# Patient Record
Sex: MALE | Race: WHITE | NOT HISPANIC OR LATINO | Employment: OTHER | URBAN - METROPOLITAN AREA
[De-identification: names, ages, dates, MRNs, and addresses within clinical notes are randomized per-mention and may not be internally consistent; named-entity substitution may affect disease eponyms.]

---

## 2018-03-30 ENCOUNTER — HOSPITAL ENCOUNTER (INPATIENT)
Facility: MEDICAL CENTER | Age: 63
LOS: 8 days | DRG: 853 | End: 2018-04-07
Attending: INTERNAL MEDICINE | Admitting: INTERNAL MEDICINE
Payer: MEDICAID

## 2018-03-30 ENCOUNTER — HOSPITAL ENCOUNTER (OUTPATIENT)
Facility: MEDICAL CENTER | Age: 63
DRG: 853 | End: 2018-03-30
Payer: MEDICAID

## 2018-03-30 DIAGNOSIS — E66.01 MORBID OBESITY (HCC): ICD-10-CM

## 2018-03-30 DIAGNOSIS — K81.9 CHOLECYSTITIS: ICD-10-CM

## 2018-03-30 DIAGNOSIS — K85.90 ACUTE PANCREATITIS, UNSPECIFIED COMPLICATION STATUS, UNSPECIFIED PANCREATITIS TYPE: ICD-10-CM

## 2018-03-30 PROBLEM — D68.318 CIRCULATING ANTICOAGULANTS (HCC): Status: ACTIVE | Noted: 2018-03-30

## 2018-03-30 PROBLEM — G47.33 OSA ON CPAP: Status: ACTIVE | Noted: 2018-03-30

## 2018-03-30 PROBLEM — Z86.19 HISTORY OF HEPATITIS C: Status: ACTIVE | Noted: 2018-03-30

## 2018-03-30 PROBLEM — A41.9 SEPSIS (HCC): Status: ACTIVE | Noted: 2018-03-30

## 2018-03-30 PROBLEM — K80.20 CHOLELITHIASIS: Status: ACTIVE | Noted: 2018-03-30

## 2018-03-30 PROBLEM — Z86.718 HISTORY OF DVT (DEEP VEIN THROMBOSIS): Status: ACTIVE | Noted: 2018-03-30

## 2018-03-30 PROBLEM — Z86.79 HISTORY OF ATRIAL FIBRILLATION: Status: ACTIVE | Noted: 2018-03-30

## 2018-03-30 PROBLEM — Z86.79 HISTORY OF CHF (CONGESTIVE HEART FAILURE): Status: ACTIVE | Noted: 2018-03-30

## 2018-03-30 PROBLEM — J44.9 COPD (CHRONIC OBSTRUCTIVE PULMONARY DISEASE) (HCC): Status: ACTIVE | Noted: 2018-03-30

## 2018-03-30 LAB
ALBUMIN SERPL BCP-MCNC: 3.4 G/DL (ref 3.2–4.9)
ALBUMIN/GLOB SERPL: 1.1 G/DL
ALP SERPL-CCNC: 130 U/L (ref 30–99)
ALT SERPL-CCNC: 14 U/L (ref 2–50)
ANION GAP SERPL CALC-SCNC: 12 MMOL/L (ref 0–11.9)
APTT PPP: 32 SEC (ref 24.7–36)
AST SERPL-CCNC: 25 U/L (ref 12–45)
BILIRUB SERPL-MCNC: 2.1 MG/DL (ref 0.1–1.5)
BUN SERPL-MCNC: 32 MG/DL (ref 8–22)
CALCIUM SERPL-MCNC: 9.1 MG/DL (ref 8.5–10.5)
CHLORIDE SERPL-SCNC: 105 MMOL/L (ref 96–112)
CO2 SERPL-SCNC: 18 MMOL/L (ref 20–33)
CREAT SERPL-MCNC: 1.34 MG/DL (ref 0.5–1.4)
ERYTHROCYTE [DISTWIDTH] IN BLOOD BY AUTOMATED COUNT: 47.9 FL (ref 35.9–50)
GLOBULIN SER CALC-MCNC: 3.2 G/DL (ref 1.9–3.5)
GLUCOSE SERPL-MCNC: 79 MG/DL (ref 65–99)
HCT VFR BLD AUTO: 44.7 % (ref 42–52)
HGB BLD-MCNC: 14.3 G/DL (ref 14–18)
INR PPP: 1.53 (ref 0.87–1.13)
LACTATE BLD-SCNC: 3.5 MMOL/L (ref 0.5–2)
LIPASE SERPL-CCNC: 425 U/L (ref 11–82)
MCH RBC QN AUTO: 29.5 PG (ref 27–33)
MCHC RBC AUTO-ENTMCNC: 32 G/DL (ref 33.7–35.3)
MCV RBC AUTO: 92.4 FL (ref 81.4–97.8)
PLATELET # BLD AUTO: 278 K/UL (ref 164–446)
PMV BLD AUTO: 11.2 FL (ref 9–12.9)
POTASSIUM SERPL-SCNC: 4.5 MMOL/L (ref 3.6–5.5)
PROT SERPL-MCNC: 6.6 G/DL (ref 6–8.2)
PROTHROMBIN TIME: 18.1 SEC (ref 12–14.6)
RBC # BLD AUTO: 4.84 M/UL (ref 4.7–6.1)
SODIUM SERPL-SCNC: 135 MMOL/L (ref 135–145)
TROPONIN I SERPL-MCNC: <0.01 NG/ML (ref 0–0.04)
WBC # BLD AUTO: 17.5 K/UL (ref 4.8–10.8)

## 2018-03-30 PROCEDURE — 85730 THROMBOPLASTIN TIME PARTIAL: CPT

## 2018-03-30 PROCEDURE — 80074 ACUTE HEPATITIS PANEL: CPT

## 2018-03-30 PROCEDURE — A9270 NON-COVERED ITEM OR SERVICE: HCPCS | Performed by: INTERNAL MEDICINE

## 2018-03-30 PROCEDURE — 36415 COLL VENOUS BLD VENIPUNCTURE: CPT

## 2018-03-30 PROCEDURE — 770006 HCHG ROOM/CARE - MED/SURG/GYN SEMI*

## 2018-03-30 PROCEDURE — 99223 1ST HOSP IP/OBS HIGH 75: CPT | Performed by: INTERNAL MEDICINE

## 2018-03-30 PROCEDURE — 80053 COMPREHEN METABOLIC PANEL: CPT

## 2018-03-30 PROCEDURE — 700111 HCHG RX REV CODE 636 W/ 250 OVERRIDE (IP): Performed by: INTERNAL MEDICINE

## 2018-03-30 PROCEDURE — 93005 ELECTROCARDIOGRAM TRACING: CPT | Performed by: INTERNAL MEDICINE

## 2018-03-30 PROCEDURE — 700101 HCHG RX REV CODE 250: Performed by: INTERNAL MEDICINE

## 2018-03-30 PROCEDURE — 83690 ASSAY OF LIPASE: CPT

## 2018-03-30 PROCEDURE — 700102 HCHG RX REV CODE 250 W/ 637 OVERRIDE(OP): Performed by: INTERNAL MEDICINE

## 2018-03-30 PROCEDURE — 84484 ASSAY OF TROPONIN QUANT: CPT

## 2018-03-30 PROCEDURE — 93010 ELECTROCARDIOGRAM REPORT: CPT | Performed by: INTERNAL MEDICINE

## 2018-03-30 PROCEDURE — 85027 COMPLETE CBC AUTOMATED: CPT

## 2018-03-30 PROCEDURE — 87522 HEPATITIS C REVRS TRNSCRPJ: CPT

## 2018-03-30 PROCEDURE — 83605 ASSAY OF LACTIC ACID: CPT

## 2018-03-30 PROCEDURE — 85610 PROTHROMBIN TIME: CPT

## 2018-03-30 PROCEDURE — 700105 HCHG RX REV CODE 258: Performed by: INTERNAL MEDICINE

## 2018-03-30 RX ORDER — FUROSEMIDE 40 MG/1
40 TABLET ORAL DAILY
COMMUNITY
End: 2023-12-26

## 2018-03-30 RX ORDER — CLONAZEPAM 1 MG/1
1 TABLET ORAL 2 TIMES DAILY
Status: ON HOLD | COMMUNITY
End: 2024-01-05

## 2018-03-30 RX ORDER — BUDESONIDE AND FORMOTEROL FUMARATE DIHYDRATE 160; 4.5 UG/1; UG/1
2 AEROSOL RESPIRATORY (INHALATION)
Status: DISCONTINUED | OUTPATIENT
Start: 2018-03-30 | End: 2018-04-07 | Stop reason: HOSPADM

## 2018-03-30 RX ORDER — TIOTROPIUM BROMIDE 18 UG/1
1 CAPSULE ORAL; RESPIRATORY (INHALATION) DAILY
Status: DISCONTINUED | OUTPATIENT
Start: 2018-03-31 | End: 2018-04-07 | Stop reason: HOSPADM

## 2018-03-30 RX ORDER — SIMVASTATIN 20 MG
20 TABLET ORAL
COMMUNITY
End: 2023-12-26

## 2018-03-30 RX ORDER — BISACODYL 10 MG
10 SUPPOSITORY, RECTAL RECTAL
Status: DISCONTINUED | OUTPATIENT
Start: 2018-03-30 | End: 2018-04-07 | Stop reason: HOSPADM

## 2018-03-30 RX ORDER — QUETIAPINE FUMARATE 150 MG/1
150 TABLET, FILM COATED ORAL 2 TIMES DAILY
COMMUNITY

## 2018-03-30 RX ORDER — PROMETHAZINE HYDROCHLORIDE 25 MG/1
12.5-25 TABLET ORAL EVERY 4 HOURS PRN
Status: DISCONTINUED | OUTPATIENT
Start: 2018-03-30 | End: 2018-04-07 | Stop reason: HOSPADM

## 2018-03-30 RX ORDER — SODIUM CHLORIDE 9 MG/ML
INJECTION, SOLUTION INTRAVENOUS
Status: ACTIVE
Start: 2018-03-30 | End: 2018-03-31

## 2018-03-30 RX ORDER — SODIUM CHLORIDE 9 MG/ML
INJECTION, SOLUTION INTRAVENOUS CONTINUOUS
Status: DISCONTINUED | OUTPATIENT
Start: 2018-03-30 | End: 2018-04-03

## 2018-03-30 RX ORDER — ATENOLOL 25 MG/1
25 TABLET ORAL 2 TIMES DAILY
COMMUNITY
End: 2023-12-26

## 2018-03-30 RX ORDER — CLONAZEPAM 1 MG/1
1 TABLET ORAL 3 TIMES DAILY
Status: DISCONTINUED | OUTPATIENT
Start: 2018-03-30 | End: 2018-04-07 | Stop reason: HOSPADM

## 2018-03-30 RX ORDER — AMOXICILLIN 250 MG
2 CAPSULE ORAL 2 TIMES DAILY
Status: DISCONTINUED | OUTPATIENT
Start: 2018-03-30 | End: 2018-04-07 | Stop reason: HOSPADM

## 2018-03-30 RX ORDER — LISINOPRIL 20 MG/1
40 TABLET ORAL DAILY
Status: ON HOLD | COMMUNITY
End: 2018-04-07

## 2018-03-30 RX ORDER — OXYCODONE HYDROCHLORIDE 5 MG/1
2.5 TABLET ORAL
Status: DISCONTINUED | OUTPATIENT
Start: 2018-03-30 | End: 2018-03-31

## 2018-03-30 RX ORDER — OXYCODONE HYDROCHLORIDE 5 MG/1
5 TABLET ORAL
Status: DISCONTINUED | OUTPATIENT
Start: 2018-03-30 | End: 2018-03-31

## 2018-03-30 RX ORDER — SODIUM CHLORIDE 9 MG/ML
500 INJECTION, SOLUTION INTRAVENOUS
Status: COMPLETED | OUTPATIENT
Start: 2018-03-30 | End: 2018-03-30

## 2018-03-30 RX ORDER — PROMETHAZINE HYDROCHLORIDE 25 MG/1
12.5-25 SUPPOSITORY RECTAL EVERY 4 HOURS PRN
Status: DISCONTINUED | OUTPATIENT
Start: 2018-03-30 | End: 2018-04-07 | Stop reason: HOSPADM

## 2018-03-30 RX ORDER — ALBUTEROL SULFATE 90 UG/1
2 AEROSOL, METERED RESPIRATORY (INHALATION) EVERY 6 HOURS PRN
Status: DISCONTINUED | OUTPATIENT
Start: 2018-03-30 | End: 2018-04-07 | Stop reason: HOSPADM

## 2018-03-30 RX ORDER — ATENOLOL 50 MG/1
25 TABLET ORAL 2 TIMES DAILY
Status: DISCONTINUED | OUTPATIENT
Start: 2018-03-30 | End: 2018-03-31

## 2018-03-30 RX ORDER — POLYETHYLENE GLYCOL 3350 17 G/17G
1 POWDER, FOR SOLUTION ORAL
Status: DISCONTINUED | OUTPATIENT
Start: 2018-03-30 | End: 2018-04-07 | Stop reason: HOSPADM

## 2018-03-30 RX ORDER — SIMVASTATIN 10 MG
20 TABLET ORAL EVERY EVENING
Status: DISCONTINUED | OUTPATIENT
Start: 2018-03-30 | End: 2018-04-07 | Stop reason: HOSPADM

## 2018-03-30 RX ORDER — THEOPHYLLINE 300 MG/1
300 TABLET, EXTENDED RELEASE ORAL 2 TIMES DAILY
COMMUNITY

## 2018-03-30 RX ORDER — LISINOPRIL 20 MG/1
40 TABLET ORAL DAILY
Status: DISCONTINUED | OUTPATIENT
Start: 2018-03-31 | End: 2018-03-31

## 2018-03-30 RX ORDER — ONDANSETRON 2 MG/ML
4 INJECTION INTRAMUSCULAR; INTRAVENOUS EVERY 4 HOURS PRN
Status: DISCONTINUED | OUTPATIENT
Start: 2018-03-30 | End: 2018-04-07 | Stop reason: HOSPADM

## 2018-03-30 RX ORDER — TIOTROPIUM BROMIDE 18 UG/1
18 CAPSULE ORAL; RESPIRATORY (INHALATION) DAILY
COMMUNITY

## 2018-03-30 RX ORDER — ONDANSETRON 4 MG/1
4 TABLET, ORALLY DISINTEGRATING ORAL EVERY 4 HOURS PRN
Status: DISCONTINUED | OUTPATIENT
Start: 2018-03-30 | End: 2018-04-07 | Stop reason: HOSPADM

## 2018-03-30 RX ORDER — HYDROCODONE BITARTRATE AND ACETAMINOPHEN 10; 325 MG/1; MG/1
1 TABLET ORAL 4 TIMES DAILY
COMMUNITY
End: 2023-12-26

## 2018-03-30 RX ORDER — ALBUTEROL SULFATE 90 UG/1
2 AEROSOL, METERED RESPIRATORY (INHALATION) EVERY 6 HOURS PRN
COMMUNITY

## 2018-03-30 RX ORDER — QUETIAPINE FUMARATE 25 MG/1
100 TABLET, FILM COATED ORAL
Status: DISCONTINUED | OUTPATIENT
Start: 2018-03-30 | End: 2018-04-07 | Stop reason: HOSPADM

## 2018-03-30 RX ORDER — SODIUM CHLORIDE 9 MG/ML
500 INJECTION, SOLUTION INTRAVENOUS ONCE
Status: COMPLETED | OUTPATIENT
Start: 2018-03-31 | End: 2018-03-31

## 2018-03-30 RX ADMIN — SIMVASTATIN 20 MG: 10 TABLET, FILM COATED ORAL at 21:25

## 2018-03-30 RX ADMIN — HYDROMORPHONE HYDROCHLORIDE 0.25 MG: 10 INJECTION, SOLUTION INTRAMUSCULAR; INTRAVENOUS; SUBCUTANEOUS at 23:09

## 2018-03-30 RX ADMIN — QUETIAPINE FUMARATE 100 MG: 25 TABLET ORAL at 21:31

## 2018-03-30 RX ADMIN — ATENOLOL 25 MG: 50 TABLET ORAL at 21:25

## 2018-03-30 RX ADMIN — SODIUM CHLORIDE: 9 INJECTION, SOLUTION INTRAVENOUS at 20:01

## 2018-03-30 RX ADMIN — BUDESONIDE AND FORMOTEROL FUMARATE DIHYDRATE 2 PUFF: 160; 4.5 AEROSOL RESPIRATORY (INHALATION) at 21:15

## 2018-03-30 RX ADMIN — METRONIDAZOLE 500 MG: 500 INJECTION, SOLUTION INTRAVENOUS at 19:59

## 2018-03-30 RX ADMIN — CLONAZEPAM 1 MG: 1 TABLET ORAL at 21:26

## 2018-03-30 RX ADMIN — CEFTRIAXONE 2 G: 2 INJECTION, POWDER, FOR SOLUTION INTRAMUSCULAR; INTRAVENOUS at 19:30

## 2018-03-30 RX ADMIN — HYDROMORPHONE HYDROCHLORIDE 0.25 MG: 10 INJECTION, SOLUTION INTRAMUSCULAR; INTRAVENOUS; SUBCUTANEOUS at 19:49

## 2018-03-30 ASSESSMENT — PATIENT HEALTH QUESTIONNAIRE - PHQ9
1. LITTLE INTEREST OR PLEASURE IN DOING THINGS: MORE THAN HALF THE DAYS
8. MOVING OR SPEAKING SO SLOWLY THAT OTHER PEOPLE COULD HAVE NOTICED. OR THE OPPOSITE, BEING SO FIGETY OR RESTLESS THAT YOU HAVE BEEN MOVING AROUND A LOT MORE THAN USUAL: NOT AT ALL
9. THOUGHTS THAT YOU WOULD BE BETTER OFF DEAD, OR OF HURTING YOURSELF: NOT AT ALL
SUM OF ALL RESPONSES TO PHQ9 QUESTIONS 1 AND 2: 4
6. FEELING BAD ABOUT YOURSELF - OR THAT YOU ARE A FAILURE OR HAVE LET YOURSELF OR YOUR FAMILY DOWN: SEVERAL DAYS
SUM OF ALL RESPONSES TO PHQ QUESTIONS 1-9: 10
7. TROUBLE CONCENTRATING ON THINGS, SUCH AS READING THE NEWSPAPER OR WATCHING TELEVISION: SEVERAL DAYS
5. POOR APPETITE OR OVEREATING: SEVERAL DAYS
3. TROUBLE FALLING OR STAYING ASLEEP OR SLEEPING TOO MUCH: MORE THAN HALF THE DAYS
2. FEELING DOWN, DEPRESSED, IRRITABLE, OR HOPELESS: MORE THAN HALF THE DAYS
4. FEELING TIRED OR HAVING LITTLE ENERGY: SEVERAL DAYS

## 2018-03-30 ASSESSMENT — COPD QUESTIONNAIRES
DO YOU EVER COUGH UP ANY MUCUS OR PHLEGM?: NO/ONLY WITH OCCASIONAL COLDS OR INFECTIONS
COPD SCREENING SCORE: 5
HAVE YOU SMOKED AT LEAST 100 CIGARETTES IN YOUR ENTIRE LIFE: NO/DON'T KNOW
DURING THE PAST 4 WEEKS HOW MUCH DID YOU FEEL SHORT OF BREATH: MOST  OR ALL OF THE TIME

## 2018-03-30 ASSESSMENT — PAIN SCALES - GENERAL
PAINLEVEL_OUTOF10: 9
PAINLEVEL_OUTOF10: 9

## 2018-03-30 ASSESSMENT — LIFESTYLE VARIABLES
EVER_SMOKED: YES
ALCOHOL_USE: NO

## 2018-03-30 NOTE — PROGRESS NOTES
Direct admit from [facility], Dr. Lees, [phone].  Accepted by Dr. Redd for Biliary cholecystitis.  ADT signed & held @ [id number], needs to be released upon pt arrival.  No written orders received.  Pt coming by fixed wing.

## 2018-03-30 NOTE — PROGRESS NOTES
Medical records received from [facility]:  D/C summary; H&P; Consults; and Demographics.  Scanned into Media tab.

## 2018-03-31 ENCOUNTER — RESOLUTE PROFESSIONAL BILLING HOSPITAL PROF FEE (OUTPATIENT)
Dept: HOSPITALIST | Facility: MEDICAL CENTER | Age: 63
End: 2018-03-31
Payer: MEDICAID

## 2018-03-31 PROBLEM — E66.01 MORBID OBESITY (HCC): Status: ACTIVE | Noted: 2018-03-31

## 2018-03-31 LAB
ANION GAP SERPL CALC-SCNC: 6 MMOL/L (ref 0–11.9)
BUN SERPL-MCNC: 35 MG/DL (ref 8–22)
CALCIUM SERPL-MCNC: 8.3 MG/DL (ref 8.5–10.5)
CHLORIDE SERPL-SCNC: 108 MMOL/L (ref 96–112)
CO2 SERPL-SCNC: 21 MMOL/L (ref 20–33)
CREAT SERPL-MCNC: 1.69 MG/DL (ref 0.5–1.4)
EKG IMPRESSION: NORMAL
ERYTHROCYTE [DISTWIDTH] IN BLOOD BY AUTOMATED COUNT: 47.5 FL (ref 35.9–50)
GLUCOSE SERPL-MCNC: 79 MG/DL (ref 65–99)
HCT VFR BLD AUTO: 37.8 % (ref 42–52)
HGB BLD-MCNC: 12.4 G/DL (ref 14–18)
LACTATE BLD-SCNC: 1.4 MMOL/L (ref 0.5–2)
LACTATE BLD-SCNC: 1.4 MMOL/L (ref 0.5–2)
LIPASE SERPL-CCNC: 263 U/L (ref 11–82)
MCH RBC QN AUTO: 29.7 PG (ref 27–33)
MCHC RBC AUTO-ENTMCNC: 32.8 G/DL (ref 33.7–35.3)
MCV RBC AUTO: 90.6 FL (ref 81.4–97.8)
PLATELET # BLD AUTO: 170 K/UL (ref 164–446)
PMV BLD AUTO: 11.5 FL (ref 9–12.9)
POTASSIUM SERPL-SCNC: 4.7 MMOL/L (ref 3.6–5.5)
RBC # BLD AUTO: 4.17 M/UL (ref 4.7–6.1)
SODIUM SERPL-SCNC: 135 MMOL/L (ref 135–145)
WBC # BLD AUTO: 12.1 K/UL (ref 4.8–10.8)

## 2018-03-31 PROCEDURE — 700111 HCHG RX REV CODE 636 W/ 250 OVERRIDE (IP): Performed by: INTERNAL MEDICINE

## 2018-03-31 PROCEDURE — 700105 HCHG RX REV CODE 258: Performed by: INTERNAL MEDICINE

## 2018-03-31 PROCEDURE — 770006 HCHG ROOM/CARE - MED/SURG/GYN SEMI*

## 2018-03-31 PROCEDURE — A9270 NON-COVERED ITEM OR SERVICE: HCPCS | Performed by: HOSPITALIST

## 2018-03-31 PROCEDURE — 700102 HCHG RX REV CODE 250 W/ 637 OVERRIDE(OP): Performed by: INTERNAL MEDICINE

## 2018-03-31 PROCEDURE — 99233 SBSQ HOSP IP/OBS HIGH 50: CPT | Performed by: HOSPITALIST

## 2018-03-31 PROCEDURE — 36415 COLL VENOUS BLD VENIPUNCTURE: CPT

## 2018-03-31 PROCEDURE — 83605 ASSAY OF LACTIC ACID: CPT | Mod: 91

## 2018-03-31 PROCEDURE — 80048 BASIC METABOLIC PNL TOTAL CA: CPT

## 2018-03-31 PROCEDURE — 700105 HCHG RX REV CODE 258: Performed by: STUDENT IN AN ORGANIZED HEALTH CARE EDUCATION/TRAINING PROGRAM

## 2018-03-31 PROCEDURE — A9270 NON-COVERED ITEM OR SERVICE: HCPCS | Performed by: INTERNAL MEDICINE

## 2018-03-31 PROCEDURE — 99407 BEHAV CHNG SMOKING > 10 MIN: CPT

## 2018-03-31 PROCEDURE — 700111 HCHG RX REV CODE 636 W/ 250 OVERRIDE (IP): Performed by: HOSPITALIST

## 2018-03-31 PROCEDURE — 83690 ASSAY OF LIPASE: CPT

## 2018-03-31 PROCEDURE — 700111 HCHG RX REV CODE 636 W/ 250 OVERRIDE (IP): Performed by: SURGERY

## 2018-03-31 PROCEDURE — 700102 HCHG RX REV CODE 250 W/ 637 OVERRIDE(OP): Performed by: HOSPITALIST

## 2018-03-31 PROCEDURE — 85027 COMPLETE CBC AUTOMATED: CPT

## 2018-03-31 PROCEDURE — 700101 HCHG RX REV CODE 250: Performed by: INTERNAL MEDICINE

## 2018-03-31 RX ORDER — OXYCODONE HYDROCHLORIDE 10 MG/1
10 TABLET ORAL
Status: DISCONTINUED | OUTPATIENT
Start: 2018-03-31 | End: 2018-04-07 | Stop reason: HOSPADM

## 2018-03-31 RX ORDER — FAMOTIDINE 20 MG/1
20 TABLET, FILM COATED ORAL 2 TIMES DAILY
Status: DISCONTINUED | OUTPATIENT
Start: 2018-03-31 | End: 2018-04-07 | Stop reason: HOSPADM

## 2018-03-31 RX ORDER — OXYCODONE HYDROCHLORIDE 5 MG/1
15 TABLET ORAL
Status: DISCONTINUED | OUTPATIENT
Start: 2018-03-31 | End: 2018-04-07 | Stop reason: HOSPADM

## 2018-03-31 RX ORDER — SODIUM CHLORIDE 9 MG/ML
1000 INJECTION, SOLUTION INTRAVENOUS ONCE
Status: COMPLETED | OUTPATIENT
Start: 2018-03-31 | End: 2018-03-31

## 2018-03-31 RX ADMIN — OXYCODONE HYDROCHLORIDE 15 MG: 5 TABLET ORAL at 13:55

## 2018-03-31 RX ADMIN — SODIUM CHLORIDE: 9 INJECTION, SOLUTION INTRAVENOUS at 03:42

## 2018-03-31 RX ADMIN — SODIUM CHLORIDE: 9 INJECTION, SOLUTION INTRAVENOUS at 19:35

## 2018-03-31 RX ADMIN — ENOXAPARIN SODIUM 40 MG: 100 INJECTION SUBCUTANEOUS at 20:01

## 2018-03-31 RX ADMIN — CLONAZEPAM 1 MG: 1 TABLET ORAL at 07:32

## 2018-03-31 RX ADMIN — OXYCODONE HYDROCHLORIDE 15 MG: 5 TABLET ORAL at 09:12

## 2018-03-31 RX ADMIN — ONDANSETRON 4 MG: 4 TABLET, ORALLY DISINTEGRATING ORAL at 03:40

## 2018-03-31 RX ADMIN — FAMOTIDINE 20 MG: 20 TABLET, FILM COATED ORAL at 20:01

## 2018-03-31 RX ADMIN — SIMVASTATIN 20 MG: 10 TABLET, FILM COATED ORAL at 20:01

## 2018-03-31 RX ADMIN — METRONIDAZOLE 500 MG: 500 INJECTION, SOLUTION INTRAVENOUS at 13:55

## 2018-03-31 RX ADMIN — ONDANSETRON 4 MG: 2 INJECTION, SOLUTION INTRAMUSCULAR; INTRAVENOUS at 12:06

## 2018-03-31 RX ADMIN — FAMOTIDINE 20 MG: 20 TABLET, FILM COATED ORAL at 09:12

## 2018-03-31 RX ADMIN — STANDARDIZED SENNA CONCENTRATE AND DOCUSATE SODIUM 2 TABLET: 8.6; 5 TABLET, FILM COATED ORAL at 09:12

## 2018-03-31 RX ADMIN — METRONIDAZOLE 500 MG: 500 INJECTION, SOLUTION INTRAVENOUS at 06:07

## 2018-03-31 RX ADMIN — HYDROMORPHONE HYDROCHLORIDE 1 MG: 10 INJECTION, SOLUTION INTRAMUSCULAR; INTRAVENOUS; SUBCUTANEOUS at 11:58

## 2018-03-31 RX ADMIN — HYDROMORPHONE HYDROCHLORIDE 0.25 MG: 10 INJECTION, SOLUTION INTRAMUSCULAR; INTRAVENOUS; SUBCUTANEOUS at 07:30

## 2018-03-31 RX ADMIN — ONDANSETRON 4 MG: 4 TABLET, ORALLY DISINTEGRATING ORAL at 17:58

## 2018-03-31 RX ADMIN — OXYCODONE HYDROCHLORIDE 15 MG: 5 TABLET ORAL at 19:36

## 2018-03-31 RX ADMIN — CEFTRIAXONE 2 G: 2 INJECTION, POWDER, FOR SOLUTION INTRAMUSCULAR; INTRAVENOUS at 20:01

## 2018-03-31 RX ADMIN — OXYCODONE HYDROCHLORIDE 5 MG: 5 TABLET ORAL at 03:40

## 2018-03-31 RX ADMIN — QUETIAPINE FUMARATE 100 MG: 25 TABLET ORAL at 22:38

## 2018-03-31 RX ADMIN — BUDESONIDE AND FORMOTEROL FUMARATE DIHYDRATE 2 PUFF: 160; 4.5 AEROSOL RESPIRATORY (INHALATION) at 09:13

## 2018-03-31 RX ADMIN — TIOTROPIUM BROMIDE 1 CAPSULE: 18 CAPSULE ORAL; RESPIRATORY (INHALATION) at 09:14

## 2018-03-31 RX ADMIN — BUDESONIDE AND FORMOTEROL FUMARATE DIHYDRATE 2 PUFF: 160; 4.5 AEROSOL RESPIRATORY (INHALATION) at 20:01

## 2018-03-31 RX ADMIN — ENOXAPARIN SODIUM 40 MG: 100 INJECTION SUBCUTANEOUS at 11:57

## 2018-03-31 RX ADMIN — SODIUM CHLORIDE 1000 ML: 9 INJECTION, SOLUTION INTRAVENOUS at 01:10

## 2018-03-31 RX ADMIN — HYDROMORPHONE HYDROCHLORIDE 1 MG: 10 INJECTION, SOLUTION INTRAMUSCULAR; INTRAVENOUS; SUBCUTANEOUS at 17:52

## 2018-03-31 RX ADMIN — SODIUM CHLORIDE: 9 INJECTION, SOLUTION INTRAVENOUS at 12:06

## 2018-03-31 RX ADMIN — CLONAZEPAM 1 MG: 1 TABLET ORAL at 20:01

## 2018-03-31 RX ADMIN — SODIUM CHLORIDE 500 ML: 9 INJECTION, SOLUTION INTRAVENOUS at 00:00

## 2018-03-31 RX ADMIN — METRONIDAZOLE 500 MG: 500 INJECTION, SOLUTION INTRAVENOUS at 20:46

## 2018-03-31 RX ADMIN — CLONAZEPAM 1 MG: 1 TABLET ORAL at 13:55

## 2018-03-31 RX ADMIN — STANDARDIZED SENNA CONCENTRATE AND DOCUSATE SODIUM 2 TABLET: 8.6; 5 TABLET, FILM COATED ORAL at 20:01

## 2018-03-31 ASSESSMENT — ENCOUNTER SYMPTOMS
DIZZINESS: 0
TREMORS: 0
ABDOMINAL PAIN: 1
MUSCULOSKELETAL NEGATIVE: 1
EYES NEGATIVE: 1
FEVER: 1
CONSTIPATION: 0
CHILLS: 1
NERVOUS/ANXIOUS: 0
DIARRHEA: 0
FOCAL WEAKNESS: 0
BRUISES/BLEEDS EASILY: 0
FALLS: 0
HEARTBURN: 0
SHORTNESS OF BREATH: 0
HEMOPTYSIS: 0
SPUTUM PRODUCTION: 1
HEADACHES: 0
EYE PAIN: 0
DEPRESSION: 0
MYALGIAS: 0
SHORTNESS OF BREATH: 1
WEAKNESS: 1
COUGH: 1
COUGH: 0
NAUSEA: 1
DOUBLE VISION: 0
WHEEZING: 0
DIAPHORESIS: 0
PSYCHIATRIC NEGATIVE: 1
FEVER: 0
CHILLS: 0
LOSS OF CONSCIOUSNESS: 0
DIARRHEA: 1
PALPITATIONS: 0
VOMITING: 1
SEIZURES: 0
BLOOD IN STOOL: 0
INSOMNIA: 0
EYE REDNESS: 0

## 2018-03-31 ASSESSMENT — LIFESTYLE VARIABLES
EVER_SMOKED: YES
SUBSTANCE_ABUSE: 0
PACK_YEARS: 30

## 2018-03-31 ASSESSMENT — COPD QUESTIONNAIRES
DURING THE PAST 4 WEEKS HOW MUCH DID YOU FEEL SHORT OF BREATH: MOST  OR ALL OF THE TIME
HAVE YOU SMOKED AT LEAST 100 CIGARETTES IN YOUR ENTIRE LIFE: YES
COPD SCREENING SCORE: 7
DO YOU EVER COUGH UP ANY MUCUS OR PHLEGM?: NO/ONLY WITH OCCASIONAL COLDS OR INFECTIONS

## 2018-03-31 ASSESSMENT — PAIN SCALES - GENERAL
PAINLEVEL_OUTOF10: 8
PAINLEVEL_OUTOF10: 9
PAINLEVEL_OUTOF10: 5
PAINLEVEL_OUTOF10: 7
PAINLEVEL_OUTOF10: 7
PAINLEVEL_OUTOF10: 10
PAINLEVEL_OUTOF10: 8
PAINLEVEL_OUTOF10: 6

## 2018-03-31 NOTE — ASSESSMENT & PLAN NOTE
Patient's atrial fibrillation was previously corrected with a combination of rate control as well as anticoagulation. This will be resumed once surgery says it's okay to resume anticoagulation.

## 2018-03-31 NOTE — RESPIRATORY CARE
COPD EDUCATION by COPD CLINICAL EDUCATOR  3/31/2018 at 4:07 PM by Lela Zuñiga     Patient interviewed by COPD education team. Patient refused COPD program at this time.  He states he understands his COPD and his SANTOSH. His declined further conversation. He declined our information on smoking cessation. Offered our contact information.  He declined.

## 2018-03-31 NOTE — ASSESSMENT & PLAN NOTE
Patient postoperatively may be able to be resumed on his anticoagulation depending on when surgery allows this to happen. Usually within the next 48 hours

## 2018-03-31 NOTE — PROGRESS NOTES
Dr. Trivedi updated regarding vital signs T 99.0, BP 85/55, BP 89-90/61 mmHg (by manual blood pressure), HR  (afib), RR 20, oxygen saturation 95% on 4L O2 NC (per pt's request, baseline 3L at home). Also notified doctor of initial 3.5 lactic acid and requested for q4h lactic acid draws to trend lactic acid. Received order to bolus 500 ml x1. MD aware of pt hx CHF.

## 2018-03-31 NOTE — PROGRESS NOTES
Pt alert and oriented  Having lots of pain, meds increased, medicated with oxycodone  Iv patent to right ac  Hrr, generalized edema throughout, pulses 1+  Lungs with wheezing throughout, loose productive cough, 02 at 4-5 liters, cont ox in place  Bs+, no n/v, +flatus, +bm  Voiding q.s.  Right lower quadrant wound with dressing cdi, WOCN will evaluate today  Turning self in bed  Stood at side of bed  NPO except meds  BP still running a little low, MD notified, meds changed  Unable to get MRCP due to weight, MD aware

## 2018-03-31 NOTE — PROGRESS NOTES
"BP (!) 87/55 Comment: RN Notified  Pulse 95   Temp 37.2 °C (99 °F)   Resp 20   Ht 1.88 m (6' 2\")   Wt (!) 178.5 kg (393 lb 8.3 oz)   SpO2 96%   BMI 50.53 kg/m²     Dr. Trivedi notified. 1L bolus ordered.   "

## 2018-03-31 NOTE — ASSESSMENT & PLAN NOTE
Status post cholecystectomy.  The patient this point will be continued on antibiotics.  Continued this point with pain management.  Monitor fluid status and continue with fluid hydration.

## 2018-03-31 NOTE — H&P
Hospital Medicine History and Physical    Date of Service  3/30/2018    Chief Complaint  No chief complaint on file.  Direct admit for pancreatitis, cholecystitis and cholelithiasis    History of Presenting Illness  62 y.o. male who is a transfer from [facility] for pancreatitis, cholecystitis and cholelithiasis.  He has significant cardiac history of reported chronic atrial fibrillation on Xarelto and atenolol, history of DVT on Xarelto, reported history of CHF. He also has COPD, Hep C, SANTOSH on CPAP.   He complained of severe epigastric and RUQ pain along with nausea, bilious vomiting and nonbloody, not melena, but loose stools.   He was seen at [facility] where he was found to have elevated lipase in the 5000 and amylase 400. Normal transaminases, bilirubin however slightly elevated as is alk phos. His US however showed gallstones and wall thickening concerning for cholecystitis. Bile duct was reported not to be dilated.   Dr. Kim, Gen Surgery was consulted and hospitalist team accepted transfer.  At the surgical floor, he was pretty comfortable. Mild TUQ and epigastric tenderness. He was nauseous. Auscultation irregular. Trace edema  I called and informed Dr. Kim.    Primary Care Physician  No primary care provider on file.    Consultants  Dr. kim    Code Status  full    Review of Systems  Review of Systems   Constitutional: Positive for malaise/fatigue. Negative for chills and fever.   HENT: Negative for congestion, hearing loss and nosebleeds.    Eyes: Negative for pain and redness.   Respiratory: Negative for cough, hemoptysis, shortness of breath and wheezing.    Cardiovascular: Negative for chest pain and palpitations.   Gastrointestinal: Positive for abdominal pain, diarrhea, nausea and vomiting. Negative for blood in stool and constipation.   Genitourinary: Negative for dysuria, frequency and hematuria.   Musculoskeletal: Negative for falls, joint pain and myalgias.   Skin: Negative for rash.   Neurological:  Positive for weakness. Negative for dizziness, tremors, focal weakness, seizures, loss of consciousness and headaches.   Psychiatric/Behavioral: The patient is not nervous/anxious and does not have insomnia.    All other systems reviewed and are negative.       Past Medical History  No past medical history on file.  See above. COPD, CHF, Afib, SANTOSH on CPAP  Surgical History  No past surgical history on file.    Medications  No current facility-administered medications on file prior to encounter.      No current outpatient prescriptions on file prior to encounter.     Current Facility-Administered Medications:   •  famotidine  •  senna-docusate **AND** polyethylene glycol/lytes **AND** magnesium hydroxide **AND** bisacodyl  •  NS  •  cefTRIAXone (ROCEPHIN) IV  •  metroNIDAZOLE (FLAGYL) IV  •  Respiratory Care per Protocol  •  Notify provider if pain remains uncontrolled **AND** Use the numeric rating scale (NRS-11) on regular floors and Critical-Care Pain Observation Tool (CPOT) on ICUs/Trauma to assess pain **AND** Pulse Ox (Oximetry) **AND** Pharmacy Consult Request **AND** If patient difficult to arouse and/or has respiratory depression, stop any opiates that are currently infusing and call a Rapid Response. **AND** oxyCODONE immediate-release **AND** oxyCODONE immediate-release **AND** HYDROmorphone  •  ondansetron  •  ondansetron  •  promethazine  •  promethazine  •  prochlorperazine  •  albuterol  •  atenolol  •  clonazePAM  •  lisinopril  •  QUEtiapine  •  rivaroxaban  •  simvastatin  •  tiotropium  •  budesonide-formoterol  •  NS  •  NS      Family History  No family history on file.  Reviewed. Denied fam h/o pancreatitis  Social History  Social History   Substance Use Topics   • Smoking status: Not on file   • Smokeless tobacco: Not on file   • Alcohol use Not on file   Denied alcohol dependence.   Denied tobacco  Allergies  No Known Allergies     Physical Exam  Laboratory   Hemodynamics  No data recorded.       No Data Recorded         Reviewed vitals at the outlying facility. Afebrile, hemodynamically stable.  Respiratory                    Physical Exam   Constitutional: He appears well-developed and well-nourished.   HENT:   Head: Normocephalic and atraumatic.   Eyes: Conjunctivae and EOM are normal. No scleral icterus.   Neck: Normal range of motion. Neck supple.   Cardiovascular: Exam reveals no gallop and no friction rub.    No murmur heard.  Irregular, slight tachycardia   Pulmonary/Chest: Effort normal and breath sounds normal. No respiratory distress. He has no wheezes. He has no rales.   Abdominal: Soft. Bowel sounds are normal. He exhibits distension (obese. Soft). There is tenderness (epigastric, RUQ). There is no rebound and no guarding.   Musculoskeletal: He exhibits no edema or tenderness.   Neurological: He is alert.   Skin: Skin is warm.   Psychiatric: He has a normal mood and affect. His behavior is normal.       Recent Labs      03/30/18 1938   WBC  17.5*   RBC  4.84   HEMOGLOBIN  14.3   HEMATOCRIT  44.7   MCV  92.4   MCH  29.5   MCHC  32.0*   RDW  47.9   PLATELETCT  278   MPV  11.2     Recent Labs      03/30/18 1938   SODIUM  135   POTASSIUM  4.5   CHLORIDE  105   CO2  18*   GLUCOSE  79   BUN  32*   CREATININE  1.34   CALCIUM  9.1     Recent Labs      03/30/18 1938   ALTSGPT  14   ASTSGOT  25   ALKPHOSPHAT  130*   TBILIRUBIN  2.1*   LIPASE  425*   GLUCOSE  79     Recent Labs      03/30/18 1938   APTT  32.0   INR  1.53*             No results found for: TROPONINI  Urinalysis:  No results found for: SPECGRAVITY, GLUCOSEUR, KETONES, NITRITE, WBCURINE, RBCURINE, BACTERIA, EPITHELCELL     Imaging  No results found.   Assessment/Plan     I anticipate this patient will require at least two midnights for appropriate medical management, necessitating inpatient admission.    * Acute pancreatitis   Assessment & Plan    Possibly gallstone pancreatitis.  MRCP ordered  Bowel rest, pain control, antiemetic,  famotidine, trend lipase        Cholelithiasis   Assessment & Plan    Dr. Olson consulted. Not emergent surgery per her, recommends the MRCP and GI consultation if MRCP abnormal.        Cholecystitis   Assessment & Plan    Ordered antibiotics  Dr. Olson consulted. Not emergent surgery per her, recommends the MRCP and GI consultation if MRCP abnormal.        Sepsis (CMS-HCC)   Assessment & Plan    This is sepsis (without associated acute organ dysfunction).   Has cholecystitis on scan at outlying facility  Ordered labs and lactic acid  Ordered IVF and sepsis protocol  IV antibiotics  Normotensive  Resolving        SANTOSH on CPAP   Assessment & Plan    Not on nocturnal O2 according to him  Ordered CPAP        COPD (chronic obstructive pulmonary disease) (CMS-HCC)   Assessment & Plan    urrently not exacerbating  Resp and O2 per protocol        History of hepatitis C   Assessment & Plan    Noted.  Outpatient follow up        History of atrial fibrillation   Assessment & Plan    On Xarelto and atenolol, continue  Get baseline EKG and echo  Low threshold to telemetry if rapid ventricular rate          History of CHF (congestive heart failure)   Assessment & Plan    Does not seem to be exacerbating at the moment  Get baseline echo        Circulating anticoagulants (CMS-HCC)   Assessment & Plan    On Xarelto for DVT.   No active bleeding  No immediate surgery planned therefore continue for now.        History of DVT (deep vein thrombosis)   Assessment & Plan    Continue Xarelto            VTE prophylaxis: Xarelto.    I spent 80 minutes, reviewing the chart, notes, vitals, labs, imaging, ordering labs, evaluating Henry Kumari for assessment, enacting the plan above. 50% of the time was spent in counseling Henry Kumari and answering questions. Discussed with Bed Control, Dr. Yo. Medical decision making is therefore complex. Time was devoted to counseling and coordinating care including review of records, pertinent  lab data and studies, as well as discussing diagnostic evaluation and work up, planned therapeutic interventions and future disposition of care. Where indicated, the assessment and plan reflect discussion of patient with consultants, other healthcare providers, family members, and additional research needed to obtain further information in formulating the plan of care for Henry Kumari.

## 2018-03-31 NOTE — WOUND TEAM
"[facility]  Inpatient Services  Initial Wound and Skin Care Evaluation    Admission Date:  3/30/18  HPI, PMH, SH: Reviewed  Unit where seen by Wound Team: [id number]  WOUND CONSULT RELATED TO:  Evaluation of open surgical wound    SUBJECTIVE:  \"You should have seen it before, I could put my hand in there.  I just saw the doctor and he said to cover it with dry gauze\"      Self Report / Pain Level:  denies    OBJECTIVE:  Clean surgical wound that would benefit with NPWT dressing while patient is here but he is refusing reporting that he wants to do what the MD in [place] has told him.  See below     WOUND TYPE, LOCATION, CHARACTERISTICS (Pressure ulcers: location, stage, POA or date identified)    Wound Type/Location: open surgical RLQ abdomen   Periwound: intact with scattered areas due to tape stripping    Drainage:  Small serosanguinous     Tissue Type and %:  Red 100%    Wound Edges:  attached    Odor:  none     Exposed structure(s):  none   S&S of Infection:  none    Measurements:  (taken 3/31/18)    Length: 1.5 cm   Width:   10cm   Depth:    6cm   Tracts/undermining:  none      INTERVENTIONS BY WOUND TEAM:  Met with patient and he informed me of self care of wound x several weeks and wound is healing.  He is pleased with progress and would prefer to continue with MD orders.  After removing old dressing and packing placed by staff, I informed patient that we would do wet to dry dressing changes daily as due to wound depth, covering wound with dry gauze is very inappropriate.  He agrees to this as he was doing this before recent MD visit.  Measurements taken and wound filled with NS moistened kerlix and covered with dry gauze securing with hypafix tape.  Discussed with staff RN.    Interdisciplinary consultation: staff RN, patient    EVALUATION: clean surgical wound that will progress slowly with wet to dry wound care    Factors affecting wound healing:  obese  Goals: wound to decrease in size by 1% " weekly    NURSING PLAN OF CARE ORDERS (X):    Dressing changes: See Dressing Maintenance orders:  X  Skin care: See Skin Care orders:   Rectal tube care: See Rectal Tube Care orders:    Other orders:    RSKIN: CURRENT (X) ORDERED (O)  Q shift Antony:  X  Q shift pressure point assessments:  X  Atmosair   X      JOSEPH       Bariatric JOSEPH       Bariatric foam         Heel float boots        Heels floated on pillows     moves self in bed  Barrier wipes       Barrier Cream       Barrier paste    Sacral silicone dressing      Padded O2 tubing       Anchorfast       Trach with Optifoam split foam        Waffle cushion       Rectal tube or BMS       Antifungal tx    Turn q 2 hours  see above  Up to chair  X    Ambulate    PT/OT      Dietician      PO  X   TF   TPN     PVN    NPO   # days    Other       WOUND TEAM PLAN OF CARE (X):    NPWT change 3 x week:         Dressing changes by wound team:       Follow up as needed:  X      Other (explain):    Anticipated discharge plans (X):  SNF:           Home Care:           Outpatient Wound Center:            Self Care:   X         Other:

## 2018-03-31 NOTE — PROGRESS NOTES
Patient reports pain, however this RN unable to give pain medicine at this time. Oxycodone and Dilaudid PRN are linked orders, last dose given at 0340 and will be available at 0640. RN offered to call MD, however patient remains adamant about getting PRN IV Dilaudid. Pt educated, however no evidence of understanding noted.

## 2018-03-31 NOTE — CARE PLAN
Problem: Safety  Goal: Will remain free from injury  Up with assist, bed alarm in use, call light in reach    Problem: Infection  Goal: Will remain free from infection  Iv antibx in use    Problem: Mobility  Goal: Risk for activity intolerance will decrease  Up with assist    Problem: Pain Management  Goal: Pain level will decrease to patient's comfort goal  Oxycodone and dilaudid as needed for pain    Problem: Respiratory:  Goal: Respiratory status will improve  02 at 4-5 liters, cont ox in place, inhalers given

## 2018-03-31 NOTE — CARE PLAN
Problem: Safety  Goal: Will remain free from injury  Outcome: PROGRESSING AS EXPECTED  Safety precautions in place. Bed in locked/low position. 2 side rails up. Treaded socks. BA on. Call light in reach, calls appropriately. Hourly rounding practiced.    Problem: Infection  Goal: Will remain free from infection  Outcome: PROGRESSING AS EXPECTED  Monitoring lactic acid, other labs, and vitals. Abx administered.

## 2018-03-31 NOTE — ASSESSMENT & PLAN NOTE
Patient remains on CPAP due to his large body habitus at nighttime. Continue with respiratory protocol

## 2018-03-31 NOTE — PROGRESS NOTES
[facility] Hospitalist Progress Note    Date of Service: 3/31/2018    Chief Complaint  62 y.o. male admitted 3/30/2018 with abdominal pain    Interval Problem Update  Mr. Kumari is a 62-year-old gentleman who comes in with abdominal pain in the epigastric as well as right upper quadrant region. Patient says the pain does radiate like a belt. Patient also has acute pancreatitis with elevated lipase. Patient is found to have acute cholecystitis. Patient is on anticoagulation for atrial fibrillation which had to be held for surgery. The patient at this point was not able to complete an MRCP due to his morbid obesity. Patient this point will go for cholecystectomy once his pancreatitis has improved.    Consultants/Specialty  Surgery    Disposition  To be determined        Review of Systems   Constitutional: Positive for chills and fever. Negative for diaphoresis.   HENT: Negative.    Eyes: Negative.  Negative for double vision.   Respiratory: Positive for cough, sputum production and shortness of breath. Negative for hemoptysis and wheezing.    Cardiovascular: Positive for chest pain. Negative for palpitations and leg swelling.   Gastrointestinal: Positive for abdominal pain, nausea and vomiting. Negative for blood in stool, constipation, diarrhea and heartburn.        Loss of appetitie    Genitourinary: Negative.  Negative for frequency, hematuria and urgency.   Musculoskeletal: Negative.  Negative for joint pain.   Skin: Negative.  Negative for itching and rash.   Neurological: Positive for weakness. Negative for dizziness, focal weakness, seizures, loss of consciousness and headaches.   Endo/Heme/Allergies: Negative.  Does not bruise/bleed easily.   Psychiatric/Behavioral: Negative.  Negative for depression, substance abuse and suicidal ideas. The patient is not nervous/anxious.    All other systems reviewed and are negative.     Physical Exam  Laboratory/Imaging   Hemodynamics  Temp (24hrs), Av.7 °C (98.1 °F),  Min:36.1 °C (97 °F), Max:37.2 °C (99 °F)   Temperature: 36.8 °C (98.3 °F)  Pulse  Av  Min: 60  Max: 117    Blood Pressure: 101/68      Respiratory      Respiration: 19, Pulse Oximetry: 98 %, O2 Daily Delivery Respiratory : Silicone Nasal Cannula     Work Of Breathing / Effort: Mild  RUL Breath Sounds: Diminished, RML Breath Sounds: Diminished, RLL Breath Sounds: Diminished, CEASAR Breath Sounds: Diminished, LLL Breath Sounds: Diminished    Fluids    Intake/Output Summary (Last 24 hours) at 18 1349  Last data filed at 18 1200   Gross per 24 hour   Intake             2100 ml   Output                0 ml   Net             2100 ml       Nutrition  No orders of the defined types were placed in this encounter.    Physical Exam   Constitutional: He is oriented to person, place, and time. He appears well-developed and well-nourished. He is cooperative. He has a sickly appearance. Nasal cannula in place.   HENT:   Head: Normocephalic and atraumatic.   Right Ear: External ear normal.   Left Ear: External ear normal.   Nose: Nose normal.   Mouth/Throat: Oropharynx is clear and moist.   Eyes: Conjunctivae and EOM are normal. Pupils are equal, round, and reactive to light.   Neck: Normal range of motion. Neck supple. No JVD present. No thyromegaly present.   Cardiovascular: Normal rate and regular rhythm.    No murmur heard.  Pulmonary/Chest: He has no wheezes. He has no rales. He exhibits no tenderness.   Abdominal: Soft. Bowel sounds are normal. He exhibits distension (from morbid obesity). He exhibits no mass. There is generalized tenderness. There is no rebound and no guarding.       Musculoskeletal: Normal range of motion. He exhibits no edema or tenderness.   Lymphadenopathy:     He has no cervical adenopathy.   Neurological: He is alert and oriented to person, place, and time. He has normal reflexes. He is not disoriented. No cranial nerve deficit. GCS eye subscore is 4. GCS verbal subscore is 5. GCS motor  subscore is 6.   Skin: Skin is warm and dry. No rash noted. He is not diaphoretic. No erythema.   Psychiatric: He has a normal mood and affect. His behavior is normal. Judgment and thought content normal.   Nursing note and vitals reviewed.      Recent Labs      03/30/18 1938 03/31/18   0336   WBC  17.5*  12.1*   RBC  4.84  4.17*   HEMOGLOBIN  14.3  12.4*   HEMATOCRIT  44.7  37.8*   MCV  92.4  90.6   MCH  29.5  29.7   MCHC  32.0*  32.8*   RDW  47.9  47.5   PLATELETCT  278  170   MPV  11.2  11.5     Recent Labs      03/30/18 1938 03/31/18   0336   SODIUM  135  135   POTASSIUM  4.5  4.7   CHLORIDE  105  108   CO2  18*  21   GLUCOSE  79  79   BUN  32*  35*   CREATININE  1.34  1.69*   CALCIUM  9.1  8.3*     Recent Labs      03/30/18 1938   APTT  32.0   INR  1.53*                  Assessment/Plan     * Acute pancreatitis   Assessment & Plan    Fluid resuscitation is being actively given with normal saline and 150 mL per hour.  Nothing by mouth status.  Pain management.  Follow lipase and amylase.        Cholelithiasis   Assessment & Plan    Patient unable to do MRCP due to his body size.  Surgery was consulted, I spoken with Dr. Olson today who recommends at this point cooling off the acute pancreatitis and at that point she will reevaluate to see if the patient benefits from having his gallbladder removed.        Cholecystitis   Assessment & Plan    Continue with antibiotics.  Continue pain management.  Surgery will reevaluate to see if patient benefits from cholecystectomy.        Morbid obesity (CMS-[facility])   Assessment & Plan    Patient's weight at this point is over 380 pounds.  Patient will need outpatient weight loss management program.  Body mass index is 50.53 kg/m².        Sepsis (CMS-[facility])   Assessment & Plan    This is sepsis (without associated acute organ dysfunction).   Secondary to cholecystitis on admission.  Follow lactic acid level.  Follow away blood cell count which has improved from  17.5-12.1.  Follow vitals.        SANTOSH on CPAP   Assessment & Plan    Continue CPAP at night        COPD (chronic obstructive pulmonary disease) (CMS-HCC)   Assessment & Plan    No acute exacerbation continue at this point with oxygen and RT protocol        History of hepatitis C   Assessment & Plan    Chronic and stable        History of atrial fibrillation   Assessment & Plan    Off anticoagulation.  Holding at this point beta blockers due to hypotension.  Monitor at this point with telemetric monitoring          History of CHF (congestive heart failure)   Assessment & Plan    Patient is pending an echocardiogram.  Monitor fluid status.  If the patient becomes fluid overloaded give diuresis.  For now he is hypotensive thus diuresis is held        Circulating anticoagulants (CMS-HCC)   Assessment & Plan    Holding anticoagulation        History of DVT (deep vein thrombosis)   Assessment & Plan    Anticoagulation at this point is on hold in lieu of the fact the patient may be going for surgery.          Quality-Core Measures   Reviewed items::  EKG reviewed, Medications reviewed, Radiology images reviewed and Labs reviewed  Cummings catheter::  No Cummings  DVT prophylaxis pharmacological::  Contraindicated - High bleeding risk  Ulcer Prophylaxis::  Yes  Antibiotics:  Treating active infection/contamination beyond 24 hours perioperative coverage  Assessed for rehabilitation services:  Patient was assess for and/or received rehabilitation services during this hospitalization

## 2018-03-31 NOTE — ASSESSMENT & PLAN NOTE
This is sepsis (without associated acute organ dysfunction).   Resolved initial sepsis-like status.

## 2018-03-31 NOTE — PROGRESS NOTES
PT seen and examined  Has gallstone pancreatitis  Too obese for MRCP  Will take to OR when lipase improves  On abx  HOLD xarelto

## 2018-03-31 NOTE — ASSESSMENT & PLAN NOTE
Patient has a history of hepatitis C at this point and monitor liver functions as well as hepatitis C viral levels.

## 2018-03-31 NOTE — ASSESSMENT & PLAN NOTE
Lipase levels are down. The elevation in lipase is most likely secondary to common bile duct stone.   ERCP4/3   Patient is now status post surgery postoperative day #1

## 2018-03-31 NOTE — PROGRESS NOTES
Recent EKG revealed atrial fibrillation with ST segment depression in anterior leads. Dr. Trivedi notified. STAT Troponin ordered.

## 2018-03-31 NOTE — ASSESSMENT & PLAN NOTE
I discussed with patient about doing weight loss management as an outpatient including bariatric clinic evaluation.  Body mass index is 50.53 kg/m².

## 2018-04-01 LAB
ALBUMIN SERPL BCP-MCNC: 2.8 G/DL (ref 3.2–4.9)
ALBUMIN/GLOB SERPL: 0.9 G/DL
ALP SERPL-CCNC: 134 U/L (ref 30–99)
ALT SERPL-CCNC: 8 U/L (ref 2–50)
ANION GAP SERPL CALC-SCNC: 8 MMOL/L (ref 0–11.9)
AST SERPL-CCNC: 15 U/L (ref 12–45)
BILIRUB SERPL-MCNC: 1.8 MG/DL (ref 0.1–1.5)
BUN SERPL-MCNC: 42 MG/DL (ref 8–22)
CALCIUM SERPL-MCNC: 8.2 MG/DL (ref 8.5–10.5)
CHLORIDE SERPL-SCNC: 107 MMOL/L (ref 96–112)
CO2 SERPL-SCNC: 20 MMOL/L (ref 20–33)
CREAT SERPL-MCNC: 1.05 MG/DL (ref 0.5–1.4)
ERYTHROCYTE [DISTWIDTH] IN BLOOD BY AUTOMATED COUNT: 49.9 FL (ref 35.9–50)
GLOBULIN SER CALC-MCNC: 3.2 G/DL (ref 1.9–3.5)
GLUCOSE SERPL-MCNC: 64 MG/DL (ref 65–99)
HCT VFR BLD AUTO: 38.2 % (ref 42–52)
HGB BLD-MCNC: 11.4 G/DL (ref 14–18)
LIPASE SERPL-CCNC: 93 U/L (ref 11–82)
LV EJECT FRACT  99904: 60
LV EJECT FRACT MOD 4C 99902: 68.39
MCH RBC QN AUTO: 28.8 PG (ref 27–33)
MCHC RBC AUTO-ENTMCNC: 29.8 G/DL (ref 33.7–35.3)
MCV RBC AUTO: 96.5 FL (ref 81.4–97.8)
MORPHOLOGY BLD-IMP: NORMAL
PLATELET # BLD AUTO: 141 K/UL (ref 164–446)
PMV BLD AUTO: 11 FL (ref 9–12.9)
POTASSIUM SERPL-SCNC: 4.4 MMOL/L (ref 3.6–5.5)
PROT SERPL-MCNC: 6 G/DL (ref 6–8.2)
RBC # BLD AUTO: 3.96 M/UL (ref 4.7–6.1)
SODIUM SERPL-SCNC: 135 MMOL/L (ref 135–145)
WBC # BLD AUTO: 10 K/UL (ref 4.8–10.8)

## 2018-04-01 PROCEDURE — 85027 COMPLETE CBC AUTOMATED: CPT

## 2018-04-01 PROCEDURE — 700111 HCHG RX REV CODE 636 W/ 250 OVERRIDE (IP): Performed by: HOSPITALIST

## 2018-04-01 PROCEDURE — 700105 HCHG RX REV CODE 258: Performed by: INTERNAL MEDICINE

## 2018-04-01 PROCEDURE — 36415 COLL VENOUS BLD VENIPUNCTURE: CPT

## 2018-04-01 PROCEDURE — 80053 COMPREHEN METABOLIC PANEL: CPT

## 2018-04-01 PROCEDURE — 770006 HCHG ROOM/CARE - MED/SURG/GYN SEMI*

## 2018-04-01 PROCEDURE — 83690 ASSAY OF LIPASE: CPT

## 2018-04-01 PROCEDURE — 93306 TTE W/DOPPLER COMPLETE: CPT

## 2018-04-01 PROCEDURE — 700102 HCHG RX REV CODE 250 W/ 637 OVERRIDE(OP): Performed by: HOSPITALIST

## 2018-04-01 PROCEDURE — 93306 TTE W/DOPPLER COMPLETE: CPT | Mod: 26 | Performed by: INTERNAL MEDICINE

## 2018-04-01 PROCEDURE — A9270 NON-COVERED ITEM OR SERVICE: HCPCS | Performed by: INTERNAL MEDICINE

## 2018-04-01 PROCEDURE — 700111 HCHG RX REV CODE 636 W/ 250 OVERRIDE (IP): Performed by: INTERNAL MEDICINE

## 2018-04-01 PROCEDURE — A9270 NON-COVERED ITEM OR SERVICE: HCPCS | Performed by: HOSPITALIST

## 2018-04-01 PROCEDURE — 99233 SBSQ HOSP IP/OBS HIGH 50: CPT | Performed by: HOSPITALIST

## 2018-04-01 PROCEDURE — 700102 HCHG RX REV CODE 250 W/ 637 OVERRIDE(OP): Performed by: INTERNAL MEDICINE

## 2018-04-01 PROCEDURE — 700111 HCHG RX REV CODE 636 W/ 250 OVERRIDE (IP): Performed by: SURGERY

## 2018-04-01 PROCEDURE — 700101 HCHG RX REV CODE 250: Performed by: INTERNAL MEDICINE

## 2018-04-01 RX ADMIN — OXYCODONE HYDROCHLORIDE 15 MG: 5 TABLET ORAL at 22:17

## 2018-04-01 RX ADMIN — CEFTRIAXONE 2 G: 2 INJECTION, POWDER, FOR SOLUTION INTRAMUSCULAR; INTRAVENOUS at 21:05

## 2018-04-01 RX ADMIN — CLONAZEPAM 1 MG: 1 TABLET ORAL at 05:11

## 2018-04-01 RX ADMIN — BUDESONIDE AND FORMOTEROL FUMARATE DIHYDRATE 2 PUFF: 160; 4.5 AEROSOL RESPIRATORY (INHALATION) at 21:21

## 2018-04-01 RX ADMIN — OXYCODONE HYDROCHLORIDE 15 MG: 5 TABLET ORAL at 15:22

## 2018-04-01 RX ADMIN — QUETIAPINE FUMARATE 100 MG: 25 TABLET ORAL at 20:52

## 2018-04-01 RX ADMIN — OXYCODONE HYDROCHLORIDE 15 MG: 5 TABLET ORAL at 03:51

## 2018-04-01 RX ADMIN — SODIUM CHLORIDE: 9 INJECTION, SOLUTION INTRAVENOUS at 00:50

## 2018-04-01 RX ADMIN — METRONIDAZOLE 500 MG: 500 INJECTION, SOLUTION INTRAVENOUS at 12:50

## 2018-04-01 RX ADMIN — OXYCODONE HYDROCHLORIDE 15 MG: 5 TABLET ORAL at 18:47

## 2018-04-01 RX ADMIN — STANDARDIZED SENNA CONCENTRATE AND DOCUSATE SODIUM 2 TABLET: 8.6; 5 TABLET, FILM COATED ORAL at 20:52

## 2018-04-01 RX ADMIN — FAMOTIDINE 20 MG: 20 TABLET, FILM COATED ORAL at 20:52

## 2018-04-01 RX ADMIN — METRONIDAZOLE 500 MG: 500 INJECTION, SOLUTION INTRAVENOUS at 05:11

## 2018-04-01 RX ADMIN — OXYCODONE HYDROCHLORIDE 15 MG: 5 TABLET ORAL at 00:50

## 2018-04-01 RX ADMIN — OXYCODONE HYDROCHLORIDE 15 MG: 5 TABLET ORAL at 08:53

## 2018-04-01 RX ADMIN — HYDROMORPHONE HYDROCHLORIDE 1 MG: 10 INJECTION, SOLUTION INTRAMUSCULAR; INTRAVENOUS; SUBCUTANEOUS at 06:43

## 2018-04-01 RX ADMIN — CLONAZEPAM 1 MG: 1 TABLET ORAL at 12:50

## 2018-04-01 RX ADMIN — SODIUM CHLORIDE: 9 INJECTION, SOLUTION INTRAVENOUS at 18:50

## 2018-04-01 RX ADMIN — BUDESONIDE AND FORMOTEROL FUMARATE DIHYDRATE 2 PUFF: 160; 4.5 AEROSOL RESPIRATORY (INHALATION) at 08:53

## 2018-04-01 RX ADMIN — SODIUM CHLORIDE: 9 INJECTION, SOLUTION INTRAVENOUS at 12:58

## 2018-04-01 RX ADMIN — TIOTROPIUM BROMIDE 1 CAPSULE: 18 CAPSULE ORAL; RESPIRATORY (INHALATION) at 08:53

## 2018-04-01 RX ADMIN — FAMOTIDINE 20 MG: 20 TABLET, FILM COATED ORAL at 08:52

## 2018-04-01 RX ADMIN — ENOXAPARIN SODIUM 40 MG: 100 INJECTION SUBCUTANEOUS at 20:52

## 2018-04-01 RX ADMIN — METRONIDAZOLE 500 MG: 500 INJECTION, SOLUTION INTRAVENOUS at 22:17

## 2018-04-01 RX ADMIN — STANDARDIZED SENNA CONCENTRATE AND DOCUSATE SODIUM 2 TABLET: 8.6; 5 TABLET, FILM COATED ORAL at 08:53

## 2018-04-01 RX ADMIN — CLONAZEPAM 1 MG: 1 TABLET ORAL at 20:52

## 2018-04-01 RX ADMIN — SIMVASTATIN 20 MG: 10 TABLET, FILM COATED ORAL at 20:52

## 2018-04-01 RX ADMIN — HYDROMORPHONE HYDROCHLORIDE 1 MG: 10 INJECTION, SOLUTION INTRAMUSCULAR; INTRAVENOUS; SUBCUTANEOUS at 12:50

## 2018-04-01 RX ADMIN — ENOXAPARIN SODIUM 40 MG: 100 INJECTION SUBCUTANEOUS at 08:53

## 2018-04-01 ASSESSMENT — ENCOUNTER SYMPTOMS
SENSORY CHANGE: 0
VOMITING: 0
ABDOMINAL PAIN: 1
DOUBLE VISION: 0
STRIDOR: 0
SPUTUM PRODUCTION: 0
DEPRESSION: 0
ORTHOPNEA: 0
FLANK PAIN: 0
EYES NEGATIVE: 1
CONSTIPATION: 0
BRUISES/BLEEDS EASILY: 0
SHORTNESS OF BREATH: 1
PALPITATIONS: 0
FEVER: 0
CHILLS: 0
NECK PAIN: 0
NAUSEA: 1
MYALGIAS: 0
HEMOPTYSIS: 0
TREMORS: 0
DIARRHEA: 0
WHEEZING: 0
WEAKNESS: 0
TINGLING: 0
COUGH: 0
HEARTBURN: 0
SINUS PAIN: 0
MEMORY LOSS: 0
PSYCHIATRIC NEGATIVE: 1
MUSCULOSKELETAL NEGATIVE: 1

## 2018-04-01 ASSESSMENT — PAIN SCALES - GENERAL
PAINLEVEL_OUTOF10: 6
PAINLEVEL_OUTOF10: 5
PAINLEVEL_OUTOF10: 7
PAINLEVEL_OUTOF10: 6
PAINLEVEL_OUTOF10: 6
PAINLEVEL_OUTOF10: 5
PAINLEVEL_OUTOF10: 7
PAINLEVEL_OUTOF10: 6
PAINLEVEL_OUTOF10: 5
PAINLEVEL_OUTOF10: 5

## 2018-04-01 ASSESSMENT — LIFESTYLE VARIABLES: SUBSTANCE_ABUSE: 0

## 2018-04-01 NOTE — PROGRESS NOTES
Removed dressing to right lower quadrant and repacked with saline soaked gauze. Applied dry gauze and tegaderm. Pt tolerated well.

## 2018-04-01 NOTE — PROGRESS NOTES
"/62   Pulse (!) 126 Comment: RN Notified  Temp 36.1 °C (97 °F)   Resp 18   Ht 1.88 m (6' 2\")   Wt (!) 178.5 kg (393 lb 8.3 oz)   SpO2 97%   BMI 50.53 kg/m²     Patient is A&Ox4.   Reports pain to abdomen, medicated per TRACEE PHILIPPE, CMS intact, reports baseline numbness and tingling.   Mobilizes with SBA and cane, educated to call for assistance.   On 3L O2 NC, SOB with activity, chest pain. CPAP at bedside.   Normoactive BS x 4. NPO with ice chips, tolerating well. Denies N&V.   + flatus, - BM. Pt is voiding.   RLQ abdominal wound, dressing changed.   PIV running IVF  Updated on POC. Belongings and call light within reach. All needs met at this time.   "

## 2018-04-01 NOTE — PROGRESS NOTES
[facility] Hospitalist Progress Note    Date of Service: 2018    Chief Complaint  62 y.o. male admitted 3/30/2018 with abdominal pain    Interval Problem Update  Mr. Kumari is a 62-year-old male comes in with morbid obesity and abdominal pain. The patient has a previous history of heart failure however heart failure has been completely ruled out and he has a normal echocardiogram. Patient at this point will be going to surgery tomorrow. The patient's lipase levels at this point have come down to 96 and at this point he is cleared for surgery he needs at this point an acute cholecystectomy.    Consultants/Specialty  Surgery Dr Olson    Disposition  To be determined        Review of Systems   Constitutional: Negative for chills and fever.   HENT: Negative.  Negative for hearing loss and sinus pain.    Eyes: Negative.  Negative for double vision.   Respiratory: Positive for shortness of breath. Negative for cough, hemoptysis, sputum production, wheezing and stridor.    Cardiovascular: Negative for chest pain, palpitations, orthopnea and leg swelling.   Gastrointestinal: Positive for abdominal pain and nausea. Negative for constipation, diarrhea, heartburn and vomiting.        Loss of appetitie    Genitourinary: Negative.  Negative for dysuria and flank pain.   Musculoskeletal: Negative.  Negative for myalgias and neck pain.   Skin: Negative.  Negative for rash.   Neurological: Negative for tingling, tremors, sensory change and weakness.   Endo/Heme/Allergies: Negative.  Does not bruise/bleed easily.   Psychiatric/Behavioral: Negative.  Negative for depression, memory loss, substance abuse and suicidal ideas.   All other systems reviewed and are negative.     Physical Exam  Laboratory/Imaging   Hemodynamics  Temp (24hrs), Av.6 °C (97.8 °F), Min:36.1 °C (97 °F), Max:36.8 °C (98.3 °F)   Temperature: 36.8 °C (98.3 °F)  Pulse  Av.6  Min: 60  Max: 126    Blood Pressure: 111/58      Respiratory      Respiration: 18,  Pulse Oximetry: 95 %     Work Of Breathing / Effort: Mild  RUL Breath Sounds: Diminished, RML Breath Sounds: Diminished, RLL Breath Sounds: Diminished, CEASAR Breath Sounds: Diminished, LLL Breath Sounds: Diminished    Fluids    Intake/Output Summary (Last 24 hours) at 04/01/18 1338  Last data filed at 04/01/18 0800   Gross per 24 hour   Intake             2460 ml   Output                0 ml   Net             2460 ml       Nutrition  Orders Placed This Encounter   Procedures   • DIET ORDER     Standing Status:   Standing     Number of Occurrences:   1     Order Specific Question:   Diet:     Answer:   Clear Liquids - No Red Foods [12]     Physical Exam   Constitutional: He is oriented to person, place, and time. He appears well-developed and well-nourished. He is cooperative. He has a sickly appearance. Nasal cannula in place.   HENT:   Head: Normocephalic and atraumatic.   Right Ear: External ear normal.   Left Ear: External ear normal.   Eyes: Conjunctivae and EOM are normal. Pupils are equal, round, and reactive to light. Right eye exhibits no discharge. Left eye exhibits no discharge.   Neck: Normal range of motion. Neck supple. No tracheal deviation present.   Cardiovascular: Normal rate and regular rhythm.    No murmur heard.  Pulmonary/Chest: No stridor. No respiratory distress. He has no wheezes.   Abdominal: Soft. Bowel sounds are normal. He exhibits distension (from morbid obesity). There is generalized tenderness. There is no rebound.       Musculoskeletal: Normal range of motion. He exhibits no edema or deformity.   Neurological: He is alert and oriented to person, place, and time. He has normal reflexes. He is not disoriented. No cranial nerve deficit. Coordination normal. GCS eye subscore is 4. GCS verbal subscore is 5. GCS motor subscore is 6.   Skin: Skin is warm and dry. No rash noted. No erythema. No pallor.   Psychiatric: He has a normal mood and affect. His behavior is normal. Judgment and thought  content normal.   Nursing note and vitals reviewed.      Recent Labs      03/30/18 1938 03/31/18 0336 04/01/18 0330   WBC  17.5*  12.1*  10.0   RBC  4.84  4.17*  3.96*   HEMOGLOBIN  14.3  12.4*  11.4*   HEMATOCRIT  44.7  37.8*  38.2*   MCV  92.4  90.6  96.5   MCH  29.5  29.7  28.8   MCHC  32.0*  32.8*  29.8*   RDW  47.9  47.5  49.9   PLATELETCT  278  170  141*   MPV  11.2  11.5  11.0     Recent Labs      03/30/18 1938 03/31/18 0336 04/01/18 0330   SODIUM  135  135  135   POTASSIUM  4.5  4.7  4.4   CHLORIDE  105  108  107   CO2  18*  21  20   GLUCOSE  79  79  64*   BUN  32*  35*  42*   CREATININE  1.34  1.69*  1.05   CALCIUM  9.1  8.3*  8.2*     Recent Labs      03/30/18 1938   APTT  32.0   INR  1.53*                  Assessment/Plan     * Acute pancreatitis   Assessment & Plan    Monitor lipase levels which are down to 93. Sinus surgery and they will be taking this patient to surgery tomorrow. Continue with fluid resuscitation        Cholelithiasis   Assessment & Plan    Patient for surgery most likely tomorrow on 4/2/2018.  Continue at this point with pain management.          Cholecystitis   Assessment & Plan    Patient is on day 3 of IV Rocephin and Flagyl combination. End date will be most likely 7 days.  Remains at this point with pain management and fluid resuscitation.          Morbid obesity (CMS-HCC)   Assessment & Plan    Patient will need outpatient weight loss management program in the bariatric clinic appointment  Body mass index is 50.53 kg/m².        Sepsis (CMS-HCC)   Assessment & Plan    This is sepsis (without associated acute organ dysfunction).   Resolved        SANTOSH on CPAP   Assessment & Plan    Patient remains on CPAP at night.        COPD (chronic obstructive pulmonary disease) (CMS-HCC)   Assessment & Plan    Currently on RT protocol and oxygen support.  No acute exacerbation noted.        History of hepatitis C   Assessment & Plan    Monitored hepatitis C levels         History of atrial fibrillation   Assessment & Plan    Currently on break and no anticoagulation with impending surgery.          History of CHF (congestive heart failure)   Assessment & Plan    Monitor fluid status and repeat echocardiogram was done today without any kind of heart failure as the patient's ejection fraction is 60% and he has excellent valve function. No prior study available.  Heart failure ruled out.        Circulating anticoagulants (CMS-[facility])   Assessment & Plan    Currently anticoagulation is on hold.        History of DVT (deep vein thrombosis)   Assessment & Plan    Currently anticoagulation is on hold secondary to the fact the patient will be going to surgery tomorrow          Quality-Core Measures   Reviewed items::  EKG reviewed, Medications reviewed, Radiology images reviewed and Labs reviewed  Cummings catheter::  No Cummings  DVT prophylaxis pharmacological::  Contraindicated - High bleeding risk  Ulcer Prophylaxis::  Yes  Antibiotics:  Treating active infection/contamination beyond 24 hours perioperative coverage  Assessed for rehabilitation services:  Patient was assess for and/or received rehabilitation services during this hospitalization

## 2018-04-01 NOTE — CARE PLAN
Problem: Safety  Goal: Will remain free from injury  Up with standby assist, calls for assistance as needed, call light in reach    Problem: Infection  Goal: Will remain free from infection  Iv antibx in use    Problem: Mobility  Goal: Risk for activity intolerance will decrease  Up with standby assist    Problem: Pain Management  Goal: Pain level will decrease to patient's comfort goal  Iv dilaudid and oxycodone for pain    Problem: Respiratory:  Goal: Respiratory status will improve  02 at 4 liters

## 2018-04-01 NOTE — PROGRESS NOTES
Pt alert and oriented  Medicated with oxycodone for pain  Iv patent to left ac  Hr tachy at times, generalized edema throughout, pulses 1+  Lungs diminished throughout, 02 at 4 liters, cont ox in place  BS+, no n/v, +flatus, no bm  Voiding q.s.   Right lower quadrant dressing cdi, with packing and dry gauze  Trapeze in use  NPO at this time with sips with meds  VSS

## 2018-04-01 NOTE — PROGRESS NOTES
"  Trauma/Surgical Progress Note    Author: Meche Bondrosalie Date & Time created: 4/1/2018   9:43 AM     Interval Events:  Feeling better. Abd pain better.  Lipase down. Plan lap nilson with IOC 4/2    Hemodynamics:  Blood pressure 111/58, pulse (!) 114, temperature 36.8 °C (98.3 °F), resp. rate 18, height 1.88 m (6' 2\"), weight (!) 178.5 kg (393 lb 8.3 oz), SpO2 95 %.     Respiratory:    Respiration: 18, Pulse Oximetry: 95 %     Work Of Breathing / Effort: Mild  RUL Breath Sounds: Diminished, RML Breath Sounds: Diminished, RLL Breath Sounds: Diminished, CEASAR Breath Sounds: Diminished, LLL Breath Sounds: Diminished  Fluids:    Intake/Output Summary (Last 24 hours) at 04/01/18 0943  Last data filed at 04/01/18 0800   Gross per 24 hour   Intake             3060 ml   Output                0 ml   Net             3060 ml     Admit Weight: (!) 172.4 kg (380 lb)  Current     Physical Exam   Constitutional: He appears well-developed.   Cardiovascular: Normal rate.    Pulmonary/Chest: He is in respiratory distress.   Abdominal: Soft. There is tenderness.   Musculoskeletal: Normal range of motion.   Neurological: He is alert.   Skin: Skin is warm.       Medical Decision Making/Problem List:    Active Hospital Problems    Diagnosis   • Acute pancreatitis [K85.90]     Priority: High   • Cholecystitis [K81.9]     Priority: High   • Cholelithiasis [K80.20]     Priority: High   • Morbid obesity (CMS-HCC) [E66.01]   • History of DVT (deep vein thrombosis) [Z86.718]   • Circulating anticoagulants (CMS-HCC) [D68.318]   • History of CHF (congestive heart failure) [Z86.79]   • History of atrial fibrillation [Z86.79]   • History of hepatitis C [Z86.19]   • COPD (chronic obstructive pulmonary disease) (CMS-HCC) [J44.9]   • SANTOSH on CPAP [G47.33, Z99.89]   • Sepsis (CMS-HCC) [A41.9]     Core Measures & Quality Metrics:  Core Measures & Quality Metrics  FRANCIS Score  Discussed patient condition with RN and Patient Dr. Burr.  CRITICAL CARE TIME " EXCLUDING PROCEDURES:20   minutes

## 2018-04-01 NOTE — CARE PLAN
Problem: Venous Thromboembolism (VTW)/Deep Vein Thrombosis (DVT) Prevention:  Goal: Patient will participate in Venous Thrombosis (VTE)/Deep Vein Thrombosis (DVT)Prevention Measures  Outcome: PROGRESSING AS EXPECTED  Refused SCDs, educated and continues to refuse. Lovenox administered per MAR     Problem: Pain Management  Goal: Pain level will decrease to patient's comfort goal  Outcome: PROGRESSING AS EXPECTED  Pain managed with Oxy 15 mg PRN

## 2018-04-01 NOTE — PROGRESS NOTES
\Pt refusing bed alarm despite education from this RN on the risks of falling. Educated pt to call prior to getting up. Pt verbalizes understanding. Pt not attempting to get out of bed and calls appropriately.

## 2018-04-02 ENCOUNTER — APPOINTMENT (OUTPATIENT)
Dept: RADIOLOGY | Facility: MEDICAL CENTER | Age: 63
DRG: 853 | End: 2018-04-02
Attending: SURGERY
Payer: MEDICAID

## 2018-04-02 LAB
HCV RNA SERPL NAA+PROBE-ACNC: <15 IU/ML
HCV RNA SERPL NAA+PROBE-LOG IU: <1.2 LOG IU
HCV RNA SERPL QL NAA+PROBE: NOT DETECTED
PATHOLOGY STUDY: NORMAL

## 2018-04-02 PROCEDURE — 500514 HCHG ENDOCLIP: Performed by: SURGERY

## 2018-04-02 PROCEDURE — 700101 HCHG RX REV CODE 250: Performed by: INTERNAL MEDICINE

## 2018-04-02 PROCEDURE — 500447 HCHG DRESSING, TEGADERM 8X12: Performed by: SURGERY

## 2018-04-02 PROCEDURE — 0FT44ZZ RESECTION OF GALLBLADDER, PERCUTANEOUS ENDOSCOPIC APPROACH: ICD-10-PCS | Performed by: SURGERY

## 2018-04-02 PROCEDURE — 700101 HCHG RX REV CODE 250

## 2018-04-02 PROCEDURE — 88304 TISSUE EXAM BY PATHOLOGIST: CPT

## 2018-04-02 PROCEDURE — 700111 HCHG RX REV CODE 636 W/ 250 OVERRIDE (IP)

## 2018-04-02 PROCEDURE — 501582 HCHG TROCAR, THRD BLADED: Performed by: SURGERY

## 2018-04-02 PROCEDURE — 501583 HCHG TROCAR, THRD CAN&SEAL 5X100: Performed by: SURGERY

## 2018-04-02 PROCEDURE — 160009 HCHG ANES TIME/MIN: Performed by: SURGERY

## 2018-04-02 PROCEDURE — 700111 HCHG RX REV CODE 636 W/ 250 OVERRIDE (IP): Performed by: HOSPITALIST

## 2018-04-02 PROCEDURE — 700102 HCHG RX REV CODE 250 W/ 637 OVERRIDE(OP)

## 2018-04-02 PROCEDURE — 500256 HCHG CATH, REDDICK: Performed by: SURGERY

## 2018-04-02 PROCEDURE — 160002 HCHG RECOVERY MINUTES (STAT): Performed by: SURGERY

## 2018-04-02 PROCEDURE — 700102 HCHG RX REV CODE 250 W/ 637 OVERRIDE(OP): Performed by: INTERNAL MEDICINE

## 2018-04-02 PROCEDURE — 160028 HCHG SURGERY MINUTES - 1ST 30 MINS LEVEL 3: Performed by: SURGERY

## 2018-04-02 PROCEDURE — 700111 HCHG RX REV CODE 636 W/ 250 OVERRIDE (IP): Performed by: INTERNAL MEDICINE

## 2018-04-02 PROCEDURE — 160048 HCHG OR STATISTICAL LEVEL 1-5: Performed by: SURGERY

## 2018-04-02 PROCEDURE — 502571 HCHG PACK, LAP CHOLE: Performed by: SURGERY

## 2018-04-02 PROCEDURE — 160039 HCHG SURGERY MINUTES - EA ADDL 1 MIN LEVEL 3: Performed by: SURGERY

## 2018-04-02 PROCEDURE — 99232 SBSQ HOSP IP/OBS MODERATE 35: CPT | Performed by: HOSPITALIST

## 2018-04-02 PROCEDURE — 700102 HCHG RX REV CODE 250 W/ 637 OVERRIDE(OP): Performed by: HOSPITALIST

## 2018-04-02 PROCEDURE — A9270 NON-COVERED ITEM OR SERVICE: HCPCS | Performed by: INTERNAL MEDICINE

## 2018-04-02 PROCEDURE — 501572 HCHG TROCAR, SHIELD OBTU 5X100: Performed by: SURGERY

## 2018-04-02 PROCEDURE — 501399 HCHG SPECIMAN BAG, ENDO CATC: Performed by: SURGERY

## 2018-04-02 PROCEDURE — 700111 HCHG RX REV CODE 636 W/ 250 OVERRIDE (IP): Performed by: SURGERY

## 2018-04-02 PROCEDURE — 700105 HCHG RX REV CODE 258: Performed by: INTERNAL MEDICINE

## 2018-04-02 PROCEDURE — 500868 HCHG NEEDLE, SURGI(VARES): Performed by: SURGERY

## 2018-04-02 PROCEDURE — 503366 HCHG TROCAR, 12X150 KII FIOS Z THR: Performed by: SURGERY

## 2018-04-02 PROCEDURE — 500389 HCHG DRAIN, RESERVOIR SUCT JP 100CC: Performed by: SURGERY

## 2018-04-02 PROCEDURE — A6402 STERILE GAUZE <= 16 SQ IN: HCPCS | Performed by: SURGERY

## 2018-04-02 PROCEDURE — 500445 HCHG HEMOSTAT, SURGICEL 4X8: Performed by: SURGERY

## 2018-04-02 PROCEDURE — A9270 NON-COVERED ITEM OR SERVICE: HCPCS | Performed by: HOSPITALIST

## 2018-04-02 PROCEDURE — 770006 HCHG ROOM/CARE - MED/SURG/GYN SEMI*

## 2018-04-02 PROCEDURE — 500375 HCHG DRAIN, J-P ROUND 10FR: Performed by: SURGERY

## 2018-04-02 PROCEDURE — 501574 HCHG TROCAR, SMTH CAN&SEAL 5: Performed by: SURGERY

## 2018-04-02 PROCEDURE — 501586 HCHG TROCAR, THRD SPIKE 5X55: Performed by: SURGERY

## 2018-04-02 PROCEDURE — 501838 HCHG SUTURE GENERAL: Performed by: SURGERY

## 2018-04-02 PROCEDURE — 160035 HCHG PACU - 1ST 60 MINS PHASE I: Performed by: SURGERY

## 2018-04-02 PROCEDURE — A9270 NON-COVERED ITEM OR SERVICE: HCPCS

## 2018-04-02 PROCEDURE — 160036 HCHG PACU - EA ADDL 30 MINS PHASE I: Performed by: SURGERY

## 2018-04-02 RX ORDER — BUPIVACAINE HYDROCHLORIDE 2.5 MG/ML
INJECTION, SOLUTION EPIDURAL; INFILTRATION; INTRACAUDAL
Status: DISCONTINUED | OUTPATIENT
Start: 2018-04-02 | End: 2018-04-02 | Stop reason: HOSPADM

## 2018-04-02 RX ADMIN — STANDARDIZED SENNA CONCENTRATE AND DOCUSATE SODIUM 2 TABLET: 8.6; 5 TABLET, FILM COATED ORAL at 20:56

## 2018-04-02 RX ADMIN — CLONAZEPAM 1 MG: 1 TABLET ORAL at 12:35

## 2018-04-02 RX ADMIN — OXYCODONE HYDROCHLORIDE 15 MG: 5 TABLET ORAL at 10:18

## 2018-04-02 RX ADMIN — FENTANYL CITRATE 50 MCG: 50 INJECTION, SOLUTION INTRAMUSCULAR; INTRAVENOUS at 08:50

## 2018-04-02 RX ADMIN — OXYCODONE HYDROCHLORIDE 15 MG: 5 TABLET ORAL at 02:46

## 2018-04-02 RX ADMIN — METRONIDAZOLE 500 MG: 500 INJECTION, SOLUTION INTRAVENOUS at 05:24

## 2018-04-02 RX ADMIN — SODIUM CHLORIDE: 9 INJECTION, SOLUTION INTRAVENOUS at 02:50

## 2018-04-02 RX ADMIN — HYDROMORPHONE HYDROCHLORIDE 1 MG: 10 INJECTION, SOLUTION INTRAMUSCULAR; INTRAVENOUS; SUBCUTANEOUS at 12:35

## 2018-04-02 RX ADMIN — OXYCODONE HYDROCHLORIDE 15 MG: 5 TABLET ORAL at 18:20

## 2018-04-02 RX ADMIN — CLONAZEPAM 1 MG: 1 TABLET ORAL at 20:56

## 2018-04-02 RX ADMIN — BUDESONIDE AND FORMOTEROL FUMARATE DIHYDRATE 2 PUFF: 160; 4.5 AEROSOL RESPIRATORY (INHALATION) at 09:54

## 2018-04-02 RX ADMIN — TIOTROPIUM BROMIDE 1 CAPSULE: 18 CAPSULE ORAL; RESPIRATORY (INHALATION) at 09:54

## 2018-04-02 RX ADMIN — STANDARDIZED SENNA CONCENTRATE AND DOCUSATE SODIUM 2 TABLET: 8.6; 5 TABLET, FILM COATED ORAL at 09:54

## 2018-04-02 RX ADMIN — QUETIAPINE FUMARATE 100 MG: 25 TABLET ORAL at 20:56

## 2018-04-02 RX ADMIN — ENOXAPARIN SODIUM 40 MG: 100 INJECTION SUBCUTANEOUS at 09:54

## 2018-04-02 RX ADMIN — ALBUTEROL SULFATE 2.5 MG: 2.5 SOLUTION RESPIRATORY (INHALATION) at 08:40

## 2018-04-02 RX ADMIN — ENOXAPARIN SODIUM 40 MG: 100 INJECTION SUBCUTANEOUS at 20:56

## 2018-04-02 RX ADMIN — HYDROCODONE BITARTRATE AND ACETAMINOPHEN 30 ML: 2.5; 108 SOLUTION ORAL at 09:05

## 2018-04-02 RX ADMIN — SODIUM CHLORIDE: 9 INJECTION, SOLUTION INTRAVENOUS at 18:20

## 2018-04-02 RX ADMIN — METRONIDAZOLE 500 MG: 500 INJECTION, SOLUTION INTRAVENOUS at 15:15

## 2018-04-02 RX ADMIN — FAMOTIDINE 20 MG: 20 TABLET, FILM COATED ORAL at 09:54

## 2018-04-02 RX ADMIN — CLONAZEPAM 1 MG: 1 TABLET ORAL at 05:24

## 2018-04-02 RX ADMIN — METRONIDAZOLE 500 MG: 500 INJECTION, SOLUTION INTRAVENOUS at 20:57

## 2018-04-02 RX ADMIN — FENTANYL CITRATE 50 MCG: 50 INJECTION, SOLUTION INTRAMUSCULAR; INTRAVENOUS at 09:05

## 2018-04-02 RX ADMIN — FAMOTIDINE 20 MG: 20 TABLET, FILM COATED ORAL at 20:56

## 2018-04-02 RX ADMIN — BUDESONIDE AND FORMOTEROL FUMARATE DIHYDRATE 2 PUFF: 160; 4.5 AEROSOL RESPIRATORY (INHALATION) at 21:15

## 2018-04-02 RX ADMIN — CEFTRIAXONE 2 G: 2 INJECTION, POWDER, FOR SOLUTION INTRAMUSCULAR; INTRAVENOUS at 20:57

## 2018-04-02 RX ADMIN — ONDANSETRON 4 MG: 2 INJECTION, SOLUTION INTRAMUSCULAR; INTRAVENOUS at 20:57

## 2018-04-02 RX ADMIN — SIMVASTATIN 20 MG: 10 TABLET, FILM COATED ORAL at 20:56

## 2018-04-02 RX ADMIN — OXYCODONE HYDROCHLORIDE 15 MG: 5 TABLET ORAL at 15:15

## 2018-04-02 ASSESSMENT — PAIN SCALES - GENERAL
PAINLEVEL_OUTOF10: 7
PAINLEVEL_OUTOF10: 8
PAINLEVEL_OUTOF10: 9
PAINLEVEL_OUTOF10: 9
PAINLEVEL_OUTOF10: 5
PAINLEVEL_OUTOF10: 6
PAINLEVEL_OUTOF10: ASSUMED PAIN PRESENT
PAINLEVEL_OUTOF10: 5

## 2018-04-02 ASSESSMENT — ENCOUNTER SYMPTOMS
EYES NEGATIVE: 1
NECK PAIN: 0
HEMOPTYSIS: 0
HALLUCINATIONS: 0
WEIGHT LOSS: 0
CLAUDICATION: 0
MYALGIAS: 0
MUSCULOSKELETAL NEGATIVE: 1
PND: 0
NAUSEA: 1
CONSTITUTIONAL NEGATIVE: 1
DIZZINESS: 0
EYE DISCHARGE: 0
SHORTNESS OF BREATH: 1
PSYCHIATRIC NEGATIVE: 1
CARDIOVASCULAR NEGATIVE: 1
MEMORY LOSS: 0
RESPIRATORY NEGATIVE: 1
NERVOUS/ANXIOUS: 0
TINGLING: 0
ABDOMINAL PAIN: 1
DEPRESSION: 0
HEADACHES: 0
COUGH: 0
BRUISES/BLEEDS EASILY: 0
PALPITATIONS: 0
BLOOD IN STOOL: 0
NEUROLOGICAL NEGATIVE: 1
EYE PAIN: 0

## 2018-04-02 NOTE — CARE PLAN
Problem: Bowel/Gastric:  Goal: Normal bowel function is maintained or improved  BM PTa  +flatus  Tolerating diet    Problem: Mobility  Goal: Risk for activity intolerance will decrease  Up x 1 assist, steady with cane    Problem: Pain Management  Goal: Pain level will decrease to patient's comfort goal  Medicated per MAR  repositioned    Problem: Respiratory:  Goal: Respiratory status will improve  4 L O2   in use  Inspiratory/ expiratory wheezes      Problem: Skin Integrity  Goal: Risk for impaired skin integrity will decrease  Coccyx red blanching  Educated on turns every 2 hours  Waffle mattress in use

## 2018-04-02 NOTE — PROGRESS NOTES
Pt back to floor.    Poc discussed, assumed care of pt.   Call light in reach, hourly rounding in place.   Pt gets up x 1 with a cane.   Reg diet.  Aawait void. LBM pta.   Oxy/dilaudid for pain.  R ELLA drain.  No further needs.

## 2018-04-02 NOTE — CONSULTS
Date of Consultation:  4/2/2018    Patient: : Henry Kumari  MRN: [id number]    Referring Physician: Dr. Meche Olson     GI:Ariel Aguirre M.D.     Reason for Consultation: ERCP For bile duct stent    History of Present Illness:   Thank you for allowing us to consult on this patient. This is a 62-year-old male morbidly obese with past history of chronic atrial fibrillation on 0 total atenolol history of DVT history CHF COPD hep C obstructive sleep apnea on CPAP soon for consultation for ERCP with stent placement. Patient was admitted on March 30, 2018 with elevation of lipase and amylase but normal transaminases and bilirubin. Ultrasound demonstrated gallstones and wall thickening concerning for cholecystitis. Lipase down trended. Patient ultimately underwent cholecystectomy 4/2/2018 with attempt for clipping of the cystic duct but due to the necrosis unable to successfully place due to the quality of tissue. A 10 mm drain in place and ERCP was recommended for decompression allow healing. Patient currently reports of mild abdominal discomfort right upper quadrant. Denies any significant history of alcohol for trauma. No family history of pancreatitis.        Past Medical History:  COPD, CHF, A. fib, obstructive sleep apnea on CPAP 4 L    Past Surgical History:  Cholecystectomy    Family History:  Noncontributory history of hepatitis    Review of Systems   Constitutional: Negative.    HENT: Negative.    Eyes: Negative.    Respiratory: Negative.    Cardiovascular: Negative.    Gastrointestinal: Positive for abdominal pain.   Genitourinary: Negative.    Musculoskeletal: Negative.    Skin: Negative.    Neurological: Negative.    Endo/Heme/Allergies: Negative.    Psychiatric/Behavioral: Negative.          Physical Exam:  Vitals:    04/02/18 0900 04/02/18 0915 04/02/18 0930 04/02/18 1000   BP:    105/71   Pulse: (!) 124 (!) 102 (!) 110 (!) 104   Resp: 15 16 19 20   Temp: 37.1 °C (98.7 °F)   36.3 °C (97.3 °F)   SpO2: 96% 96%  96% 96%   Weight:       Height:           Physical Exam   Constitutional: He is oriented to person, place, and time and well-developed, well-nourished, and in no distress.   Morbidly obese   HENT:   Head: Normocephalic and atraumatic.   Eyes: Conjunctivae are normal. Pupils are equal, round, and reactive to light.   Neck: Normal range of motion. Neck supple. No JVD present. No tracheal deviation present. No thyromegaly present.   Cardiovascular: Normal rate, regular rhythm and normal heart sounds.  Exam reveals no gallop and no friction rub.    No murmur heard.  Pulmonary/Chest: Effort normal. No stridor.   Abdominal: Soft. He exhibits no distension and no mass. There is tenderness. There is no rebound and no guarding.   Multiple laproscopic port site. ELLA drain   Musculoskeletal: Normal range of motion. He exhibits no edema.   Lymphadenopathy:     He has no cervical adenopathy.   Neurological: He is alert and oriented to person, place, and time.   Skin: Skin is warm and dry.         Labs:  Recent Labs      03/30/18 1938 03/31/18 0336 04/01/18   0330   WBC  17.5*  12.1*  10.0   RBC  4.84  4.17*  3.96*   HEMOGLOBIN  14.3  12.4*  11.4*   HEMATOCRIT  44.7  37.8*  38.2*   MCV  92.4  90.6  96.5   MCH  29.5  29.7  28.8   MCHC  32.0*  32.8*  29.8*   RDW  47.9  47.5  49.9   PLATELETCT  278  170  141*   MPV  11.2  11.5  11.0     Recent Labs      03/30/18 1938 03/31/18 0336  04/01/18   0330   SODIUM  135  135  135   POTASSIUM  4.5  4.7  4.4   CHLORIDE  105  108  107   CO2  18*  21  20   GLUCOSE  79  79  64*   BUN  32*  35*  42*     Recent Labs      03/30/18 1938   APTT  32.0   INR  1.53*         Imaging:  No inpatient image          Impressions:  1. Necrotic gallbladder, s/p laparoscopic cholecystectomy. Cystic duct unable to be clipped due to friability of tissue   2. Gallstone pancreatitis  3. Morbid obesity  4. History of DVT  5. COPD  6. Hepatitis C, unclear if treated  7. Sleep apnea on CPAP    62-year-old  male with morbid obesity multiple risk for further procedure including sleep apnea history atrial fibrillation, DVT on Xarelto with complaints of abdominal pain with necrotizing gallbladder status post cholecystectomy. Due to friability of the cystic duct unable to apply clips. ELLA tube placed. Recommended for ERCP for decompression and stent placement to allow healing.    Recommendations:  1. Nothing by mouth post midnight for planned ERCP and stent placement, tentatively scheduled for April 3, 2018, 7:30AM at [facility]. Risk and benefits discussed including pancreatitis discussed. Agreeable to proceeding. Hold AM dose of lovenox x 1 dose  2. Hepatitis C management as outpatient.   3. Continue antibiotics per team    Indication:Bile leak, gangrenous gallbladder.   Risks, benefits, and alternatives were discussed with with consenting person(s). Consenting person(s) were given an opportunity to ask questions and discuss other options. Risks including but not limited to failed or incomplete ERCP, stent migration, ineffective therapy, radiation exposure, pancreatitis (with potential future complications), contrast reaction, perforation, infection, bleeding, missed lesion(s), cardiac and/or pulmonary event, aspiration, stroke, possible need for surgery, hospitalization possibly prolonged, discomfort, unsuccessful and/or incomplete procedure, possible need for repeat procedures and/or additional testings, damage to adjacent organs and/or vascular structures, medication reaction, disability, death, and other adverse events possibly life-threatening. Discussion was undertaken with Layman's terms. Consenting persons stated understanding and acceptance of these risks, and wished to proceed. Consent was given in clear state of mind.

## 2018-04-02 NOTE — DOCUMENTATION QUERY
DOCUMENTATION QUERY    PROVIDERS: Please select “Cosign w/ note” to reply to query.    To better represent the severity of illness of your patient, please review the following information and exercise your independent professional judgment in responding to this query.     BUN 35, Creatinine 1.69 and GFR 41 are noted in the Lab Results. IVF has been given. Based upon the clinical findings, risk factors, and treatment, can a diagnosis be provided to support this finding and treatment?    Acute renal failure  Acute kidney injury  Chronic kidney disease, (if so, please provide staging).  Acute on chronic kidney disease, (if so, please provide staging)  Other explanation of clinical findings  Findings of no clinical significance   Unable to determine (no explanation for clinical findings)    The medical record reflects the following:   Clinical Findings BUN 42 - 32  Creatinine 1.69 - 1.05  GFR >60 - 41   Treatment IVF  Labs    Risk Factors Sepsis  Gallstone pancreatitis   Acute cholecystitis with cholelithiasis   Chronic atrial fibrillation   Location within medical record Lab Results      Thank you,   Martina Lagunas, RN, BSN  Clinical   [phone]

## 2018-04-02 NOTE — CARE PLAN
Problem: Pain Management  Goal: Pain level will decrease to patient’s comfort goal  Outcome: PROGRESSING AS EXPECTED  Pt has pain in abdomen, receiving PRN pain meds per MAR, repositioned for comfort. Non-pharmacologic options offered.    Problem: Mobility  Goal: Risk for activity intolerance will decrease  Outcome: PROGRESSING AS EXPECTED  Pt is a 1 person assist with cane. Pt mobilized up to BR.

## 2018-04-02 NOTE — PROGRESS NOTES
[facility] Hospitalist Progress Note    Date of Service: 4/2/2018    Chief Complaint  62 y.o. male admitted 3/30/2018 with abdominal pain    Interval Problem Update  Mr. Kumari is a 62-year-old male comes to us from [place]. The patient has morbid obesity was recently with a surgical intervention to remove his appendix. The patient still has been having some postoperative pain for which she is recovering. The patient now has developed severe epigastric pain that was radiating about-like fashion. The patient was found to have acute cholecystitis with common bile duct stone and the patient also had acute pancreatitis with it. Patient at this point underwent surgical removal of the gallbladder as well as intraoperative: Angiogram. Patient at this point has improved with his overall pancreatitis pain is better at this point he will be allowed to eat and ambulate. Anticipate that the patient will be discharging home and currently he is residing with his sister in [place] and this will be done in the next 24 hours.    Consultants/Specialty  Surgery Dr Olson    Disposition  To be determined        Review of Systems   Constitutional: Negative for malaise/fatigue and weight loss.   HENT: Negative.  Negative for congestion and nosebleeds.    Eyes: Negative.  Negative for pain and discharge.   Respiratory: Positive for shortness of breath. Negative for cough and hemoptysis.    Cardiovascular: Negative for chest pain, palpitations, claudication and PND.   Gastrointestinal: Positive for abdominal pain and nausea. Negative for blood in stool.        Loss of appetitie    Genitourinary: Negative.  Negative for dysuria and urgency.   Musculoskeletal: Negative.  Negative for joint pain, myalgias and neck pain.   Skin: Negative.  Negative for itching and rash.   Neurological: Negative for dizziness, tingling and headaches.   Endo/Heme/Allergies: Negative.  Does not bruise/bleed easily.   Psychiatric/Behavioral: Negative.  Negative for  depression, hallucinations, memory loss and suicidal ideas. The patient is not nervous/anxious.    All other systems reviewed and are negative.     Physical Exam  Laboratory/Imaging   Hemodynamics  Temp (24hrs), Av.9 °C (98.4 °F), Min:36.3 °C (97.3 °F), Max:38.1 °C (100.5 °F)   Temperature: 36.3 °C (97.3 °F)  Pulse  Av.4  Min: 60  Max: 126 Heart Rate (Monitored): (!) 116  Blood Pressure: 105/71, NIBP: 132/81      Respiratory      Respiration: 20, Pulse Oximetry: 96 %     Work Of Breathing / Effort: Moderate  RUL Breath Sounds: Diminished, RML Breath Sounds: Diminished, RLL Breath Sounds: Diminished, CEASAR Breath Sounds: Diminished, LLL Breath Sounds: Diminished    Fluids    Intake/Output Summary (Last 24 hours) at 18 1339  Last data filed at 18 1215   Gross per 24 hour   Intake             4150 ml   Output              305 ml   Net             3845 ml       Nutrition  Orders Placed This Encounter   Procedures   • DIET ORDER     Standing Status:   Standing     Number of Occurrences:   1     Order Specific Question:   Diet:     Answer:   Regular [1]     Physical Exam   Constitutional: He is oriented to person, place, and time. He appears well-developed and well-nourished. He is cooperative. He has a sickly appearance. No distress. Nasal cannula in place.   HENT:   Head: Normocephalic and atraumatic.   Right Ear: External ear normal.   Left Ear: External ear normal.   Nose: Nose normal.   Mouth/Throat: Oropharynx is clear and moist.   Eyes: Conjunctivae and EOM are normal. Pupils are equal, round, and reactive to light.   Neck: Normal range of motion. Neck supple. No JVD present. No thyromegaly present.   Cardiovascular: Normal rate, regular rhythm and normal heart sounds.    Pulmonary/Chest: Effort normal and breath sounds normal. No respiratory distress. He has no wheezes. He has no rales.   Abdominal: Soft. Bowel sounds are normal. He exhibits distension (from morbid obesity). He exhibits no  mass. There is generalized tenderness. There is no rebound and no guarding.       Musculoskeletal: Normal range of motion. He exhibits no edema, tenderness or deformity.   Lymphadenopathy:     He has no cervical adenopathy.   Neurological: He is alert and oriented to person, place, and time. He has normal reflexes. He is not disoriented. He displays normal reflexes. He exhibits normal muscle tone. GCS eye subscore is 4. GCS verbal subscore is 5. GCS motor subscore is 6.   Skin: Skin is warm and dry. He is not diaphoretic. No erythema.   Psychiatric: He has a normal mood and affect. His behavior is normal. Judgment and thought content normal.   Nursing note and vitals reviewed.      Recent Labs      03/30/18 1938 03/31/18 0336 04/01/18 0330   WBC  17.5*  12.1*  10.0   RBC  4.84  4.17*  3.96*   HEMOGLOBIN  14.3  12.4*  11.4*   HEMATOCRIT  44.7  37.8*  38.2*   MCV  92.4  90.6  96.5   MCH  29.5  29.7  28.8   MCHC  32.0*  32.8*  29.8*   RDW  47.9  47.5  49.9   PLATELETCT  278  170  141*   MPV  11.2  11.5  11.0     Recent Labs      03/30/18 1938 03/31/18 0336 04/01/18 0330   SODIUM  135  135  135   POTASSIUM  4.5  4.7  4.4   CHLORIDE  105  108  107   CO2  18*  21  20   GLUCOSE  79  79  64*   BUN  32*  35*  42*   CREATININE  1.34  1.69*  1.05   CALCIUM  9.1  8.3*  8.2*     Recent Labs      03/30/18 1938   APTT  32.0   INR  1.53*                  Assessment/Plan     * Acute pancreatitis   Assessment & Plan    Lipase levels are down. The elevation in lipase is most likely secondary to common bile duct stone. ERCP could not be done to remove this as the patient's body size prevented that from happening. Patient is now status post surgery postoperative day #0        Cholelithiasis   Assessment & Plan    Status post cholecystectomy.  The patient this point will be continued on antibiotics.  Continued this point with pain management.  Monitor fluid status and continue with fluid hydration.           Cholecystitis   Assessment & Plan    Today's day 4 out of 7 of IV Rocephin and Flagyl. This can be switched over to oral once the patient's oral intake is adequate.          Morbid obesity (CMS-HCC)   Assessment & Plan    I discussed with patient about doing weight loss management as an outpatient including bariatric clinic evaluation.  Body mass index is 50.53 kg/m².        Sepsis (CMS-HCC)   Assessment & Plan    This is sepsis (without associated acute organ dysfunction).   Resolved initial sepsis-like status.        SANTOSH on CPAP   Assessment & Plan    Patient remains on CPAP due to his large body habitus at nighttime. Continue with respiratory protocol        COPD (chronic obstructive pulmonary disease) (CMS-HCC)   Assessment & Plan    Currently no acute COPD exacerbation remains on RT protocol and nebulizer treatments.        History of hepatitis C   Assessment & Plan    Patient has a history of hepatitis C at this point and monitor liver functions as well as hepatitis C viral levels.        History of atrial fibrillation   Assessment & Plan    Patient's atrial fibrillation was previously corrected with a combination of rate control as well as anticoagulation. This will be resumed once surgery says it's okay to resume anticoagulation.          History of CHF (congestive heart failure)   Assessment & Plan    Patient does not have a history of heart failure. His left ventricular ejection fraction is above 60% and his cardiac function is stable.  Cardiac history at this point has been ruled out..        Circulating anticoagulants (CMS-HCC)   Assessment & Plan    Patient may resume anticoagulation once surgery deems feasible        History of DVT (deep vein thrombosis)   Assessment & Plan    Patient postoperatively may be able to be resumed on his anticoagulation depending on when surgery allows this to happen. Usually within the next 48 hours          Quality-Core Measures   Reviewed items::  EKG reviewed,  Medications reviewed, Radiology images reviewed and Labs reviewed  Cummings catheter::  No Cummings  DVT prophylaxis pharmacological::  Contraindicated - High bleeding risk  Ulcer Prophylaxis::  Yes  Antibiotics:  Treating active infection/contamination beyond 24 hours perioperative coverage  Assessed for rehabilitation services:  Patient was assess for and/or received rehabilitation services during this hospitalization

## 2018-04-02 NOTE — PROGRESS NOTES
Bedside report completed, assumed pt care.  Pt resting in bed, A&Ox4.   Assessment complete.  Pt has healing RLQ appy incision with packing, dressing CDI  Inspiratory and expiratory wheezes noted  Pt on 4 L O2 nasal cannula  Pt has obese rounded abdomen  BM PTA  +flatus  Pt up x 1 assist to bathroom  NPO at midnight  Pt has numbness and tingling BLE and on coccyx  Pt denies chest pain and nausea  Pt has some SOB, but is his baseline per pt  Pt has red blanching coccyx, educated pt to turn every two hours,  Waffle overlay placed on bed  Pillows in use for turns   complaints of pain. Denies need for medication  Repositioned for comfort  Trapeze in use  Discussed plan of care with pt.   all questions answered   Call light within reach, bed in low position, possessions within reach, treaded socks on, floor free from trip hazard, Hourly rounding in place.  Bed alarm in use   SCDs on.

## 2018-04-02 NOTE — DOCUMENTATION QUERY
DOCUMENTATION QUERY    PROVIDERS: Please select “Cosign w/ note” to reply to query.    To better represent the severity of illness of your patient, please review the following information and exercise your independent professional judgment in responding to this query.     Oxygen saturation 95% on 4L O2 NC (per pt's request, baseline 3L at home) is documented in the 3/30 RN Progress Note. Based upon the clinical findings, risk factors, and treatment, can a diagnosis be provided to support this finding?    • Chronic Respiratory Failure  • Chronic Hypoxemia  • Other explanation of clinical findings  • Unable to determine    The medical record reflects the following:   Clinical Findings 3/30 RN PN: RR 20, oxygen saturation 95% on 4L NC (per pt's request, baseline 3L at home)    3/31 Hospitalist PN:   · COPD - no acute exacerbation at this point with oxygen and RT protocol  · Nasal cannula in place, diminished lung sounds, SpO2 98%   Treatment Supplemental oxygen  RT protocol   Risk Factors Sepsis  Gallstone pancreatitis  Acute cholecystitis  Morbid obesity with BMI > 50  COPD  SANTOSH   Location within medical record Progress Notes     Thank you,   Martina Lagunas, RN, BSN  Clinical   [phone]

## 2018-04-02 NOTE — OP REPORT
DATE OF SERVICE:  04/02/2018    PREOPERATIVE DIAGNOSES:  Acute cholecystitis and gallstone pancreatitis.    POSTOPERATIVE DIAGNOSIS:  Necrotic gangrenous cholecystitis.    PROCEDURE:  Laparoscopic cholecystectomy.    SURGEON:  Meche Olson MD    ASSISTANT:  RK Paez    ANESTHESIA:  General endotracheal.    ANESTHESIOLOGIST:  Chirag Anguiano MD.    INDICATIONS:  The patient is a 62-year-old male who is morbidly obese, who has   gallstone pancreatitis and acute cholecystitis.  His pancreatitis is now   resolved.  He is being brought at this time for laparoscopic cholecystectomy.    FINDINGS:  Markedly necrotic gallbladder which took approximately 40 minutes   to dissect out due to the necrosis and his obesity.  The duct was clipped;   however, it was necrotic and disintegrated.  More clips were placed, but there   was a bile leak found, drainage tube was left in place with anticipation of   having to have an ERCP to stent him open because of the necrosis of his cystic   duct.    DESCRIPTION OF PROCEDURE:  Patient was identified and consented, he was   brought to the operating room and placed in supine position.  Patient   underwent general endotracheal anesthetic.  Patient's abdomen was prepped and   draped in sterile fashion.  The periumbilical was anesthetized with 0.25%   Marcaine.  A 1 cm incision was made in the superior aspect of the umbilicus   and Veress needle was introduced into the abdominal cavity.  Pneumoperitoneum   was released and port sites were _____.  A 5 mm trocar was placed.  Under   laparoscopic guidance, a 5 mm trocar was placed in the mid abdomen and a 10-mm   trocar was placed in the epigastric site, and two 5-mm trocars placed in the   right subcostal position.  The gallbladder was identified, it was decompressed   with needle cystostomy and it was elevated.  The duct was  from   surrounding tissues.  Clips were placed, but because of the quality of the   tissues,  disintegrated and the rest of the gallbladder was removed and brought   out through the epigastric port with an EndoCatch.  The liver bed was   irrigated and hemostasis obtained with electrocautery.  Surgicel was placed in   place.  Attempt was made to try to clip the duct again, but this was not   successful.  It was elected because of the quality of the tissues that to   leave a 10 mm drain in place and then plan on having an ERCP performed, to   stent him open to allow this to heal.  The pneumoperitoneum was released.    Port sites were closed with staples and the drain was left in place and   secured with 2-0 nylon.  Patient was extubated and taken to recovery in stable   condition.  All sponge and needle counts were correct.       ____________________________________     ANDREIA RODRIGUEZ MD    [facility] / [facility]    DD:  04/02/2018 08:24:33  DT:  04/02/2018 09:26:13    D#:  [id number]  Job#:  [id number]    cc: SABAS TALBOT MD

## 2018-04-02 NOTE — CONSULTS
DATE OF SERVICE:  03/30/2018    SURGICAL CONSULTATION    PHYSICIAN REQUESTING CONSULTATION:  Dr. Yo.    REASON FOR CONSULTATION:  The patient is a 62-year-old male who was   transferred from [place] with evidence of cholecystitis as well as elevated   amylase.  He admitted his usual state of health and started having epigastric   abdominal pain, was taken to the emergency room, was found to have an elevated   liver function tests as well as an elevated lipase and amylase.  He   subsequently was transferred to [facility] because of his morbid   obesity.    PAST MEDICAL HISTORY:  ILLNESSES:  History of DVTs, on Xarelto.  He has COPD, hepatitis C and he is   on CPAP for obstructive apnea.    PAST SURGICAL HISTORY:  Open appendectomy.    MEDICATIONS:  Xarelto, albuterol, Tenormin, Clonazepam, Lasix, Prinivil,   Seroquel, Zocor, Spiriva.    ALLERGIES:  None.    SOCIAL HISTORY:  _____.    PHYSICAL EXAMINATION:  VITAL SIGNS:  He weighs 178 kilos.  GENERAL:  He is alert.  HEENT:  Mildly icteric.  NECK:  Supple.  LUNGS:  Coarse.  HEART:  Regular rate and rhythm.  ABDOMEN:  Protuberant.  He has an open wound in his right lower quadrant, is   being packed from recent open appendectomy.  EXTREMITIES:  With 2+ edema.  NEUROLOGIC:  Intact.    LABORATORY DATA:  Blood work shows white count of 12,000.  His lipase is 263.    His liver function tests are mildly elevated with alkaline phosphatase 134   and bilirubin 1.8.    IMPRESSION:  A 62-year-old male with morbid obesity, has evidence of gallstone   pancreatitis.    PLAN:  He will be admitted.  We will monitor him until his lipase normalizes   and we will proceed with laparoscopic cholecystectomy with an intraoperative   cholangiogram.  He is too large to undergo an MRCP.  We will continue him on   antibiotics.  The procedure has been explained to the patient as well as risks   including bleeding, infection, conversion to an open procedure,   intraabdominal  injuries, and anesthetic risk.  He understands and wished to   proceed.       ____________________________________     MD ANNE RILEY / MARIVEL    DD:  04/02/2018 07:14:36  DT:  04/02/2018 07:35:03    D#:  [id number]  Job#:  [id number]

## 2018-04-03 ENCOUNTER — APPOINTMENT (OUTPATIENT)
Dept: RADIOLOGY | Facility: MEDICAL CENTER | Age: 63
DRG: 853 | End: 2018-04-03
Attending: INTERNAL MEDICINE
Payer: MEDICAID

## 2018-04-03 LAB
ANION GAP SERPL CALC-SCNC: 7 MMOL/L (ref 0–11.9)
BASOPHILS # BLD AUTO: 0 % (ref 0–1.8)
BASOPHILS # BLD: 0 K/UL (ref 0–0.12)
BUN SERPL-MCNC: 24 MG/DL (ref 8–22)
CALCIUM SERPL-MCNC: 8 MG/DL (ref 8.5–10.5)
CHLORIDE SERPL-SCNC: 105 MMOL/L (ref 96–112)
CO2 SERPL-SCNC: 20 MMOL/L (ref 20–33)
CREAT SERPL-MCNC: 0.71 MG/DL (ref 0.5–1.4)
EOSINOPHIL # BLD AUTO: 0.24 K/UL (ref 0–0.51)
EOSINOPHIL NFR BLD: 3.5 % (ref 0–6.9)
ERYTHROCYTE [DISTWIDTH] IN BLOOD BY AUTOMATED COUNT: 46.3 FL (ref 35.9–50)
GLUCOSE SERPL-MCNC: 123 MG/DL (ref 65–99)
HCT VFR BLD AUTO: 29.3 % (ref 42–52)
HGB BLD-MCNC: 9.5 G/DL (ref 14–18)
LYMPHOCYTES # BLD AUTO: 0.48 K/UL (ref 1–4.8)
LYMPHOCYTES NFR BLD: 7 % (ref 22–41)
MANUAL DIFF BLD: NORMAL
MCH RBC QN AUTO: 29 PG (ref 27–33)
MCHC RBC AUTO-ENTMCNC: 32.1 G/DL (ref 33.7–35.3)
MCV RBC AUTO: 90.3 FL (ref 81.4–97.8)
MONOCYTES # BLD AUTO: 0.24 K/UL (ref 0–0.85)
MONOCYTES NFR BLD AUTO: 3.5 % (ref 0–13.4)
MORPHOLOGY BLD-IMP: NORMAL
NEUTROPHILS # BLD AUTO: 5.85 K/UL (ref 1.82–7.42)
NEUTROPHILS NFR BLD: 86 % (ref 44–72)
NRBC # BLD AUTO: 0 K/UL
NRBC BLD-RTO: 0 /100 WBC
PLATELET # BLD AUTO: 170 K/UL (ref 164–446)
PLATELET BLD QL SMEAR: NORMAL
PMV BLD AUTO: 11 FL (ref 9–12.9)
POTASSIUM SERPL-SCNC: 3.8 MMOL/L (ref 3.6–5.5)
RBC # BLD AUTO: 3.21 M/UL (ref 4.7–6.1)
RBC BLD AUTO: NORMAL
SODIUM SERPL-SCNC: 132 MMOL/L (ref 135–145)
WBC # BLD AUTO: 6.8 K/UL (ref 4.8–10.8)

## 2018-04-03 PROCEDURE — 110371 HCHG SHELL REV 272: Performed by: INTERNAL MEDICINE

## 2018-04-03 PROCEDURE — 502240 HCHG MISC OR SUPPLY RC 0272: Performed by: INTERNAL MEDICINE

## 2018-04-03 PROCEDURE — 94640 AIRWAY INHALATION TREATMENT: CPT

## 2018-04-03 PROCEDURE — 85007 BL SMEAR W/DIFF WBC COUNT: CPT

## 2018-04-03 PROCEDURE — 74328 X-RAY BILE DUCT ENDOSCOPY: CPT

## 2018-04-03 PROCEDURE — 700111 HCHG RX REV CODE 636 W/ 250 OVERRIDE (IP): Performed by: STUDENT IN AN ORGANIZED HEALTH CARE EDUCATION/TRAINING PROGRAM

## 2018-04-03 PROCEDURE — 160048 HCHG OR STATISTICAL LEVEL 1-5: Performed by: INTERNAL MEDICINE

## 2018-04-03 PROCEDURE — 160203 HCHG ENDO MINUTES - 1ST 30 MINS LEVEL 4: Performed by: INTERNAL MEDICINE

## 2018-04-03 PROCEDURE — 80048 BASIC METABOLIC PNL TOTAL CA: CPT

## 2018-04-03 PROCEDURE — 99233 SBSQ HOSP IP/OBS HIGH 50: CPT | Performed by: INTERNAL MEDICINE

## 2018-04-03 PROCEDURE — 160035 HCHG PACU - 1ST 60 MINS PHASE I: Performed by: INTERNAL MEDICINE

## 2018-04-03 PROCEDURE — 36415 COLL VENOUS BLD VENIPUNCTURE: CPT

## 2018-04-03 PROCEDURE — 160009 HCHG ANES TIME/MIN: Performed by: INTERNAL MEDICINE

## 2018-04-03 PROCEDURE — 160002 HCHG RECOVERY MINUTES (STAT): Performed by: INTERNAL MEDICINE

## 2018-04-03 PROCEDURE — 700111 HCHG RX REV CODE 636 W/ 250 OVERRIDE (IP)

## 2018-04-03 PROCEDURE — 700111 HCHG RX REV CODE 636 W/ 250 OVERRIDE (IP): Performed by: SURGERY

## 2018-04-03 PROCEDURE — 700101 HCHG RX REV CODE 250: Performed by: INTERNAL MEDICINE

## 2018-04-03 PROCEDURE — 700111 HCHG RX REV CODE 636 W/ 250 OVERRIDE (IP): Performed by: HOSPITALIST

## 2018-04-03 PROCEDURE — 500066 HCHG BITE BLOCK, ECT: Performed by: INTERNAL MEDICINE

## 2018-04-03 PROCEDURE — 700102 HCHG RX REV CODE 250 W/ 637 OVERRIDE(OP): Performed by: INTERNAL MEDICINE

## 2018-04-03 PROCEDURE — 700102 HCHG RX REV CODE 250 W/ 637 OVERRIDE(OP): Performed by: HOSPITALIST

## 2018-04-03 PROCEDURE — 770001 HCHG ROOM/CARE - MED/SURG/GYN PRIV*

## 2018-04-03 PROCEDURE — 700102 HCHG RX REV CODE 250 W/ 637 OVERRIDE(OP)

## 2018-04-03 PROCEDURE — 700101 HCHG RX REV CODE 250

## 2018-04-03 PROCEDURE — 160208 HCHG ENDO MINUTES - EA ADDL 1 MIN LEVEL 4: Performed by: INTERNAL MEDICINE

## 2018-04-03 PROCEDURE — 0F788DZ DILATION OF CYSTIC DUCT WITH INTRALUMINAL DEVICE, VIA NATURAL OR ARTIFICIAL OPENING ENDOSCOPIC: ICD-10-PCS | Performed by: INTERNAL MEDICINE

## 2018-04-03 PROCEDURE — A9270 NON-COVERED ITEM OR SERVICE: HCPCS

## 2018-04-03 PROCEDURE — BF101ZZ FLUOROSCOPY OF BILE DUCTS USING LOW OSMOLAR CONTRAST: ICD-10-PCS | Performed by: INTERNAL MEDICINE

## 2018-04-03 PROCEDURE — 700101 HCHG RX REV CODE 250: Performed by: HOSPITALIST

## 2018-04-03 PROCEDURE — A9270 NON-COVERED ITEM OR SERVICE: HCPCS | Performed by: HOSPITALIST

## 2018-04-03 PROCEDURE — 85027 COMPLETE CBC AUTOMATED: CPT

## 2018-04-03 PROCEDURE — C2617 STENT, NON-COR, TEM W/O DEL: HCPCS | Performed by: INTERNAL MEDICINE

## 2018-04-03 PROCEDURE — 700111 HCHG RX REV CODE 636 W/ 250 OVERRIDE (IP): Performed by: INTERNAL MEDICINE

## 2018-04-03 PROCEDURE — A9270 NON-COVERED ITEM OR SERVICE: HCPCS | Performed by: INTERNAL MEDICINE

## 2018-04-03 DEVICE — STENT BILIARY ADVANTIX 10FR X 9CM: Type: IMPLANTABLE DEVICE | Status: FUNCTIONAL

## 2018-04-03 RX ORDER — SODIUM CHLORIDE 9 MG/ML
INJECTION, SOLUTION INTRAVENOUS CONTINUOUS
Status: DISCONTINUED | OUTPATIENT
Start: 2018-04-03 | End: 2018-04-04

## 2018-04-03 RX ORDER — FUROSEMIDE 10 MG/ML
40 INJECTION INTRAMUSCULAR; INTRAVENOUS
Status: DISCONTINUED | OUTPATIENT
Start: 2018-04-03 | End: 2018-04-03

## 2018-04-03 RX ORDER — IPRATROPIUM BROMIDE AND ALBUTEROL SULFATE 2.5; .5 MG/3ML; MG/3ML
3 SOLUTION RESPIRATORY (INHALATION)
Status: DISCONTINUED | OUTPATIENT
Start: 2018-04-03 | End: 2018-04-07 | Stop reason: HOSPADM

## 2018-04-03 RX ORDER — FUROSEMIDE 10 MG/ML
40 INJECTION INTRAMUSCULAR; INTRAVENOUS ONCE
Status: COMPLETED | OUTPATIENT
Start: 2018-04-03 | End: 2018-04-03

## 2018-04-03 RX ORDER — ONDANSETRON 2 MG/ML
INJECTION INTRAMUSCULAR; INTRAVENOUS
Status: COMPLETED
Start: 2018-04-03 | End: 2018-04-03

## 2018-04-03 RX ORDER — ALBUTEROL SULFATE 90 UG/1
AEROSOL, METERED RESPIRATORY (INHALATION)
Status: COMPLETED
Start: 2018-04-03 | End: 2018-04-03

## 2018-04-03 RX ADMIN — STANDARDIZED SENNA CONCENTRATE AND DOCUSATE SODIUM 2 TABLET: 8.6; 5 TABLET, FILM COATED ORAL at 23:02

## 2018-04-03 RX ADMIN — FAMOTIDINE 20 MG: 20 TABLET, FILM COATED ORAL at 23:02

## 2018-04-03 RX ADMIN — CLONAZEPAM 1 MG: 1 TABLET ORAL at 23:02

## 2018-04-03 RX ADMIN — TIOTROPIUM BROMIDE 1 CAPSULE: 18 CAPSULE ORAL; RESPIRATORY (INHALATION) at 10:50

## 2018-04-03 RX ADMIN — IPRATROPIUM BROMIDE AND ALBUTEROL SULFATE 3 ML: .5; 3 SOLUTION RESPIRATORY (INHALATION) at 04:16

## 2018-04-03 RX ADMIN — OXYCODONE HYDROCHLORIDE 15 MG: 5 TABLET ORAL at 05:13

## 2018-04-03 RX ADMIN — HYDROMORPHONE HYDROCHLORIDE 1 MG: 10 INJECTION, SOLUTION INTRAMUSCULAR; INTRAVENOUS; SUBCUTANEOUS at 03:40

## 2018-04-03 RX ADMIN — ONDANSETRON 4 MG: 2 INJECTION, SOLUTION INTRAMUSCULAR; INTRAVENOUS at 03:40

## 2018-04-03 RX ADMIN — SODIUM CHLORIDE: 9 INJECTION, SOLUTION INTRAVENOUS at 07:45

## 2018-04-03 RX ADMIN — ONDANSETRON 4 MG: 2 INJECTION INTRAMUSCULAR; INTRAVENOUS at 09:35

## 2018-04-03 RX ADMIN — BUDESONIDE AND FORMOTEROL FUMARATE DIHYDRATE 2 PUFF: 160; 4.5 AEROSOL RESPIRATORY (INHALATION) at 10:50

## 2018-04-03 RX ADMIN — OXYCODONE HYDROCHLORIDE 10 MG: 10 TABLET ORAL at 13:48

## 2018-04-03 RX ADMIN — CLONAZEPAM 1 MG: 1 TABLET ORAL at 05:13

## 2018-04-03 RX ADMIN — METRONIDAZOLE 500 MG: 500 INJECTION, SOLUTION INTRAVENOUS at 23:03

## 2018-04-03 RX ADMIN — METRONIDAZOLE 500 MG: 500 INJECTION, SOLUTION INTRAVENOUS at 13:49

## 2018-04-03 RX ADMIN — CEFTRIAXONE 2 G: 2 INJECTION, POWDER, FOR SOLUTION INTRAMUSCULAR; INTRAVENOUS at 23:05

## 2018-04-03 RX ADMIN — QUETIAPINE FUMARATE 100 MG: 25 TABLET ORAL at 23:03

## 2018-04-03 RX ADMIN — METRONIDAZOLE 500 MG: 500 INJECTION, SOLUTION INTRAVENOUS at 05:13

## 2018-04-03 RX ADMIN — ALBUTEROL SULFATE 2 PUFF: 90 AEROSOL, METERED RESPIRATORY (INHALATION) at 03:47

## 2018-04-03 RX ADMIN — BUDESONIDE AND FORMOTEROL FUMARATE DIHYDRATE 2 PUFF: 160; 4.5 AEROSOL RESPIRATORY (INHALATION) at 23:02

## 2018-04-03 RX ADMIN — ENOXAPARIN SODIUM 40 MG: 100 INJECTION SUBCUTANEOUS at 23:02

## 2018-04-03 RX ADMIN — FUROSEMIDE 40 MG: 10 INJECTION, SOLUTION INTRAMUSCULAR; INTRAVENOUS at 05:13

## 2018-04-03 RX ADMIN — HYDROMORPHONE HYDROCHLORIDE 1 MG: 10 INJECTION, SOLUTION INTRAMUSCULAR; INTRAVENOUS; SUBCUTANEOUS at 10:54

## 2018-04-03 RX ADMIN — SIMVASTATIN 20 MG: 10 TABLET, FILM COATED ORAL at 23:02

## 2018-04-03 RX ADMIN — CLONAZEPAM 1 MG: 1 TABLET ORAL at 13:48

## 2018-04-03 RX ADMIN — OXYCODONE HYDROCHLORIDE 15 MG: 5 TABLET ORAL at 17:25

## 2018-04-03 RX ADMIN — HYDROMORPHONE HYDROCHLORIDE 1 MG: 10 INJECTION, SOLUTION INTRAMUSCULAR; INTRAVENOUS; SUBCUTANEOUS at 23:02

## 2018-04-03 ASSESSMENT — PAIN SCALES - GENERAL
PAINLEVEL_OUTOF10: 10
PAINLEVEL_OUTOF10: 8
PAINLEVEL_OUTOF10: 8
PAINLEVEL_OUTOF10: 9
PAINLEVEL_OUTOF10: 8
PAINLEVEL_OUTOF10: 8
PAINLEVEL_OUTOF10: 10
PAINLEVEL_OUTOF10: 8
PAINLEVEL_OUTOF10: 8

## 2018-04-03 ASSESSMENT — ENCOUNTER SYMPTOMS
BRUISES/BLEEDS EASILY: 0
MUSCULOSKELETAL NEGATIVE: 1
NERVOUS/ANXIOUS: 0
TINGLING: 0
WEAKNESS: 1
DEPRESSION: 0
DOUBLE VISION: 0
BLOOD IN STOOL: 0
DIZZINESS: 0
EYES NEGATIVE: 1
EYE DISCHARGE: 0
PSYCHIATRIC NEGATIVE: 1
COUGH: 1
ABDOMINAL PAIN: 1
WEIGHT LOSS: 0
MEMORY LOSS: 0
HALLUCINATIONS: 0
SHORTNESS OF BREATH: 1
NAUSEA: 0
PALPITATIONS: 0
HEMOPTYSIS: 0
EYE PAIN: 0
MYALGIAS: 0
PND: 0
CLAUDICATION: 0
HEADACHES: 0
NECK PAIN: 0

## 2018-04-03 ASSESSMENT — LIFESTYLE VARIABLES: DO YOU DRINK ALCOHOL: NO

## 2018-04-03 NOTE — OR NURSING
0921- Pt arrives from Or, pt on 10L mask, udsed inhaler prior to coming to PACU.  Pt alert and drain noted to right abd.    0935- Pt c/o nausea, 4mg zofran given.    0945- Report called to pt's previous bed, transport request placed.    1004- Pt sleeping at this time.    1014- Transport here to take pt back to his room.

## 2018-04-03 NOTE — PROGRESS NOTES
Pt returned from ERCP, with O2 at 5L NC, pt titrated down to 3L, O2 saturation at 96%. Pt ambulated with FWW, steady gait from bed to bathroom , +flatus, -BM, +void. Pt given ice chips per active order, advanced diet to CLD for lunch per active order. Pt has 4 lap sites to abdomen, CDI and RQ dressing  To be changed per active order. ELLA with moderate output to RLQ. All questions and concerns have been addressed at this time. Bariatric accommodations have been met, bed in locked/lowest position, call light and personal items within reach, bed alarm on, will continue to monitor.

## 2018-04-03 NOTE — PROGRESS NOTES
[facility] Hospitalist Progress Note    Date of Service: 4/3/2018    Chief Complaint  62 y.o. male admitted 3/30/2018 with abdominal pain    Interval Problem Update  Mr. Kumari is a 62-year-old male comes to us from [place]. The patient has morbid obesity was recently with a surgical intervention to remove his appendix. The patient still has been having some postoperative pain for which she is recovering. The patient now has developed severe epigastric pain that was radiating about-like fashion. The patient was found to have acute cholecystitis with common bile duct stone and the patient also had acute pancreatitis with it. Patient at this point underwent surgical removal of the gallbladder as well as intraoperative: Angiogram. Patient at this point has improved with his overall pancreatitis pain is better at this point he will be allowed to eat and ambulate. SOB with possible some signs offluid overload then treated with iv lasix and CPAP.     4/3 still some abd pain and SOB. ERCP today with good result. On clears. Patient's pain is local, 6-8/10, intermittent and does not radiate to other location, sharp and with some tingling. Can be controlled by pain meds.     Consultants/Specialty  Surgery Dr Olson    Disposition  To be determined        Review of Systems   Constitutional: Negative for malaise/fatigue and weight loss.   HENT: Negative.  Negative for congestion and nosebleeds.    Eyes: Negative.  Negative for double vision, pain and discharge.   Respiratory: Positive for cough and shortness of breath. Negative for hemoptysis.    Cardiovascular: Negative for chest pain, palpitations, claudication and PND.   Gastrointestinal: Positive for abdominal pain. Negative for blood in stool and nausea.        Loss of appetitie    Genitourinary: Negative.  Negative for dysuria and urgency.   Musculoskeletal: Negative.  Negative for joint pain, myalgias and neck pain.   Skin: Negative.  Negative for itching and rash.    Neurological: Positive for weakness. Negative for dizziness, tingling and headaches.   Endo/Heme/Allergies: Negative.  Does not bruise/bleed easily.   Psychiatric/Behavioral: Negative.  Negative for depression, hallucinations, memory loss and suicidal ideas. The patient is not nervous/anxious.    All other systems reviewed and are negative.     Physical Exam  Laboratory/Imaging   Hemodynamics  Temp (24hrs), Av.7 °C (98.1 °F), Min:36.3 °C (97.3 °F), Max:37.2 °C (98.9 °F)   Temperature: 36.3 °C (97.3 °F)  Pulse  Av.6  Min: 60  Max: 126 Heart Rate (Monitored): (!) 120  Blood Pressure: 112/72, NIBP: 105/74      Respiratory      Respiration: (!) 22, Pulse Oximetry: 97 %     Given By:: Mouthpiece, Work Of Breathing / Effort: Moderate;Tachypnea  RUL Breath Sounds: Coarse Crackles, RML Breath Sounds: Coarse Crackles, RLL Breath Sounds: Coarse Crackles, CEASAR Breath Sounds: Coarse Crackles, LLL Breath Sounds: Coarse Crackles    Fluids    Intake/Output Summary (Last 24 hours) at 18 1447  Last data filed at 18 1425   Gross per 24 hour   Intake             3870 ml   Output             1440 ml   Net             2430 ml       Nutrition  Orders Placed This Encounter   Procedures   • DIET ORDER     Standing Status:   Standing     Number of Occurrences:   1     Order Specific Question:   Diet:     Answer:   Clear Liquid [10]     Physical Exam   Constitutional: He is oriented to person, place, and time. He appears well-developed. He is cooperative. He has a sickly appearance. No distress. Nasal cannula in place.   HENT:   Head: Normocephalic and atraumatic.   Nose: Nose normal.   Mouth/Throat: Oropharynx is clear and moist.   Eyes: Conjunctivae and EOM are normal. Pupils are equal, round, and reactive to light.   Neck: Normal range of motion. Neck supple. No JVD present. No thyromegaly present.   Cardiovascular: Normal rate, regular rhythm and normal heart sounds.    Pulmonary/Chest: Effort normal. No  respiratory distress. He has no wheezes. He has rales. He exhibits no tenderness.   Abdominal: Soft. Bowel sounds are normal. He exhibits distension (from morbid obesity). He exhibits no mass. There is generalized tenderness. There is no rebound.       Musculoskeletal: Normal range of motion. He exhibits no edema, tenderness or deformity.   Lymphadenopathy:     He has no cervical adenopathy.   Neurological: He is alert and oriented to person, place, and time. He has normal reflexes. He is not disoriented. He exhibits normal muscle tone. GCS eye subscore is 4. GCS verbal subscore is 5. GCS motor subscore is 6.   Skin: Skin is warm and dry. He is not diaphoretic. No erythema.   Psychiatric: He has a normal mood and affect. His behavior is normal. Judgment and thought content normal.   Nursing note and vitals reviewed.      Recent Labs      04/01/18   0330   WBC  10.0   RBC  3.96*   HEMOGLOBIN  11.4*   HEMATOCRIT  38.2*   MCV  96.5   MCH  28.8   MCHC  29.8*   RDW  49.9   PLATELETCT  141*   MPV  11.0     Recent Labs      04/01/18   0330   SODIUM  135   POTASSIUM  4.4   CHLORIDE  107   CO2  20   GLUCOSE  64*   BUN  42*   CREATININE  1.05   CALCIUM  8.2*                      Assessment/Plan     * Acute pancreatitis- (present on admission)   Assessment & Plan    Lipase levels are down. The elevation in lipase is most likely secondary to common bile duct stone.   ERCP4/3   Patient is now status post surgery postoperative day #1        Cholelithiasis- (present on admission)   Assessment & Plan    Status post cholecystectomy.  The patient this point will be continued on antibiotics.  Continued this point with pain management.  Monitor fluid status and continue with fluid hydration.          Cholecystitis- (present on admission)   Assessment & Plan    Today's day 5 out of 7 of IV Rocephin and Flagyl. This can be switched over to oral once the patient's oral intake is adequate.  S/p ERCP 4/3, Cystic duct leak secondary to  gangrenous gallbladder, treated with biliary sphincterotomy placement of decompressive 10Fr x 9cm plastic biliary stent          Morbid obesity (CMS-[facility])- (present on admission)   Assessment & Plan    I discussed with patient about doing weight loss management as an outpatient including bariatric clinic evaluation.  Body mass index is 50.53 kg/m².        Sepsis (CMS-[facility])- (present on admission)   Assessment & Plan    This is sepsis (without associated acute organ dysfunction).   Resolved initial sepsis-like status.        SANTOSH on CPAP- (present on admission)   Assessment & Plan    Patient remains on CPAP due to his large body habitus at nighttime. Continue with respiratory protocol        COPD (chronic obstructive pulmonary disease) (CMS-HCC)- (present on admission)   Assessment & Plan    Currently no acute COPD exacerbation remains on RT protocol and nebulizer treatments.        History of hepatitis C- (present on admission)   Assessment & Plan    Patient has a history of hepatitis C at this point and monitor liver functions as well as hepatitis C viral levels.        History of atrial fibrillation- (present on admission)   Assessment & Plan    Patient's atrial fibrillation was previously corrected with a combination of rate control as well as anticoagulation. This will be resumed once surgery says it's okay to resume anticoagulation.          Circulating anticoagulants (CMS-[facility])- (present on admission)   Assessment & Plan    Patient may resume anticoagulation once surgery deems feasible        History of DVT (deep vein thrombosis)- (present on admission)   Assessment & Plan    Patient postoperatively may be able to be resumed on his anticoagulation depending on when surgery allows this to happen. Usually within the next 48 hours          Quality-Core Measures   Reviewed items::  EKG reviewed, Medications reviewed, Radiology images reviewed and Labs reviewed  Cummings catheter::  No Cummings  DVT prophylaxis  pharmacological::  Contraindicated - High bleeding risk and Enoxaparin (Lovenox)  DVT prophylaxis - mechanical:  SCDs  Ulcer Prophylaxis::  Yes  Antibiotics:  Treating active infection/contamination beyond 24 hours perioperative coverage  Assessed for rehabilitation services:  Patient was assess for and/or received rehabilitation services during this hospitalization   For complexity-based billing, please refer to the history, exam, and decison making above. In addition, I spent >35 minutes caring for the patient today. More than 50% of the time was spent counseling and coordinating care.    I have discussed with RN and JAMEY and SW and other consultants about patient's plan.

## 2018-04-03 NOTE — PROCEDURES
Endoscopic Retrograde Cholangiopancreatography    Date of Procedure:  4/3/2018  Attending Physician:  Ariel Aguirre MD  Indications: Bile duct leak due to gangrenous gallbladder and friable cystic duct      Instrument: Olympus Flexible Sideviewing Endoscope  Sedation: Nicko Mike M.D./General    Pre-Anesthesia Assessment:  Prior to the procedure, a History and Physical was performed, and patient medications and allergies were reviewed. The patient’s tolerance of previous anesthesia was also reviewed. The risks and benefits of the procedure and the sedation options and risks were discussed with the patient including but not limited to infection, bleeding, aspiration, perforation, adverse medication reaction, missed diagnosis, missed lesions, and pancreatitis. The patient verbalized understanding. All questions were answered, and informed consent was obtained    After I obtained informed consent from the patient, the patient was placed in the supine position. Appropriate time-out protocol was followed: the correct patient, the correct procedure, and the correct equipment in the room were confirmed. Throughout the procedure, the patient’s blood pressure, pulse, and oxygen saturations were monitored continuously. The Olymus flexible sideviewing duodenoscope was inserted through the oropharynx, esophagus intubated, then advanced to the gastrointestinal tract to the major papilla. The duct(s) were cannulated and contrast was injected I personally interpreted the ductal images.  Findings and interventions were performed and documented below. Air was then withdrawn and the duodenoscope was removed. The patient tolerated the procedure well. There were no immediate postoperative complications    Findings:     film showed right upper quadrant drain. Stomach with fluid and food debris but no obvious signs of gastric outlet obstruction. Papilla located in the 2nd portion of duodenum. Utilizing 0.025 wire and sphincterotome  cannulation of the biliary system was pure. Wire was advanced into the right intrahepatic duct over the expected course. Test cholangiogram demonstrated anatomy. Due to patient known history of friable cystic duct with clips unable to secure a traction delivery sphincterotomy was made. There were no bleeding noted. Utilizing a 9-12 mm extractor balloon a stepwise occlusion cholangiogram from proximal to distal demonstrated no proximal leakage or extravasation of contrast in the liver or proximal duct. Slow pull-through demonstrated filling of the cystic duct which appeared long. There appears to be extravasation of contrast into the location of drain. Sphincterotomy site without extravasation. The bile duct does appear mildly dilated. A 10 Burmese by 9 cm plastic bile duct stent was placed successfully. It did span above the location of the cystic duct takeoff. Throughout the procedure the pancreatic duct was never cannulated or injected. Bilateral films under diaphragm was negative for free air.    Impressions:   1. Cystic duct leak secondary to gangrenous gallbladder, treated with biliary sphincterotomy placement of decompressive 10Fr x 9cm plastic biliary stent    Recommendations:   1. Nothing by mouth for 4 hours and if no pain advanced diet to clears and then as tolerated  2. Monitor for postprocedure complication including sphincterotomy bleed  3. Ideally hold anticoagulation for 3 days but due to history of atrial fibrillation, from GI's stand point can starting next dose as scheduled.  4. Stent patency approximately 3 months, will need removal at that time with repeat ERCP and cholangiogram  5. Surgical drain management as per team.    NOTE: Radiologic interpretation of dynamic and static fluoroscopic imaging by myself.  At no time was/were a Radiologist present.

## 2018-04-03 NOTE — PROGRESS NOTES
Pt's work of breathing increased and crackles heard. Respiratory called for PRN nebulizer; pt tolerated well, but lungs still wet. Hospitalist on-call, Dr. Farooq joaquind and 40 of Lasixs ordered as well as maintenance fluids stopped. No other new orders at this time.

## 2018-04-03 NOTE — DOCUMENTATION QUERY
DOCUMENTATION QUERY    PROVIDERS: Please select “Cosign w/ note” to reply to query.    To better represent the severity of illness of your patient, please review the following information and exercise your independent professional judgment in responding to this query.     Sepsis is documented in the H&P and Hospitalist Progress Notes and MINO is noted in the CDI query response. Based upon the clinical findings, risk factors, and treatment, can the relationship between these two conditions be further specified?    • Acute kidney injury is related to sepsis (severe sepsis)  • Acute kidney injury is not related to sepsis  • Other explanation of clinical findings  • Unable to determine    The medical record reflects the following:   Clinical Findings CDI query response: acute kidney injury    4/1 Hosp PN: sepsis - resolved    3/31 Hosp PN:  · Sepsis - secondary to cholecystitis on admission  · Follow lactic acid, follow vitals  · WBC has improved from 17.5 - 12.1    H&P:  · Sepsis - has cholecystitis on scan at outlying facility  · Ordered IVF, IV abx and sepsis protocol, resolving     BUN 42 - 32  Creatinine 1.69 - 1.05  GFR >60 - 41   Treatment IVF  Sepsis protocol   Ceftriaxone  Flagyl   S/p cholecystectomy    Risk Factors Sepsis  Cholecystitis  Gallbladder pancreatitis   MINO   Location within medical record History and Physical, Progress Notes, CDI query and Lab Results      Thank you,   Martina Lagunas, RN, BSN  Clinical   [phone]

## 2018-04-03 NOTE — CARE PLAN
Problem: Safety  Goal: Will remain free from injury  Outcome: PROGRESSING AS EXPECTED  Proper fall precautions in place. Call light within reach and encouraged to use. Hourly rounding in practice.      Problem: Skin Integrity  Goal: Risk for impaired skin integrity will decrease  Outcome: PROGRESSING AS EXPECTED  Pt demonstrates ability to turn self in bed without assistance of staff. Understands importance in prevention of breakdown, ulcers, and potential infection. Hourly rounding in effect.

## 2018-04-03 NOTE — PROGRESS NOTES
"  Trauma/Surgical Progress Note    Author: Meche Olson Date & Time created: 4/3/2018   10:46 AM     Interval Events:  SP lap nilson for gangrenous cholecystitis - friable duct with drain left. ERCP today to drain. Reg diet. Mobilize.    Hemodynamics:  Blood pressure 141/69, pulse (!) 122, temperature 36.9 °C (98.4 °F), resp. rate 18, height 1.88 m (6' 2\"), weight (!) 178.5 kg (393 lb 8.3 oz), SpO2 98 %.     Respiratory:    Respiration: 18, Pulse Oximetry: 98 %     Given By:: Mouthpiece, Work Of Breathing / Effort: Moderate  RUL Breath Sounds: Coarse Crackles, RML Breath Sounds: Coarse Crackles, RLL Breath Sounds: Coarse Crackles, CEASAR Breath Sounds: Coarse Crackles, LLL Breath Sounds: Coarse Crackles  Fluids:    Intake/Output Summary (Last 24 hours) at 04/03/18 1046  Last data filed at 04/03/18 0600   Gross per 24 hour   Intake             3870 ml   Output             1185 ml   Net             2685 ml     Admit Weight: (!) 172.4 kg (380 lb)  Current     Physical Exam   Constitutional: He appears well-developed.   HENT:   Head: Normocephalic.   Cardiovascular: Normal rate.    Pulmonary/Chest: He is in respiratory distress.   Abdominal: Soft. There is tenderness.   Dressings dry  ELLA bilious drainage   Musculoskeletal: Normal range of motion.   Neurological: He is alert.   Skin: Skin is warm.       Medical Decision Making/Problem List:    Active Hospital Problems    Diagnosis   • Acute pancreatitis [K85.90]     Priority: High   • Cholecystitis [K81.9]     Priority: High     Severe gangrenous cholecystitis  4/2 - Lap nilson - friable duct - bile drainage noted, drain left  4/3 - ERCP, stent       • Cholelithiasis [K80.20]     Priority: High   • Morbid obesity (CMS-HCC) [E66.01]   • History of DVT (deep vein thrombosis) [Z86.718]   • Circulating anticoagulants (CMS-HCC) [D68.318]   • History of CHF (congestive heart failure) [Z86.79]   • History of atrial fibrillation [Z86.79]   • History of hepatitis C [Z86.19]   • COPD " (chronic obstructive pulmonary disease) (CMS-HCC) [J44.9]   • SANTOSH on CPAP [G47.33, Z99.89]   • Sepsis (CMS-HCC) [A41.9]     Core Measures & Quality Metrics:  Labs reviewed, Medications reviewed and Radiology images reviewed  Cummings catheter: No Cummings      DVT Prophylaxis: Enoxaparin (Lovenox)  DVT prophylaxis - mechanical: SCDs  Ulcer prophylaxis: Yes  Antibiotics: Treating active infection/contamination beyond 24 hours perioperative coverage  Assessed for rehab: Patient unable to tolerate rehabilitation therapeutic regimen    FRANCIS Score  Discussed patient condition with RN and Patient.  CRITICAL CARE TIME EXCLUDING PROCEDURES: 20    minutes

## 2018-04-03 NOTE — PROGRESS NOTES
Assumed care of pt at 1900, report given.  A+O x 4, VSS, and on 2/3L O2 NC (CPAP at night).  Pt has 4 abdominal lap sites; covered with dry gauze, CDI. No drainage.   Pt has POA RQ wound; dressing changes daily per active order; tolerating well. Dressing CDI; no drainage.   RLQ ELLA drain in place draining moderate serosanguinous/purulent fluid to bulb suction.  +void, +flatus.   Pt tolerating regular diet well, but NPO at midnight for ERCP in AM.   Pt ambulates with a 1 assist FWW to restroom.   Bariatric accommodations with trapeze in place; pt tolerating well.      Pt updated on POC and all questions answered at this time.  Bed in lowest position, call light within reach, and no current needs.

## 2018-04-04 LAB
ANION GAP SERPL CALC-SCNC: 5 MMOL/L (ref 0–11.9)
BASOPHILS # BLD AUTO: 0.3 % (ref 0–1.8)
BASOPHILS # BLD: 0.02 K/UL (ref 0–0.12)
BUN SERPL-MCNC: 18 MG/DL (ref 8–22)
CALCIUM SERPL-MCNC: 8.3 MG/DL (ref 8.5–10.5)
CHLORIDE SERPL-SCNC: 104 MMOL/L (ref 96–112)
CO2 SERPL-SCNC: 24 MMOL/L (ref 20–33)
CREAT SERPL-MCNC: 0.63 MG/DL (ref 0.5–1.4)
EOSINOPHIL # BLD AUTO: 0.22 K/UL (ref 0–0.51)
EOSINOPHIL NFR BLD: 3.6 % (ref 0–6.9)
ERYTHROCYTE [DISTWIDTH] IN BLOOD BY AUTOMATED COUNT: 47.4 FL (ref 35.9–50)
GLUCOSE SERPL-MCNC: 107 MG/DL (ref 65–99)
HCT VFR BLD AUTO: 30.5 % (ref 42–52)
HGB BLD-MCNC: 9.8 G/DL (ref 14–18)
IMM GRANULOCYTES # BLD AUTO: 0.07 K/UL (ref 0–0.11)
IMM GRANULOCYTES NFR BLD AUTO: 1.1 % (ref 0–0.9)
LYMPHOCYTES # BLD AUTO: 0.52 K/UL (ref 1–4.8)
LYMPHOCYTES NFR BLD: 8.5 % (ref 22–41)
MCH RBC QN AUTO: 29.3 PG (ref 27–33)
MCHC RBC AUTO-ENTMCNC: 32.1 G/DL (ref 33.7–35.3)
MCV RBC AUTO: 91.3 FL (ref 81.4–97.8)
MONOCYTES # BLD AUTO: 0.66 K/UL (ref 0–0.85)
MONOCYTES NFR BLD AUTO: 10.8 % (ref 0–13.4)
NEUTROPHILS # BLD AUTO: 4.6 K/UL (ref 1.82–7.42)
NEUTROPHILS NFR BLD: 75.7 % (ref 44–72)
NRBC # BLD AUTO: 0 K/UL
NRBC BLD-RTO: 0 /100 WBC
PLATELET # BLD AUTO: 181 K/UL (ref 164–446)
PMV BLD AUTO: 11.4 FL (ref 9–12.9)
POTASSIUM SERPL-SCNC: 3.8 MMOL/L (ref 3.6–5.5)
RBC # BLD AUTO: 3.34 M/UL (ref 4.7–6.1)
SODIUM SERPL-SCNC: 133 MMOL/L (ref 135–145)
WBC # BLD AUTO: 6.1 K/UL (ref 4.8–10.8)

## 2018-04-04 PROCEDURE — 700102 HCHG RX REV CODE 250 W/ 637 OVERRIDE(OP): Performed by: HOSPITALIST

## 2018-04-04 PROCEDURE — 700111 HCHG RX REV CODE 636 W/ 250 OVERRIDE (IP): Performed by: HOSPITALIST

## 2018-04-04 PROCEDURE — A9270 NON-COVERED ITEM OR SERVICE: HCPCS | Performed by: INTERNAL MEDICINE

## 2018-04-04 PROCEDURE — 770001 HCHG ROOM/CARE - MED/SURG/GYN PRIV*

## 2018-04-04 PROCEDURE — 700111 HCHG RX REV CODE 636 W/ 250 OVERRIDE (IP): Performed by: NURSE PRACTITIONER

## 2018-04-04 PROCEDURE — 700101 HCHG RX REV CODE 250: Performed by: HOSPITALIST

## 2018-04-04 PROCEDURE — 700102 HCHG RX REV CODE 250 W/ 637 OVERRIDE(OP): Performed by: INTERNAL MEDICINE

## 2018-04-04 PROCEDURE — 700102 HCHG RX REV CODE 250 W/ 637 OVERRIDE(OP): Performed by: NURSE PRACTITIONER

## 2018-04-04 PROCEDURE — 99232 SBSQ HOSP IP/OBS MODERATE 35: CPT | Performed by: INTERNAL MEDICINE

## 2018-04-04 PROCEDURE — 700111 HCHG RX REV CODE 636 W/ 250 OVERRIDE (IP): Performed by: SURGERY

## 2018-04-04 PROCEDURE — 700105 HCHG RX REV CODE 258

## 2018-04-04 PROCEDURE — 85025 COMPLETE CBC W/AUTO DIFF WBC: CPT

## 2018-04-04 PROCEDURE — 36415 COLL VENOUS BLD VENIPUNCTURE: CPT

## 2018-04-04 PROCEDURE — 80048 BASIC METABOLIC PNL TOTAL CA: CPT

## 2018-04-04 PROCEDURE — A9270 NON-COVERED ITEM OR SERVICE: HCPCS | Performed by: HOSPITALIST

## 2018-04-04 PROCEDURE — A9270 NON-COVERED ITEM OR SERVICE: HCPCS | Performed by: NURSE PRACTITIONER

## 2018-04-04 RX ORDER — SODIUM CHLORIDE 9 MG/ML
INJECTION, SOLUTION INTRAVENOUS
Status: COMPLETED
Start: 2018-04-04 | End: 2018-04-04

## 2018-04-04 RX ORDER — FUROSEMIDE 20 MG/1
40 TABLET ORAL
Status: DISCONTINUED | OUTPATIENT
Start: 2018-04-04 | End: 2018-04-07 | Stop reason: HOSPADM

## 2018-04-04 RX ORDER — ACETAMINOPHEN 500 MG
1000 TABLET ORAL EVERY 6 HOURS
Status: DISCONTINUED | OUTPATIENT
Start: 2018-04-04 | End: 2018-04-07 | Stop reason: HOSPADM

## 2018-04-04 RX ORDER — KETOROLAC TROMETHAMINE 30 MG/ML
30 INJECTION, SOLUTION INTRAMUSCULAR; INTRAVENOUS EVERY 6 HOURS
Status: DISCONTINUED | OUTPATIENT
Start: 2018-04-04 | End: 2018-04-07 | Stop reason: HOSPADM

## 2018-04-04 RX ORDER — METRONIDAZOLE 500 MG/1
500 TABLET ORAL EVERY 8 HOURS
Status: COMPLETED | OUTPATIENT
Start: 2018-04-04 | End: 2018-04-05

## 2018-04-04 RX ORDER — POTASSIUM CHLORIDE 20 MEQ/1
20 TABLET, EXTENDED RELEASE ORAL DAILY
Status: DISCONTINUED | OUTPATIENT
Start: 2018-04-04 | End: 2018-04-05

## 2018-04-04 RX ADMIN — OXYCODONE HYDROCHLORIDE 15 MG: 5 TABLET ORAL at 22:45

## 2018-04-04 RX ADMIN — KETOROLAC TROMETHAMINE 30 MG: 30 INJECTION, SOLUTION INTRAMUSCULAR; INTRAVENOUS at 22:45

## 2018-04-04 RX ADMIN — OXYCODONE HYDROCHLORIDE 15 MG: 5 TABLET ORAL at 08:45

## 2018-04-04 RX ADMIN — METRONIDAZOLE 500 MG: 500 TABLET ORAL at 13:40

## 2018-04-04 RX ADMIN — FUROSEMIDE 40 MG: 20 TABLET ORAL at 11:46

## 2018-04-04 RX ADMIN — OXYCODONE HYDROCHLORIDE 15 MG: 5 TABLET ORAL at 11:46

## 2018-04-04 RX ADMIN — METRONIDAZOLE 500 MG: 500 TABLET ORAL at 22:46

## 2018-04-04 RX ADMIN — ENOXAPARIN SODIUM 40 MG: 100 INJECTION SUBCUTANEOUS at 08:46

## 2018-04-04 RX ADMIN — ACETAMINOPHEN 1000 MG: 500 TABLET ORAL at 11:00

## 2018-04-04 RX ADMIN — STANDARDIZED SENNA CONCENTRATE AND DOCUSATE SODIUM 2 TABLET: 8.6; 5 TABLET, FILM COATED ORAL at 08:46

## 2018-04-04 RX ADMIN — FAMOTIDINE 20 MG: 20 TABLET, FILM COATED ORAL at 22:46

## 2018-04-04 RX ADMIN — CLONAZEPAM 1 MG: 1 TABLET ORAL at 13:40

## 2018-04-04 RX ADMIN — BUDESONIDE AND FORMOTEROL FUMARATE DIHYDRATE 2 PUFF: 160; 4.5 AEROSOL RESPIRATORY (INHALATION) at 22:45

## 2018-04-04 RX ADMIN — FUROSEMIDE 40 MG: 20 TABLET ORAL at 17:13

## 2018-04-04 RX ADMIN — CLONAZEPAM 1 MG: 1 TABLET ORAL at 05:31

## 2018-04-04 RX ADMIN — KETOROLAC TROMETHAMINE 30 MG: 30 INJECTION, SOLUTION INTRAMUSCULAR; INTRAVENOUS at 17:13

## 2018-04-04 RX ADMIN — CEFTRIAXONE 2 G: 2 INJECTION, POWDER, FOR SOLUTION INTRAMUSCULAR; INTRAVENOUS at 22:48

## 2018-04-04 RX ADMIN — ACETAMINOPHEN 1000 MG: 500 TABLET ORAL at 17:13

## 2018-04-04 RX ADMIN — SODIUM CHLORIDE: 9 INJECTION, SOLUTION INTRAVENOUS at 05:30

## 2018-04-04 RX ADMIN — TIOTROPIUM BROMIDE 1 CAPSULE: 18 CAPSULE ORAL; RESPIRATORY (INHALATION) at 08:46

## 2018-04-04 RX ADMIN — STANDARDIZED SENNA CONCENTRATE AND DOCUSATE SODIUM 2 TABLET: 8.6; 5 TABLET, FILM COATED ORAL at 22:45

## 2018-04-04 RX ADMIN — METRONIDAZOLE 500 MG: 500 INJECTION, SOLUTION INTRAVENOUS at 05:31

## 2018-04-04 RX ADMIN — ACETAMINOPHEN 1000 MG: 500 TABLET ORAL at 22:45

## 2018-04-04 RX ADMIN — CLONAZEPAM 1 MG: 1 TABLET ORAL at 22:45

## 2018-04-04 RX ADMIN — BUDESONIDE AND FORMOTEROL FUMARATE DIHYDRATE 2 PUFF: 160; 4.5 AEROSOL RESPIRATORY (INHALATION) at 08:46

## 2018-04-04 RX ADMIN — POTASSIUM CHLORIDE 20 MEQ: 1500 TABLET, EXTENDED RELEASE ORAL at 11:45

## 2018-04-04 RX ADMIN — OXYCODONE HYDROCHLORIDE 15 MG: 5 TABLET ORAL at 18:43

## 2018-04-04 RX ADMIN — OXYCODONE HYDROCHLORIDE 15 MG: 5 TABLET ORAL at 05:31

## 2018-04-04 RX ADMIN — FAMOTIDINE 20 MG: 20 TABLET, FILM COATED ORAL at 08:45

## 2018-04-04 RX ADMIN — RIVAROXABAN 20 MG: 20 TABLET, FILM COATED ORAL at 17:13

## 2018-04-04 RX ADMIN — SIMVASTATIN 20 MG: 10 TABLET, FILM COATED ORAL at 22:46

## 2018-04-04 RX ADMIN — KETOROLAC TROMETHAMINE 30 MG: 30 INJECTION, SOLUTION INTRAMUSCULAR; INTRAVENOUS at 11:00

## 2018-04-04 RX ADMIN — QUETIAPINE FUMARATE 100 MG: 25 TABLET ORAL at 22:46

## 2018-04-04 ASSESSMENT — ENCOUNTER SYMPTOMS
DOUBLE VISION: 0
MYALGIAS: 0
CHILLS: 0
BACK PAIN: 1
EYE PAIN: 0
CARDIOVASCULAR NEGATIVE: 1
NAUSEA: 0
EYES NEGATIVE: 1
SHORTNESS OF BREATH: 1
PALPITATIONS: 0
MEMORY LOSS: 0
HEADACHES: 0
HALLUCINATIONS: 0
DIZZINESS: 0
TINGLING: 0
MUSCULOSKELETAL NEGATIVE: 1
ABDOMINAL PAIN: 0
EYE DISCHARGE: 0
CLAUDICATION: 0
NERVOUS/ANXIOUS: 0
CONSTITUTIONAL NEGATIVE: 1
PSYCHIATRIC NEGATIVE: 1
WEAKNESS: 0
BLOOD IN STOOL: 0
GASTROINTESTINAL NEGATIVE: 1
DEPRESSION: 0
NECK PAIN: 0
WEIGHT LOSS: 0
BRUISES/BLEEDS EASILY: 0
RESPIRATORY NEGATIVE: 1
COUGH: 0
PND: 0
HEMOPTYSIS: 0

## 2018-04-04 ASSESSMENT — PAIN SCALES - GENERAL
PAINLEVEL_OUTOF10: 6
PAINLEVEL_OUTOF10: 8
PAINLEVEL_OUTOF10: 9

## 2018-04-04 ASSESSMENT — LIFESTYLE VARIABLES: DO YOU DRINK ALCOHOL: NO

## 2018-04-04 NOTE — PROGRESS NOTES
Assumed care of pt at 1900, report given.  A+O x 4, VSS, and on 3L O2 NC (CPAP at night).  Pt has 4 abdominal lap sites; covered with dry gauze, CDI. No drainage.   Pt has POA RQ wound; dressing changes daily per active order; tolerating well. Dressing CDI; no drainage.   RLQ ELLA drain in place draining moderate serosanguinous/purulent fluid to bulb suction.  +void, +flatus.   Pt tolerating regular diet.  Pt ambulates with a 1 assist FWW to restroom.   Bariatric accommodations with trapeze in place; pt tolerating well.      Pt updated on POC and all questions answered at this time.  Bed in lowest position, call light within reach, and no current needs.

## 2018-04-04 NOTE — PROGRESS NOTES
Gastroenterology Progress Note     Author: Ariel Aguirre   Date & Time Created: 4/4/2018 4:35 PM    Interval History:  4/3/2018: ERCP with sphincterotomy and placement of 10Fr x 9cm plastic biliary stent for cystic duct leak  4/4/2018: no issues overnight. Reports abdominal pain improving.    Chief Complaint:  Cystic Duct Leak    Review of Systems:  Review of Systems   Constitutional: Negative.    HENT: Negative.    Eyes: Negative.    Respiratory: Negative.    Cardiovascular: Negative.    Gastrointestinal: Negative.    Genitourinary: Negative.    Musculoskeletal: Negative.    Skin: Negative.    Endo/Heme/Allergies: Negative.    Psychiatric/Behavioral: Negative.        Physical Exam:  Physical Exam   Constitutional: He appears well-developed and well-nourished.   Morbidly obese   HENT:   Head: Normocephalic and atraumatic.   Eyes: Conjunctivae are normal. Pupils are equal, round, and reactive to light.   Neck: Normal range of motion. Neck supple. No JVD present. No tracheal deviation present. No thyromegaly present.   Cardiovascular: Normal rate and regular rhythm.    Pulmonary/Chest: No stridor. No respiratory distress. He has no wheezes. He has no rales.   Abdominal: He exhibits no distension and no mass. There is tenderness. There is no rebound and no guarding.   ELLA drain in place.   Lymphadenopathy:     He has no cervical adenopathy.   Neurological: He is alert.   Skin: Skin is warm.       Labs:        Invalid input(s): [id number]      Recent Labs      04/03/18   1450  04/04/18   0446   SODIUM  132*  133*   POTASSIUM  3.8  3.8   CHLORIDE  105  104   CO2  20  24   BUN  24*  18   CREATININE  0.71  0.63   CALCIUM  8.0*  8.3*     Recent Labs      04/03/18   1450  04/04/18   0446   GLUCOSE  123*  107*     Recent Labs      04/03/18   1450  04/04/18   0446   RBC  3.21*  3.34*   HEMOGLOBIN  9.5*  9.8*   HEMATOCRIT  29.3*  30.5*   PLATELETCT  170  181     Recent Labs      04/03/18   1450  04/04/18   0446   WBC  6.8   6.1   NEUTSPOLYS  86.00*  75.70*   LYMPHOCYTES  7.00*  8.50*   MONOCYTES  3.50  10.80   EOSINOPHILS  3.50  3.60   BASOPHILS  0.00  0.30     Hemodynamics:  Temp (24hrs), Av.9 °C (98.5 °F), Min:36.5 °C (97.7 °F), Max:37.5 °C (99.5 °F)  Temperature: 36.7 °C (98 °F)  Pulse  Av.4  Min: 60  Max: 126  Blood Pressure: 112/65     Respiratory:    Respiration: 20, Pulse Oximetry: 96 %     Work Of Breathing / Effort: Moderate;Tachypnea  RUL Breath Sounds: Coarse Crackles, RML Breath Sounds: Coarse Crackles, RLL Breath Sounds: Coarse Crackles, CEASAR Breath Sounds: Coarse Crackles, LLL Breath Sounds: Coarse Crackles  Fluids:    Intake/Output Summary (Last 24 hours) at 18 1635  Last data filed at 18 0600   Gross per 24 hour   Intake              560 ml   Output              660 ml   Net             -100 ml       GI/Nutrition:  Orders Placed This Encounter   Procedures   • DIET ORDER     Standing Status:   Standing     Number of Occurrences:   1     Order Specific Question:   Diet:     Answer:   Regular [1]     Medical Decision Making, by Problem:  Active Hospital Problems    Diagnosis   • *Acute pancreatitis [K85.90]   • Cholecystitis [K81.9]   • Cholelithiasis [K80.20]   • Morbid obesity (CMS-HCC) [E66.01]   • History of DVT (deep vein thrombosis) [Z86.718]   • Circulating anticoagulants (CMS-HCC) [D68.318]   • History of atrial fibrillation [Z86.79]   • History of hepatitis C [Z86.19]   • COPD (chronic obstructive pulmonary disease) (CMS-HCC) [J44.9]   • SANTOSH on CPAP [G47.33, Z99.89]   • Sepsis (CMS-HCC) [A41.9]     Impressions:  1. Necrotic gallbladder, s/p laparoscopic cholecystectomy. Cystic duct unable to be clipped due to friability of tissue- treated with ERCP with sphincterotomy on 4/3/2018 with placement of 10Fr x 9cm stent  2. Gallstone pancreatitis  3. Morbid obesity  4. History of DVT  5. COPD  6. Hepatitis C, unclear if treated  7. Sleep apnea on CPAP    Plan:  1. ELLA drain as per team  2. Repeat  ERCP in 3 months for stent removal and evaluation of leak.     GI TO SIGN OFF / STAND BY - PLEASE CALL IF ANY CONCERNS OR QUESTIONS      Quality-Core Measures

## 2018-04-04 NOTE — PROGRESS NOTES
[facility] Hospitalist Progress Note    Date of Service: 4/4/2018    Chief Complaint  62 y.o. male admitted 3/30/2018 with abdominal pain    Interval Problem Update  Mr. Kumari is a 62-year-old male comes to us from [place]. The patient has morbid obesity was recently with a surgical intervention to remove his appendix. The patient still has been having some postoperative pain for which she is recovering. The patient now has developed severe epigastric pain that was radiating about-like fashion. The patient was found to have acute cholecystitis with common bile duct stone and the patient also had acute pancreatitis with it. Patient at this point underwent surgical removal of the gallbladder as well as intraoperative: Angiogram. Patient at this point has improved with his overall pancreatitis pain is better at this point he will be allowed to eat and ambulate. SOB with possible some signs offluid overload then treated with iv lasix and CPAP.     4/3 still some abd pain and SOB. ERCP today with good result. On clears. Patient's pain is local, 6-8/10, intermittent and does not radiate to other location, sharp and with some tingling. Can be controlled by pain meds.   4/4 stable and less SOB, but patient still demonstrates significant fluid overload. Significant edema with dependent. Patient has been giving diuretics yesterday. Patient otherwise denies acute event overnight. Patient otherwise denies fever, chills, nausea, vomiting, adb pain, SOB, CP, headache, constipation, diarrhea, cough, or sputum.    Consultants/Specialty  Surgery Dr Olson    Disposition  To be determined        Review of Systems   Constitutional: Negative for chills, malaise/fatigue and weight loss.   HENT: Negative.  Negative for congestion and nosebleeds.    Eyes: Negative.  Negative for double vision, pain and discharge.   Respiratory: Positive for shortness of breath. Negative for cough and hemoptysis.    Cardiovascular: Negative for chest pain,  palpitations, claudication and PND.   Gastrointestinal: Negative for abdominal pain, blood in stool and nausea.        Loss of appetitie    Genitourinary: Negative.  Negative for dysuria and urgency.   Musculoskeletal: Positive for back pain. Negative for joint pain, myalgias and neck pain.   Skin: Negative.  Negative for itching and rash.   Neurological: Negative for dizziness, tingling, weakness and headaches.   Endo/Heme/Allergies: Negative.  Does not bruise/bleed easily.   Psychiatric/Behavioral: Negative.  Negative for depression, hallucinations, memory loss and suicidal ideas. The patient is not nervous/anxious.    All other systems reviewed and are negative.     Physical Exam  Laboratory/Imaging   Hemodynamics  Temp (24hrs), Av.8 °C (98.3 °F), Min:36.3 °C (97.3 °F), Max:37.5 °C (99.5 °F)   Temperature: 36.7 °C (98 °F)  Pulse  Av.4  Min: 60  Max: 126 Heart Rate (Monitored): (!) 120  Blood Pressure: 112/65, NIBP: 105/74      Respiratory      Respiration: 20, Pulse Oximetry: 96 %     Work Of Breathing / Effort: Moderate;Tachypnea  RUL Breath Sounds: Coarse Crackles, RML Breath Sounds: Coarse Crackles, RLL Breath Sounds: Coarse Crackles, CEASAR Breath Sounds: Coarse Crackles, LLL Breath Sounds: Coarse Crackles    Fluids    Intake/Output Summary (Last 24 hours) at 18 0928  Last data filed at 18 0600   Gross per 24 hour   Intake              660 ml   Output             1000 ml   Net             -340 ml       Nutrition  Orders Placed This Encounter   Procedures   • DIET ORDER     Standing Status:   Standing     Number of Occurrences:   1     Order Specific Question:   Diet:     Answer:   Regular [1]     Physical Exam   Constitutional: He is oriented to person, place, and time. He appears well-developed and well-nourished. He is cooperative. He has a sickly appearance. No distress. Nasal cannula in place.   HENT:   Head: Normocephalic and atraumatic.   Nose: Nose normal.   Mouth/Throat: Oropharynx  is clear and moist.   Eyes: Conjunctivae and EOM are normal. Pupils are equal, round, and reactive to light.   Neck: Normal range of motion. Neck supple. No JVD present. No thyromegaly present.   Cardiovascular: Normal rate, regular rhythm and normal heart sounds.    Pulmonary/Chest: Effort normal. He has no wheezes. He has rales. He exhibits no tenderness.   Abdominal: Soft. Bowel sounds are normal. He exhibits no distension (from morbid obesity) and no mass. There is generalized tenderness. There is no rebound.       Musculoskeletal: Normal range of motion. He exhibits edema. He exhibits no tenderness or deformity.   Lymphadenopathy:     He has no cervical adenopathy.   Neurological: He is alert and oriented to person, place, and time. He has normal reflexes. He is not disoriented. He exhibits normal muscle tone. GCS eye subscore is 4. GCS verbal subscore is 5. GCS motor subscore is 6.   Skin: Skin is warm and dry. He is not diaphoretic. No erythema.   Psychiatric: He has a normal mood and affect. His behavior is normal. Judgment and thought content normal.   Nursing note and vitals reviewed.      Recent Labs      04/03/18   1450  04/04/18   0446   WBC  6.8  6.1   RBC  3.21*  3.34*   HEMOGLOBIN  9.5*  9.8*   HEMATOCRIT  29.3*  30.5*   MCV  90.3  91.3   MCH  29.0  29.3   MCHC  32.1*  32.1*   RDW  46.3  47.4   PLATELETCT  170  181   MPV  11.0  11.4     Recent Labs      04/03/18   1450  04/04/18   0446   SODIUM  132*  133*   POTASSIUM  3.8  3.8   CHLORIDE  105  104   CO2  20  24   GLUCOSE  123*  107*   BUN  24*  18   CREATININE  0.71  0.63   CALCIUM  8.0*  8.3*                      Assessment/Plan     * Acute pancreatitis- (present on admission)   Assessment & Plan    Lipase levels are down. The elevation in lipase is most likely secondary to common bile duct stone.   ERCP4/3   Patient is now status post surgery postoperative day #2  PT OT pending        Cholelithiasis- (present on admission)   Assessment & Plan     Status post cholecystectomy.  The patient this point will be continued on antibiotics.  Continued this point with pain management.  Monitor fluid status and continue with fluid hydration.          Cholecystitis- (present on admission)   Assessment & Plan    Today's day 6 out of 7 of IV Rocephin and Flagyl. This can be switched over to oral once the patient's oral intake is adequate.  S/p ERCP 4/3, Cystic duct leak secondary to gangrenous gallbladder, treated with biliary sphincterotomy placement of decompressive 10Fr x 9cm plastic biliary stent          Morbid obesity (CMS-HCC)- (present on admission)   Assessment & Plan    I discussed with patient about doing weight loss management as an outpatient including bariatric clinic evaluation.  Body mass index is 50.53 kg/m².        Sepsis (CMS-HCC)- (present on admission)   Assessment & Plan    This is sepsis (without associated acute organ dysfunction).   Resolved initial sepsis-like status.        SANTOSH on CPAP- (present on admission)   Assessment & Plan    Patient remains on CPAP due to his large body habitus at nighttime. Continue with respiratory protocol        COPD (chronic obstructive pulmonary disease) (CMS-HCC)- (present on admission)   Assessment & Plan    Currently no acute COPD exacerbation remains on RT protocol and nebulizer treatments.        History of hepatitis C- (present on admission)   Assessment & Plan    Patient has a history of hepatitis C at this point and monitor liver functions as well as hepatitis C viral levels.        History of atrial fibrillation- (present on admission)   Assessment & Plan    Patient's atrial fibrillation was previously corrected with a combination of rate control as well as anticoagulation.  restart AC          Circulating anticoagulants (CMS-HCC)- (present on admission)   Assessment & Plan    Patient may resume anticoagulation once surgery deems feasible        History of DVT (deep vein thrombosis)- (present on admission)    Assessment & Plan    Restart AC        fluid overload:  - likely from recent fluid resuscitation   Start patient on Lasix 40 mg by mouth twice a day   Monitor patient's potassium level   Quality-Core Measures   Reviewed items::  EKG reviewed, Medications reviewed, Radiology images reviewed and Labs reviewed  Cumimngs catheter::  No Cummings  DVT prophylaxis - mechanical:  SCDs  Ulcer Prophylaxis::  Yes  Antibiotics:  Treating active infection/contamination beyond 24 hours perioperative coverage  Assessed for rehabilitation services:  Patient was assess for and/or received rehabilitation services during this hospitalization   For complexity-based billing, please refer to the history, exam, and decison making above. In addition, I spent >35 minutes caring for the patient today. More than 50% of the time was spent counseling and coordinating care.    I have discussed with RN and CM and SW and other consultants about patient's plan.

## 2018-04-04 NOTE — DIETARY
"Nutrition Services     Pt noted with nutrition admit screen trigger: poor po and unplanned wt loss PTA     He was admitted with Biliary cholecystitis, Acute cholecystitis due to biliary calculus.  He is currently on a regular diet with great po intake of % of meals.     Ht: 74\"  Wt: 178.5 kg via bed scale 3/30  Body mass index is 50.5 kg/m². - morbidly obese, class III     No mention of recent wt changes per chart review.  Due to pt's morbid obesity, slow wt loss is encouraged.  Due to BMI of >40, weight loss counseling not appropriate in acute care setting.     RECOMMEND:   · Referral to outpatient nutrition services for weight management after D/C  · Encourage po intake of meals   · Record percentage of meals conusmed in ADLs to help monitor po adequacy  · Obtain measured weights to help monitor wt and lab trends     Due to great PO intake and no recent wt changes note; RD will con't to monitor per dept policy     "

## 2018-04-04 NOTE — PROGRESS NOTES
"  Trauma/Surgical Progress Note    Author: Meche Olson Date & Time created: 4/3/2018   10:46 AM     Interval Events:  SP lap nilson for gangrenous cholecystitis - friable duct with drain left. ERCP yesterday with stent placement. Tolerating Reg diet. Needs to Mobilize more.  Continue aggressive pulmonary toilet.    Hemodynamics:  Blood pressure 112/65, pulse (!) 116, temperature 36.7 °C (98 °F), resp. rate 20, height 1.88 m (6' 2\"), weight (!) 178.5 kg (393 lb 8.3 oz), SpO2 96 %.     Respiratory:    Respiration: 20, Pulse Oximetry: 96 %     Work Of Breathing / Effort: Moderate;Tachypnea  RUL Breath Sounds: Coarse Crackles, RML Breath Sounds: Coarse Crackles, RLL Breath Sounds: Coarse Crackles, CEASAR Breath Sounds: Coarse Crackles, LLL Breath Sounds: Coarse Crackles  Fluids:    Intake/Output Summary (Last 24 hours) at 04/03/18 1046  Last data filed at 04/03/18 0600   Gross per 24 hour   Intake             3870 ml   Output             1185 ml   Net             2685 ml     Admit Weight: (!) 172.4 kg (380 lb)  Current     Physical Exam   Constitutional: He appears well-developed.   HENT:   Head: Normocephalic.   Cardiovascular: Normal rate.    Pulmonary/Chest: He is in respiratory distress.   Abdominal: Soft. There is tenderness.   Dressings dry  ELLA bilious drainage   Musculoskeletal: Normal range of motion.   Neurological: He is alert.   Skin: Skin is warm.       Medical Decision Making/Problem List:    Active Hospital Problems    Diagnosis   • Acute pancreatitis [K85.90]     Priority: High   • Cholecystitis [K81.9]     Priority: High     Severe gangrenous cholecystitis  4/2 - Lap nilson - friable duct - bile drainage noted, drain left  4/3 - ERCP, stent       • Cholelithiasis [K80.20]     Priority: High   • Morbid obesity (CMS-HCC) [E66.01]   • History of DVT (deep vein thrombosis) [Z86.718]   • Circulating anticoagulants (CMS-HCC) [D68.318]   • History of atrial fibrillation [Z86.79]   • History of hepatitis C " [Z86.19]   • COPD (chronic obstructive pulmonary disease) (CMS-HCC) [J44.9]   • SANTOSH on CPAP [G47.33, Z99.89]   • Sepsis (CMS-HCC) [A41.9]     Core Measures & Quality Metrics:  Labs reviewed, Medications reviewed and Radiology images reviewed  Cummings catheter: No Cummings      DVT Prophylaxis: Enoxaparin (Lovenox)  DVT prophylaxis - mechanical: SCDs  Ulcer prophylaxis: Yes  Antibiotics: Treating active infection/contamination beyond 24 hours perioperative coverage  Assessed for rehab: Patient unable to tolerate rehabilitation therapeutic regimen    FRANCIS Score  Discussed patient condition with RN and Patient.  CRITICAL CARE TIME EXCLUDING PROCEDURES: 20    minutes    ROS    Physical Exam   Constitutional: He appears well-developed.   HENT:   Head: Normocephalic.   Cardiovascular: Normal rate.    Pulmonary/Chest: He is in respiratory distress.   Abdominal: Soft. There is tenderness.   Dressings dry  ELLA bilious drainage   Musculoskeletal: Normal range of motion.   Neurological: He is alert.   Skin: Skin is warm.       Quality-Core Measures   Reviewed items::  Labs reviewed, Medications reviewed and Radiology images reviewed  Cummings catheter::  No Cummings  DVT prophylaxis pharmacological::  Enoxaparin (Lovenox)  DVT prophylaxis - mechanical:  SCDs  Ulcer Prophylaxis::  Yes  Antibiotics:  Treating active infection/contamination beyond 24 hours perioperative coverage  Assessed for rehabilitation services:  Patient unable to tolerate rehabilitation therapeutic regimen

## 2018-04-05 LAB
ANION GAP SERPL CALC-SCNC: 9 MMOL/L (ref 0–11.9)
BASOPHILS # BLD AUTO: 0.6 % (ref 0–1.8)
BASOPHILS # BLD: 0.03 K/UL (ref 0–0.12)
BUN SERPL-MCNC: 21 MG/DL (ref 8–22)
CALCIUM SERPL-MCNC: 8.5 MG/DL (ref 8.5–10.5)
CHLORIDE SERPL-SCNC: 108 MMOL/L (ref 96–112)
CO2 SERPL-SCNC: 21 MMOL/L (ref 20–33)
CREAT SERPL-MCNC: 1.09 MG/DL (ref 0.5–1.4)
EOSINOPHIL # BLD AUTO: 0.28 K/UL (ref 0–0.51)
EOSINOPHIL NFR BLD: 5.4 % (ref 0–6.9)
ERYTHROCYTE [DISTWIDTH] IN BLOOD BY AUTOMATED COUNT: 48.1 FL (ref 35.9–50)
GLUCOSE SERPL-MCNC: 114 MG/DL (ref 65–99)
HCT VFR BLD AUTO: 28.9 % (ref 42–52)
HGB BLD-MCNC: 9.1 G/DL (ref 14–18)
IMM GRANULOCYTES # BLD AUTO: 0.08 K/UL (ref 0–0.11)
IMM GRANULOCYTES NFR BLD AUTO: 1.6 % (ref 0–0.9)
LYMPHOCYTES # BLD AUTO: 0.56 K/UL (ref 1–4.8)
LYMPHOCYTES NFR BLD: 10.9 % (ref 22–41)
MCH RBC QN AUTO: 28.9 PG (ref 27–33)
MCHC RBC AUTO-ENTMCNC: 31.5 G/DL (ref 33.7–35.3)
MCV RBC AUTO: 91.7 FL (ref 81.4–97.8)
MONOCYTES # BLD AUTO: 0.54 K/UL (ref 0–0.85)
MONOCYTES NFR BLD AUTO: 10.5 % (ref 0–13.4)
NEUTROPHILS # BLD AUTO: 3.65 K/UL (ref 1.82–7.42)
NEUTROPHILS NFR BLD: 71 % (ref 44–72)
NRBC # BLD AUTO: 0 K/UL
NRBC BLD-RTO: 0 /100 WBC
PLATELET # BLD AUTO: 190 K/UL (ref 164–446)
PMV BLD AUTO: 11.2 FL (ref 9–12.9)
POTASSIUM SERPL-SCNC: 4.2 MMOL/L (ref 3.6–5.5)
RBC # BLD AUTO: 3.15 M/UL (ref 4.7–6.1)
SODIUM SERPL-SCNC: 138 MMOL/L (ref 135–145)
WBC # BLD AUTO: 5.1 K/UL (ref 4.8–10.8)

## 2018-04-05 PROCEDURE — 700102 HCHG RX REV CODE 250 W/ 637 OVERRIDE(OP): Performed by: HOSPITALIST

## 2018-04-05 PROCEDURE — 97161 PT EVAL LOW COMPLEX 20 MIN: CPT

## 2018-04-05 PROCEDURE — A9270 NON-COVERED ITEM OR SERVICE: HCPCS | Performed by: INTERNAL MEDICINE

## 2018-04-05 PROCEDURE — A9270 NON-COVERED ITEM OR SERVICE: HCPCS | Performed by: HOSPITALIST

## 2018-04-05 PROCEDURE — 700102 HCHG RX REV CODE 250 W/ 637 OVERRIDE(OP): Performed by: NURSE PRACTITIONER

## 2018-04-05 PROCEDURE — 97165 OT EVAL LOW COMPLEX 30 MIN: CPT

## 2018-04-05 PROCEDURE — 85025 COMPLETE CBC W/AUTO DIFF WBC: CPT

## 2018-04-05 PROCEDURE — 700102 HCHG RX REV CODE 250 W/ 637 OVERRIDE(OP): Performed by: INTERNAL MEDICINE

## 2018-04-05 PROCEDURE — G8987 SELF CARE CURRENT STATUS: HCPCS | Mod: CK

## 2018-04-05 PROCEDURE — 700111 HCHG RX REV CODE 636 W/ 250 OVERRIDE (IP): Performed by: NURSE PRACTITIONER

## 2018-04-05 PROCEDURE — G8979 MOBILITY GOAL STATUS: HCPCS | Mod: CI

## 2018-04-05 PROCEDURE — 94660 CPAP INITIATION&MGMT: CPT

## 2018-04-05 PROCEDURE — A9270 NON-COVERED ITEM OR SERVICE: HCPCS | Performed by: NURSE PRACTITIONER

## 2018-04-05 PROCEDURE — G8980 MOBILITY D/C STATUS: HCPCS | Mod: CI

## 2018-04-05 PROCEDURE — 36415 COLL VENOUS BLD VENIPUNCTURE: CPT

## 2018-04-05 PROCEDURE — 770001 HCHG ROOM/CARE - MED/SURG/GYN PRIV*

## 2018-04-05 PROCEDURE — 99232 SBSQ HOSP IP/OBS MODERATE 35: CPT | Performed by: INTERNAL MEDICINE

## 2018-04-05 PROCEDURE — G8978 MOBILITY CURRENT STATUS: HCPCS | Mod: CI

## 2018-04-05 PROCEDURE — G8988 SELF CARE GOAL STATUS: HCPCS | Mod: CJ

## 2018-04-05 PROCEDURE — 80048 BASIC METABOLIC PNL TOTAL CA: CPT

## 2018-04-05 RX ADMIN — CLONAZEPAM 1 MG: 1 TABLET ORAL at 06:16

## 2018-04-05 RX ADMIN — KETOROLAC TROMETHAMINE 30 MG: 30 INJECTION, SOLUTION INTRAMUSCULAR; INTRAVENOUS at 12:00

## 2018-04-05 RX ADMIN — OXYCODONE HYDROCHLORIDE 15 MG: 5 TABLET ORAL at 10:48

## 2018-04-05 RX ADMIN — FAMOTIDINE 20 MG: 20 TABLET, FILM COATED ORAL at 10:47

## 2018-04-05 RX ADMIN — POLYETHYLENE GLYCOL 3350 1 PACKET: 17 POWDER, FOR SOLUTION ORAL at 10:47

## 2018-04-05 RX ADMIN — OXYCODONE HYDROCHLORIDE 15 MG: 5 TABLET ORAL at 03:00

## 2018-04-05 RX ADMIN — MAGNESIUM HYDROXIDE 30 ML: 400 SUSPENSION ORAL at 10:48

## 2018-04-05 RX ADMIN — ACETAMINOPHEN 1000 MG: 500 TABLET ORAL at 23:54

## 2018-04-05 RX ADMIN — STANDARDIZED SENNA CONCENTRATE AND DOCUSATE SODIUM 2 TABLET: 8.6; 5 TABLET, FILM COATED ORAL at 20:55

## 2018-04-05 RX ADMIN — FUROSEMIDE 40 MG: 20 TABLET ORAL at 15:11

## 2018-04-05 RX ADMIN — OXYCODONE HYDROCHLORIDE 15 MG: 5 TABLET ORAL at 18:44

## 2018-04-05 RX ADMIN — BUDESONIDE AND FORMOTEROL FUMARATE DIHYDRATE 2 PUFF: 160; 4.5 AEROSOL RESPIRATORY (INHALATION) at 10:47

## 2018-04-05 RX ADMIN — FUROSEMIDE 40 MG: 20 TABLET ORAL at 06:15

## 2018-04-05 RX ADMIN — METRONIDAZOLE 500 MG: 500 TABLET ORAL at 06:15

## 2018-04-05 RX ADMIN — OXYCODONE HYDROCHLORIDE 15 MG: 5 TABLET ORAL at 06:15

## 2018-04-05 RX ADMIN — CLONAZEPAM 1 MG: 1 TABLET ORAL at 20:55

## 2018-04-05 RX ADMIN — KETOROLAC TROMETHAMINE 30 MG: 30 INJECTION, SOLUTION INTRAMUSCULAR; INTRAVENOUS at 06:16

## 2018-04-05 RX ADMIN — BUDESONIDE AND FORMOTEROL FUMARATE DIHYDRATE 2 PUFF: 160; 4.5 AEROSOL RESPIRATORY (INHALATION) at 21:26

## 2018-04-05 RX ADMIN — CLONAZEPAM 1 MG: 1 TABLET ORAL at 13:00

## 2018-04-05 RX ADMIN — FAMOTIDINE 20 MG: 20 TABLET, FILM COATED ORAL at 20:55

## 2018-04-05 RX ADMIN — RIVAROXABAN 20 MG: 20 TABLET, FILM COATED ORAL at 18:44

## 2018-04-05 RX ADMIN — SIMVASTATIN 20 MG: 10 TABLET, FILM COATED ORAL at 20:55

## 2018-04-05 RX ADMIN — ACETAMINOPHEN 1000 MG: 500 TABLET ORAL at 12:00

## 2018-04-05 RX ADMIN — TIOTROPIUM BROMIDE 1 CAPSULE: 18 CAPSULE ORAL; RESPIRATORY (INHALATION) at 10:48

## 2018-04-05 RX ADMIN — QUETIAPINE FUMARATE 100 MG: 25 TABLET ORAL at 20:55

## 2018-04-05 RX ADMIN — KETOROLAC TROMETHAMINE 30 MG: 30 INJECTION, SOLUTION INTRAMUSCULAR; INTRAVENOUS at 18:44

## 2018-04-05 RX ADMIN — ACETAMINOPHEN 1000 MG: 500 TABLET ORAL at 06:15

## 2018-04-05 RX ADMIN — STANDARDIZED SENNA CONCENTRATE AND DOCUSATE SODIUM 2 TABLET: 8.6; 5 TABLET, FILM COATED ORAL at 10:47

## 2018-04-05 RX ADMIN — ACETAMINOPHEN 1000 MG: 500 TABLET ORAL at 18:44

## 2018-04-05 ASSESSMENT — GAIT ASSESSMENTS
GAIT LEVEL OF ASSIST: STAND BY ASSIST
ASSISTIVE DEVICE: FRONT WHEEL WALKER
DEVIATION: INCREASED BASE OF SUPPORT
DISTANCE (FEET): 45

## 2018-04-05 ASSESSMENT — ENCOUNTER SYMPTOMS
ABDOMINAL PAIN: 0
EYE DISCHARGE: 0
PND: 0
EYE PAIN: 0
BACK PAIN: 1
NECK PAIN: 0
TINGLING: 0
DEPRESSION: 0
FEVER: 0
MEMORY LOSS: 0
HEMOPTYSIS: 0
DIZZINESS: 0
MYALGIAS: 0
HALLUCINATIONS: 0
CHILLS: 0
HEADACHES: 0
SHORTNESS OF BREATH: 0
PALPITATIONS: 0
BRUISES/BLEEDS EASILY: 0
WEAKNESS: 0
CLAUDICATION: 0
NAUSEA: 0
EYES NEGATIVE: 1
COUGH: 0
BLOOD IN STOOL: 0
NERVOUS/ANXIOUS: 0
DOUBLE VISION: 0
PSYCHIATRIC NEGATIVE: 1

## 2018-04-05 ASSESSMENT — PAIN SCALES - GENERAL
PAINLEVEL_OUTOF10: 4
PAINLEVEL_OUTOF10: 8
PAINLEVEL_OUTOF10: 9
PAINLEVEL_OUTOF10: 7
PAINLEVEL_OUTOF10: 3
PAINLEVEL_OUTOF10: 7
PAINLEVEL_OUTOF10: 7

## 2018-04-05 ASSESSMENT — COGNITIVE AND FUNCTIONAL STATUS - GENERAL
CLIMB 3 TO 5 STEPS WITH RAILING: A LITTLE
SUGGESTED CMS G CODE MODIFIER MOBILITY: CI
SUGGESTED CMS G CODE MODIFIER DAILY ACTIVITY: CK
MOBILITY SCORE: 23
DAILY ACTIVITIY SCORE: 16
TOILETING: A LOT
DRESSING REGULAR LOWER BODY CLOTHING: TOTAL
DRESSING REGULAR UPPER BODY CLOTHING: A LITTLE
HELP NEEDED FOR BATHING: A LOT

## 2018-04-05 ASSESSMENT — ACTIVITIES OF DAILY LIVING (ADL): TOILETING: INDEPENDENT

## 2018-04-05 NOTE — PROGRESS NOTES
"  Trauma/Surgical Progress Note    Author: Dorota Burgess Date & Time created: 4/5/2018   06:46 AM     Interval Events:  POD #3 SP lap nilson for gangrenous cholecystitis - friable duct with drain left. ERCP  with stent placement 4/3. Tolerating Reg diet. Decreased abd pain.  Still with some bilious drainage in ELLA drain.  Needs to Mobilize more.  Continue aggressive pulmonary toilet.    Hemodynamics:  Blood pressure 105/67, pulse 83, temperature 36.2 °C (97.2 °F), resp. rate 18, height 1.88 m (6' 2.02\"), weight (!) 178.5 kg (393 lb 8.3 oz), SpO2 100 %.     Respiratory:    Respiration: 18, Pulse Oximetry: 100 %     Work Of Breathing / Effort: Moderate  RUL Breath Sounds: Diminished;Crackles, RML Breath Sounds: Diminished;Expiratory Wheezes;Crackles, RLL Breath Sounds: Diminished;Expiratory Wheezes;Crackles, CEASAR Breath Sounds: Diminished;Crackles, LLL Breath Sounds: Diminished;Expiratory Wheezes;Crackles  Fluids:    Intake/Output Summary (Last 24 hours) at 04/03/18 1046  Last data filed at 04/03/18 0600   Gross per 24 hour   Intake             3870 ml   Output             1185 ml   Net             2685 ml     Admit Weight: (!) 172.4 kg (380 lb)  Current     Physical Exam   Constitutional: He appears well-developed.   HENT:   Head: Normocephalic.   Cardiovascular: Normal rate.    Pulmonary/Chest: He is in respiratory distress.   Abdominal: Soft. There is tenderness.   Dressings dry  ELLA bilious drainage   Musculoskeletal: Normal range of motion.   Neurological: He is alert.   Skin: Skin is warm.       Medical Decision Making/Problem List:    Active Hospital Problems    Diagnosis   • Acute pancreatitis [K85.90]     Priority: High   • Cholecystitis [K81.9]     Priority: High     Severe gangrenous cholecystitis  4/2 - Lap nilson - friable duct - bile drainage noted, drain left  4/3 - ERCP, stent       • Cholelithiasis [K80.20]     Priority: High   • Morbid obesity (CMS-HCC) [E66.01]   • History of DVT (deep vein thrombosis) " [Z86.718]   • Circulating anticoagulants (CMS-HCC) [D68.318]   • History of atrial fibrillation [Z86.79]   • History of hepatitis C [Z86.19]   • COPD (chronic obstructive pulmonary disease) (CMS-HCC) [J44.9]   • SANTOSH on CPAP [G47.33, Z99.89]   • Sepsis (CMS-HCC) [A41.9]     Core Measures & Quality Metrics:  Labs reviewed, Medications reviewed and Radiology images reviewed  Cummings catheter: No Cummings      DVT Prophylaxis: Enoxaparin (Lovenox)  DVT prophylaxis - mechanical: SCDs  Ulcer prophylaxis: Yes  Antibiotics: Treating active infection/contamination beyond 24 hours perioperative coverage  Assessed for rehab: Patient unable to tolerate rehabilitation therapeutic regimen    FRANCIS Score  Discussed patient condition with RN and Patient.  CRITICAL CARE TIME EXCLUDING PROCEDURES: 20    minutes    ROS    Physical Exam   Constitutional: He appears well-developed.   HENT:   Head: Normocephalic.   Cardiovascular: Normal rate.    Pulmonary/Chest: He is in respiratory distress.   Abdominal: Soft. There is tenderness.   Dressings dry  ELLA bilious drainage   Musculoskeletal: Normal range of motion.   Neurological: He is alert.   Skin: Skin is warm.       Quality-Core Measures   Reviewed items::  Labs reviewed, Medications reviewed and Radiology images reviewed  Cummings catheter::  No Cummings  DVT prophylaxis pharmacological::  Enoxaparin (Lovenox)  DVT prophylaxis - mechanical:  SCDs  Ulcer Prophylaxis::  Yes  Antibiotics:  Treating active infection/contamination beyond 24 hours perioperative coverage  Assessed for rehabilitation services:  Patient unable to tolerate rehabilitation therapeutic regimen

## 2018-04-05 NOTE — PROGRESS NOTES
Assumed care of pt at 1900, report given.  A+O x 4, VSS, and on 3L O2 NC.  Pt has 4 abdominal lap sites; covered with dry gauze, CDI. No drainage.   Pt has POA RQ wound; dressing changes daily per active order; tolerating well. Dressing CDI; no drainage.   RLQ ELLA drain in place draining moderate serosanguinous/purulent fluid to bulb suction.  +void, +flatus.   Pt tolerating regular diet.  Pt ambulates with a 1 assist FWW to restroom.   Bariatric accommodations with trapeze in place; pt tolerating well.      Lasix started 2 times a day; tolerating well.   Pt updated on POC and all questions answered at this time.  Bed in lowest position, call light within reach, and no current needs.

## 2018-04-05 NOTE — CARE PLAN
Problem: Venous Thromboembolism (VTW)/Deep Vein Thrombosis (DVT) Prevention:  Goal: Patient will participate in Venous Thrombosis (VTE)/Deep Vein Thrombosis (DVT)Prevention Measures  Outcome: PROGRESSING AS EXPECTED  Encourage ambulation, give anticoags as ordered    Problem: Respiratory:  Goal: Respiratory status will improve  Outcome: PROGRESSING AS EXPECTED  Encourage ambulation, give lasix as ordered, encourage coughing and deep breathing to lessen fluids in lungs

## 2018-04-05 NOTE — FACE TO FACE
Face to Face Supporting Documentation - Home Health    The encounter with this patient was in whole or in part the primary reason for home health admission.    Date of encounter:   Patient:                    MRN:                       YOB: 2018  Henry Kumari  [id number]  1955     Home health to see patient for:  Skilled Nursing care for assessment, interventions & education and Occupational therapy evaluation and treatment    Skilled need for:  Surgical Aftercare s/p surgery    Skilled nursing interventions to include:  Wound Care    Homebound status evidenced by:  Need the aid of supportive devices such as crutches, canes, wheelchairs or walkers. Leaving home requires a considerable and taxing effort. There is a normal inability to leave the home.    Community Physician to provide follow up care: Pcp Unknown     Optional Interventions? No      I certify the face to face encounter for this home health care referral meets the CMS requirements and the encounter/clinical assessment with the patient was, in whole, or in part, for the medical condition(s) listed above, which is the primary reason for home health care. Based on my clinical findings: the service(s) are medically necessary, support the need for home health care, and the homebound criteria are met.  I certify that this patient has had a face to face encounter by myself.  Claritza Vee M.D. - NPI: [id number]

## 2018-04-05 NOTE — PROGRESS NOTES
[facility] Hospitalist Progress Note    Date of Service: 4/5/2018    Chief Complaint  62 y.o. male admitted 3/30/2018 with abdominal pain    Interval Problem Update  Mr. Kumari is a 62-year-old male comes to us from [place]. The patient has morbid obesity was recently with a surgical intervention to remove his appendix. The patient still has been having some postoperative pain for which she is recovering. The patient now has developed severe epigastric pain that was radiating about-like fashion. The patient was found to have acute cholecystitis with common bile duct stone and the patient also had acute pancreatitis with it. Patient at this point underwent surgical removal of the gallbladder as well as intraoperative: Angiogram. Patient at this point has improved with his overall pancreatitis pain is better at this point he will be allowed to eat and ambulate. SOB with possible some signs offluid overload then treated with iv lasix and CPAP.     4/3 still some abd pain and SOB. ERCP today with good result. On clears. Patient's pain is local, 6-8/10, intermittent and does not radiate to other location, sharp and with some tingling. Can be controlled by pain meds.   4/4 stable and less SOB, but patient still demonstrates significant fluid overload. Significant edema with dependent. Patient has been giving diuretics yesterday. Patient otherwise denies acute event overnight. Patient otherwise denies fever, chills, nausea, vomiting, adb pain, SOB, CP, headache, constipation, diarrhea, cough, or sputum.  4/5 feeling better after lasix. Less pain. Less edema but still swollen. Patient otherwise denies fever, chills, nausea, vomiting, adb pain, SOB, CP, headache, constipation, diarrhea, cough, or sputum.      Consultants/Specialty  Surgery Dr Olson    Disposition  To be determined        Review of Systems   Constitutional: Negative for chills, fever and malaise/fatigue.   HENT: Negative.  Negative for congestion, ear discharge and  nosebleeds.    Eyes: Negative.  Negative for double vision, pain and discharge.   Respiratory: Negative for cough, hemoptysis and shortness of breath.    Cardiovascular: Positive for leg swelling. Negative for chest pain, palpitations, claudication and PND.   Gastrointestinal: Negative for abdominal pain, blood in stool and nausea.        Loss of appetitie    Genitourinary: Negative.  Negative for dysuria and urgency.   Musculoskeletal: Positive for back pain. Negative for joint pain, myalgias and neck pain.   Skin: Negative.  Negative for itching.   Neurological: Negative for dizziness, tingling, weakness and headaches.   Endo/Heme/Allergies: Negative.  Does not bruise/bleed easily.   Psychiatric/Behavioral: Negative.  Negative for depression, hallucinations, memory loss and suicidal ideas. The patient is not nervous/anxious.    All other systems reviewed and are negative.     Physical Exam  Laboratory/Imaging   Hemodynamics  Temp (24hrs), Av.4 °C (97.5 °F), Min:36.1 °C (97 °F), Max:37 °C (98.6 °F)   Temperature: 37 °C (98.6 °F)  Pulse  Av.6  Min: 60  Max: 126   Blood Pressure: 114/73      Respiratory      Respiration: 19, Pulse Oximetry: 100 %     Work Of Breathing / Effort: Moderate  RUL Breath Sounds: Diminished;Crackles, RML Breath Sounds: Diminished;Expiratory Wheezes;Crackles, RLL Breath Sounds: Diminished;Expiratory Wheezes;Crackles, CEASAR Breath Sounds: Diminished;Crackles, LLL Breath Sounds: Diminished;Expiratory Wheezes;Crackles    Fluids    Intake/Output Summary (Last 24 hours) at 18 1502  Last data filed at 18 1229   Gross per 24 hour   Intake             1240 ml   Output              375 ml   Net              865 ml       Nutrition  Orders Placed This Encounter   Procedures   • DIET ORDER     Standing Status:   Standing     Number of Occurrences:   1     Order Specific Question:   Diet:     Answer:   Regular [1]     Order Specific Question:   Macronutrient modifications:     Answer:    Low Fat [5]     Physical Exam   Constitutional: He is oriented to person, place, and time. He appears well-developed and well-nourished. He is cooperative. He has a sickly appearance. No distress. Nasal cannula in place.   HENT:   Head: Normocephalic.   Left Ear: External ear normal.   Nose: Nose normal.   Mouth/Throat: Oropharynx is clear and moist.   Eyes: Conjunctivae and EOM are normal. Pupils are equal, round, and reactive to light.   Neck: Normal range of motion. Neck supple. No JVD present. No thyromegaly present.   Cardiovascular: Normal rate, regular rhythm and normal heart sounds.  Exam reveals no gallop.    No murmur heard.  Pulmonary/Chest: Effort normal. No respiratory distress. He has no rales. He exhibits no tenderness.   Abdominal: Soft. Bowel sounds are normal. He exhibits no distension (from morbid obesity) and no mass. There is generalized tenderness. There is no rebound.       Musculoskeletal: Normal range of motion. He exhibits edema. He exhibits no tenderness or deformity.   Lymphadenopathy:     He has no cervical adenopathy.   Neurological: He is alert and oriented to person, place, and time. He has normal reflexes. He is not disoriented. He exhibits normal muscle tone. GCS eye subscore is 4. GCS verbal subscore is 5. GCS motor subscore is 6.   Skin: Skin is warm and dry. He is not diaphoretic. No erythema.   Psychiatric: He has a normal mood and affect. His behavior is normal. Judgment and thought content normal.   Nursing note and vitals reviewed.      Recent Labs      04/03/18   1450  04/04/18   0446  04/05/18   0321   WBC  6.8  6.1  5.1   RBC  3.21*  3.34*  3.15*   HEMOGLOBIN  9.5*  9.8*  9.1*   HEMATOCRIT  29.3*  30.5*  28.9*   MCV  90.3  91.3  91.7   MCH  29.0  29.3  28.9   MCHC  32.1*  32.1*  31.5*   RDW  46.3  47.4  48.1   PLATELETCT  170  181  190   MPV  11.0  11.4  11.2     Recent Labs      04/03/18   1450  04/04/18   0446  04/05/18   0322   SODIUM  132*  133*  138   POTASSIUM  3.8   3.8  4.2   CHLORIDE  105  104  108   CO2  20  24  21   GLUCOSE  123*  107*  114*   BUN  24*  18  21   CREATININE  0.71  0.63  1.09   CALCIUM  8.0*  8.3*  8.5                      Assessment/Plan     * Acute pancreatitis- (present on admission)   Assessment & Plan    Lipase levels are down. The elevation in lipase is most likely secondary to common bile duct stone.   ERCP4/3   Patient is now status post surgery postoperative day #3  Better with lasix and no PT needs. May home soon when able to better with OT.         Cholelithiasis- (present on admission)   Assessment & Plan    Status post cholecystectomy.  The patient this point will be continued on antibiotics. Will change to po after DC home  Continued this point with pain management.  Monitor fluid status and continue with fluid hydration.          Cholecystitis- (present on admission)   Assessment & Plan    Today's day 6 out of 7 of IV Rocephin and Flagyl. This can be switched over to oral once the patient's oral intake is adequate.  S/p ERCP 4/3, Cystic duct leak secondary to gangrenous gallbladder, treated with biliary sphincterotomy placement of decompressive 10Fr x 9cm plastic biliary stent          Morbid obesity (CMS-HCC)- (present on admission)   Assessment & Plan    I discussed with patient about doing weight loss management as an outpatient including bariatric clinic evaluation.  Body mass index is 50.53 kg/m².        Sepsis (CMS-HCC)- (present on admission)   Assessment & Plan    This is sepsis (without associated acute organ dysfunction).   Resolved initial sepsis-like status.        SANTOSH on CPAP- (present on admission)   Assessment & Plan    Patient remains on CPAP due to his large body habitus at nighttime. Continue with respiratory protocol        COPD (chronic obstructive pulmonary disease) (CMS-HCC)- (present on admission)   Assessment & Plan    Currently no acute COPD exacerbation remains on RT protocol and nebulizer treatments.        History  of hepatitis C- (present on admission)   Assessment & Plan    Patient has a history of hepatitis C at this point and monitor liver functions as well as hepatitis C viral levels.        History of atrial fibrillation- (present on admission)   Assessment & Plan    Patient's atrial fibrillation was previously corrected with a combination of rate control as well as anticoagulation.  restart AC          Circulating anticoagulants (CMS-HCC)- (present on admission)   Assessment & Plan    Patient may resume anticoagulation once surgery deems feasible        History of DVT (deep vein thrombosis)- (present on admission)   Assessment & Plan    Restart AC        fluid overload:  - likely from recent fluid resuscitation   Better now, will cont with patient on Lasix 40 mg by mouth twice a day   Monitor patient's potassium level 4.2 today  Quality-Core Measures   Reviewed items::  EKG reviewed, Medications reviewed, Radiology images reviewed and Labs reviewed  Cummings catheter::  No Cummings  DVT prophylaxis - mechanical:  SCDs  Ulcer Prophylaxis::  Yes  Antibiotics:  Treating active infection/contamination beyond 24 hours perioperative coverage  Assessed for rehabilitation services:  Patient was assess for and/or received rehabilitation services during this hospitalization   For complexity-based billing, please refer to the history, exam, and decison making above. In addition, I spent >35 minutes caring for the patient today. More than 50% of the time was spent counseling and coordinating care.    I have discussed with RN and JAMEY and SW and other consultants about patient's plan.

## 2018-04-05 NOTE — THERAPY
"Occupational Therapy Evaluation completed.   Functional Status:  OT eval completed on 63 YO M admitted s/p lap nilson for gangrenous cholecystitis. Pt required total A for LB dressing and min A for supine>sit EOB. Pt screaming from pain with movement of LB. Pt completed grooming seated with SPV. Pt reports legs feel like they're \"locked\" and has difficulty and pain with bending at knees. Pt limited due to pain management and decreased activity tolerance. Will continue to follow for acute OT services while in-house.  Plan of Care: Will benefit from Occupational Therapy 3 times per week  Discharge Recommendations:  Equipment: Will Continue to Assess for Equipment Needs. Post-acute therapy TBD depending on progress pt makes while in acute care setting     See \"Rehab Therapy-Acute\" Patient Summary Report for complete documentation.    "

## 2018-04-05 NOTE — PROGRESS NOTES
"0645 Received report, introduced self to pt, reviewed labs, orders, allergies, code status    1052 pt given milk of mag and laxative powder as pt c/o constipation. States last BM was two weeks ago with only \"a few nuggets here and there\"    1530 dressing changed to old appendectomy site as well as dressing to ELLA insertion site changed. Both have moderate output of serosanguinous drainage. Pt then ambulated to bathroom, stand by assist.     1800 pt has had two BMs since given PRN milk of mag and PRN miralax.  "

## 2018-04-05 NOTE — THERAPY
"Physical Therapy Evaluation completed.   Bed Mobility:  Supine to Sit: Stand by Assist  Transfers: Sit to Stand: Supervised  Gait: Level Of Assist: Stand by Assist with Front-Wheel Walker       Plan of Care: Patient with no further skilled PT needs in the acute care setting at this time  Discharge Recommendations: Equipment: No Equipment Needed.     Pt presents very near his functional baseline. He reports his mobility is usually influenced by his COPD and 02 requirements that day. Today, pt did not require physical assist and was able to perform gait with FWW. Pt reports he has a wc he utilizes at home when he is unable to ambulate long distances. At this time, pt does not require further acute skilled PT services.     See \"Rehab Therapy-Acute\" Patient Summary Report for complete documentation.     "

## 2018-04-06 LAB
ALBUMIN SERPL BCP-MCNC: 2.5 G/DL (ref 3.2–4.9)
ALBUMIN/GLOB SERPL: 0.7 G/DL
ALP SERPL-CCNC: 141 U/L (ref 30–99)
ALT SERPL-CCNC: <5 U/L (ref 2–50)
ANION GAP SERPL CALC-SCNC: 7 MMOL/L (ref 0–11.9)
AST SERPL-CCNC: 10 U/L (ref 12–45)
BASOPHILS # BLD AUTO: 0.8 % (ref 0–1.8)
BASOPHILS # BLD: 0.05 K/UL (ref 0–0.12)
BILIRUB SERPL-MCNC: 0.4 MG/DL (ref 0.1–1.5)
BUN SERPL-MCNC: 26 MG/DL (ref 8–22)
CALCIUM SERPL-MCNC: 8.5 MG/DL (ref 8.5–10.5)
CHLORIDE SERPL-SCNC: 107 MMOL/L (ref 96–112)
CO2 SERPL-SCNC: 20 MMOL/L (ref 20–33)
CREAT SERPL-MCNC: 0.99 MG/DL (ref 0.5–1.4)
EOSINOPHIL # BLD AUTO: 0.31 K/UL (ref 0–0.51)
EOSINOPHIL NFR BLD: 5.1 % (ref 0–6.9)
ERYTHROCYTE [DISTWIDTH] IN BLOOD BY AUTOMATED COUNT: 48.8 FL (ref 35.9–50)
GLOBULIN SER CALC-MCNC: 3.4 G/DL (ref 1.9–3.5)
GLUCOSE SERPL-MCNC: 112 MG/DL (ref 65–99)
HCT VFR BLD AUTO: 30.6 % (ref 42–52)
HGB BLD-MCNC: 9.4 G/DL (ref 14–18)
IMM GRANULOCYTES # BLD AUTO: 0.16 K/UL (ref 0–0.11)
IMM GRANULOCYTES NFR BLD AUTO: 2.7 % (ref 0–0.9)
LYMPHOCYTES # BLD AUTO: 0.64 K/UL (ref 1–4.8)
LYMPHOCYTES NFR BLD: 10.6 % (ref 22–41)
MCH RBC QN AUTO: 28.5 PG (ref 27–33)
MCHC RBC AUTO-ENTMCNC: 30.7 G/DL (ref 33.7–35.3)
MCV RBC AUTO: 92.7 FL (ref 81.4–97.8)
MONOCYTES # BLD AUTO: 0.53 K/UL (ref 0–0.85)
MONOCYTES NFR BLD AUTO: 8.8 % (ref 0–13.4)
NEUTROPHILS # BLD AUTO: 4.34 K/UL (ref 1.82–7.42)
NEUTROPHILS NFR BLD: 72 % (ref 44–72)
NRBC # BLD AUTO: 0 K/UL
NRBC BLD-RTO: 0 /100 WBC
PLATELET # BLD AUTO: 217 K/UL (ref 164–446)
PMV BLD AUTO: 10.6 FL (ref 9–12.9)
POTASSIUM SERPL-SCNC: 4.3 MMOL/L (ref 3.6–5.5)
PROT SERPL-MCNC: 5.9 G/DL (ref 6–8.2)
RBC # BLD AUTO: 3.3 M/UL (ref 4.7–6.1)
SODIUM SERPL-SCNC: 134 MMOL/L (ref 135–145)
WBC # BLD AUTO: 6 K/UL (ref 4.8–10.8)

## 2018-04-06 PROCEDURE — 700105 HCHG RX REV CODE 258

## 2018-04-06 PROCEDURE — 770001 HCHG ROOM/CARE - MED/SURG/GYN PRIV*

## 2018-04-06 PROCEDURE — 36415 COLL VENOUS BLD VENIPUNCTURE: CPT

## 2018-04-06 PROCEDURE — A9270 NON-COVERED ITEM OR SERVICE: HCPCS | Performed by: HOSPITALIST

## 2018-04-06 PROCEDURE — 80053 COMPREHEN METABOLIC PANEL: CPT

## 2018-04-06 PROCEDURE — 99232 SBSQ HOSP IP/OBS MODERATE 35: CPT | Performed by: INTERNAL MEDICINE

## 2018-04-06 PROCEDURE — 700102 HCHG RX REV CODE 250 W/ 637 OVERRIDE(OP): Performed by: INTERNAL MEDICINE

## 2018-04-06 PROCEDURE — 700102 HCHG RX REV CODE 250 W/ 637 OVERRIDE(OP): Performed by: HOSPITALIST

## 2018-04-06 PROCEDURE — A9270 NON-COVERED ITEM OR SERVICE: HCPCS | Performed by: INTERNAL MEDICINE

## 2018-04-06 PROCEDURE — 700102 HCHG RX REV CODE 250 W/ 637 OVERRIDE(OP): Performed by: NURSE PRACTITIONER

## 2018-04-06 PROCEDURE — A9270 NON-COVERED ITEM OR SERVICE: HCPCS | Performed by: NURSE PRACTITIONER

## 2018-04-06 PROCEDURE — 700111 HCHG RX REV CODE 636 W/ 250 OVERRIDE (IP): Performed by: NURSE PRACTITIONER

## 2018-04-06 PROCEDURE — 85025 COMPLETE CBC W/AUTO DIFF WBC: CPT

## 2018-04-06 RX ORDER — SODIUM CHLORIDE 9 MG/ML
INJECTION, SOLUTION INTRAVENOUS
Status: COMPLETED
Start: 2018-04-06 | End: 2018-04-06

## 2018-04-06 RX ADMIN — QUETIAPINE FUMARATE 100 MG: 25 TABLET ORAL at 20:13

## 2018-04-06 RX ADMIN — KETOROLAC TROMETHAMINE 30 MG: 30 INJECTION, SOLUTION INTRAMUSCULAR; INTRAVENOUS at 12:20

## 2018-04-06 RX ADMIN — BUDESONIDE AND FORMOTEROL FUMARATE DIHYDRATE 2 PUFF: 160; 4.5 AEROSOL RESPIRATORY (INHALATION) at 12:10

## 2018-04-06 RX ADMIN — BUDESONIDE AND FORMOTEROL FUMARATE DIHYDRATE 2 PUFF: 160; 4.5 AEROSOL RESPIRATORY (INHALATION) at 20:14

## 2018-04-06 RX ADMIN — FUROSEMIDE 40 MG: 20 TABLET ORAL at 15:26

## 2018-04-06 RX ADMIN — ACETAMINOPHEN 1000 MG: 500 TABLET ORAL at 06:19

## 2018-04-06 RX ADMIN — KETOROLAC TROMETHAMINE 30 MG: 30 INJECTION, SOLUTION INTRAMUSCULAR; INTRAVENOUS at 06:20

## 2018-04-06 RX ADMIN — OXYCODONE HYDROCHLORIDE 15 MG: 5 TABLET ORAL at 06:25

## 2018-04-06 RX ADMIN — ACETAMINOPHEN 1000 MG: 500 TABLET ORAL at 18:11

## 2018-04-06 RX ADMIN — CLONAZEPAM 1 MG: 1 TABLET ORAL at 20:12

## 2018-04-06 RX ADMIN — SIMVASTATIN 20 MG: 10 TABLET, FILM COATED ORAL at 20:12

## 2018-04-06 RX ADMIN — KETOROLAC TROMETHAMINE 30 MG: 30 INJECTION, SOLUTION INTRAMUSCULAR; INTRAVENOUS at 00:02

## 2018-04-06 RX ADMIN — FAMOTIDINE 20 MG: 20 TABLET, FILM COATED ORAL at 20:13

## 2018-04-06 RX ADMIN — CLONAZEPAM 1 MG: 1 TABLET ORAL at 12:19

## 2018-04-06 RX ADMIN — CLONAZEPAM 1 MG: 1 TABLET ORAL at 06:20

## 2018-04-06 RX ADMIN — OXYCODONE HYDROCHLORIDE 15 MG: 5 TABLET ORAL at 00:11

## 2018-04-06 RX ADMIN — SODIUM CHLORIDE 500 ML: 9 INJECTION, SOLUTION INTRAVENOUS at 06:20

## 2018-04-06 RX ADMIN — KETOROLAC TROMETHAMINE 30 MG: 30 INJECTION, SOLUTION INTRAMUSCULAR; INTRAVENOUS at 18:12

## 2018-04-06 RX ADMIN — FAMOTIDINE 20 MG: 20 TABLET, FILM COATED ORAL at 08:24

## 2018-04-06 RX ADMIN — ALBUTEROL SULFATE 2 PUFF: 90 AEROSOL, METERED RESPIRATORY (INHALATION) at 08:28

## 2018-04-06 RX ADMIN — ACETAMINOPHEN 1000 MG: 500 TABLET ORAL at 12:19

## 2018-04-06 RX ADMIN — TIOTROPIUM BROMIDE 1 CAPSULE: 18 CAPSULE ORAL; RESPIRATORY (INHALATION) at 08:21

## 2018-04-06 RX ADMIN — RIVAROXABAN 20 MG: 20 TABLET, FILM COATED ORAL at 18:12

## 2018-04-06 RX ADMIN — FUROSEMIDE 40 MG: 20 TABLET ORAL at 06:19

## 2018-04-06 ASSESSMENT — ENCOUNTER SYMPTOMS
HEMOPTYSIS: 0
COUGH: 0
PALPITATIONS: 0
CLAUDICATION: 0
SHORTNESS OF BREATH: 0
EYE DISCHARGE: 0
MYALGIAS: 0
PND: 0
DIZZINESS: 0
EYES NEGATIVE: 1
DEPRESSION: 0
FEVER: 0
BLOOD IN STOOL: 0
BRUISES/BLEEDS EASILY: 0
DOUBLE VISION: 0
WEAKNESS: 0
HEADACHES: 0
NAUSEA: 0
MEMORY LOSS: 0
ABDOMINAL PAIN: 0
EYE PAIN: 0
NECK PAIN: 0
BACK PAIN: 1
TINGLING: 0
PSYCHIATRIC NEGATIVE: 1
CHILLS: 0
NERVOUS/ANXIOUS: 0
HALLUCINATIONS: 0

## 2018-04-06 ASSESSMENT — PAIN SCALES - GENERAL
PAINLEVEL_OUTOF10: 6
PAINLEVEL_OUTOF10: 6
PAINLEVEL_OUTOF10: 7
PAINLEVEL_OUTOF10: 6

## 2018-04-06 NOTE — PROGRESS NOTES
ELLA compressed with green fluid, leaking ss fluid around insertion site, up in chair for short time states more comfortable in bed, was able to walk around in the room he is SOB with exertion.

## 2018-04-06 NOTE — DISCHARGE PLANNING
CCS received a call from Betty at [facility] the referral has been accepted however they do not have PT or OT available. SW on floor has been notified.

## 2018-04-06 NOTE — DISCHARGE PLANNING
CCS received notification from [facility]. The referral has been denied. They are not contracted with the patient's insurance

## 2018-04-06 NOTE — CARE PLAN
Problem: Venous Thromboembolism (VTW)/Deep Vein Thrombosis (DVT) Prevention:  Goal: Patient will participate in Venous Thrombosis (VTE)/Deep Vein Thrombosis (DVT)Prevention Measures  Outcome: PROGRESSING AS EXPECTED  Remains on an oral blood thinner, usually refusing SCDs.     Problem: Bowel/Gastric:  Goal: Normal bowel function is maintained or improved  Outcome: PROGRESSING AS EXPECTED  Moving bowel, tolerating solid diet.

## 2018-04-06 NOTE — PROGRESS NOTES
[facility] Hospitalist Progress Note    Date of Service: 4/6/2018    Chief Complaint  62 y.o. male admitted 3/30/2018 with abdominal pain    Interval Problem Update  Mr. Kumari is a 62-year-old male comes to us from [place]. The patient has morbid obesity was recently with a surgical intervention to remove his appendix. The patient still has been having some postoperative pain for which she is recovering. The patient now has developed severe epigastric pain that was radiating about-like fashion. The patient was found to have acute cholecystitis with common bile duct stone and the patient also had acute pancreatitis with it. Patient at this point underwent surgical removal of the gallbladder as well as intraoperative: Angiogram. Patient at this point has improved with his overall pancreatitis pain is better at this point he will be allowed to eat and ambulate. SOB with possible some signs offluid overload then treated with iv lasix and CPAP.     4/3 still some abd pain and SOB. ERCP today with good result. On clears. Patient's pain is local, 6-8/10, intermittent and does not radiate to other location, sharp and with some tingling. Can be controlled by pain meds.   4/4 stable and less SOB, but patient still demonstrates significant fluid overload. Significant edema with dependent. Patient has been giving diuretics yesterday. Patient otherwise denies acute event overnight. Patient otherwise denies fever, chills, nausea, vomiting, adb pain, SOB, CP, headache, constipation, diarrhea, cough, or sputum.  4/5 feeling better after lasix. Less pain. Less edema but still swollen. Patient otherwise denies fever, chills, nausea, vomiting, adb pain, SOB, CP, headache, constipation, diarrhea, cough, or sputum.  4/6 patient's ELLA drain still bilious . Not clear to discharge per surgery. Patient is a little bit upset about this news. Patient wants to go home. Mild abdominal pain. Less edematous. Will cont with lasix. Patient otherwise  denies fever, chills, nausea, vomiting, SOB, CP, headache, constipation, diarrhea, cough, or sputum.      Consultants/Specialty  Surgery Dr Olson    Disposition  Home with         Review of Systems   Constitutional: Negative for chills, fever and malaise/fatigue.   HENT: Negative.  Negative for congestion, ear discharge and nosebleeds.    Eyes: Negative.  Negative for double vision, pain and discharge.   Respiratory: Negative for cough, hemoptysis and shortness of breath.    Cardiovascular: Positive for leg swelling. Negative for chest pain, palpitations, claudication and PND.   Gastrointestinal: Negative for abdominal pain, blood in stool and nausea.        Loss of appetitie    Genitourinary: Negative.  Negative for dysuria and urgency.   Musculoskeletal: Positive for back pain. Negative for joint pain, myalgias and neck pain.   Skin: Negative.  Negative for itching.   Neurological: Negative for dizziness, tingling, weakness and headaches.   Endo/Heme/Allergies: Negative.  Does not bruise/bleed easily.   Psychiatric/Behavioral: Negative.  Negative for depression, hallucinations, memory loss and suicidal ideas. The patient is not nervous/anxious.    All other systems reviewed and are negative.     Physical Exam  Laboratory/Imaging   Hemodynamics  Temp (24hrs), Av.3 °C (97.4 °F), Min:36.1 °C (97 °F), Max:36.7 °C (98.1 °F)   Temperature: 36.7 °C (98.1 °F)  Pulse  Av.5  Min: 60  Max: 126   Blood Pressure: 113/79      Respiratory      Respiration: 16, Pulse Oximetry: 99 %     Work Of Breathing / Effort: Moderate  RUL Breath Sounds: Coarse Crackles, RML Breath Sounds: Coarse Crackles, RLL Breath Sounds: Coarse Crackles, CEASAR Breath Sounds: Coarse Crackles, LLL Breath Sounds: Coarse Crackles    Fluids    Intake/Output Summary (Last 24 hours) at 18 1527  Last data filed at 18 1200   Gross per 24 hour   Intake              880 ml   Output              605 ml   Net              275 ml        Nutrition  Orders Placed This Encounter   Procedures   • DIET ORDER     Standing Status:   Standing     Number of Occurrences:   1     Order Specific Question:   Diet:     Answer:   Regular [1]     Order Specific Question:   Macronutrient modifications:     Answer:   Low Fat [5]     Physical Exam   Constitutional: He is oriented to person, place, and time. He appears well-developed and well-nourished. He is cooperative. He has a sickly appearance. No distress. Nasal cannula in place.   HENT:   Head: Normocephalic.   Left Ear: External ear normal.   Nose: Nose normal.   Mouth/Throat: Oropharynx is clear and moist.   Eyes: Conjunctivae and EOM are normal. Pupils are equal, round, and reactive to light.   Neck: Normal range of motion. Neck supple. No JVD present. No thyromegaly present.   Cardiovascular: Normal rate, regular rhythm and normal heart sounds.  Exam reveals no gallop.    No murmur heard.  Pulmonary/Chest: Effort normal. No respiratory distress. He has no rales. He exhibits no tenderness.   Abdominal: Soft. Bowel sounds are normal. He exhibits no distension (from morbid obesity) and no mass. There is generalized tenderness. There is no rebound.       Musculoskeletal: Normal range of motion. He exhibits edema. He exhibits no tenderness or deformity.   Lymphadenopathy:     He has no cervical adenopathy.   Neurological: He is alert and oriented to person, place, and time. He has normal reflexes. He is not disoriented. He exhibits normal muscle tone. GCS eye subscore is 4. GCS verbal subscore is 5. GCS motor subscore is 6.   Skin: Skin is warm and dry. He is not diaphoretic. No erythema.   Psychiatric: He has a normal mood and affect. His behavior is normal. Judgment and thought content normal.   Nursing note and vitals reviewed.      Recent Labs      04/04/18   0446  04/05/18   0321  04/06/18   0357   WBC  6.1  5.1  6.0   RBC  3.34*  3.15*  3.30*   HEMOGLOBIN  9.8*  9.1*  9.4*   HEMATOCRIT  30.5*  28.9*   30.6*   MCV  91.3  91.7  92.7   MCH  29.3  28.9  28.5   MCHC  32.1*  31.5*  30.7*   RDW  47.4  48.1  48.8   PLATELETCT  181  190  217   MPV  11.4  11.2  10.6     Recent Labs      04/04/18   0446  04/05/18   0322  04/06/18   0357   SODIUM  133*  138  134*   POTASSIUM  3.8  4.2  4.3   CHLORIDE  104  108  107   CO2  24  21  20   GLUCOSE  107*  114*  112*   BUN  18  21  26*   CREATININE  0.63  1.09  0.99   CALCIUM  8.3*  8.5  8.5                      Assessment/Plan     * Acute pancreatitis- (present on admission)   Assessment & Plan    Lipase levels are down. The elevation in lipase is most likely secondary to common bile duct stone.   ERCP4/3   Patient is now status post surgery postoperative day #3  Better with lasix and no PT needs. May home soon when able to better with OT.         Cholelithiasis- (present on admission)   Assessment & Plan    Status post cholecystectomy. ELLA{ drain bilious, not cleared for DC per surgery  The patient this point will be continued on antibiotics. Will change to po after DC home  Continued this point with pain management.  Monitor fluid status and continue with fluid hydration        Cholecystitis- (present on admission)   Assessment & Plan    Today's day 7 out of 7 of IV Rocephin and Flagyl. This can be switched over to oral once the patient's oral intake is adequate.  S/p ERCP 4/3, Cystic duct leak secondary to gangrenous gallbladder, treated with biliary sphincterotomy placement of decompressive 10Fr x 9cm plastic biliary stent          Morbid obesity (CMS-HCC)- (present on admission)   Assessment & Plan    I discussed with patient about doing weight loss management as an outpatient including bariatric clinic evaluation.  Body mass index is 50.53 kg/m².        Sepsis (CMS-HCC)- (present on admission)   Assessment & Plan    This is sepsis (without associated acute organ dysfunction).   Resolved initial sepsis-like status.        SANTOSH on CPAP- (present on admission)   Assessment & Plan     Patient remains on CPAP due to his large body habitus at nighttime. Continue with respiratory protocol        COPD (chronic obstructive pulmonary disease) (CMS-HCC)- (present on admission)   Assessment & Plan    Currently no acute COPD exacerbation remains on RT protocol and nebulizer treatments.        History of hepatitis C- (present on admission)   Assessment & Plan    Patient has a history of hepatitis C at this point and monitor liver functions as well as hepatitis C viral levels.        History of atrial fibrillation- (present on admission)   Assessment & Plan    Patient's atrial fibrillation was previously corrected with a combination of rate control as well as anticoagulation.  restart AC          Circulating anticoagulants (CMS-HCC)- (present on admission)   Assessment & Plan    Patient may resume anticoagulation once surgery deems feasible        History of DVT (deep vein thrombosis)- (present on admission)   Assessment & Plan    Restart AC        fluid overload:  - likely from recent fluid resuscitation   Better now, will cont with patient on Lasix 40 mg by mouth twice a day   Monitor patient's potassium level 4.3 today  Quality-Core Measures   Reviewed items::  EKG reviewed, Medications reviewed, Radiology images reviewed and Labs reviewed  Cummings catheter::  No Cummings  DVT prophylaxis - mechanical:  SCDs  Ulcer Prophylaxis::  Yes  Antibiotics:  Treating active infection/contamination beyond 24 hours perioperative coverage  Assessed for rehabilitation services:  Patient was assess for and/or received rehabilitation services during this hospitalization   For complexity-based billing, please refer to the history, exam, and decison making above. In addition, I spent >35 minutes caring for the patient today. More than 50% of the time was spent counseling and coordinating care.    I have discussed with RN and JAMEY and SW and other consultants about patient's plan.

## 2018-04-06 NOTE — PROGRESS NOTES
"  Trauma/Surgical Progress Note    Author: Meche Olson Date & Time created: 4/6/2018   9:24 AM     Interval Events:  Doing well. ELLA output coming down. Still bilious. Reg diet. Mobilize.    Hemodynamics:  Blood pressure 113/79, pulse 81, temperature 36.7 °C (98.1 °F), resp. rate 16, height 1.88 m (6' 2.02\"), weight (!) 178.5 kg (393 lb 8.3 oz), SpO2 99 %.     Respiratory:    Respiration: 16, Pulse Oximetry: 99 %     Work Of Breathing / Effort: Moderate  RUL Breath Sounds: Coarse Crackles, RML Breath Sounds: Coarse Crackles, RLL Breath Sounds: Coarse Crackles, CEASAR Breath Sounds: Coarse Crackles, LLL Breath Sounds: Coarse Crackles  Fluids:    Intake/Output Summary (Last 24 hours) at 04/06/18 0925  Last data filed at 04/06/18 0900   Gross per 24 hour   Intake              440 ml   Output              705 ml   Net             -265 ml         Admit Weight: (!) 172.4 kg (380 lb)  Current     Physical Exam   Constitutional: He appears well-developed.   HENT:   Head: Normocephalic.   Cardiovascular: Normal rate.    Pulmonary/Chest: He is in respiratory distress.   Abdominal: Soft. There is tenderness.   Dressings dry  ELLA bilious drainage   Musculoskeletal: Normal range of motion.   Neurological: He is alert.   Skin: Skin is warm.       Medical Decision Making/Problem List:    Active Hospital Problems    Diagnosis   • Acute pancreatitis [K85.90]     Priority: High   • Cholecystitis [K81.9]     Priority: High     Severe gangrenous cholecystitis  4/2 - Lap nilson - friable duct - bile drainage noted, drain left  4/3 - ERCP, stent       • Cholelithiasis [K80.20]     Priority: High   • Morbid obesity (CMS-HCC) [E66.01]   • History of DVT (deep vein thrombosis) [Z86.718]   • Circulating anticoagulants (CMS-HCC) [D68.318]   • History of atrial fibrillation [Z86.79]   • History of hepatitis C [Z86.19]   • COPD (chronic obstructive pulmonary disease) (CMS-[facility]) [J44.9]   • SANTOSH on CPAP [G47.33, Z99.89]   • Sepsis (CMS-[facility]) [A41.9] "     Core Measures & Quality Metrics:  Labs reviewed, Medications reviewed and Radiology images reviewed  Cummings catheter: No Cummings      DVT Prophylaxis: Enoxaparin (Lovenox)  DVT prophylaxis - mechanical: SCDs  Ulcer prophylaxis: Yes  Antibiotics: Treating active infection/contamination beyond 24 hours perioperative coverage  Assessed for rehab: Patient unable to tolerate rehabilitation therapeutic regimen    FRANCIS Score  Discussed patient condition with RN and Patient.  CRITICAL CARE TIME EXCLUDING PROCEDURES: 20    minutes

## 2018-04-06 NOTE — DISCHARGE PLANNING
[facility] received a HH choice form per the choice the referral has been sent to [facility].

## 2018-04-06 NOTE — DISCHARGE PLANNING
Referral: [facility]    Intervention: Per [facility], [facility] covers [place], but is not able to provide PT or OT, MD notified.  Per MD, pt does not need Physical Therapy.    Plan: As Above.

## 2018-04-06 NOTE — PROGRESS NOTES
Assessment completed.  AA&Ox4. VSS. SpO2 >90% on 2L via NC.  RT made aware of CPAP at bedside that pt uses nightly.  Reporting 4/10 pain. Tolerable per pt.   Lap sites x4 with dry gauze and tegaderm. Dressings CDI.  RLQ ELLA drain compressed to self suction, draining SS output.  Right abdominal wound with dressing CDI.   Tolerating regular diet. Denies N/V.  + void. + BM.   All needs met at this time. Call light within reach. Pt calls appropriately.

## 2018-04-06 NOTE — PROGRESS NOTES
Awake and alert, concerned about going home today, was able to sit at the edge of bed by self and tolerated solid diet.

## 2018-04-07 VITALS
BODY MASS INDEX: 40.43 KG/M2 | TEMPERATURE: 98.4 F | WEIGHT: 315 LBS | DIASTOLIC BLOOD PRESSURE: 93 MMHG | RESPIRATION RATE: 20 BRPM | SYSTOLIC BLOOD PRESSURE: 122 MMHG | OXYGEN SATURATION: 99 % | HEIGHT: 74 IN | HEART RATE: 103 BPM

## 2018-04-07 PROBLEM — A41.9 SEPSIS (HCC): Status: RESOLVED | Noted: 2018-03-30 | Resolved: 2018-04-07

## 2018-04-07 PROBLEM — K85.90 ACUTE PANCREATITIS: Status: RESOLVED | Noted: 2018-03-30 | Resolved: 2018-04-07

## 2018-04-07 PROBLEM — K81.9 CHOLECYSTITIS: Status: RESOLVED | Noted: 2018-03-30 | Resolved: 2018-04-07

## 2018-04-07 PROBLEM — K80.20 CHOLELITHIASIS: Status: RESOLVED | Noted: 2018-03-30 | Resolved: 2018-04-07

## 2018-04-07 LAB
ALBUMIN SERPL BCP-MCNC: 2.5 G/DL (ref 3.2–4.9)
ALBUMIN/GLOB SERPL: 0.7 G/DL
ALP SERPL-CCNC: 195 U/L (ref 30–99)
ALT SERPL-CCNC: 5 U/L (ref 2–50)
ANION GAP SERPL CALC-SCNC: 6 MMOL/L (ref 0–11.9)
AST SERPL-CCNC: 14 U/L (ref 12–45)
BASOPHILS # BLD AUTO: 0.7 % (ref 0–1.8)
BASOPHILS # BLD: 0.04 K/UL (ref 0–0.12)
BILIRUB SERPL-MCNC: 0.6 MG/DL (ref 0.1–1.5)
BUN SERPL-MCNC: 23 MG/DL (ref 8–22)
CALCIUM SERPL-MCNC: 8.7 MG/DL (ref 8.5–10.5)
CHLORIDE SERPL-SCNC: 108 MMOL/L (ref 96–112)
CO2 SERPL-SCNC: 24 MMOL/L (ref 20–33)
CREAT SERPL-MCNC: 0.87 MG/DL (ref 0.5–1.4)
EOSINOPHIL # BLD AUTO: 0.25 K/UL (ref 0–0.51)
EOSINOPHIL NFR BLD: 4.3 % (ref 0–6.9)
ERYTHROCYTE [DISTWIDTH] IN BLOOD BY AUTOMATED COUNT: 48.6 FL (ref 35.9–50)
GLOBULIN SER CALC-MCNC: 3.7 G/DL (ref 1.9–3.5)
GLUCOSE SERPL-MCNC: 111 MG/DL (ref 65–99)
HCT VFR BLD AUTO: 30.8 % (ref 42–52)
HGB BLD-MCNC: 9.7 G/DL (ref 14–18)
IMM GRANULOCYTES # BLD AUTO: 0.26 K/UL (ref 0–0.11)
IMM GRANULOCYTES NFR BLD AUTO: 4.4 % (ref 0–0.9)
LYMPHOCYTES # BLD AUTO: 0.53 K/UL (ref 1–4.8)
LYMPHOCYTES NFR BLD: 9 % (ref 22–41)
MCH RBC QN AUTO: 28.9 PG (ref 27–33)
MCHC RBC AUTO-ENTMCNC: 31.5 G/DL (ref 33.7–35.3)
MCV RBC AUTO: 91.7 FL (ref 81.4–97.8)
MONOCYTES # BLD AUTO: 0.43 K/UL (ref 0–0.85)
MONOCYTES NFR BLD AUTO: 7.3 % (ref 0–13.4)
NEUTROPHILS # BLD AUTO: 4.35 K/UL (ref 1.82–7.42)
NEUTROPHILS NFR BLD: 74.3 % (ref 44–72)
NRBC # BLD AUTO: 0 K/UL
NRBC BLD-RTO: 0 /100 WBC
PLATELET # BLD AUTO: 261 K/UL (ref 164–446)
PMV BLD AUTO: 10.3 FL (ref 9–12.9)
POTASSIUM SERPL-SCNC: 4.8 MMOL/L (ref 3.6–5.5)
PROT SERPL-MCNC: 6.2 G/DL (ref 6–8.2)
RBC # BLD AUTO: 3.36 M/UL (ref 4.7–6.1)
SODIUM SERPL-SCNC: 138 MMOL/L (ref 135–145)
WBC # BLD AUTO: 5.9 K/UL (ref 4.8–10.8)

## 2018-04-07 PROCEDURE — 700102 HCHG RX REV CODE 250 W/ 637 OVERRIDE(OP): Performed by: INTERNAL MEDICINE

## 2018-04-07 PROCEDURE — A9270 NON-COVERED ITEM OR SERVICE: HCPCS | Performed by: INTERNAL MEDICINE

## 2018-04-07 PROCEDURE — A9270 NON-COVERED ITEM OR SERVICE: HCPCS | Performed by: HOSPITALIST

## 2018-04-07 PROCEDURE — 85025 COMPLETE CBC W/AUTO DIFF WBC: CPT

## 2018-04-07 PROCEDURE — 700111 HCHG RX REV CODE 636 W/ 250 OVERRIDE (IP): Performed by: NURSE PRACTITIONER

## 2018-04-07 PROCEDURE — 700102 HCHG RX REV CODE 250 W/ 637 OVERRIDE(OP): Performed by: HOSPITALIST

## 2018-04-07 PROCEDURE — 80053 COMPREHEN METABOLIC PANEL: CPT

## 2018-04-07 PROCEDURE — 700102 HCHG RX REV CODE 250 W/ 637 OVERRIDE(OP): Performed by: NURSE PRACTITIONER

## 2018-04-07 PROCEDURE — 36415 COLL VENOUS BLD VENIPUNCTURE: CPT

## 2018-04-07 PROCEDURE — 99239 HOSP IP/OBS DSCHRG MGMT >30: CPT | Performed by: INTERNAL MEDICINE

## 2018-04-07 PROCEDURE — A9270 NON-COVERED ITEM OR SERVICE: HCPCS | Performed by: NURSE PRACTITIONER

## 2018-04-07 RX ADMIN — OXYCODONE HYDROCHLORIDE 10 MG: 10 TABLET ORAL at 03:59

## 2018-04-07 RX ADMIN — KETOROLAC TROMETHAMINE 30 MG: 30 INJECTION, SOLUTION INTRAMUSCULAR; INTRAVENOUS at 12:25

## 2018-04-07 RX ADMIN — KETOROLAC TROMETHAMINE 30 MG: 30 INJECTION, SOLUTION INTRAMUSCULAR; INTRAVENOUS at 05:25

## 2018-04-07 RX ADMIN — CLONAZEPAM 1 MG: 1 TABLET ORAL at 04:00

## 2018-04-07 RX ADMIN — BUDESONIDE AND FORMOTEROL FUMARATE DIHYDRATE 2 PUFF: 160; 4.5 AEROSOL RESPIRATORY (INHALATION) at 09:13

## 2018-04-07 RX ADMIN — ACETAMINOPHEN 1000 MG: 500 TABLET ORAL at 12:25

## 2018-04-07 RX ADMIN — ACETAMINOPHEN 1000 MG: 500 TABLET ORAL at 05:24

## 2018-04-07 RX ADMIN — OXYCODONE HYDROCHLORIDE 10 MG: 10 TABLET ORAL at 09:13

## 2018-04-07 RX ADMIN — FUROSEMIDE 40 MG: 20 TABLET ORAL at 05:24

## 2018-04-07 RX ADMIN — FAMOTIDINE 20 MG: 20 TABLET, FILM COATED ORAL at 09:12

## 2018-04-07 RX ADMIN — CLONAZEPAM 1 MG: 1 TABLET ORAL at 12:25

## 2018-04-07 RX ADMIN — TIOTROPIUM BROMIDE 1 CAPSULE: 18 CAPSULE ORAL; RESPIRATORY (INHALATION) at 09:00

## 2018-04-07 ASSESSMENT — PAIN SCALES - GENERAL
PAINLEVEL_OUTOF10: 5
PAINLEVEL_OUTOF10: 5
PAINLEVEL_OUTOF10: 6

## 2018-04-07 NOTE — PROGRESS NOTES
Report received from RN, assumed care at 1900  Pt is A0X4, and responds appropriately   Pt declines any SOB, chest pain, new onset of numbness/ tingiling  Pt rates pain at  6/10, on a scale of 1-10, pt medicated per MAR  Pt is voiding adequatly and without hesitancy  Pt has + flatus, + bowel sounds, + BM on 4/6/2018  Pt ambulates with a x1 assist and a FWW  Pt is tolerating a regular low fat  diet, pt denies any nausea/vomiting  Pt has a ELLA to RLQ, dressing saturated with serosanguinous fluid and changed, dressing is now clean, dry, and intact, ELLA is to self suction, output continues to be green  Pt has a right lower abdomen incision that is approximated with staples and covered with a clean,dry, and intact dressing,   Pt has x4 lap sites to abdomen that are approximated with staples and are open to air   CPAP at bedside and in use for pt this evening   Plan of care discussed, all questions answered. Explained importance of calling before getting OOB and pt verbalizes understanding. Explained importance of oral care. Call light is within reach, treaded slipper socks on, bed in lowest/ locked position, hourly rounding in place, all needs met at this time

## 2018-04-07 NOTE — PROGRESS NOTES
"Patient pain well controlled on PO medication. Ambulating at baseline. Patient on 3LO2NC at home and back to baseline. ELLA drain removed per MD order, dry dressing placed. Staples removed and steri strips placed.  Wound packing to previous surgery site was done by night shift RN this morning and patients sister who is retired RN is and has been changing dressing at home per MD order. Patient will be discharged home per MD order today, sister phoned so that she may drive in from [facility] to  patient. /93   Pulse (!) 103   Temp 36.9 °C (98.4 °F)   Resp 20   Ht 1.88 m (6' 2.02\")   Wt (!) 188.7 kg (416 lb 0.1 oz)   SpO2 99%   BMI 53.39 kg/m²      "

## 2018-04-07 NOTE — CARE PLAN
Problem: Communication  Goal: The ability to communicate needs accurately and effectively will improve  Outcome: PROGRESSING AS EXPECTED  Pt updated on POC, all questions answered at this time, pt able to communicate needs effectively     Problem: Safety  Goal: Will remain free from falls  Outcome: PROGRESSING AS EXPECTED  Treaded socks on, bed in lowest position, call light is within reach, personal belongings within reach, all needs met at this time

## 2018-04-07 NOTE — PROGRESS NOTES
Right abdominal wound changed per active order, ELLA site dressing changed due to dressing being saturated, ELLA output is stating to become serosanguinous instead of the green output it was having earlier

## 2018-04-07 NOTE — PROGRESS NOTES
Tolerated diet, wound packing changed area red and clean Leaking around insertion site of ELLA with serous drainage.

## 2018-04-07 NOTE — PROGRESS NOTES
4/7/2018 pt seen and examined.  Tolerating diet, pain controlled, ambulation at baseline.  + flatulence and stool, voids w/o difficulty.      No new labs today    ELLA drain is serous and can be D/C'd.  From surgical standpoint, pt is ready to D/C home.  May f/u with primary care in [place] to have staples removed after 4/9/2018.

## 2018-04-07 NOTE — DISCHARGE SUMMARY
CHIEF COMPLAINT ON ADMISSION  Direct admit for pancreatitis, cholecystitis and cholelithiasis    CODE STATUS  Full Code    HPI & HOSPITAL COURSE  Mr. Kumari is a 62-year-old male comes to us from [place]. The patient has morbid obesity was recently with a surgical intervention to remove his appendix. The patient still had been having some postoperative pain for which he is recovering. The patient then developed severe epigastric pain that was radiating about-like fashion. The patient was found to have acute cholecystitis with common bile duct stone and the patient also had acute pancreatitis with it. Patient at this point underwent surgical removal of the gallbladder as well as intraoperative cholangiogram. Patient was found to have Bile duct leak due to gangrenous gallbladder and friable cystic duct and then patient then had ERCP with 10Fr x 9cm plastic biliary stent placed.  Patient then has improved with his overall pancreatitis pain is better at this point he will be allowed to eat and ambulate. SOB with possible some signs offluid overload then treated with iv and po lasix and CPAP. Patient currently stable and remained no fever, patient also tolerated the procedure and recovered. Patient currently ELLA drainage out and ready to be discharged home.    Patient is also recommended to follow up with GI to remove biliary stent in 3 months.    The patient met 2-midnight criteria for an inpatient stay at the time of discharge.    Therefore, he is discharged in good and stable condition with close outpatient follow-up.    SPECIFIC OUTPATIENT FOLLOW-UP  GI  surgery    DISCHARGE PROBLEM LIST  Principal Problem (Resolved):    Acute pancreatitis POA: Yes  Active Problems:    History of DVT (deep vein thrombosis) POA: Yes    Circulating anticoagulants (CMS-HCC) POA: Yes    History of atrial fibrillation POA: Yes    History of hepatitis C POA: Yes    COPD (chronic obstructive pulmonary disease) (CMS-HCC) POA: Yes    SANTOSH on CPAP  POA: Yes    Morbid obesity (CMS-HCC) POA: Yes  Resolved Problems:    Cholecystitis POA: Yes    Cholelithiasis POA: Yes    Sepsis (CMS-HCC) POA: Yes      FOLLOW UP  Need GI follow-up to remove biliary stent within 3 M, Dr. Ariel Aguirre    Primary care doctor    Schedule an appointment as soon as possible for a visit in 1 week      Meche Olson M.D.  [address]  [address] [id number] [phone]    In 10 days  For wound re-check    Ariel Aguirre M.D.  [address] [facility] [address]  [phone]      Call Monday morning to schedule an appointment in 1 month to discuss removal of biliary stent.      MEDICATIONS ON DISCHARGE   Henry Kumari   Home Medication Instructions GAVINO:[id number]    Printed on:04/07/18 1102   Medication Information                      albuterol 108 (90 Base) MCG/ACT Aero Soln inhalation aerosol  Inhale 2 Puffs by mouth every 6 hours as needed for Shortness of Breath.             atenolol (TENORMIN) 25 MG Tab  Take 25 mg by mouth 2 times a day.             clonazePAM (KLONOPIN) 1 MG Tab  Take 1 mg by mouth 3 times a day.             fluticasone-salmeterol (ADVAIR) 250-50 MCG/DOSE AEROSOL POWDER, BREATH ACTIVATED  Inhale 1 Puff by mouth every day.             furosemide (LASIX) 40 MG Tab  Take 40 mg by mouth as needed.             HYDROcodone/acetaminophen (NORCO)  MG Tab  Take 1 Tab by mouth every 6 hours as needed.             QUEtiapine (SEROQUEL) 100 MG Tab  Take 100 mg by mouth every bedtime.             rivaroxaban (XARELTO) 20 MG Tab tablet  Take 20 mg by mouth every day.             simvastatin (ZOCOR) 20 MG Tab  Take 20 mg by mouth every evening.             theophylline SR (THEODUR) 300 MG TABLET SR 12 HR  Take 300 mg by mouth 2 times a day.             tiotropium (SPIRIVA) 18 MCG Cap  Inhale 18 mcg by mouth every day.                 DIET  Orders Placed This Encounter   Procedures   • DIET ORDER     Standing Status:   Standing     Number of Occurrences:   1     Order  Specific Question:   Diet:     Answer:   Regular [1]     Order Specific Question:   Macronutrient modifications:     Answer:   Low Fat [5]       ACTIVITY  As tolerated.  Weight bearing as tolerated      CONSULTATIONS  GI  Surgery      PROCEDURES      4/2 - Lap nilson - friable duct - bile drainage noted, drain left      4/3 - ERCP, stent        PE:  Gen: AAOx3, NAD  Eyes: PELLA  Neck: no JVD, no lymphadenopathy  Cardia: RRR, no mrg  Lungs: CTAB, no rales, rhonci or wheezing  Abd: NABS, soft, non extended, no mass  EXT: Edema, peripheral pulse 2+ b/l  Neuro: CN II-XII intact, non focal, reflex 2+ symmetrical  Skin: Intact, no lesion, warm  Psych: Appropriate.      LABORATORY  Lab Results   Component Value Date/Time    SODIUM 138 04/07/2018 09:29 AM    POTASSIUM 4.8 04/07/2018 09:29 AM    CHLORIDE 108 04/07/2018 09:29 AM    CO2 24 04/07/2018 09:29 AM    GLUCOSE 111 (H) 04/07/2018 09:29 AM    BUN 23 (H) 04/07/2018 09:29 AM    CREATININE 0.87 04/07/2018 09:29 AM        Lab Results   Component Value Date/Time    WBC 5.9 04/07/2018 09:29 AM    HEMOGLOBIN 9.7 (L) 04/07/2018 09:29 AM    HEMATOCRIT 30.8 (L) 04/07/2018 09:29 AM    PLATELETCT 261 04/07/2018 09:29 AM        Total time of the discharge process exceeds 32 minutes

## 2018-04-07 NOTE — DISCHARGE INSTRUCTIONS
YOB: 1955   Age: 62 y.o.               Admit Date: 3/30/2018     Discharge Date: 4/7/2018  Attending Doctor:  Claritza Vee M.D.                  Allergies:  Patient has no known allergies.  Medical History (as on file):   History reviewed. No pertinent past medical history.  Past Surgical History:   Procedure Laterality Date   • ERCP IN OR N/A 4/3/2018    Procedure: ERCP IN OR- W/POSS STENT;  Surgeon: Ariel Aguirre M.D.;  Location: SURGERY SAME DAY [facility] ORS;  Service: Gastroenterology   • CORNELIA BY LAPAROSCOPY  4/2/2018    Procedure: CORNELIA BY LAPAROSCOPY;  Surgeon: Meche Olson M.D.;  Location: SURGERY [facility] ORS;  Service: General       Discharge Instructions  Blood Pressure: 122/93  Weight: (!) 188.7 kg (416 lb 0.1 oz)    Discharged to home by car with relative. Discharged via wheelchair, hospital escort: Yes.    Special equipment needed: Oxygen    Belongings with: Personal    Instructions:    Follow up with primary care MD   Follow up with surgeon    RIGHT ABDOMINAL WOUND: remove old dressing (CAREFUL WITH TAPE REMOVAL) and irrigate wound with normal saline flush.  Fill wound with piece kerlix gauze moistened with stefan lsaline and cover with dry gauze and secure with hypafix tape.  Change daily and PRN drainage.  Can cover halina wound skin that are irritated or open with thin hydrocolloid.      Be sure to schedule a follow-up appointment with your primary care doctor or any specialists as instructed.     Discharge Plan:   Diet Plan: Discussed  Activity Level: Discussed  Smoking Cessation Offered: Patient Refused  Confirmed Follow up Appointment: Patient to Call and Schedule Appointment  Confirmed Symptoms Management: Discussed  Medication Reconciliation Updated: Yes  Influenza Vaccine Indication: Patient Refuses (January 2018)    DIET:  You may eat any foods that you can tolerate.  It is a good idea to eat a high fiber diet and take in plenty of fluids to prevent constipation.  If you do become  constipated you may want to take a mild laxative or take ducolax tablets on a daily basis until your bowel habits are regular.  Constipation can be very uncomfortable, along with straining, after recent surgery.    I understand that a diet low in cholesterol, fat, and sodium is recommended for good health. Unless I have been given specific instructions below for another diet, I accept this instruction as my diet prescription.       Discharge Medication Instructions:    Below are the medications your physician expects you to take upon discharge:    Review all your home medications and newly ordered medications with your doctor and/or pharmacist. Follow medication instructions as directed by your doctor and/or pharmacist.  GENERAL POST-OPERATIVE  PATIENT INSTRUCTIONS      FOLLOW-UP:  Please call your physician/ return to ER if you have any fevers greater than 100.4, drainage from your wound that is not clear or looks infected, persistent bleeding, increasing abdominal pain, problems urinating, or persistent nausea/vomiting.      WOUND CARE INSTRUCTIONS:  RIGHT ABDOMINAL WOUND: remove old dressing (CAREFUL WITH TAPE REMOVAL) and irrigate wound with normal saline flush.  Fill wound with piece kerlix gauze moistened with stefan lsaline and cover with dry gauze and secure with hypafix tape.  Change daily and PRN drainage.  Can cover halina wound skin that are irritated or open with thin hydrocolloid.    For other incisions Keep a dry clean dressing on the wound if there is drainage. The initial bandage may be removed after 24 hours.  Once the wound has quit draining you may leave it open to air.  If clothing rubs against the wound or causes irritation and the wound is not draining you may cover it with a dry dressing during the daytime.  Try to keep the wound dry and avoid ointments on the wound unless directed to do so.  If the wound becomes bright red and painful or starts to drain infected material that is not clear,  please contact your physician immediately.  If the wound is mildly pink and has a thick firm ridge underneath it, this is normal, and is referred to as a healing ridge.  This will resolve over the next 4-6 weeks. No baths, hot tubs, swimming, no submerging incisions, unit til healed.     ACTIVITY:  You are encouraged to cough and deep breath or use your incentive spirometer if you were given one, every 15-30 minutes when awake.  This will help prevent respiratory complications and low grade fevers post-operatively if you had a general anesthetic.  You may want to hug a pillow when coughing and sneezing to add additional support to the surgical area, if you had abdominal or chest surgery, which will decrease pain during these times.  You are encouraged to walk and engage in light activity for the next two weeks.  You should not lift more than 10 pounds during this time frame as it could put you at increased risk for complications.      MEDICATIONS:  Try to take narcotic medications and anti-inflammatory medications, such as tylenol, ibuprofen, naprosyn, etc., with food.  This will minimize stomach upset from the medication.  Should you develop nausea and vomiting from the pain medication, or develop a rash, please discontinue the medication and contact your physician.  You should not drive, make important decisions, or operate machinery when taking narcotic pain medication.    QUESTIONS:  Please feel free to call your physician or the hospital  if you have any questions, and they will be glad to assist you.         Discharge Instructions    Special Instructions: None    · Is patient discharged on Warfarin / Coumadin?   No     Depression / Suicide Risk    As you are discharged from this [facility] facility, it is important to learn how to keep safe from harming yourself.    Recognize the warning signs:  · Abrupt changes in personality, positive or negative- including increase in energy   · Giving away  possessions  · Change in eating patterns- significant weight changes-  positive or negative  · Change in sleeping patterns- unable to sleep or sleeping all the time   · Unwillingness or inability to communicate  · Depression  · Unusual sadness, discouragement and loneliness  · Talk of wanting to die  · Neglect of personal appearance   · Rebelliousness- reckless behavior  · Withdrawal from people/activities they love  · Confusion- inability to concentrate     If you or a loved one observes any of these behaviors or has concerns about self-harm, here's what you can do:  · Talk about it- your feelings and reasons for harming yourself  · Remove any means that you might use to hurt yourself (examples: pills, rope, extension cords, firearm)  · Get professional help from the community (Mental Health, Substance Abuse, psychological counseling)  · Do not be alone:Call your Safe Contact- someone whom you trust who will be there for you.  · Call your local CRISIS HOTLINE 652-2926 or 257-716-2109  · Call your local Children's Mobile Crisis Response Team [place] (585) 634-3018 or www.[place]  · Call the toll free National Suicide Prevention Hotlines   · National Suicide Prevention Lifeline 923-029-GATQ (4895)  · National Hope Line Network 800-SUICIDE (930-7992)

## 2018-04-27 ENCOUNTER — HOSPITAL ENCOUNTER (OUTPATIENT)
Facility: MEDICAL CENTER | Age: 63
DRG: 919 | End: 2018-04-27
Payer: MEDICAID

## 2018-04-27 ENCOUNTER — HOSPITAL ENCOUNTER (OUTPATIENT)
Dept: RADIOLOGY | Facility: MEDICAL CENTER | Age: 63
End: 2018-04-27

## 2018-04-27 ENCOUNTER — HOSPITAL ENCOUNTER (INPATIENT)
Facility: MEDICAL CENTER | Age: 63
LOS: 7 days | DRG: 919 | End: 2018-05-04
Attending: HOSPITALIST | Admitting: HOSPITALIST
Payer: MEDICAID

## 2018-04-27 ENCOUNTER — APPOINTMENT (OUTPATIENT)
Dept: RADIOLOGY | Facility: MEDICAL CENTER | Age: 63
DRG: 919 | End: 2018-04-27
Attending: HOSPITALIST
Payer: MEDICAID

## 2018-04-27 DIAGNOSIS — J96.11 CHRONIC RESPIRATORY FAILURE WITH HYPOXIA (HCC): ICD-10-CM

## 2018-04-27 DIAGNOSIS — J42 CHRONIC BRONCHITIS, UNSPECIFIED CHRONIC BRONCHITIS TYPE (HCC): ICD-10-CM

## 2018-04-27 DIAGNOSIS — G47.33 OSA ON CPAP: ICD-10-CM

## 2018-04-27 PROCEDURE — 700111 HCHG RX REV CODE 636 W/ 250 OVERRIDE (IP): Performed by: HOSPITALIST

## 2018-04-27 PROCEDURE — 99223 1ST HOSP IP/OBS HIGH 75: CPT | Performed by: HOSPITALIST

## 2018-04-27 PROCEDURE — 700101 HCHG RX REV CODE 250

## 2018-04-27 PROCEDURE — 700102 HCHG RX REV CODE 250 W/ 637 OVERRIDE(OP): Performed by: HOSPITALIST

## 2018-04-27 PROCEDURE — 770006 HCHG ROOM/CARE - MED/SURG/GYN SEMI*

## 2018-04-27 PROCEDURE — 700105 HCHG RX REV CODE 258: Performed by: HOSPITALIST

## 2018-04-27 PROCEDURE — 36415 COLL VENOUS BLD VENIPUNCTURE: CPT

## 2018-04-27 PROCEDURE — A9270 NON-COVERED ITEM OR SERVICE: HCPCS | Performed by: HOSPITALIST

## 2018-04-27 PROCEDURE — 87040 BLOOD CULTURE FOR BACTERIA: CPT | Mod: 91

## 2018-04-27 PROCEDURE — 700105 HCHG RX REV CODE 258

## 2018-04-27 RX ORDER — QUETIAPINE FUMARATE 25 MG/1
100 TABLET, FILM COATED ORAL
Status: DISCONTINUED | OUTPATIENT
Start: 2018-04-27 | End: 2018-05-04 | Stop reason: HOSPADM

## 2018-04-27 RX ORDER — POLYETHYLENE GLYCOL 3350 17 G/17G
1 POWDER, FOR SOLUTION ORAL
Status: DISCONTINUED | OUTPATIENT
Start: 2018-04-27 | End: 2018-05-04 | Stop reason: HOSPADM

## 2018-04-27 RX ORDER — MORPHINE SULFATE 4 MG/ML
4 INJECTION, SOLUTION INTRAMUSCULAR; INTRAVENOUS
Status: DISCONTINUED | OUTPATIENT
Start: 2018-04-27 | End: 2018-05-03

## 2018-04-27 RX ORDER — SODIUM CHLORIDE 9 MG/ML
INJECTION, SOLUTION INTRAVENOUS
Status: COMPLETED
Start: 2018-04-27 | End: 2018-04-27

## 2018-04-27 RX ORDER — DIGOXIN 125 MCG
125 TABLET ORAL DAILY
COMMUNITY
End: 2023-12-26

## 2018-04-27 RX ORDER — BISACODYL 10 MG
10 SUPPOSITORY, RECTAL RECTAL
Status: DISCONTINUED | OUTPATIENT
Start: 2018-04-27 | End: 2018-05-04 | Stop reason: HOSPADM

## 2018-04-27 RX ORDER — ATENOLOL 50 MG/1
25 TABLET ORAL 2 TIMES DAILY
Status: DISCONTINUED | OUTPATIENT
Start: 2018-04-27 | End: 2018-05-04 | Stop reason: HOSPADM

## 2018-04-27 RX ORDER — SPIRONOLACTONE 25 MG/1
25 TABLET ORAL DAILY
COMMUNITY

## 2018-04-27 RX ORDER — CLONAZEPAM 1 MG/1
1 TABLET ORAL 3 TIMES DAILY
Status: DISCONTINUED | OUTPATIENT
Start: 2018-04-27 | End: 2018-05-04 | Stop reason: HOSPADM

## 2018-04-27 RX ORDER — PROMETHAZINE HYDROCHLORIDE 25 MG/1
12.5-25 SUPPOSITORY RECTAL EVERY 4 HOURS PRN
Status: DISCONTINUED | OUTPATIENT
Start: 2018-04-27 | End: 2018-05-04 | Stop reason: HOSPADM

## 2018-04-27 RX ORDER — HYDROCODONE BITARTRATE AND ACETAMINOPHEN 10; 325 MG/1; MG/1
1 TABLET ORAL EVERY 6 HOURS PRN
Status: DISCONTINUED | OUTPATIENT
Start: 2018-04-27 | End: 2018-05-03

## 2018-04-27 RX ORDER — GEMFIBROZIL 600 MG/1
600 TABLET, FILM COATED ORAL 2 TIMES DAILY
COMMUNITY

## 2018-04-27 RX ORDER — SIMVASTATIN 10 MG
20 TABLET ORAL EVERY EVENING
Status: DISCONTINUED | OUTPATIENT
Start: 2018-04-27 | End: 2018-05-04 | Stop reason: HOSPADM

## 2018-04-27 RX ORDER — PROMETHAZINE HYDROCHLORIDE 25 MG/1
12.5-25 TABLET ORAL EVERY 4 HOURS PRN
Status: DISCONTINUED | OUTPATIENT
Start: 2018-04-27 | End: 2018-05-04 | Stop reason: HOSPADM

## 2018-04-27 RX ORDER — ONDANSETRON 4 MG/1
4 TABLET, ORALLY DISINTEGRATING ORAL EVERY 4 HOURS PRN
Status: DISCONTINUED | OUTPATIENT
Start: 2018-04-27 | End: 2018-05-04 | Stop reason: HOSPADM

## 2018-04-27 RX ORDER — THEOPHYLLINE 300 MG/1
300 TABLET, EXTENDED RELEASE ORAL 2 TIMES DAILY
Status: DISCONTINUED | OUTPATIENT
Start: 2018-04-27 | End: 2018-05-04 | Stop reason: HOSPADM

## 2018-04-27 RX ORDER — BUDESONIDE AND FORMOTEROL FUMARATE DIHYDRATE 160; 4.5 UG/1; UG/1
2 AEROSOL RESPIRATORY (INHALATION)
Status: DISCONTINUED | OUTPATIENT
Start: 2018-04-27 | End: 2018-04-28

## 2018-04-27 RX ORDER — LISINOPRIL 40 MG/1
40 TABLET ORAL
COMMUNITY
End: 2023-12-26

## 2018-04-27 RX ORDER — TIOTROPIUM BROMIDE 18 UG/1
1 CAPSULE ORAL; RESPIRATORY (INHALATION) DAILY
Status: DISCONTINUED | OUTPATIENT
Start: 2018-04-27 | End: 2018-05-04 | Stop reason: HOSPADM

## 2018-04-27 RX ORDER — ONDANSETRON 2 MG/ML
4 INJECTION INTRAMUSCULAR; INTRAVENOUS EVERY 4 HOURS PRN
Status: DISCONTINUED | OUTPATIENT
Start: 2018-04-27 | End: 2018-05-04 | Stop reason: HOSPADM

## 2018-04-27 RX ORDER — AMOXICILLIN 250 MG
2 CAPSULE ORAL 2 TIMES DAILY
Status: DISCONTINUED | OUTPATIENT
Start: 2018-04-27 | End: 2018-05-04 | Stop reason: HOSPADM

## 2018-04-27 RX ORDER — ACETAMINOPHEN 325 MG/1
650 TABLET ORAL EVERY 6 HOURS PRN
Status: DISCONTINUED | OUTPATIENT
Start: 2018-04-27 | End: 2018-05-04 | Stop reason: HOSPADM

## 2018-04-27 RX ADMIN — STANDARDIZED SENNA CONCENTRATE AND DOCUSATE SODIUM 2 TABLET: 8.6; 5 TABLET, FILM COATED ORAL at 20:23

## 2018-04-27 RX ADMIN — CLONAZEPAM 1 MG: 1 TABLET ORAL at 20:24

## 2018-04-27 RX ADMIN — PIPERACILLIN SODIUM AND TAZOBACTAM SODIUM 3.38 G: 3; .375 INJECTION, POWDER, FOR SOLUTION INTRAVENOUS at 18:46

## 2018-04-27 RX ADMIN — BUDESONIDE AND FORMOTEROL FUMARATE DIHYDRATE 2 PUFF: 160; 4.5 AEROSOL RESPIRATORY (INHALATION) at 14:39

## 2018-04-27 RX ADMIN — CLONAZEPAM 1 MG: 1 TABLET ORAL at 14:30

## 2018-04-27 RX ADMIN — HYDROCODONE BITARTRATE AND ACETAMINOPHEN 1 TABLET: 10; 325 TABLET ORAL at 20:27

## 2018-04-27 RX ADMIN — THEOPHYLLINE 300 MG: 300 TABLET, EXTENDED RELEASE ORAL at 20:23

## 2018-04-27 RX ADMIN — STANDARDIZED SENNA CONCENTRATE AND DOCUSATE SODIUM 2 TABLET: 8.6; 5 TABLET, FILM COATED ORAL at 14:29

## 2018-04-27 RX ADMIN — HYDROCODONE BITARTRATE AND ACETAMINOPHEN 1 TABLET: 10; 325 TABLET ORAL at 14:30

## 2018-04-27 RX ADMIN — SIMVASTATIN 20 MG: 10 TABLET, FILM COATED ORAL at 20:23

## 2018-04-27 RX ADMIN — TIOTROPIUM BROMIDE 1 CAPSULE: 18 CAPSULE ORAL; RESPIRATORY (INHALATION) at 14:30

## 2018-04-27 RX ADMIN — PIPERACILLIN SODIUM AND TAZOBACTAM SODIUM 3.38 G: 3; .375 INJECTION, POWDER, FOR SOLUTION INTRAVENOUS at 14:30

## 2018-04-27 RX ADMIN — QUETIAPINE FUMARATE 100 MG: 25 TABLET ORAL at 20:23

## 2018-04-27 RX ADMIN — SODIUM CHLORIDE 500 ML: 9 INJECTION, SOLUTION INTRAVENOUS at 14:34

## 2018-04-27 ASSESSMENT — PAIN SCALES - GENERAL
PAINLEVEL_OUTOF10: 3
PAINLEVEL_OUTOF10: 8
PAINLEVEL_OUTOF10: 7

## 2018-04-27 ASSESSMENT — LIFESTYLE VARIABLES
EVER HAD A DRINK FIRST THING IN THE MORNING TO STEADY YOUR NERVES TO GET RID OF A HANGOVER: NO
ALCOHOL_USE: YES
TOTAL SCORE: 1
HAVE YOU EVER FELT YOU SHOULD CUT DOWN ON YOUR DRINKING: YES
ON A TYPICAL DAY WHEN YOU DRINK ALCOHOL HOW MANY DRINKS DO YOU HAVE: 1
AVERAGE NUMBER OF DAYS PER WEEK YOU HAVE A DRINK CONTAINING ALCOHOL: 1
DOES PATIENT WANT TO STOP DRINKING: YES
EVER_SMOKED: YES
DOES PATIENT WANT TO TALK TO SOMEONE ABOUT QUITTING: NO
EVER FELT BAD OR GUILTY ABOUT YOUR DRINKING: NO
TOTAL SCORE: 1
HOW MANY TIMES IN THE PAST YEAR HAVE YOU HAD 5 OR MORE DRINKS IN A DAY: 0
HAVE PEOPLE ANNOYED YOU BY CRITICIZING YOUR DRINKING: NO
EVER_SMOKED: YES
TOTAL SCORE: 1
CONSUMPTION TOTAL: NEGATIVE

## 2018-04-27 ASSESSMENT — COPD QUESTIONNAIRES
DURING THE PAST 4 WEEKS HOW MUCH DID YOU FEEL SHORT OF BREATH: SOME OF THE TIME
COPD SCREENING SCORE: 6
HAVE YOU SMOKED AT LEAST 100 CIGARETTES IN YOUR ENTIRE LIFE: YES
DO YOU EVER COUGH UP ANY MUCUS OR PHLEGM?: YES, A FEW DAYS A WEEK OR MONTH

## 2018-04-27 ASSESSMENT — PATIENT HEALTH QUESTIONNAIRE - PHQ9
3. TROUBLE FALLING OR STAYING ASLEEP OR SLEEPING TOO MUCH: NOT AT ALL
SUM OF ALL RESPONSES TO PHQ9 QUESTIONS 1 AND 2: 2
1. LITTLE INTEREST OR PLEASURE IN DOING THINGS: SEVERAL DAYS
8. MOVING OR SPEAKING SO SLOWLY THAT OTHER PEOPLE COULD HAVE NOTICED. OR THE OPPOSITE, BEING SO FIGETY OR RESTLESS THAT YOU HAVE BEEN MOVING AROUND A LOT MORE THAN USUAL: NOT AT ALL
2. FEELING DOWN, DEPRESSED, IRRITABLE, OR HOPELESS: SEVERAL DAYS
7. TROUBLE CONCENTRATING ON THINGS, SUCH AS READING THE NEWSPAPER OR WATCHING TELEVISION: NOT AT ALL

## 2018-04-27 NOTE — PROGRESS NOTES
"Med rec complete per patient.  Pt states he takes simvastatin as needed for when he \"eats a lot of meat.\"  Pt also states he takes lisinopril and atenolol as needed based on his BP.  Allergies reviewed - NKDA.  "

## 2018-04-27 NOTE — WOUND TEAM
Arrived to see pt for wound care.  Pt being taken for a test by transport upon arrival.  Will return for evaluation.

## 2018-04-27 NOTE — PROGRESS NOTES
Direct admit from [facility]. Accepted by Dr. Hernandez for post oeprative intraabdominal abscess.  ADT signed & held, needs to be released upon pt arrival.  No written orders received.  Pt coming by air.

## 2018-04-27 NOTE — PROGRESS NOTES
Direct admit from [place]  On unit with care flight staff at 1100  Hospitalist emanid for orders at 1105  --------------------------------------------------------------  2 RN skin check complete:    Bruising to bilateral upper extremities  Redness and dryness to pannus  Healing lap sites x4 to abdomen- scabs noted to 3  Wound to RLQ- wound consult placed  BLE discoloration and dryness  Dryness noted to toes  Generalized skin dryness/flakiness  ----------------------------------------------------------------------  Vitals at 1110    105/65  O2 99% on 3L- baseline O2 usage  P  (serena fib)  T 98.2  ----------------------------------------------------------------------  Admit profile complete  No vaccines at this time  Med rec complete  -----------------------------------------------------------------------  Assessment done    A+Ox4    3L O2 NC- baseline    Pt states pain 7/10- will medicate when orders are placed by MD BERRY fib- MD aware    Hyperactive BSx4    R AC PIV POA, saline locked until orders for fluids placed    Bed low and locked, call light and belongings in reach, hourly rounding in place    Dicussed POC    All needs met at this time

## 2018-04-27 NOTE — PROGRESS NOTES
Called talked to RN patient is unable to lay flat on camera bed for Nuc Med- Hepatobiliary due back pain. Patient sent back to room.

## 2018-04-28 ENCOUNTER — APPOINTMENT (OUTPATIENT)
Dept: RADIOLOGY | Facility: MEDICAL CENTER | Age: 63
DRG: 919 | End: 2018-04-28
Attending: HOSPITALIST
Payer: MEDICAID

## 2018-04-28 PROBLEM — R10.11 RIGHT UPPER QUADRANT ABDOMINAL PAIN: Status: ACTIVE | Noted: 2018-04-28

## 2018-04-28 PROBLEM — R11.2 INTRACTABLE NAUSEA AND VOMITING: Status: ACTIVE | Noted: 2018-04-28

## 2018-04-28 LAB
ALBUMIN SERPL BCP-MCNC: 3 G/DL (ref 3.2–4.9)
ALBUMIN/GLOB SERPL: 0.8 G/DL
ALP SERPL-CCNC: 82 U/L (ref 30–99)
ALT SERPL-CCNC: <5 U/L (ref 2–50)
ANION GAP SERPL CALC-SCNC: 9 MMOL/L (ref 0–11.9)
AST SERPL-CCNC: 9 U/L (ref 12–45)
BILIRUB SERPL-MCNC: 1.1 MG/DL (ref 0.1–1.5)
BUN SERPL-MCNC: 25 MG/DL (ref 8–22)
CALCIUM SERPL-MCNC: 8.9 MG/DL (ref 8.5–10.5)
CHLORIDE SERPL-SCNC: 103 MMOL/L (ref 96–112)
CO2 SERPL-SCNC: 23 MMOL/L (ref 20–33)
CREAT SERPL-MCNC: 0.94 MG/DL (ref 0.5–1.4)
ERYTHROCYTE [DISTWIDTH] IN BLOOD BY AUTOMATED COUNT: 48.7 FL (ref 35.9–50)
GLOBULIN SER CALC-MCNC: 3.6 G/DL (ref 1.9–3.5)
GLUCOSE SERPL-MCNC: 92 MG/DL (ref 65–99)
HCT VFR BLD AUTO: 34.3 % (ref 42–52)
HGB BLD-MCNC: 10.6 G/DL (ref 14–18)
INR PPP: 1.29 (ref 0.87–1.13)
MCH RBC QN AUTO: 27.7 PG (ref 27–33)
MCHC RBC AUTO-ENTMCNC: 30.9 G/DL (ref 33.7–35.3)
MCV RBC AUTO: 89.8 FL (ref 81.4–97.8)
PLATELET # BLD AUTO: 158 K/UL (ref 164–446)
PMV BLD AUTO: 12.3 FL (ref 9–12.9)
POTASSIUM SERPL-SCNC: 4 MMOL/L (ref 3.6–5.5)
PROT SERPL-MCNC: 6.6 G/DL (ref 6–8.2)
PROTHROMBIN TIME: 15.8 SEC (ref 12–14.6)
RBC # BLD AUTO: 3.82 M/UL (ref 4.7–6.1)
SODIUM SERPL-SCNC: 135 MMOL/L (ref 135–145)
WBC # BLD AUTO: 8.9 K/UL (ref 4.8–10.8)

## 2018-04-28 PROCEDURE — 700105 HCHG RX REV CODE 258: Performed by: HOSPITALIST

## 2018-04-28 PROCEDURE — 700101 HCHG RX REV CODE 250: Performed by: HOSPITALIST

## 2018-04-28 PROCEDURE — A9270 NON-COVERED ITEM OR SERVICE: HCPCS | Performed by: HOSPITALIST

## 2018-04-28 PROCEDURE — 85610 PROTHROMBIN TIME: CPT

## 2018-04-28 PROCEDURE — A9537 TC99M MEBROFENIN: HCPCS

## 2018-04-28 PROCEDURE — 770006 HCHG ROOM/CARE - MED/SURG/GYN SEMI*

## 2018-04-28 PROCEDURE — 94640 AIRWAY INHALATION TREATMENT: CPT

## 2018-04-28 PROCEDURE — 85027 COMPLETE CBC AUTOMATED: CPT

## 2018-04-28 PROCEDURE — 700102 HCHG RX REV CODE 250 W/ 637 OVERRIDE(OP): Performed by: HOSPITALIST

## 2018-04-28 PROCEDURE — 36415 COLL VENOUS BLD VENIPUNCTURE: CPT

## 2018-04-28 PROCEDURE — 700111 HCHG RX REV CODE 636 W/ 250 OVERRIDE (IP): Performed by: HOSPITALIST

## 2018-04-28 PROCEDURE — 80053 COMPREHEN METABOLIC PANEL: CPT

## 2018-04-28 PROCEDURE — 99232 SBSQ HOSP IP/OBS MODERATE 35: CPT | Performed by: HOSPITALIST

## 2018-04-28 RX ORDER — NYSTATIN 100000 [USP'U]/G
POWDER TOPICAL 2 TIMES DAILY
Status: DISCONTINUED | OUTPATIENT
Start: 2018-04-28 | End: 2018-05-04 | Stop reason: HOSPADM

## 2018-04-28 RX ORDER — BUDESONIDE AND FORMOTEROL FUMARATE DIHYDRATE 160; 4.5 UG/1; UG/1
2 AEROSOL RESPIRATORY (INHALATION) 2 TIMES DAILY
Status: DISCONTINUED | OUTPATIENT
Start: 2018-04-28 | End: 2018-05-04 | Stop reason: HOSPADM

## 2018-04-28 RX ADMIN — CLONAZEPAM 1 MG: 1 TABLET ORAL at 06:24

## 2018-04-28 RX ADMIN — NYSTATIN: 100000 POWDER TOPICAL at 14:33

## 2018-04-28 RX ADMIN — CLONAZEPAM 1 MG: 1 TABLET ORAL at 20:15

## 2018-04-28 RX ADMIN — ALBUTEROL SULFATE 2.5 MG: 2.5 SOLUTION RESPIRATORY (INHALATION) at 08:27

## 2018-04-28 RX ADMIN — THEOPHYLLINE 300 MG: 300 TABLET, EXTENDED RELEASE ORAL at 09:55

## 2018-04-28 RX ADMIN — TIOTROPIUM BROMIDE 1 CAPSULE: 18 CAPSULE ORAL; RESPIRATORY (INHALATION) at 08:35

## 2018-04-28 RX ADMIN — PIPERACILLIN SODIUM AND TAZOBACTAM SODIUM 3.38 G: 3; .375 INJECTION, POWDER, FOR SOLUTION INTRAVENOUS at 12:56

## 2018-04-28 RX ADMIN — THEOPHYLLINE 300 MG: 300 TABLET, EXTENDED RELEASE ORAL at 20:16

## 2018-04-28 RX ADMIN — QUETIAPINE FUMARATE 100 MG: 25 TABLET ORAL at 20:14

## 2018-04-28 RX ADMIN — CLONAZEPAM 1 MG: 1 TABLET ORAL at 12:56

## 2018-04-28 RX ADMIN — SIMVASTATIN 20 MG: 10 TABLET, FILM COATED ORAL at 20:15

## 2018-04-28 RX ADMIN — PIPERACILLIN SODIUM AND TAZOBACTAM SODIUM 3.38 G: 3; .375 INJECTION, POWDER, FOR SOLUTION INTRAVENOUS at 06:00

## 2018-04-28 RX ADMIN — BUDESONIDE AND FORMOTEROL FUMARATE DIHYDRATE 2 PUFF: 160; 4.5 AEROSOL RESPIRATORY (INHALATION) at 08:35

## 2018-04-28 RX ADMIN — PIPERACILLIN SODIUM AND TAZOBACTAM SODIUM 3.38 G: 3; .375 INJECTION, POWDER, FOR SOLUTION INTRAVENOUS at 20:14

## 2018-04-28 RX ADMIN — BUDESONIDE AND FORMOTEROL FUMARATE DIHYDRATE 2 PUFF: 160; 4.5 AEROSOL RESPIRATORY (INHALATION) at 20:14

## 2018-04-28 RX ADMIN — PIPERACILLIN SODIUM AND TAZOBACTAM SODIUM 3.38 G: 3; .375 INJECTION, POWDER, FOR SOLUTION INTRAVENOUS at 00:34

## 2018-04-28 RX ADMIN — HYDROCODONE BITARTRATE AND ACETAMINOPHEN 1 TABLET: 10; 325 TABLET ORAL at 20:15

## 2018-04-28 RX ADMIN — ATENOLOL 25 MG: 50 TABLET ORAL at 12:56

## 2018-04-28 RX ADMIN — NYSTATIN: 100000 POWDER TOPICAL at 20:15

## 2018-04-28 RX ADMIN — HYDROCODONE BITARTRATE AND ACETAMINOPHEN 1 TABLET: 10; 325 TABLET ORAL at 06:24

## 2018-04-28 RX ADMIN — HYDROCODONE BITARTRATE AND ACETAMINOPHEN 1 TABLET: 10; 325 TABLET ORAL at 12:56

## 2018-04-28 ASSESSMENT — ENCOUNTER SYMPTOMS
ABDOMINAL PAIN: 1
CONSTITUTIONAL NEGATIVE: 1
NAUSEA: 1
PSYCHIATRIC NEGATIVE: 1
RESPIRATORY NEGATIVE: 1
NEUROLOGICAL NEGATIVE: 1
MUSCULOSKELETAL NEGATIVE: 1

## 2018-04-28 ASSESSMENT — PAIN SCALES - GENERAL
PAINLEVEL_OUTOF10: 7
PAINLEVEL_OUTOF10: 5
PAINLEVEL_OUTOF10: 6

## 2018-04-28 ASSESSMENT — LIFESTYLE VARIABLES
PACK_YEARS: 30
EVER_SMOKED: YES

## 2018-04-28 NOTE — DIETARY
NUTRITION SERVICES: BMI - Pt with BMI >40 (=48.15). Weight loss counseling not appropriate in acute care setting. RECOMMEND - Referral to outpatient nutrition services for weight management after D/C.

## 2018-04-28 NOTE — PROGRESS NOTES
"Assumed care of patient from night shift RN.  Patient is alert and oriented times 4, states pain of 6/10, will medicate per MAR.  VSS /61   Pulse (!) 103   Temp 36.8 °C (98.2 °F)   Resp 17   Ht 1.88 m (6' 2\")   Wt (!) 170.1 kg (375 lb)   SpO2 95%   BMI 48.15 kg/m²   PIV in the L forearm, patent and running NS TKO.  Dressing to the R abdomen, CDI.  4 lap sites to abdomen.  Red rash to pannus, will discuss with MD.  Patient is on a clear liquid diet, tolerating well.  Last BM this AM, urinating without difficulty.  POC discussed for the day, bed is locked and in the lowest position, call light is within reach.  All needs are met at this time, hourly rounding is in place.  "

## 2018-04-28 NOTE — RESPIRATORY CARE
COPD EDUCATION by COPD CLINICAL EDUCATOR  4/28/2018 at 12:14 PM by Trista Perez     Patient interviewed by COPD education team. Patient refused COPD program at this time.

## 2018-04-28 NOTE — CARE PLAN
Problem: Communication  Goal: The ability to communicate needs accurately and effectively will improve  Outcome: PROGRESSING AS EXPECTED  Patient provided 1:1 discussion    Problem: Safety  Goal: Will remain free from injury  Outcome: PROGRESSING AS EXPECTED  Patient educated on use of call light and not to get out of bed without staff present

## 2018-04-28 NOTE — CARE PLAN
Problem: Safety  Goal: Will remain free from falls  Outcome: PROGRESSING AS EXPECTED  Pt will not fall during hospital stay

## 2018-04-28 NOTE — H&P
CHIEF COMPLAINT:  Fever, nausea, vomiting.    HISTORY OF PRESENT ILLNESS:  This is a 62-year-old gentleman who was   originally cared for here at [facility] by Dr. Meche Olson and the hospitalist   service.  He underwent a laparoscopic cholecystectomy with intraoperative   cholangiogram on April 2nd.  His postprocedure recovery was complicated by a   bile leak for which he had an ERCP and common bile duct stent placed by Dr. Ariel Aguirre on the 3rd.  He was subsequently discharged in good condition and   states that he was feeling pretty good.    Since the patient has been home, he has had problems with increasing nausea   and vomiting.  He reports his abdominal pain is perhaps a little bit worse   than it was before, but he thinks it is not very bad at all.  He had not   noticed that he had a fever, but did notice that he had chills.  He eventually   went to the emergency room in [place] today.  In [place], he was found to have a   temperature of 103.9.  He had a CT scan done which demonstrated fluid   collection around the pancreatic head and the gallbladder fossa and it was   felt that the patient may be having a postoperative complication.  He was   therefore sent to our facility for further evaluation.    REVIEW OF SYSTEMS:  Positive as noted; otherwise positive for chronic back   pain.    PREVIOUS MEDICAL HISTORY:  1.  COPD.  2.  Chronic back pain.  3.  Congestive heart failure.  4.  Distant history of deep venous thrombosis.  5.  Hypertension.  6.  Dyslipidemia.  7.  Chronic anticoagulation, on apixaban.  8.  Atrial fibrillation.    OUTPATIENT MEDICATIONS:  1.  PRN albuterol.  2.  Atenolol 25 mg daily.  3.  Klonopin 1 mg twice daily.  4.  Digoxin 125 mcg daily.  5.  Advair 250/50 one puff b.i.d.  6.  Lasix 40 mg once daily.  7.  Gemfibrozil 600 mg daily.  8.  Hydrocodone 10/325 one q.6 hours p.r.n. pain.  9.  Lisinopril 40 mg daily.  10.  Seroquel 100 mg in the evening.  11.  Xarelto 20 mg daily.  12.  Zocor 20 mg in  the evening.  13.  Spironolactone 25 mg daily.  14.  Maurice-Dur 300 mg twice daily.  15.  Spiriva 18 mcg inhaled daily.    ALLERGIES:  TO TAPE.    SOCIAL HISTORY:  The patient has a distant history of tobacco use.  He does   not smoke currently.  He drinks occasionally.    SURGICAL HISTORY:  1.  Laparoscopic cholecystectomy as noted.  2.  ERCP as noted.    FAMILY HISTORY:  Reviewed but not relevant to presentation.    PHYSICAL EXAMINATION:  VITAL SIGNS:  Temperature 36.8, heart rate 98, respiratory rate 20, /61,   satting 99% on 3 liters nasal cannula.  GENERAL:  The patient is awake, alert.  He is in no acute distress.  HEENT:  Head is normocephalic and atraumatic.  Mucous membranes are moist.    Sclerae and conjunctivae are benign.  NECK:  Trachea is in the midline.  Neck is supple.  There is no JVD or bruits.  RESPIRATORY:  Clear to auscultation bilaterally, some faint wheezing noted.  CARDIAC:  Regular rate and rhythm.  No murmurs, rubs or clicks.  ABDOMEN:  Soft, no guarding, rebound, hepatosplenomegaly, or masses, though   exam is compromised by body habitus.  There is a small healing incision in the   right lower quadrant consistent with the patient's history of appendectomy.    There is minimal tenderness to palpation in the right upper quadrant without   peritoneal findings.  EXTREMITIES:  1+ edema.  No clubbing or cyanosis.  Calves nontender to   palpation.  SKIN:  Warm and dry.  No rashes appreciated.  Small healing wound in the right   lower quadrant as noted.  NEUROLOGIC:  Alert, nonfocal.  PSYCHIATRIC:  Mood and affect are appropriate.  Hygiene neat and clean.    LABORATORY DATA:  Reviewed from the transferring facility, white count 13.6,   hemoglobin 13.2, platelet count 232.  Sodium is 138, potassium 4.6, BUN 23,   creatinine 1.2, lipase 239.  Lactic acid at their facility 4.2.  UA is nitrite   and leukocyte esterase negative.  BNP is 521.  Troponin I is less than 0.017.    Repeat lactic acid  2.8.    IMAGING STUDIES:  CT of the abdomen and pelvis were again reviewed from the   transferring facility, positive for fluid collection in the gallbladder fossa   and around the pancreatic head; seroma versus abscess.    ASSESSMENT AND PLAN:  This is a 62-year-old gentleman with problem list as   follows:  1.  Abdominal pain, question abdominal sepsis:  I have discussed the case   today with Dr. Meche Olson who has graciously agreed to evaluate the patient.    I have ordered a HIDA scan.  I have discussed the case as well with Dr. Scott Baum, who will evaluate for the possibility of IR drainage.  We will place   the patient on Zosyn and check blood cultures in the meanwhile.  2.  Recent appendectomy:  The patient had an appendectomy about 6 weeks ago.    He does have an open wound in the right lower quadrant.  We will consult wound   care and ask them to assist in its management.  It looks good to me on my   examination.  3.  Sepsis:  Intra-abdominal source is most likely, treat as noted.  Lactic   acid was in the 2s on transferring.  He has stable vital signs.  4.  Chronic pain:  Continue p.r.n. support.  5.  Chronic obstructive pulmonary disease:  Placed on O2 and respiratory   protocols, continue outpatient medication regimen.  6.  History of congestive heart failure:  The patient does appear to be   compensated.  His last echo this year, earlier this month demonstrated   preserved LVEF with no significant valvular abnormalities.  7.  Distant history of deep venous thrombosis:  Currently on Xarelto.  8.  History of atrial fibrillation:  The patient is rate controlled and   anticoagulated on Xarelto.  9.  Chronic anticoagulation, on Xarelto for above indications.  10.  Hypertension.  Continue outpatient regimen.  11.  Dyslipidemia.  Continue statin and gemfibrozil.  12.  History of sleep apnea:  The patient uses CPAP at home, he did not bring   his mask with this; however, we will set him up with his own  equipment.    Given the nature of the patient's presentation, I think he will require   greater than 2 midnights care for him adequately; therefore, readmitting him   to full admission status.       ____________________________________     DO NUPUR Yeung / MARIVEL    DD:  04/27/2018 20:18:24  DT:  04/27/2018 21:17:49    D#:  [id number]  Job#:  [id number]

## 2018-04-29 LAB
ANION GAP SERPL CALC-SCNC: 6 MMOL/L (ref 0–11.9)
BUN SERPL-MCNC: 16 MG/DL (ref 8–22)
CALCIUM SERPL-MCNC: 8.8 MG/DL (ref 8.5–10.5)
CHLORIDE SERPL-SCNC: 106 MMOL/L (ref 96–112)
CO2 SERPL-SCNC: 25 MMOL/L (ref 20–33)
CREAT SERPL-MCNC: 0.73 MG/DL (ref 0.5–1.4)
ERYTHROCYTE [DISTWIDTH] IN BLOOD BY AUTOMATED COUNT: 47.1 FL (ref 35.9–50)
GLUCOSE SERPL-MCNC: 98 MG/DL (ref 65–99)
HCT VFR BLD AUTO: 31.7 % (ref 42–52)
HGB BLD-MCNC: 9.8 G/DL (ref 14–18)
MCH RBC QN AUTO: 27.7 PG (ref 27–33)
MCHC RBC AUTO-ENTMCNC: 30.9 G/DL (ref 33.7–35.3)
MCV RBC AUTO: 89.5 FL (ref 81.4–97.8)
PLATELET # BLD AUTO: 152 K/UL (ref 164–446)
PMV BLD AUTO: 12.1 FL (ref 9–12.9)
POTASSIUM SERPL-SCNC: 4.6 MMOL/L (ref 3.6–5.5)
RBC # BLD AUTO: 3.54 M/UL (ref 4.7–6.1)
SODIUM SERPL-SCNC: 137 MMOL/L (ref 135–145)
WBC # BLD AUTO: 4.5 K/UL (ref 4.8–10.8)

## 2018-04-29 PROCEDURE — 770006 HCHG ROOM/CARE - MED/SURG/GYN SEMI*

## 2018-04-29 PROCEDURE — 700111 HCHG RX REV CODE 636 W/ 250 OVERRIDE (IP): Performed by: HOSPITALIST

## 2018-04-29 PROCEDURE — 700102 HCHG RX REV CODE 250 W/ 637 OVERRIDE(OP): Performed by: HOSPITALIST

## 2018-04-29 PROCEDURE — 85027 COMPLETE CBC AUTOMATED: CPT

## 2018-04-29 PROCEDURE — 99232 SBSQ HOSP IP/OBS MODERATE 35: CPT | Performed by: HOSPITALIST

## 2018-04-29 PROCEDURE — 80048 BASIC METABOLIC PNL TOTAL CA: CPT

## 2018-04-29 PROCEDURE — A9270 NON-COVERED ITEM OR SERVICE: HCPCS | Performed by: HOSPITALIST

## 2018-04-29 PROCEDURE — 700105 HCHG RX REV CODE 258: Performed by: HOSPITALIST

## 2018-04-29 PROCEDURE — 36415 COLL VENOUS BLD VENIPUNCTURE: CPT

## 2018-04-29 RX ORDER — CEFAZOLIN SODIUM 2 G/100ML
2 INJECTION, SOLUTION INTRAVENOUS ONCE
Status: DISCONTINUED | OUTPATIENT
Start: 2018-04-30 | End: 2018-05-01

## 2018-04-29 RX ADMIN — HYDROCODONE BITARTRATE AND ACETAMINOPHEN 1 TABLET: 10; 325 TABLET ORAL at 02:57

## 2018-04-29 RX ADMIN — TIOTROPIUM BROMIDE 1 CAPSULE: 18 CAPSULE ORAL; RESPIRATORY (INHALATION) at 09:18

## 2018-04-29 RX ADMIN — NYSTATIN: 100000 POWDER TOPICAL at 09:19

## 2018-04-29 RX ADMIN — CLONAZEPAM 1 MG: 1 TABLET ORAL at 12:38

## 2018-04-29 RX ADMIN — HYDROCODONE BITARTRATE AND ACETAMINOPHEN 1 TABLET: 10; 325 TABLET ORAL at 23:05

## 2018-04-29 RX ADMIN — PIPERACILLIN SODIUM AND TAZOBACTAM SODIUM 3.38 G: 3; .375 INJECTION, POWDER, FOR SOLUTION INTRAVENOUS at 12:37

## 2018-04-29 RX ADMIN — NYSTATIN: 100000 POWDER TOPICAL at 21:56

## 2018-04-29 RX ADMIN — ATENOLOL 25 MG: 50 TABLET ORAL at 12:38

## 2018-04-29 RX ADMIN — THEOPHYLLINE 300 MG: 300 TABLET, EXTENDED RELEASE ORAL at 09:19

## 2018-04-29 RX ADMIN — ATENOLOL 25 MG: 50 TABLET ORAL at 00:50

## 2018-04-29 RX ADMIN — HYDROCODONE BITARTRATE AND ACETAMINOPHEN 1 TABLET: 10; 325 TABLET ORAL at 11:08

## 2018-04-29 RX ADMIN — THEOPHYLLINE 300 MG: 300 TABLET, EXTENDED RELEASE ORAL at 22:00

## 2018-04-29 RX ADMIN — PIPERACILLIN SODIUM AND TAZOBACTAM SODIUM 3.38 G: 3; .375 INJECTION, POWDER, FOR SOLUTION INTRAVENOUS at 21:55

## 2018-04-29 RX ADMIN — SIMVASTATIN 20 MG: 10 TABLET, FILM COATED ORAL at 21:56

## 2018-04-29 RX ADMIN — PIPERACILLIN SODIUM AND TAZOBACTAM SODIUM 3.38 G: 3; .375 INJECTION, POWDER, FOR SOLUTION INTRAVENOUS at 05:43

## 2018-04-29 RX ADMIN — QUETIAPINE FUMARATE 100 MG: 25 TABLET ORAL at 21:55

## 2018-04-29 RX ADMIN — CLONAZEPAM 1 MG: 1 TABLET ORAL at 21:57

## 2018-04-29 RX ADMIN — BUDESONIDE AND FORMOTEROL FUMARATE DIHYDRATE 2 PUFF: 160; 4.5 AEROSOL RESPIRATORY (INHALATION) at 21:56

## 2018-04-29 RX ADMIN — CLONAZEPAM 1 MG: 1 TABLET ORAL at 05:43

## 2018-04-29 RX ADMIN — BUDESONIDE AND FORMOTEROL FUMARATE DIHYDRATE 2 PUFF: 160; 4.5 AEROSOL RESPIRATORY (INHALATION) at 09:19

## 2018-04-29 RX ADMIN — HYDROCODONE BITARTRATE AND ACETAMINOPHEN 1 TABLET: 10; 325 TABLET ORAL at 17:10

## 2018-04-29 ASSESSMENT — PAIN SCALES - GENERAL
PAINLEVEL_OUTOF10: 5
PAINLEVEL_OUTOF10: 4
PAINLEVEL_OUTOF10: 6
PAINLEVEL_OUTOF10: 8
PAINLEVEL_OUTOF10: 5

## 2018-04-29 ASSESSMENT — ENCOUNTER SYMPTOMS
MUSCULOSKELETAL NEGATIVE: 1
NEUROLOGICAL NEGATIVE: 1
RESPIRATORY NEGATIVE: 1
CONSTITUTIONAL NEGATIVE: 1
PSYCHIATRIC NEGATIVE: 1
ABDOMINAL PAIN: 1
NAUSEA: 1

## 2018-04-29 NOTE — PROGRESS NOTES
Pt seen and examined  Abd benign  CT neg for fluid to drain  HIDA positive for still having leak despite stent  Will have [facility] see to consider upsizing stent

## 2018-04-29 NOTE — PROGRESS NOTES
"Assumed care of patient from night shift RN.  Patient is alert and oriented times 4, states pain is \"fair\" at this time, declines intervention.  VSS /74   Pulse 82   Temp 36.6 °C (97.8 °F)   Resp 18   Ht 1.88 m (6' 2\")   Wt (!) 170.1 kg (375 lb)   SpO2 98%   BMI 48.15 kg/m²   PIV in the R wrist, patent and running NS TKO.  Dressing to R abdomen, CDI.  Rash to the lower abdomen.  On 3L oxygen, this is patient's baseline at home.  Patient is a standby assist with a cane, demonstrates steady gait and minimal assistance needed.  POC discussed for the day, bed is locked and in the lowest position, call light is within reach.  All needs are met at this time, hourly rounding is in place.  "

## 2018-04-29 NOTE — CONSULTS
Consults     [facility]    See dictation - Appears to still have a low grade bile leak. Will plan ERCP to potentially place 2 CBD stents to further decrease biliary pressures to allow Cystic duct leak healing. Patient already has a large 10Fr CBD stent in      [facility]

## 2018-04-29 NOTE — PROGRESS NOTES
Patient without complaints this pm, ambulatory, +void with hesitancy, +BM, refused softeners, medicated for pain with some relief, patient states chronic pain always 5-6/10 to back, neck, shoulders and right leg. Tolerating CL diet, abd dressing changed, some tunneling noted toward umbilicus, IVABX continued, refused SCD's with counseling, IS to 2250, o2 on 3L baseline. VSS will continue to monitor

## 2018-04-29 NOTE — CARE PLAN
Problem: Safety  Goal: Will remain free from injury  Outcome: PROGRESSING AS EXPECTED  Patient instructed not to get out of bed without staff present.    Problem: Pain Management  Goal: Pain level will decrease to patient's comfort goal  Outcome: PROGRESSING AS EXPECTED  Medicated per MAR for pain

## 2018-04-29 NOTE — PROGRESS NOTES
[facility] Hospitalist Progress Note    Date of Service: 2018    Chief Complaint  62 y.o. male admitted 2018 with n/v, abdominal pain    Interval Problem Update  No fever    Intractable nausea    Npo    hida scan pending      Consultants/Specialty    Dr kim surgery    Disposition  home        Review of Systems   Constitutional: Negative.    HENT: Negative.    Respiratory: Negative.    Gastrointestinal: Positive for abdominal pain and nausea.   Genitourinary: Negative.    Musculoskeletal: Negative.    Skin: Negative.    Neurological: Negative.    Psychiatric/Behavioral: Negative.    All other systems reviewed and are negative.     Physical Exam  Laboratory/Imaging   Hemodynamics  Temp (24hrs), Av.6 °C (97.8 °F), Min:36.1 °C (97 °F), Max:36.9 °C (98.5 °F)   Temperature: 36.9 °C (98.5 °F)  Pulse  Av.5  Min: 84  Max: 107    Blood Pressure: 114/75      Respiratory      Respiration: 18, Pulse Oximetry: 98 %, O2 Daily Delivery Respiratory : Silicone Nasal Cannula     Given By:: Mouthpiece, #MDI/DPI Given: MDI/DPI x 1, Work Of Breathing / Effort: Mild  RUL Breath Sounds: Clear;Diminished, RML Breath Sounds: Diminished, RLL Breath Sounds: Diminished, CEASAR Breath Sounds: Clear;Diminished, LLL Breath Sounds: Diminished    Fluids    Intake/Output Summary (Last 24 hours) at 188  Last data filed at 18   Gross per 24 hour   Intake              300 ml   Output             1750 ml   Net            -1450 ml       Nutrition  Orders Placed This Encounter   Procedures   • Diet Order     Standing Status:   Standing     Number of Occurrences:   1     Order Specific Question:   Diet:     Answer:   Clear Liquid [10]     Physical Exam   Constitutional: He is oriented to person, place, and time. No distress.   Eyes: Left eye exhibits no discharge.   Neck: No tracheal deviation present. No thyromegaly present.   Cardiovascular: Normal rate.  Exam reveals no gallop and no friction rub.    No murmur  heard.  Pulmonary/Chest: Effort normal and breath sounds normal. No respiratory distress. He has no wheezes. He has no rales.   Abdominal: Soft. He exhibits distension. There is tenderness (ruq).   Musculoskeletal: He exhibits no edema or deformity.   Neurological: He is alert and oriented to person, place, and time. No cranial nerve deficit. Coordination normal.   Skin: He is not diaphoretic.       Recent Labs      04/28/18   0322   WBC  8.9   RBC  3.82*   HEMOGLOBIN  10.6*   HEMATOCRIT  34.3*   MCV  89.8   MCH  27.7   MCHC  30.9*   RDW  48.7   PLATELETCT  158*   MPV  12.3     Recent Labs      04/28/18   0322   SODIUM  135   POTASSIUM  4.0   CHLORIDE  103   CO2  23   GLUCOSE  92   BUN  25*   CREATININE  0.94   CALCIUM  8.9     Recent Labs      04/28/18   0322   INR  1.29*                  Assessment/Plan     * Right upper quadrant abdominal pain- (present on admission)   Assessment & Plan    Pain control    Empiric abx iv zosyn    Npo    hida scan    Surgery on case        Intractable nausea and vomiting- (present on admission)   Assessment & Plan    Iv zofran prn    Npo    hydrate        check am cbc, bmp      Quality-Core Measures   Cummings catheter::  No Cummings  DVT prophylaxis - mechanical:  SCDs

## 2018-04-29 NOTE — DISCHARGE PLANNING
Medical Social Work    Referral: Missing home portable tank    Intervention: SW was notified that pt is upset because when pt was airlifted from [place] here his home portable tank went missing. ABHIJIT called [employer] #[phone] and the business office isn't open due to it being the weekend. Unit SW will need to follow up on Monday with [employer]'s lost and found department.     Plan: Unit ABHIJIT to follow up tomorrow

## 2018-04-29 NOTE — ASSESSMENT & PLAN NOTE
Presented with persistent pain and nausea and vomiting  Lap Choley and ERCP ~ 4 weeks ago  s/p ERCP, stent, sx improved

## 2018-04-30 LAB
ANION GAP SERPL CALC-SCNC: 5 MMOL/L (ref 0–11.9)
BUN SERPL-MCNC: 11 MG/DL (ref 8–22)
CALCIUM SERPL-MCNC: 8.7 MG/DL (ref 8.5–10.5)
CHLORIDE SERPL-SCNC: 108 MMOL/L (ref 96–112)
CO2 SERPL-SCNC: 25 MMOL/L (ref 20–33)
CREAT SERPL-MCNC: 0.71 MG/DL (ref 0.5–1.4)
ERYTHROCYTE [DISTWIDTH] IN BLOOD BY AUTOMATED COUNT: 47.8 FL (ref 35.9–50)
GLUCOSE SERPL-MCNC: 100 MG/DL (ref 65–99)
HCT VFR BLD AUTO: 33.7 % (ref 42–52)
HGB BLD-MCNC: 10 G/DL (ref 14–18)
MCH RBC QN AUTO: 27.2 PG (ref 27–33)
MCHC RBC AUTO-ENTMCNC: 29.7 G/DL (ref 33.7–35.3)
MCV RBC AUTO: 91.6 FL (ref 81.4–97.8)
PLATELET # BLD AUTO: 176 K/UL (ref 164–446)
PMV BLD AUTO: 12 FL (ref 9–12.9)
POTASSIUM SERPL-SCNC: 4.4 MMOL/L (ref 3.6–5.5)
RBC # BLD AUTO: 3.68 M/UL (ref 4.7–6.1)
SODIUM SERPL-SCNC: 138 MMOL/L (ref 135–145)
WBC # BLD AUTO: 3.7 K/UL (ref 4.8–10.8)

## 2018-04-30 PROCEDURE — 700111 HCHG RX REV CODE 636 W/ 250 OVERRIDE (IP): Performed by: HOSPITALIST

## 2018-04-30 PROCEDURE — 80048 BASIC METABOLIC PNL TOTAL CA: CPT

## 2018-04-30 PROCEDURE — 85027 COMPLETE CBC AUTOMATED: CPT

## 2018-04-30 PROCEDURE — 99232 SBSQ HOSP IP/OBS MODERATE 35: CPT | Performed by: HOSPITALIST

## 2018-04-30 PROCEDURE — 36415 COLL VENOUS BLD VENIPUNCTURE: CPT

## 2018-04-30 PROCEDURE — A9270 NON-COVERED ITEM OR SERVICE: HCPCS | Performed by: HOSPITALIST

## 2018-04-30 PROCEDURE — 700102 HCHG RX REV CODE 250 W/ 637 OVERRIDE(OP): Performed by: HOSPITALIST

## 2018-04-30 PROCEDURE — 770006 HCHG ROOM/CARE - MED/SURG/GYN SEMI*

## 2018-04-30 PROCEDURE — 700105 HCHG RX REV CODE 258: Performed by: HOSPITALIST

## 2018-04-30 RX ADMIN — CLONAZEPAM 1 MG: 1 TABLET ORAL at 11:49

## 2018-04-30 RX ADMIN — PIPERACILLIN SODIUM AND TAZOBACTAM SODIUM 3.38 G: 3; .375 INJECTION, POWDER, FOR SOLUTION INTRAVENOUS at 12:49

## 2018-04-30 RX ADMIN — BUDESONIDE AND FORMOTEROL FUMARATE DIHYDRATE 2 PUFF: 160; 4.5 AEROSOL RESPIRATORY (INHALATION) at 09:02

## 2018-04-30 RX ADMIN — NYSTATIN: 100000 POWDER TOPICAL at 21:00

## 2018-04-30 RX ADMIN — BUDESONIDE AND FORMOTEROL FUMARATE DIHYDRATE 2 PUFF: 160; 4.5 AEROSOL RESPIRATORY (INHALATION) at 21:14

## 2018-04-30 RX ADMIN — ONDANSETRON 4 MG: 2 INJECTION, SOLUTION INTRAMUSCULAR; INTRAVENOUS at 15:34

## 2018-04-30 RX ADMIN — ACETAMINOPHEN 650 MG: 325 TABLET, FILM COATED ORAL at 15:34

## 2018-04-30 RX ADMIN — QUETIAPINE FUMARATE 100 MG: 25 TABLET ORAL at 21:14

## 2018-04-30 RX ADMIN — THEOPHYLLINE 300 MG: 300 TABLET, EXTENDED RELEASE ORAL at 09:02

## 2018-04-30 RX ADMIN — NYSTATIN: 100000 POWDER TOPICAL at 09:02

## 2018-04-30 RX ADMIN — TIOTROPIUM BROMIDE 1 CAPSULE: 18 CAPSULE ORAL; RESPIRATORY (INHALATION) at 09:02

## 2018-04-30 RX ADMIN — CLONAZEPAM 1 MG: 1 TABLET ORAL at 05:06

## 2018-04-30 RX ADMIN — CLONAZEPAM 1 MG: 1 TABLET ORAL at 21:14

## 2018-04-30 RX ADMIN — HYDROCODONE BITARTRATE AND ACETAMINOPHEN 1 TABLET: 10; 325 TABLET ORAL at 05:06

## 2018-04-30 RX ADMIN — ACETAMINOPHEN 650 MG: 325 TABLET, FILM COATED ORAL at 09:07

## 2018-04-30 RX ADMIN — SIMVASTATIN 20 MG: 10 TABLET, FILM COATED ORAL at 21:13

## 2018-04-30 RX ADMIN — HYDROCODONE BITARTRATE AND ACETAMINOPHEN 1 TABLET: 10; 325 TABLET ORAL at 18:14

## 2018-04-30 RX ADMIN — ATENOLOL 25 MG: 50 TABLET ORAL at 01:00

## 2018-04-30 RX ADMIN — THEOPHYLLINE 300 MG: 300 TABLET, EXTENDED RELEASE ORAL at 21:00

## 2018-04-30 RX ADMIN — ATENOLOL 25 MG: 50 TABLET ORAL at 11:49

## 2018-04-30 RX ADMIN — PIPERACILLIN SODIUM AND TAZOBACTAM SODIUM 3.38 G: 3; .375 INJECTION, POWDER, FOR SOLUTION INTRAVENOUS at 21:14

## 2018-04-30 RX ADMIN — HYDROCODONE BITARTRATE AND ACETAMINOPHEN 1 TABLET: 10; 325 TABLET ORAL at 11:47

## 2018-04-30 RX ADMIN — PIPERACILLIN SODIUM AND TAZOBACTAM SODIUM 3.38 G: 3; .375 INJECTION, POWDER, FOR SOLUTION INTRAVENOUS at 05:14

## 2018-04-30 ASSESSMENT — ENCOUNTER SYMPTOMS
MUSCULOSKELETAL NEGATIVE: 1
ABDOMINAL PAIN: 0
PSYCHIATRIC NEGATIVE: 1
NAUSEA: 0
CHILLS: 0
NEUROLOGICAL NEGATIVE: 1
CONSTITUTIONAL NEGATIVE: 1
VOMITING: 0
FEVER: 0
ABDOMINAL PAIN: 1
RESPIRATORY NEGATIVE: 1
NAUSEA: 1

## 2018-04-30 ASSESSMENT — PAIN SCALES - GENERAL
PAINLEVEL_OUTOF10: 6
PAINLEVEL_OUTOF10: 0
PAINLEVEL_OUTOF10: 6

## 2018-04-30 NOTE — PROGRESS NOTES
[facility] Hospitalist Progress Note    Date of Service: 2018    Chief Complaint  62 y.o. male admitted 2018 with n/v, abdominal pain    Interval Problem Update  No fever    Nausea is much better    Npo    hida scan c/w bile leak    [facility] states ercp with stent tomorrow      Consultants/Specialty    Dr kim surgery    Gi consultants    Disposition  home        Review of Systems   Constitutional: Negative.    HENT: Negative.    Respiratory: Negative.    Gastrointestinal: Positive for abdominal pain and nausea.   Genitourinary: Negative.    Musculoskeletal: Negative.    Skin: Negative.    Neurological: Negative.    Psychiatric/Behavioral: Negative.    All other systems reviewed and are negative.     Physical Exam  Laboratory/Imaging   Hemodynamics  Temp (24hrs), Av.7 °C (98.1 °F), Min:36.6 °C (97.8 °F), Max:37.1 °C (98.7 °F)   Temperature: 36.6 °C (97.8 °F)  Pulse  Av.5  Min: 58  Max: 107    Blood Pressure: 103/56      Respiratory      Respiration: 18, Pulse Oximetry: 96 %     Work Of Breathing / Effort: Mild  RUL Breath Sounds: Clear, RML Breath Sounds: Clear, RLL Breath Sounds: Clear, CEASAR Breath Sounds: Clear, LLL Breath Sounds: Clear    Fluids    Intake/Output Summary (Last 24 hours) at 18 1104  Last data filed at 18 0900   Gross per 24 hour   Intake              810 ml   Output             1100 ml   Net             -290 ml       Nutrition  Orders Placed This Encounter   Procedures   • DIET ORDER     Standing Status:   Standing     Number of Occurrences:   1     Order Specific Question:   Diet:     Answer:   Full Liquid [11]     Physical Exam   Constitutional: He is oriented to person, place, and time. No distress.   Eyes: Left eye exhibits no discharge.   Neck: No tracheal deviation present. No thyromegaly present.   Cardiovascular: Normal rate.  Exam reveals no gallop and no friction rub.    No murmur heard.  Pulmonary/Chest: Effort normal and breath sounds normal. No respiratory  distress. He has no wheezes. He has no rales.   Abdominal: Soft. He exhibits distension. There is tenderness (ruq).   Musculoskeletal: He exhibits no edema or deformity.   Neurological: He is alert and oriented to person, place, and time. No cranial nerve deficit. Coordination normal.   Skin: He is not diaphoretic.       Recent Labs      04/28/18 0322 04/29/18 0318 04/30/18 0149   WBC  8.9  4.5*  3.7*   RBC  3.82*  3.54*  3.68*   HEMOGLOBIN  10.6*  9.8*  10.0*   HEMATOCRIT  34.3*  31.7*  33.7*   MCV  89.8  89.5  91.6   MCH  27.7  27.7  27.2   MCHC  30.9*  30.9*  29.7*   RDW  48.7  47.1  47.8   PLATELETCT  158*  152*  176   MPV  12.3  12.1  12.0     Recent Labs      04/28/18 0322 04/29/18 0318 04/30/18 0149   SODIUM  135  137  138   POTASSIUM  4.0  4.6  4.4   CHLORIDE  103  106  108   CO2  23  25  25   GLUCOSE  92  98  100*   BUN  25*  16  11   CREATININE  0.94  0.73  0.71   CALCIUM  8.9  8.8  8.7     Recent Labs      04/28/18 0322   INR  1.29*                  Assessment/Plan     * Right upper quadrant abdominal pain- (present on admission)   Assessment & Plan    Pain control    Empiric abx iv zosyn    Npo    Gi consult    Surgery on case        Intractable nausea and vomiting- (present on admission)   Assessment & Plan    Iv zofran prn    Npo    hydrate        check am cbc, bmp      Quality-Core Measures   Cummings catheter::  No Cummings  DVT prophylaxis - mechanical:  SCDs

## 2018-04-30 NOTE — PROGRESS NOTES
Per [facility], pt to have sx at 1030 5/1 in the [facility]. Representative unable to address diet change.

## 2018-04-30 NOTE — WOUND TEAM
"[facility]  Inpatient Services  Initial Wound and Skin Care Evaluation    Admission Date:  4/27/18  HPI, PMH, SH: Reviewed  Unit where seen by Wound Team: [id number]    WOUND CONSULT RELATED TO:  Evaluation of RLQ surgical wound    SUBJECTIVE:  \"I remember you\"    Self Report / Pain Level:  Denies related to this wound    OBJECTIVE:   Patient seen by me previous admission and lengthy conversation with patient who declined NPWT dressing at that time and continues to today as he wants to follow his MD in [place] recommendation for wound care which is wet to dry packing and he feels that wound is progressing.     WOUND TYPE, LOCATION, CHARACTERISTICS (Pressure ulcers: location, stage, POA or date identified)    Wound Type/Location:  Open surgical wound RLQ abdomen    Periwound:  intact    Drainage:   Scant serosanguinous     Tissue Type and %:   Red 100%   Wound Edges:   attached   Odor:   none    Exposed structure(s):  none    S&S of Infection:   none   Measurements: (taken 4/29/18)    Length:  1.3cm   Width:   4.5cm   Depth:   5cm   Tracts/undermining: none      INTERVENTIONS BY WOUND TEAM:  Met with patient and removed old dressing and packing.  Wound appears clean.  Irrigated with NS and measurements taken.  Filled wound with NS moistened kerlix gauze and secured with dry gauze and tape.      Interdisciplinary consultation: patient    EVALUATION:    Clean appearing wound that would progress with NPWT dressing but patient refuses    Factors affecting wound healing:  obesity  Goals: wound to decrease in size by 2% weekly    NURSING PLAN OF CARE ORDERS (X):    Dressing changes: See Dressing Maintenance orders:   X  Skin care: See Skin Care orders:   Rectal tube care: See Rectal Tube Care orders:    Other orders:    RSKIN: CURRENT (X) ORDERED (O)  Q shift Antony:  X  Q shift pressure point assessments:  X  Atmosair   X      JOSEPH       Bariatric JOSEPH       Bariatric foam         Heel float boots        Heels " floated on pillows     independent with bed mobility  Barrier wipes       Barrier Cream       Barrier paste    Sacral silicone dressing      Padded O2 tubing       Anchorfast       Trach with Optifoam split foam        Waffle cushion       Rectal tube or BMS       Antifungal tx    Turn q 2 hours  see above  Up to chair    X  Ambulate  X  PT/OT      Dietician      PO  X   TF   TPN     PVN    NPO   # days    Other       WOUND TEAM PLAN OF CARE (X):    NPWT change 3 x week:         Dressing changes by wound team:       Follow up as needed:   X     Other (explain):    Anticipated discharge plans (X):  SNF:           Home Care:           Outpatient Wound Center:            Self Care:   X         Other:

## 2018-04-30 NOTE — PROGRESS NOTES
Patient without complaints this pm, still worried about his missing oxygen tank from flight here. Left msg with lost and found and will notify SW tmw, patient NPO  For OR in am, consent signed, +BM, +void, abx continued, VSS, ambulating independently, RLQ dressing CDI, nystatin to abd, medicated for pain with relief.

## 2018-04-30 NOTE — PROGRESS NOTES
Bedside report received.  Assessment complete.  A&O x 4. Patient calls appropriately.  Patient upwith no assist. Bed alarm refused, pt educated.   Patient has 6/10 pain. Pt complains of HA.  Denies N&V. Tolerating full liquid. NPO at 0000 diet.  Surgical wound to RLQ.  + void, + flatus  Patient denies SOB.  SCD's refused.  Patient expected to go to s 5/1 at 1030.  Review plan with of care with patient. Call light and personal belongings with in reach. Hourly rounding in place. All needs met at this time.

## 2018-04-30 NOTE — PROGRESS NOTES
[facility] Hospitalist Progress Note    Date of Service: 2018    Chief Complaint  62 y.o. male admitted 2018 with n/v, abdominal pain    Interval Problem Update  No fever    Intractable nausea    Npo    hida scan c/w bile leak    [facility] called for consult      Consultants/Specialty    Dr kim surgery    [facility] consultants    Disposition  home        Review of Systems   Constitutional: Negative.    HENT: Negative.    Respiratory: Negative.    Gastrointestinal: Positive for abdominal pain and nausea.   Genitourinary: Negative.    Musculoskeletal: Negative.    Skin: Negative.    Neurological: Negative.    Psychiatric/Behavioral: Negative.    All other systems reviewed and are negative.     Physical Exam  Laboratory/Imaging   Hemodynamics  Temp (24hrs), Av.5 °C (97.7 °F), Min:36.4 °C (97.5 °F), Max:36.6 °C (97.8 °F)   Temperature: 36.6 °C (97.8 °F)  Pulse  Av.4  Min: 82  Max: 107    Blood Pressure: 102/61      Respiratory      Respiration: 18, Pulse Oximetry: 98 %     Work Of Breathing / Effort: Mild  RUL Breath Sounds: Diminished, RML Breath Sounds: Diminished, RLL Breath Sounds: Diminished, CEASAR Breath Sounds: Diminished, LLL Breath Sounds: Diminished    Fluids    Intake/Output Summary (Last 24 hours) at 18  Last data filed at 18 1600   Gross per 24 hour   Intake              650 ml   Output             1100 ml   Net             -450 ml       Nutrition  Orders Placed This Encounter   Procedures   • Diet Order     Standing Status:   Standing     Number of Occurrences:   1     Order Specific Question:   Diet:     Answer:   Clear Liquid [10]   • DIET NPO     Standing Status:   Standing     Number of Occurrences:   8     Order Specific Question:   Restrict to:     Answer:   Sips with Medications [3]     Physical Exam   Constitutional: He is oriented to person, place, and time. No distress.   Eyes: Left eye exhibits no discharge.   Neck: No tracheal deviation present. No thyromegaly present.    Cardiovascular: Normal rate.  Exam reveals no gallop and no friction rub.    No murmur heard.  Pulmonary/Chest: Effort normal and breath sounds normal. No respiratory distress. He has no wheezes. He has no rales.   Abdominal: Soft. He exhibits distension. There is tenderness (ruq).   Musculoskeletal: He exhibits no edema or deformity.   Neurological: He is alert and oriented to person, place, and time. No cranial nerve deficit. Coordination normal.   Skin: He is not diaphoretic.       Recent Labs      04/28/18 0322  04/29/18   0318   WBC  8.9  4.5*   RBC  3.82*  3.54*   HEMOGLOBIN  10.6*  9.8*   HEMATOCRIT  34.3*  31.7*   MCV  89.8  89.5   MCH  27.7  27.7   MCHC  30.9*  30.9*   RDW  48.7  47.1   PLATELETCT  158*  152*   MPV  12.3  12.1     Recent Labs      04/28/18   0322  04/29/18   0318   SODIUM  135  137   POTASSIUM  4.0  4.6   CHLORIDE  103  106   CO2  23  25   GLUCOSE  92  98   BUN  25*  16   CREATININE  0.94  0.73   CALCIUM  8.9  8.8     Recent Labs      04/28/18   0322   INR  1.29*                  Assessment/Plan     * Right upper quadrant abdominal pain- (present on admission)   Assessment & Plan    Pain control    Empiric abx iv zosyn    Npo    Gi consult    Surgery on case        Intractable nausea and vomiting- (present on admission)   Assessment & Plan    Iv zofran prn    Npo    hydrate        check am cbc, bmp      Quality-Core Measures   Cummings catheter::  No Cummings  DVT prophylaxis - mechanical:  SCDs

## 2018-04-30 NOTE — PROGRESS NOTES
Gastroenterology Progress Note     Author: Pete Fay   Date & Time Created: 4/30/2018 9:00 AM    Chief Complaint:  Bile leak    Interval History:  Bile leak    Doing well. No nausea or vomiting and no abd pain. NPO however. No f/c    Review of Systems:  Review of Systems   Constitutional: Negative for chills and fever.   Cardiovascular: Negative for chest pain.   Gastrointestinal: Negative for abdominal pain, nausea and vomiting.       Physical Exam:  Physical Exam   Constitutional: He is oriented to person, place, and time. He appears well-developed and well-nourished. No distress.   HENT:   Head: Atraumatic.   Eyes: EOM are normal.   Cardiovascular: Normal rate and regular rhythm.    No murmur heard.  Pulmonary/Chest: Effort normal and breath sounds normal. No respiratory distress. He has no wheezes.   Abdominal: Soft. He exhibits no distension. There is no tenderness. There is no rebound and no guarding.   Musculoskeletal: He exhibits edema.   Neurological: He is alert and oriented to person, place, and time.   Skin: Skin is warm and dry. No erythema.   Psychiatric: He has a normal mood and affect. His behavior is normal. Thought content normal.       Labs:        Invalid input(s): [id number]      Recent Labs      04/28/18 0322 04/29/18 0318 04/30/18 0149   SODIUM  135  137  138   POTASSIUM  4.0  4.6  4.4   CHLORIDE  103  106  108   CO2  23  25  25   BUN  25*  16  11   CREATININE  0.94  0.73  0.71   CALCIUM  8.9  8.8  8.7     Recent Labs      04/28/18 0322 04/29/18 0318 04/30/18 0149   ALTSGPT  <5   --    --    ASTSGOT  9*   --    --    ALKPHOSPHAT  82   --    --    TBILIRUBIN  1.1   --    --    GLUCOSE  92  98  100*     Recent Labs      04/28/18   0322  04/29/18 0318 04/30/18 0149   RBC  3.82*  3.54*  3.68*   HEMOGLOBIN  10.6*  9.8*  10.0*   HEMATOCRIT  34.3*  31.7*  33.7*   PLATELETCT  158*  152*  176   PROTHROMBTM  15.8*   --    --    INR  1.29*   --    --      Recent Labs       18   0322  18   0318  18   0149   WBC  8.9  4.5*  3.7*   ASTSGOT  9*   --    --    ALTSGPT  <5   --    --    ALKPHOSPHAT  82   --    --    TBILIRUBIN  1.1   --    --      Hemodynamics:  Temp (24hrs), Av.8 °C (98.2 °F), Min:36.6 °C (97.8 °F), Max:37.1 °C (98.7 °F)  Temperature: 37.1 °C (98.7 °F)  Pulse  Av.7  Min: 60  Max: 126   Blood Pressure: 100/79     Respiratory:    Respiration: 18, Pulse Oximetry: 98 %        RUL Breath Sounds: Clear, RML Breath Sounds: Clear;Diminished, RLL Breath Sounds: Clear;Diminished, CEASAR Breath Sounds: Clear, LLL Breath Sounds: Clear;Diminished  Fluids:    Intake/Output Summary (Last 24 hours) at 18 0900  Last data filed at 18 0313   Gross per 24 hour   Intake              470 ml   Output             1100 ml   Net             -630 ml       GI/Nutrition:  Orders Placed This Encounter   Procedures   • DIET ORDER     Standing Status:   Standing     Number of Occurrences:   1     Order Specific Question:   Diet:     Answer:   Full Liquid [11]     Medical Decision Making, by Problem:  Active Hospital Problems    Diagnosis   • *Right upper quadrant abdominal pain [R10.11]   • Intractable nausea and vomiting [R11.2]     Impression and Plan    1) Bile leak - Persistent leak noted on HIDA after patient presented with N/v. Plan for ERCP with replacement of current stent and placement of possibly a second? In order to further facilitate healing of his Cystic duct leak. Currently planned for tomorrow late morning.  2) Nausea and vomiting. - mostly resolved. Will try some full liquids today  3) Stage 3 Obesity  4) Pancytopenia  5) COPD  6) Sleep apnea  7) Afib  8) anticoagulation therapy - Held     Pete Fay MD      Quality-Core Measures

## 2018-04-30 NOTE — CONSULTS
DATE OF SERVICE:  04/29/2018    GASTROENTEROLOGY CONSULTATION    REQUESTING PHYSICIAN:  Dr. Hernandez.    REASON FOR REQUEST:  Nausea, vomiting, and bile leak.    HISTORY OF PRESENT ILLNESS:  The patient is a 62-year-old male, who last month   had a complicated cholecystitis presentation ended up undergoing   cholecystectomy, noted at that time to have a necrotic cystic duct, and at   that time, Dr. Olson was concerned that there was no way that this cystic duct   could hold a clip and immediately asked for an ERCP for evaluation, and   indeed on that study, there was a cystic duct bile leak, and hence, a large   10-[place] 9-cm stent was placed.  Overall, results were excellent.  The   patient was discharged only to return 2 days ago, now with nausea and vomiting   coming on with eating food.  He really says he has not had any abdominal   pain, just nausea and vomiting, and this actually is improving.  Imaging at an   outside hospital noted a fluid collection, so he is transferred here with   concern about getting a percutaneous drain; however, interventional radiology   said that the amount of fluid that is present is so tiny, they could not   access it.  We were being asked to evaluate him and consider ERCP with stent   placement in spite of the fact that he already has one, questioning whether or   not there is a larger stent or something else that we can do to fix the   situation.    REVIEW OF SYSTEMS:  Positive as noted above.  Also, positive for headaches,   back pain, knee pain, shortness of breath.  Otherwise, complete review of   systems is negative.    PAST MEDICAL HISTORY:  COPD, cholecystitis, bile leak, chronic back pain, DVT,   hypertension, hyperlipidemia, atrial fibrillation.    MEDICATIONS:  As an outpatient; atenolol, albuterol MDI, Zocor, Xarelto,   Aldactone, lisinopril, hydrocodone, gemfibrozil, Lasix, digoxin, Klonopin,   Maurice-Dur, Spiriva, and Seroquel.    PAST SURGICAL HISTORY:  History of  laparoscopic cholecystectomy, ERCP.    SOCIAL HISTORY:  He is a nonsmoker.  Alcohol use occasional.    FAMILY MEDICAL HISTORY:  Noncontributory.    PHYSICAL EXAMINATION:  VITAL SIGNS:  Blood pressure 114/74 with a heart rate of 82, afebrile, satting   92% on 2-liter nasal cannula.  GENERAL:  He is a morbidly obese white male, in no acute distress, lying in   bed.  HEENT:  Reveals that his extraocular movements are intact bilaterally.  His   sclerae are anicteric bilaterally.  Oral exam reveals a Mallampati 2 score   without oral lesions.  CARDIOVASCULAR:  Regular rate and rhythm without murmurs.  LUNGS:  Clear to auscultation bilaterally.  ABDOMEN:  Soft, nondistended.  No masses or organomegaly appreciated.  No   rebound tenderness appreciated.  SKIN:  Grossly free of rashes.  There is no evidence of icterus.  EXTREMITIES:  Evaluation reveals no pitting edema bilaterally.    LABORATORY DATA:  CBC reveals a white blood cell count 4.5, hemoglobin 9.8,   hematocrit 31.7, platelet count of 152.  BMP reveals sodium 137, potassium   4.6, chloride 106, bicarb 25, BUN 16, creatinine 0.73, glucose of 98.  Liver   enzymes are normal.  INR 1.29.    IMAGING:  HIDA scan again does suggest leak from the cystic duct.  There is   also noted to be luminal bile.  CT scan; unable to pull up the images or the   report; however, it was reported to me that there was a very small fluid   collection in the marquita hepatis area.    IMPRESSION/PLAN/MEDICAL DECISION MAKIN.  Nausea and vomiting:  Presenting symptoms at that time and at this point   have to suspect ongoing bile leak as a cause.  It is interesting that he does   not have any abdominal pain; however, at any rate, HIDA scan does show ongoing   leak, and for this reason, we will consider ERCP.  He already has a pretty   much the largest stent available and it appears to be in correct placement   given that there is luminal bile on the HIDA scan.  At any rate either way, we    will plan on repeat ERCP if possible, that replacing the stent could benefit   him if there is any sign of a clogged stent.  However, even if this is not the   case, which would be unlikely 2 weeks out from previous ERCP, would consider   potentially placement of 2 stents to further decrease the bile pressures and   improve flow, and I have discussed this in detail with him including the   risks, benefits, and alternatives, and he would like to proceed.  We will keep   him n.p.o. after midnight, and we will order antibiotics.  2.  Bile leak.  3.  Stage III obesity.  4.  Pancytopenia.  5.  Chronic obstructive pulmonary disease.       ____________________________________     MD DIANA HENNING / MARIVEL    DD:  04/29/2018 16:52:41  DT:  04/29/2018 17:48:53    D#:  [id number]  Job#:  [id number]    cc: [facility]

## 2018-04-30 NOTE — CARE PLAN
Problem: Pain Management  Goal: Pain level will decrease to patient's comfort goal  Outcome: PROGRESSING AS EXPECTED  Pt medicated per protocol     Problem: Mobility  Goal: Risk for activity intolerance will decrease  Outcome: PROGRESSING AS EXPECTED  Witnessed pts ambulation for safety

## 2018-04-30 NOTE — CARE PLAN
Problem: Pain Management  Goal: Pain level will decrease to patient's comfort goal  Outcome: PROGRESSING AS EXPECTED  Patient resting comfortably this pm, medicated for pain as ordered with relief

## 2018-05-01 PROBLEM — I48.20 CHRONIC ATRIAL FIBRILLATION (HCC): Status: ACTIVE | Noted: 2018-03-30

## 2018-05-01 PROBLEM — J96.11 CHRONIC RESPIRATORY FAILURE WITH HYPOXIA (HCC): Status: ACTIVE | Noted: 2018-05-01

## 2018-05-01 PROBLEM — F11.20 OPIATE DEPENDENCE (HCC): Status: ACTIVE | Noted: 2018-05-01

## 2018-05-01 PROBLEM — B19.20 HEPATITIS C INFECTION: Status: ACTIVE | Noted: 2018-05-01

## 2018-05-01 PROBLEM — F39 MOOD DISORDER (HCC): Status: ACTIVE | Noted: 2018-05-01

## 2018-05-01 PROBLEM — K83.9 BILE LEAK: Status: ACTIVE | Noted: 2018-04-28

## 2018-05-01 PROBLEM — F13.20 ANXIOLYTIC DEPENDENCE (HCC): Status: ACTIVE | Noted: 2018-05-01

## 2018-05-01 PROCEDURE — 700102 HCHG RX REV CODE 250 W/ 637 OVERRIDE(OP): Performed by: HOSPITALIST

## 2018-05-01 PROCEDURE — 700111 HCHG RX REV CODE 636 W/ 250 OVERRIDE (IP): Performed by: INTERNAL MEDICINE

## 2018-05-01 PROCEDURE — 700101 HCHG RX REV CODE 250

## 2018-05-01 PROCEDURE — 700111 HCHG RX REV CODE 636 W/ 250 OVERRIDE (IP)

## 2018-05-01 PROCEDURE — A9270 NON-COVERED ITEM OR SERVICE: HCPCS | Performed by: HOSPITALIST

## 2018-05-01 PROCEDURE — 700105 HCHG RX REV CODE 258: Performed by: HOSPITALIST

## 2018-05-01 PROCEDURE — 700111 HCHG RX REV CODE 636 W/ 250 OVERRIDE (IP): Performed by: HOSPITALIST

## 2018-05-01 PROCEDURE — 770006 HCHG ROOM/CARE - MED/SURG/GYN SEMI*

## 2018-05-01 PROCEDURE — 700105 HCHG RX REV CODE 258

## 2018-05-01 PROCEDURE — 99233 SBSQ HOSP IP/OBS HIGH 50: CPT | Performed by: INTERNAL MEDICINE

## 2018-05-01 RX ORDER — CEFAZOLIN SODIUM 2 G/100ML
2 INJECTION, SOLUTION INTRAVENOUS ONCE
Status: COMPLETED | OUTPATIENT
Start: 2018-05-01 | End: 2018-05-01

## 2018-05-01 RX ORDER — SODIUM CHLORIDE 9 MG/ML
INJECTION, SOLUTION INTRAVENOUS
Status: COMPLETED
Start: 2018-05-01 | End: 2018-05-01

## 2018-05-01 RX ADMIN — QUETIAPINE FUMARATE 100 MG: 25 TABLET ORAL at 20:47

## 2018-05-01 RX ADMIN — HYDROCODONE BITARTRATE AND ACETAMINOPHEN 1 TABLET: 10; 325 TABLET ORAL at 00:43

## 2018-05-01 RX ADMIN — BUDESONIDE AND FORMOTEROL FUMARATE DIHYDRATE 2 PUFF: 160; 4.5 AEROSOL RESPIRATORY (INHALATION) at 10:14

## 2018-05-01 RX ADMIN — PIPERACILLIN SODIUM AND TAZOBACTAM SODIUM 3.38 G: 3; .375 INJECTION, POWDER, FOR SOLUTION INTRAVENOUS at 05:51

## 2018-05-01 RX ADMIN — MORPHINE SULFATE 4 MG: 4 INJECTION INTRAVENOUS at 17:02

## 2018-05-01 RX ADMIN — HYDROCODONE BITARTRATE AND ACETAMINOPHEN 1 TABLET: 10; 325 TABLET ORAL at 06:41

## 2018-05-01 RX ADMIN — SIMVASTATIN 20 MG: 10 TABLET, FILM COATED ORAL at 20:42

## 2018-05-01 RX ADMIN — SODIUM CHLORIDE 500 ML: 9 INJECTION, SOLUTION INTRAVENOUS at 05:51

## 2018-05-01 RX ADMIN — CLONAZEPAM 1 MG: 1 TABLET ORAL at 20:43

## 2018-05-01 RX ADMIN — ATENOLOL 25 MG: 50 TABLET ORAL at 00:43

## 2018-05-01 RX ADMIN — MORPHINE SULFATE 4 MG: 4 INJECTION INTRAVENOUS at 05:51

## 2018-05-01 RX ADMIN — THEOPHYLLINE 300 MG: 300 TABLET, EXTENDED RELEASE ORAL at 20:44

## 2018-05-01 RX ADMIN — PIPERACILLIN SODIUM AND TAZOBACTAM SODIUM 3.38 G: 3; .375 INJECTION, POWDER, FOR SOLUTION INTRAVENOUS at 15:05

## 2018-05-01 RX ADMIN — CLONAZEPAM 1 MG: 1 TABLET ORAL at 05:51

## 2018-05-01 RX ADMIN — MORPHINE SULFATE 4 MG: 4 INJECTION INTRAVENOUS at 10:24

## 2018-05-01 RX ADMIN — CEFAZOLIN SODIUM 2 G: 2 INJECTION, SOLUTION INTRAVENOUS at 10:15

## 2018-05-01 RX ADMIN — HYDROCODONE BITARTRATE AND ACETAMINOPHEN 1 TABLET: 10; 325 TABLET ORAL at 20:42

## 2018-05-01 RX ADMIN — BUDESONIDE AND FORMOTEROL FUMARATE DIHYDRATE 2 PUFF: 160; 4.5 AEROSOL RESPIRATORY (INHALATION) at 20:43

## 2018-05-01 RX ADMIN — CLONAZEPAM 1 MG: 1 TABLET ORAL at 15:05

## 2018-05-01 RX ADMIN — ATENOLOL 25 MG: 50 TABLET ORAL at 15:05

## 2018-05-01 RX ADMIN — ACETAMINOPHEN 650 MG: 325 TABLET, FILM COATED ORAL at 02:25

## 2018-05-01 RX ADMIN — MORPHINE SULFATE 4 MG: 4 INJECTION INTRAVENOUS at 23:11

## 2018-05-01 RX ADMIN — NYSTATIN: 100000 POWDER TOPICAL at 20:42

## 2018-05-01 RX ADMIN — PIPERACILLIN SODIUM AND TAZOBACTAM SODIUM 3.38 G: 3; .375 INJECTION, POWDER, FOR SOLUTION INTRAVENOUS at 20:41

## 2018-05-01 RX ADMIN — NYSTATIN: 100000 POWDER TOPICAL at 10:18

## 2018-05-01 RX ADMIN — ATENOLOL 25 MG: 50 TABLET ORAL at 23:11

## 2018-05-01 ASSESSMENT — ENCOUNTER SYMPTOMS
NERVOUS/ANXIOUS: 1
BLURRED VISION: 0
FEVER: 0
HEADACHES: 0
SORE THROAT: 0
COUGH: 0
NAUSEA: 1
ABDOMINAL PAIN: 1
DIARRHEA: 1
DIZZINESS: 0
CHILLS: 0
VOMITING: 0
SHORTNESS OF BREATH: 0

## 2018-05-01 ASSESSMENT — PAIN SCALES - GENERAL
PAINLEVEL_OUTOF10: 7
PAINLEVEL_OUTOF10: 7
PAINLEVEL_OUTOF10: 5

## 2018-05-01 NOTE — PROGRESS NOTES
Patient is AOX4. Patient complains of back pain. Patient encouraged to walk the halls and get out of bed. Patient has been NPO since midnight for surgery today. On call ABX will be sent down with patient when he goes for surgery. No nausea upon assessment. Dressing to abdomen is c/d/I . No acute distresses noted otherwise at this time. Call light within reach. Will continue to monitor.

## 2018-05-01 NOTE — PROGRESS NOTES
Assumed patient care from Milton Hunt. Patient is comfortable. A/O X 4. No reports of pain. Updated on sx timing. Ordered late tray for patient.

## 2018-05-01 NOTE — PROGRESS NOTES
Patient with complaints of loose some incontinent stool, requesting imodium, order for r/o cdiff ordered. Patient with complaints of back pain, requesting tylenol, ambulated halls with SBA, heat pack applied to back with relief. NPO at MN for OR in am. IVABX continued. No complaints of nausea this pm, abd dressing CDI will continue to monitor

## 2018-05-01 NOTE — PROGRESS NOTES
[facility] Hospitalist Progress Note    Date of Service: 2018    Chief Complaint  62 y.o. male with past medical history of morbid obesity , sleep apnea , chronic atrial fibrillation , DVT , chronic anticoagulation , hypertension , mood disorder , anxiolytic dependence , narcotic dependent admitted 2018 with Abdominal Pain, NV. Patient underwent a lap nilson on 2018, ERCP 4/3 w/ stent. He felt well at discharge but returns with gradual worsening of the above symptoms. He presented to [facility] where he reportedly had a fever of 103.9. CT demonstrated fluid around the pancreatic head and gallbladder fossa-he was subsequently transferred here    Interval Problem Update  Awaiting ERCP, stent  Postponed this a.m. due to diarrhea  Abdominal pain is somewhat improved  He is still nauseated but tolerating water    Consultants/Specialty  Gastroenterology- [facility]  Surgery- [facility]    Disposition  TBD        Review of Systems   Constitutional: Positive for malaise/fatigue. Negative for chills and fever.        No fever since admission   HENT: Negative for sore throat.    Eyes: Negative for blurred vision.   Respiratory: Negative for cough and shortness of breath.    Cardiovascular: Negative for chest pain.   Gastrointestinal: Positive for abdominal pain, diarrhea and nausea. Negative for vomiting.   Genitourinary: Negative for dysuria.   Musculoskeletal: Negative for joint pain.   Neurological: Negative for dizziness and headaches.   Psychiatric/Behavioral: The patient is nervous/anxious (mildly).       Physical Exam  Laboratory/Imaging   Hemodynamics  Temp (24hrs), Av.5 °C (97.7 °F), Min:36.3 °C (97.3 °F), Max:36.7 °C (98.1 °F)   Temperature: 36.3 °C (97.3 °F)  Pulse  Av.3  Min: 58  Max: 107    Blood Pressure: 115/80      Respiratory      Respiration: 18, Pulse Oximetry: 94 %     Work Of Breathing / Effort: Mild (at rest)  RUL Breath Sounds: Clear, RML Breath Sounds: Clear, RLL Breath Sounds: Clear, CEASAR Breath  Sounds: Clear, LLL Breath Sounds: Clear    Fluids    Intake/Output Summary (Last 24 hours) at 05/01/18 1255  Last data filed at 04/30/18 2100   Gross per 24 hour   Intake              580 ml   Output                0 ml   Net              580 ml       Nutrition  Orders Placed This Encounter   Procedures   • DIET ORDER     Standing Status:   Standing     Number of Occurrences:   1     Order Specific Question:   Diet:     Answer:   Full Liquid [11]   • DIET NPO     Standing Status:   Standing     Number of Occurrences:   1     Order Specific Question:   Restrict to:     Answer:   Strict [1]     Physical Exam   Constitutional: He is oriented to person, place, and time. He appears well-developed. No distress.   Morbidly obese   HENT:   Head: Normocephalic and atraumatic.   Mouth/Throat: Oropharynx is clear and moist.   Eyes: Conjunctivae and EOM are normal. Pupils are equal, round, and reactive to light. Right eye exhibits no discharge. Left eye exhibits no discharge. No scleral icterus.   Neck: Neck supple.   Cardiovascular: Normal rate.    irreg   Pulmonary/Chest: Effort normal. No respiratory distress. He has no wheezes. He has no rales. He exhibits no tenderness.   Somewhat diminished with poor excursion likely secondary to habitus   Abdominal: Soft. He exhibits distension (secondary to obesity). There is tenderness (epigastric to right upper quadrant). There is no rebound and no guarding.   Decreased bowel sounds   Musculoskeletal: He exhibits no edema or tenderness.   Neurological: He is alert and oriented to person, place, and time. No cranial nerve deficit.   Skin: Skin is warm and dry. He is not diaphoretic.   Psychiatric: He has a normal mood and affect.   Nursing note and vitals reviewed.      Recent Labs      04/29/18   0318  04/30/18   0149   WBC  4.5*  3.7*   RBC  3.54*  3.68*   HEMOGLOBIN  9.8*  10.0*   HEMATOCRIT  31.7*  33.7*   MCV  89.5  91.6   MCH  27.7  27.2   MCHC  30.9*  29.7*   RDW  47.1  47.8    PLATELETCT  152*  176   MPV  12.1  12.0     Recent Labs      04/29/18   0318  04/30/18   0149   SODIUM  137  138   POTASSIUM  4.6  4.4   CHLORIDE  106  108   CO2  25  25   GLUCOSE  98  100*   BUN  16  11   CREATININE  0.73  0.71   CALCIUM  8.8  8.7                      Assessment/Plan     * Right upper quadrant abdominal pain- (present on admission)   Assessment & Plan    Pain management        Mood disorder (HCC)- (present on admission)   Assessment & Plan    On Seroquel        Anxiolytic dependence (HCC)- (present on admission)   Assessment & Plan    On chronic Klonipin- prior Alprazolam        Opiate dependence (HCC)- (present on admission)   Assessment & Plan    Recurring Norco Rx on         Chronic respiratory failure with hypoxia (HCC)- (present on admission)   Assessment & Plan    Home O2 3L        Hepatitis C infection- (present on admission)   Assessment & Plan    Recent RNA <15        BMI 45.0-49.9, adult (HCC)- (present on admission)   Assessment & Plan    48.5        Bile leak- (present on admission)   Assessment & Plan    w/ persistent pain, NV  Lap Choley and ERCP ~ 4 weeks ago  Planned ERCP and stent placement        SANTOSH on CPAP- (present on admission)   Assessment & Plan    ordered        COPD (chronic obstructive pulmonary disease) (HCC)- (present on admission)   Assessment & Plan    O2 dep 3L  No exacerbation  RT protocol  Continue home meds        Chronic atrial fibrillation (HCC)- (present on admission)   Assessment & Plan    Continue digoxin and atenolol  Resume Xeralto when able        Circulating anticoagulants (HCC)- (present on admission)   Assessment & Plan    Resume Xarelto when done w/ procedures        History of DVT (deep vein thrombosis)- (present on admission)   Assessment & Plan    Resume anticoag when OK per gI          Quality-Core Measures   Reviewed items::  Labs reviewed and Medications reviewed  Cummings catheter::  No Cummings  DVT prophylaxis pharmacological::  Contraindicated  - High bleeding risk (on hold for procedure)  DVT prophylaxis - mechanical:  SCDs  Antibiotics:  Treating active infection/contamination beyond 24 hours perioperative coverage  Assessed for rehabilitation services:  Patient returned to prior level of function, rehabilitation not indicated at this time

## 2018-05-01 NOTE — CARE PLAN
Problem: Bowel/Gastric:  Goal: Normal bowel function is maintained or improved  Outcome: PROGRESSING SLOWER THAN EXPECTED  Patient with loose frequent stools. c-diff ordered

## 2018-05-01 NOTE — PROGRESS NOTES
Veronica from [facility] called in. Mr. Kumari's sx scheduled for tomorrow 5/2/2018 in [facility] at 1530. Patient to continue diet until 0000. New consent OK to complete if needed.

## 2018-05-02 ENCOUNTER — APPOINTMENT (OUTPATIENT)
Dept: RADIOLOGY | Facility: MEDICAL CENTER | Age: 63
DRG: 919 | End: 2018-05-02
Attending: INTERNAL MEDICINE
Payer: MEDICAID

## 2018-05-02 LAB
BACTERIA BLD CULT: NORMAL
BACTERIA BLD CULT: NORMAL
SIGNIFICANT IND 70042: NORMAL
SIGNIFICANT IND 70042: NORMAL
SITE SITE: NORMAL
SITE SITE: NORMAL
SOURCE SOURCE: NORMAL
SOURCE SOURCE: NORMAL

## 2018-05-02 PROCEDURE — 700102 HCHG RX REV CODE 250 W/ 637 OVERRIDE(OP): Performed by: HOSPITALIST

## 2018-05-02 PROCEDURE — 700105 HCHG RX REV CODE 258: Performed by: HOSPITALIST

## 2018-05-02 PROCEDURE — 160208 HCHG ENDO MINUTES - EA ADDL 1 MIN LEVEL 4: Performed by: INTERNAL MEDICINE

## 2018-05-02 PROCEDURE — 160048 HCHG OR STATISTICAL LEVEL 1-5: Performed by: INTERNAL MEDICINE

## 2018-05-02 PROCEDURE — 160035 HCHG PACU - 1ST 60 MINS PHASE I: Performed by: INTERNAL MEDICINE

## 2018-05-02 PROCEDURE — 770006 HCHG ROOM/CARE - MED/SURG/GYN SEMI*

## 2018-05-02 PROCEDURE — 500066 HCHG BITE BLOCK, ECT: Performed by: INTERNAL MEDICINE

## 2018-05-02 PROCEDURE — 99225 PR SUBSEQUENT OBSERVATION CARE,LEVEL II: CPT | Performed by: INTERNAL MEDICINE

## 2018-05-02 PROCEDURE — 0DB68ZX EXCISION OF STOMACH, VIA NATURAL OR ARTIFICIAL OPENING ENDOSCOPIC, DIAGNOSTIC: ICD-10-PCS | Performed by: INTERNAL MEDICINE

## 2018-05-02 PROCEDURE — 502240 HCHG MISC OR SUPPLY RC 0272: Performed by: INTERNAL MEDICINE

## 2018-05-02 PROCEDURE — 160002 HCHG RECOVERY MINUTES (STAT): Performed by: INTERNAL MEDICINE

## 2018-05-02 PROCEDURE — 160009 HCHG ANES TIME/MIN: Performed by: INTERNAL MEDICINE

## 2018-05-02 PROCEDURE — 110371 HCHG SHELL REV 272: Performed by: INTERNAL MEDICINE

## 2018-05-02 PROCEDURE — 0F788DZ DILATION OF CYSTIC DUCT WITH INTRALUMINAL DEVICE, VIA NATURAL OR ARTIFICIAL OPENING ENDOSCOPIC: ICD-10-PCS | Performed by: INTERNAL MEDICINE

## 2018-05-02 PROCEDURE — 700111 HCHG RX REV CODE 636 W/ 250 OVERRIDE (IP)

## 2018-05-02 PROCEDURE — BF101ZZ FLUOROSCOPY OF BILE DUCTS USING LOW OSMOLAR CONTRAST: ICD-10-PCS | Performed by: INTERNAL MEDICINE

## 2018-05-02 PROCEDURE — 74328 X-RAY BILE DUCT ENDOSCOPY: CPT

## 2018-05-02 PROCEDURE — 0FPB8DZ REMOVAL OF INTRALUMINAL DEVICE FROM HEPATOBILIARY DUCT, VIA NATURAL OR ARTIFICIAL OPENING ENDOSCOPIC: ICD-10-PCS | Performed by: INTERNAL MEDICINE

## 2018-05-02 PROCEDURE — 0DB58ZX EXCISION OF ESOPHAGUS, VIA NATURAL OR ARTIFICIAL OPENING ENDOSCOPIC, DIAGNOSTIC: ICD-10-PCS | Performed by: INTERNAL MEDICINE

## 2018-05-02 PROCEDURE — A9270 NON-COVERED ITEM OR SERVICE: HCPCS | Performed by: HOSPITALIST

## 2018-05-02 PROCEDURE — 94660 CPAP INITIATION&MGMT: CPT

## 2018-05-02 PROCEDURE — 700101 HCHG RX REV CODE 250

## 2018-05-02 PROCEDURE — C1876 STENT, NON-COA/NON-COV W/DEL: HCPCS | Performed by: INTERNAL MEDICINE

## 2018-05-02 PROCEDURE — 160203 HCHG ENDO MINUTES - 1ST 30 MINS LEVEL 4: Performed by: INTERNAL MEDICINE

## 2018-05-02 PROCEDURE — 88312 SPECIAL STAINS GROUP 1: CPT

## 2018-05-02 PROCEDURE — 700111 HCHG RX REV CODE 636 W/ 250 OVERRIDE (IP): Performed by: HOSPITALIST

## 2018-05-02 PROCEDURE — 0DB98ZX EXCISION OF DUODENUM, VIA NATURAL OR ARTIFICIAL OPENING ENDOSCOPIC, DIAGNOSTIC: ICD-10-PCS | Performed by: INTERNAL MEDICINE

## 2018-05-02 PROCEDURE — 88305 TISSUE EXAM BY PATHOLOGIST: CPT

## 2018-05-02 DEVICE — STENT WALLFLEX BILIARY RX FC RMV US 10X80: Type: IMPLANTABLE DEVICE | Status: FUNCTIONAL

## 2018-05-02 RX ADMIN — BUDESONIDE AND FORMOTEROL FUMARATE DIHYDRATE 2 PUFF: 160; 4.5 AEROSOL RESPIRATORY (INHALATION) at 09:00

## 2018-05-02 RX ADMIN — QUETIAPINE FUMARATE 100 MG: 25 TABLET ORAL at 21:58

## 2018-05-02 RX ADMIN — NYSTATIN: 100000 POWDER TOPICAL at 22:00

## 2018-05-02 RX ADMIN — PIPERACILLIN SODIUM AND TAZOBACTAM SODIUM 3.38 G: 3; .375 INJECTION, POWDER, FOR SOLUTION INTRAVENOUS at 12:56

## 2018-05-02 RX ADMIN — CLONAZEPAM 1 MG: 1 TABLET ORAL at 21:59

## 2018-05-02 RX ADMIN — STANDARDIZED SENNA CONCENTRATE AND DOCUSATE SODIUM 2 TABLET: 8.6; 5 TABLET, FILM COATED ORAL at 21:59

## 2018-05-02 RX ADMIN — HYDROCODONE BITARTRATE AND ACETAMINOPHEN 1 TABLET: 10; 325 TABLET ORAL at 22:00

## 2018-05-02 RX ADMIN — MORPHINE SULFATE 4 MG: 4 INJECTION INTRAVENOUS at 13:07

## 2018-05-02 RX ADMIN — PIPERACILLIN SODIUM AND TAZOBACTAM SODIUM 3.38 G: 3; .375 INJECTION, POWDER, FOR SOLUTION INTRAVENOUS at 22:02

## 2018-05-02 RX ADMIN — CLONAZEPAM 1 MG: 1 TABLET ORAL at 04:41

## 2018-05-02 RX ADMIN — THEOPHYLLINE 300 MG: 300 TABLET, EXTENDED RELEASE ORAL at 21:59

## 2018-05-02 RX ADMIN — MORPHINE SULFATE 4 MG: 4 INJECTION INTRAVENOUS at 17:53

## 2018-05-02 RX ADMIN — NYSTATIN: 100000 POWDER TOPICAL at 08:23

## 2018-05-02 RX ADMIN — MORPHINE SULFATE 4 MG: 4 INJECTION INTRAVENOUS at 04:42

## 2018-05-02 RX ADMIN — BUDESONIDE AND FORMOTEROL FUMARATE DIHYDRATE 2 PUFF: 160; 4.5 AEROSOL RESPIRATORY (INHALATION) at 22:00

## 2018-05-02 RX ADMIN — PIPERACILLIN SODIUM AND TAZOBACTAM SODIUM 3.38 G: 3; .375 INJECTION, POWDER, FOR SOLUTION INTRAVENOUS at 04:43

## 2018-05-02 RX ADMIN — CLONAZEPAM 1 MG: 1 TABLET ORAL at 12:50

## 2018-05-02 RX ADMIN — TIOTROPIUM BROMIDE 1 CAPSULE: 18 CAPSULE ORAL; RESPIRATORY (INHALATION) at 08:24

## 2018-05-02 RX ADMIN — MORPHINE SULFATE 4 MG: 4 INJECTION INTRAVENOUS at 09:45

## 2018-05-02 RX ADMIN — SIMVASTATIN 20 MG: 10 TABLET, FILM COATED ORAL at 21:57

## 2018-05-02 ASSESSMENT — ENCOUNTER SYMPTOMS
MUSCULOSKELETAL NEGATIVE: 1
SHORTNESS OF BREATH: 0
HEADACHES: 0
NAUSEA: 0
SORE THROAT: 0
VOMITING: 0
NERVOUS/ANXIOUS: 0
DIZZINESS: 0
CARDIOVASCULAR NEGATIVE: 1
PSYCHIATRIC NEGATIVE: 1
FEVER: 0
CONSTITUTIONAL NEGATIVE: 1
DIARRHEA: 0
NEUROLOGICAL NEGATIVE: 1
COUGH: 0
EYES NEGATIVE: 1
RESPIRATORY NEGATIVE: 1
NAUSEA: 1
BLURRED VISION: 0
ABDOMINAL PAIN: 1
CHILLS: 0

## 2018-05-02 ASSESSMENT — PAIN SCALES - GENERAL
PAINLEVEL_OUTOF10: 0
PAINLEVEL_OUTOF10: 7
PAINLEVEL_OUTOF10: 2
PAINLEVEL_OUTOF10: 8
PAINLEVEL_OUTOF10: 7
PAINLEVEL_OUTOF10: 2
PAINLEVEL_OUTOF10: 0
PAINLEVEL_OUTOF10: 2
PAINLEVEL_OUTOF10: 0
PAINLEVEL_OUTOF10: 0

## 2018-05-02 NOTE — PROCEDURES
Endoscopic Retrograde Cholangiopancreatography    Date of Procedure:  5/2/2018  Attending Physician:  Ariel Aguirre MD   Indications: Bile leak, abnormal HIDA scan, nausea/vomiting      Instrument: Olympus Flexible Sideviewing Endoscope  Sedation:  Tabatha Ricardo M.D, General Anesthesia    Pre-Anesthesia Assessment:  Prior to the procedure, a History and Physical was performed, and patient medications and allergies were reviewed. The patient’s tolerance of previous anesthesia was also reviewed. The risks and benefits of the procedure and the sedation options and risks were discussed with the patient including but not limited to infection, bleeding, aspiration, perforation, adverse medication reaction, missed diagnosis, missed lesions, and pancreatitis. The patient verbalized understanding. All questions were answered, and informed consent was obtained      After I obtained informed consent from the patient, the patient was placed in the prone/swimmer position. Appropriate time-out protocol was followed: the correct patient, the correct procedure, and the correct equipment in the room were confirmed. Throughout the procedure, the patient’s blood pressure, pulse, and oxygen saturations were monitored continuously. The Olymus flexible forward viewing endoscope and  sideviewing duodenoscope were inserted through the oropharynx, esophagus intubated, then advanced to the gastrointestinal tract to the major papilla. The duct(s) were cannulated and contrast was injected I personally interpreted the ductal images.  Findings and interventions were performed and documented below. Air was then withdrawn and the duodenoscope was removed. The patient tolerated the procedure well. There were no immediate postoperative complications    Findings:    EGD:  Esophagus normal GE junction at approximately 45 cm from incisor. Biopsy esophagus  Stomach bile noted in stomach mild gastric erythema. Biopsy taken  Duodenum 2nd portion and  1st portion normal. Stent appears visible.    ERCP:   film showed right upper quadrant stent consistent with known in situ biliary stent. Stent appears somewhat occluded with debris. Stent was removed intact through-the-scope snare. Cannulation utilizing 0.035 wire and a 9-12 mm balloon preferentially entered the bile duct. Throughout the entire procedure and carotid duct was never cannulated with injected. Balloon sweep demonstrated biliary debris and sludge. The biliary orifice accommodates 12mm balloon pullthrough.  Stepwise occlusion cholangiogram demonstrated no filling defect in the biliary system. The cystic duct appears to fill without glory extravasation would leak noted with contrast. Due to abnormal HIDA scan concerning for ongoing leak as well as nausea secondary to leak a fully covered metal biliary [employer] Wallflex 10mm x 8cm stent was placed below the bifurcation with bridging the area of cystic duct take off, and exits the biliary orifice with at least 4 interces from the orifice. Biliary film under the diaphragm without free air    Impressions:   1. No gross extravasation of contrast noted from cystic duct on occlusion cholangiogram. Due to abnormal HIDA placement of a fully covered 10 mm x 8 cm metal bile duct stent  2. Removal a semi-occluded in-situ plastic biliary stent  3. Gastric erythema, with bile noted, biopsied.  4. Biopsy of normal appearing esophagus; and duodenum. No evidence of gastric outlet obstruction.    Recommendations:   1. Monitor for post procedure complications including pancreatitis  2. Advance diet as tolerated in 4 hours if no pain  3. Await pathology  4. Consideration to start Carafate for bile acid gastritis  5. Stent removal in 4-6 months with ERCP.      NOTE: Radiologic interpretation of dynamic and static fluoroscopic imaging by myself.  At no time was/were a Radiologist present.

## 2018-05-02 NOTE — CARE PLAN
Problem: Safety  Goal: Will remain free from falls  Outcome: PROGRESSING AS EXPECTED  Non skid socks on. Bed locked and in low position. Patient educated to call nursing staff before getting out of bed.     Problem: Skin Integrity  Goal: Risk for impaired skin integrity will decrease  Outcome: PROGRESSING AS EXPECTED  Nystatin powder applied to abdominal folds. Dressing to right abdomen CDI

## 2018-05-02 NOTE — PROGRESS NOTES
[facility] Hospitalist Progress Note    Date of Service: 2018    Chief Complaint  62 y.o. male with past medical history of morbid obesity , sleep apnea , chronic atrial fibrillation , DVT , chronic anticoagulation , hypertension , mood disorder , anxiolytic dependence , narcotic dependent admitted 2018 with Abdominal Pain, NV. Patient underwent a lap nilson on 2018, ERCP /3 w/ stent. He felt well at discharge but returns with gradual worsening of the above symptoms. He presented to [facility] where he reportedly had a fever of 103.9. CT demonstrated fluid around the pancreatic head and gallbladder fossa-he was subsequently transferred here    Interval Problem Update  Awaiting ERCP, stent-has not gone down yet today  Pain is better, he is nothing by mouth for procedure  Patient did not tolerate CPAP last night-decided to turn up his oxygen instead      Consultants/Specialty  Gastroenterology- \[facility] [place] [facility]\""  Surgery- [facility]    Disposition  TBD        Review of Systems   Constitutional: Negative for chills, fever and malaise/fatigue.        No fever since admission   HENT: Negative for sore throat.    Eyes: Negative for blurred vision.   Respiratory: Negative for cough and shortness of breath.    Cardiovascular: Negative for chest pain.   Gastrointestinal: Positive for abdominal pain (improved). Negative for diarrhea, nausea and vomiting.   Genitourinary: Negative for dysuria.   Musculoskeletal: Negative for joint pain.   Neurological: Negative for dizziness and headaches.   Psychiatric/Behavioral: The patient is not nervous/anxious (improved).       Physical Exam  Laboratory/Imaging   Hemodynamics  Temp (24hrs), Av.4 °C (97.6 °F), Min:36.3 °C (97.4 °F), Max:36.6 °C (97.8 °F)   Temperature: 36.3 °C (97.4 °F)  Pulse  Av.3  Min: 58  Max: 107    Blood Pressure: 115/69      Respiratory      Respiration: 16, Pulse Oximetry: 100 %     Work Of Breathing / Effort: Mild  RUL Breath Sounds: Clear, RML Breath Sounds:  Diminished, RLL Breath Sounds: Diminished, CEASAR Breath Sounds: Diminished, LLL Breath Sounds: Diminished    Fluids    Intake/Output Summary (Last 24 hours) at 05/02/18 1529  Last data filed at 05/02/18 0800   Gross per 24 hour   Intake              800 ml   Output              500 ml   Net              300 ml       Nutrition  Orders Placed This Encounter   Procedures   • DIET NPO     Standing Status:   Standing     Number of Occurrences:   1     Order Specific Question:   Restrict to:     Answer:   Strict [1]     Physical Exam   Constitutional: He is oriented to person, place, and time. He appears well-developed. No distress.   Morbidly obese   HENT:   Head: Normocephalic and atraumatic.   Mouth/Throat: Oropharynx is clear and moist.   Eyes: Conjunctivae and EOM are normal. Pupils are equal, round, and reactive to light. Right eye exhibits no discharge. Left eye exhibits no discharge. No scleral icterus.   Neck: Neck supple.   Cardiovascular: Normal rate.    irreg   Pulmonary/Chest: Effort normal. No respiratory distress. He has no wheezes. He has no rales. He exhibits no tenderness.   Poor excursion and diminished but otherwise clear   Abdominal: Soft. He exhibits no distension (less so while supine). There is no tenderness. There is no rebound and no guarding.   Decreased bowel sounds-nothing by mouth   Musculoskeletal: He exhibits no edema or tenderness.   Neurological: He is alert and oriented to person, place, and time. No cranial nerve deficit.   Skin: Skin is warm and dry. He is not diaphoretic.   Psychiatric: He has a normal mood and affect.   Nursing note and vitals reviewed.      Recent Labs      04/30/18   0149   WBC  3.7*   RBC  3.68*   HEMOGLOBIN  10.0*   HEMATOCRIT  33.7*   MCV  91.6   MCH  27.2   MCHC  29.7*   RDW  47.8   PLATELETCT  176   MPV  12.0     Recent Labs      04/30/18   0149   SODIUM  138   POTASSIUM  4.4   CHLORIDE  108   CO2  25   GLUCOSE  100*   BUN  11   CREATININE  0.71   CALCIUM  8.7                       Assessment/Plan     * Right upper quadrant abdominal pain- (present on admission)   Assessment & Plan    Pain management        Mood disorder (HCC)- (present on admission)   Assessment & Plan    On Seroquel        Anxiolytic dependence (HCC)- (present on admission)   Assessment & Plan    On chronic Klonipin- prior Alprazolam        Opiate dependence (HCC)- (present on admission)   Assessment & Plan    Recurring Norco Rx on         Chronic respiratory failure with hypoxia (HCC)- (present on admission)   Assessment & Plan    Home O2 3L        Hepatitis C infection- (present on admission)   Assessment & Plan    Recent RNA <15        BMI 45.0-49.9, adult (HCC)- (present on admission)   Assessment & Plan    48.5        Bile leak- (present on admission)   Assessment & Plan    Presented with persistent pain and nausea and vomiting  Lap Choley and ERCP ~ 4 weeks ago  Planned ERCP and stent placement- patient to go down today        Sepsis (HCC)- (present on admission)   Assessment & Plan    This is sepsis (without associated acute organ dysfunction).   This is possible based on his presentation to outlying facility with fever nearing on 104  He is empirically on Zosyn  Cultures are negative        SANTOSH on CPAP- (present on admission)   Assessment & Plan    Patient did not tolerate the CPAP machine here-he has instead turned up his oxygen        COPD (chronic obstructive pulmonary disease) ([facility])- (present on admission)   Assessment & Plan    O2 dep 3L  No exacerbation  RT protocol  Continue home meds        Chronic atrial fibrillation (HCC)- (present on admission)   Assessment & Plan    Continue digoxin and atenolol  Resume Xeralto when able        Circulating anticoagulants (HCC)- (present on admission)   Assessment & Plan    Resume Xarelto when done w/ procedures        History of DVT (deep vein thrombosis)- (present on admission)   Assessment & Plan    Resume anticoag when OK per gI           Quality-Core Measures   Reviewed items::  Labs reviewed and Medications reviewed  Cummings catheter::  No Cummings  DVT prophylaxis pharmacological::  Contraindicated - High bleeding risk (on hold for procedure)  DVT prophylaxis - mechanical:  SCDs  Antibiotics:  Treating active infection/contamination beyond 24 hours perioperative coverage  Assessed for rehabilitation services:  Patient returned to prior level of function, rehabilitation not indicated at this time

## 2018-05-02 NOTE — OR NURSING
"Patient A+OX4. Denies pain states\"I feel alot better than i did the last time\"  I just want to get back to my room.  Patient VSS. No distress.  "

## 2018-05-02 NOTE — PROGRESS NOTES
AO x 4  2L o2 NC  Breath sounds clear, diminished bilat bases  Bowel sounds hypoactive in all 4 quadrants  Pulses equal bilateral, peripheral and pedal  +void -bm + flatus  4 right side lap incisions KEVAN no drainage  Lower right abd incision cover with guaze and surgical tape.  No pitting edema bilat lower extremities.  Pain scale 5/10 denies intervention  IV right wrist pated running Zosyn 25 ml  Bed locked in lowest position, call light in reach.

## 2018-05-02 NOTE — PROGRESS NOTES
Gastroenterology Progress Note     Author: Ariel Aguirre   Date & Time Created: 2018 3:39 PM    Chief Complaint:  Bile leak      Interval History:  No nausea or vomiting, mild right upper quadrant discomfort.    Review of Systems:  Review of Systems   Constitutional: Negative.    HENT: Negative.    Eyes: Negative.    Respiratory: Negative.    Cardiovascular: Negative.    Gastrointestinal: Positive for nausea.   Genitourinary: Negative.    Musculoskeletal: Negative.    Skin: Negative.    Neurological: Negative.    Endo/Heme/Allergies: Negative.    Psychiatric/Behavioral: Negative.        Physical Exam:  Physical Exam   Constitutional: He is oriented to person, place, and time. He appears well-developed and well-nourished.   HENT:   Head: Normocephalic and atraumatic.   Eyes: Conjunctivae and EOM are normal. Pupils are equal, round, and reactive to light.   Neck: Normal range of motion.   Cardiovascular: Normal rate and regular rhythm.    Pulmonary/Chest: Effort normal and breath sounds normal. No respiratory distress. He has no wheezes. He has no rales. He exhibits no tenderness.   Abdominal: Bowel sounds are normal. He exhibits no distension and no mass. There is no tenderness. There is no rebound and no guarding.   Musculoskeletal: Normal range of motion.   Neurological: He is alert and oriented to person, place, and time.   Skin: Skin is warm and dry. No rash noted. No erythema. No pallor.       Labs:        Invalid input(s): [id number]      Recent Labs      18   014   SODIUM  138   POTASSIUM  4.4   CHLORIDE  108   CO2  25   BUN  11   CREATININE  0.71   CALCIUM  8.7     Recent Labs      18   GLUCOSE  100*     Recent Labs      18   RBC  3.68*   HEMOGLOBIN  10.0*   HEMATOCRIT  33.7*   PLATELETCT  176     Recent Labs      18   WBC  3.7*     Hemodynamics:  Temp (24hrs), Av.4 °C (97.6 °F), Min:36.3 °C (97.4 °F), Max:36.6 °C (97.8 °F)  Temperature: 36.3 °C (97.4  °F)  Pulse  Av.2  Min: 58  Max: 126   Blood Pressure: 115/69     Respiratory:    Respiration: 16, Pulse Oximetry: 100 %     Work Of Breathing / Effort: Mild  RUL Breath Sounds: Clear, RML Breath Sounds: Diminished, RLL Breath Sounds: Diminished, CEASAR Breath Sounds: Diminished, LLL Breath Sounds: Diminished  Fluids:    Intake/Output Summary (Last 24 hours) at 18 1539  Last data filed at 18 0800   Gross per 24 hour   Intake              800 ml   Output              500 ml   Net              300 ml       GI/Nutrition:  Orders Placed This Encounter   Procedures   • DIET NPO     Standing Status:   Standing     Number of Occurrences:   1     Order Specific Question:   Restrict to:     Answer:   Strict [1]     Medical Decision Making, by Problem:  Active Hospital Problems    Diagnosis   • *Right upper quadrant abdominal pain [R10.11]   • BMI 45.0-49.9, adult (HCC) [Z68.42]   • Hepatitis C infection [B19.20]   • Chronic respiratory failure with hypoxia (HCC) [J96.11]   • Opiate dependence (HCC) [F11.20]   • Anxiolytic dependence (HCC) [F13.20]   • Mood disorder (HCC) [F39]   • Bile leak [K83.9]   • Circulating anticoagulants (HCC) [D68.318]   • COPD (chronic obstructive pulmonary disease) (HCC) [J44.9]   • History of DVT (deep vein thrombosis) [Z86.718]   • Chronic atrial fibrillation (HCC) [I48.2]   • SANTOSH on CPAP [G47.33, Z99.89]   • Sepsis (HCC) [A41.9]     Impression and Plan     1) Bile leak - Persistent leak noted on HIDA after patient presented with N/v. Plan for ERCP with replacement of current stent and placement. Discussed metal stent, fully covered to ensure complete coverage of leak vs plastic stent  2) Nausea and vomiting. - mostly resolved. Will try some full liquids today. Truly unclear if relate to leak.  3) Stage 3 Obesity  4) Pancytopenia  5) COPD  6) Sleep apnea  7) Afib  8) anticoagulation therapy - Held      Indication:Bile leak, nausea.   Risks, benefits, and alternatives were discussed  with with consenting person(s). Consenting person(s) were given an opportunity to ask questions and discuss other options. Risks including but not limited to failed or incomplete ERCP, stent migration, ineffective therapy, radiation exposure, pancreatitis (with potential future complications), contrast reaction, perforation, infection, bleeding, missed lesion(s), cardiac and/or pulmonary event, aspiration, stroke, possible need for surgery, hospitalization possibly prolonged, discomfort, unsuccessful and/or incomplete procedure, possible need for repeat procedures and/or additional testings, damage to adjacent organs and/or vascular structures, medication reaction, disability, death, and other adverse events possibly life-threatening. Discussion was undertaken with Layman's terms. Consenting persons stated understanding and acceptance of these risks, and wished to proceed. Consent was given in clear state of mind.      Quality-Core Measures

## 2018-05-02 NOTE — PROGRESS NOTES
Received report from day shift RN. Assumed patient care at 1900  Patient is AOx4  Pain rated at a 7/10, medicated per MAr.  Patient on 2 L O2 via nasal cannula  Denies nausea  Patient will be NPO at midnight, patient is aware  Dressing to right abdomen CDI, will change during this shift  Ambulates 1 person assist with cane  +void +flatus  Reviewed plan of care with patient. All needs met at this time.

## 2018-05-03 PROCEDURE — 700102 HCHG RX REV CODE 250 W/ 637 OVERRIDE(OP): Performed by: HOSPITALIST

## 2018-05-03 PROCEDURE — 770006 HCHG ROOM/CARE - MED/SURG/GYN SEMI*

## 2018-05-03 PROCEDURE — A9270 NON-COVERED ITEM OR SERVICE: HCPCS | Performed by: INTERNAL MEDICINE

## 2018-05-03 PROCEDURE — 700111 HCHG RX REV CODE 636 W/ 250 OVERRIDE (IP): Performed by: HOSPITALIST

## 2018-05-03 PROCEDURE — A9270 NON-COVERED ITEM OR SERVICE: HCPCS | Performed by: HOSPITALIST

## 2018-05-03 PROCEDURE — 99233 SBSQ HOSP IP/OBS HIGH 50: CPT | Performed by: INTERNAL MEDICINE

## 2018-05-03 PROCEDURE — 700102 HCHG RX REV CODE 250 W/ 637 OVERRIDE(OP): Performed by: INTERNAL MEDICINE

## 2018-05-03 PROCEDURE — 700105 HCHG RX REV CODE 258: Performed by: HOSPITALIST

## 2018-05-03 RX ORDER — SPIRONOLACTONE 50 MG/1
25 TABLET, FILM COATED ORAL DAILY
Status: DISCONTINUED | OUTPATIENT
Start: 2018-05-03 | End: 2018-05-04 | Stop reason: HOSPADM

## 2018-05-03 RX ORDER — GEMFIBROZIL 600 MG/1
600 TABLET, FILM COATED ORAL 2 TIMES DAILY
Status: DISCONTINUED | OUTPATIENT
Start: 2018-05-03 | End: 2018-05-04 | Stop reason: HOSPADM

## 2018-05-03 RX ORDER — LISINOPRIL 20 MG/1
40 TABLET ORAL DAILY
Status: DISCONTINUED | OUTPATIENT
Start: 2018-05-03 | End: 2018-05-04 | Stop reason: HOSPADM

## 2018-05-03 RX ORDER — HYDROCODONE BITARTRATE AND ACETAMINOPHEN 10; 325 MG/1; MG/1
1 TABLET ORAL EVERY 4 HOURS PRN
Status: DISCONTINUED | OUTPATIENT
Start: 2018-05-03 | End: 2018-05-04 | Stop reason: HOSPADM

## 2018-05-03 RX ORDER — DIGOXIN 250 MCG
125 TABLET ORAL DAILY
Status: DISCONTINUED | OUTPATIENT
Start: 2018-05-03 | End: 2018-05-04 | Stop reason: HOSPADM

## 2018-05-03 RX ORDER — SUCRALFATE 1 G/1
1 TABLET ORAL EVERY 6 HOURS
Status: DISCONTINUED | OUTPATIENT
Start: 2018-05-03 | End: 2018-05-04 | Stop reason: HOSPADM

## 2018-05-03 RX ORDER — FUROSEMIDE 20 MG/1
40 TABLET ORAL DAILY
Status: DISCONTINUED | OUTPATIENT
Start: 2018-05-04 | End: 2018-05-04 | Stop reason: HOSPADM

## 2018-05-03 RX ADMIN — TIOTROPIUM BROMIDE 1 CAPSULE: 18 CAPSULE ORAL; RESPIRATORY (INHALATION) at 10:53

## 2018-05-03 RX ADMIN — DIGOXIN 125 MCG: 250 TABLET ORAL at 17:53

## 2018-05-03 RX ADMIN — SUCRALFATE 1 G: 1 TABLET ORAL at 17:44

## 2018-05-03 RX ADMIN — QUETIAPINE FUMARATE 100 MG: 25 TABLET ORAL at 21:22

## 2018-05-03 RX ADMIN — HYDROCODONE BITARTRATE AND ACETAMINOPHEN 1 TABLET: 10; 325 TABLET ORAL at 15:38

## 2018-05-03 RX ADMIN — BUDESONIDE AND FORMOTEROL FUMARATE DIHYDRATE 2 PUFF: 160; 4.5 AEROSOL RESPIRATORY (INHALATION) at 09:41

## 2018-05-03 RX ADMIN — POLYETHYLENE GLYCOL (3350) 1 PACKET: 17 POWDER, FOR SOLUTION ORAL at 14:04

## 2018-05-03 RX ADMIN — CLONAZEPAM 1 MG: 1 TABLET ORAL at 13:51

## 2018-05-03 RX ADMIN — CLONAZEPAM 1 MG: 1 TABLET ORAL at 21:23

## 2018-05-03 RX ADMIN — NYSTATIN 1 G: 100000 POWDER TOPICAL at 21:20

## 2018-05-03 RX ADMIN — HYDROCODONE BITARTRATE AND ACETAMINOPHEN 1 TABLET: 10; 325 TABLET ORAL at 05:04

## 2018-05-03 RX ADMIN — CLONAZEPAM 1 MG: 1 TABLET ORAL at 05:04

## 2018-05-03 RX ADMIN — RIVAROXABAN 20 MG: 20 TABLET, FILM COATED ORAL at 17:46

## 2018-05-03 RX ADMIN — LISINOPRIL 40 MG: 20 TABLET ORAL at 10:50

## 2018-05-03 RX ADMIN — PIPERACILLIN SODIUM AND TAZOBACTAM SODIUM 3.38 G: 3; .375 INJECTION, POWDER, FOR SOLUTION INTRAVENOUS at 05:04

## 2018-05-03 RX ADMIN — SIMVASTATIN 20 MG: 10 TABLET, FILM COATED ORAL at 21:21

## 2018-05-03 RX ADMIN — SUCRALFATE 1 G: 1 TABLET ORAL at 11:07

## 2018-05-03 RX ADMIN — THEOPHYLLINE 300 MG: 300 TABLET, EXTENDED RELEASE ORAL at 21:22

## 2018-05-03 RX ADMIN — MORPHINE SULFATE 4 MG: 4 INJECTION INTRAVENOUS at 01:55

## 2018-05-03 RX ADMIN — MAGNESIUM HYDROXIDE 30 ML: 400 SUSPENSION ORAL at 14:04

## 2018-05-03 RX ADMIN — ATENOLOL 25 MG: 50 TABLET ORAL at 13:54

## 2018-05-03 RX ADMIN — STANDARDIZED SENNA CONCENTRATE AND DOCUSATE SODIUM 2 TABLET: 8.6; 5 TABLET, FILM COATED ORAL at 09:42

## 2018-05-03 RX ADMIN — BUDESONIDE AND FORMOTEROL FUMARATE DIHYDRATE 2 PUFF: 160; 4.5 AEROSOL RESPIRATORY (INHALATION) at 21:21

## 2018-05-03 RX ADMIN — THEOPHYLLINE 300 MG: 300 TABLET, EXTENDED RELEASE ORAL at 09:42

## 2018-05-03 RX ADMIN — MORPHINE SULFATE 4 MG: 4 INJECTION INTRAVENOUS at 09:42

## 2018-05-03 RX ADMIN — HYDROCODONE BITARTRATE AND ACETAMINOPHEN 1 TABLET: 10; 325 TABLET ORAL at 11:07

## 2018-05-03 RX ADMIN — NYSTATIN: 100000 POWDER TOPICAL at 09:41

## 2018-05-03 RX ADMIN — HYDROCODONE BITARTRATE AND ACETAMINOPHEN 1 TABLET: 10; 325 TABLET ORAL at 21:21

## 2018-05-03 RX ADMIN — GEMFIBROZIL 600 MG: 600 TABLET ORAL at 21:29

## 2018-05-03 ASSESSMENT — CHA2DS2 SCORE
CHA2DS2 VASC SCORE: 3
DIABETES: NO
PRIOR STROKE OR TIA OR THROMBOEMBOLISM: YES
SEX: MALE
AGE 75 OR GREATER: NO
AGE 65 TO 74: NO
CHF OR LEFT VENTRICULAR DYSFUNCTION: NO
VASCULAR DISEASE: NO
HYPERTENSION: YES

## 2018-05-03 ASSESSMENT — PAIN SCALES - GENERAL
PAINLEVEL_OUTOF10: 7
PAINLEVEL_OUTOF10: 7
PAINLEVEL_OUTOF10: 8
PAINLEVEL_OUTOF10: 7

## 2018-05-03 ASSESSMENT — ENCOUNTER SYMPTOMS
RESPIRATORY NEGATIVE: 1
GASTROINTESTINAL NEGATIVE: 1
CARDIOVASCULAR NEGATIVE: 1

## 2018-05-03 NOTE — CARE PLAN
Problem: Communication  Goal: The ability to communicate needs accurately and effectively will improve  Outcome: PROGRESSING AS EXPECTED  Education provided on importance of using call light to alert staff of patient needs. Pt demonstrates understanding by using call light appropriately.    Problem: Pain Management  Goal: Pain level will decrease to patient's comfort goal  Outcome: PROGRESSING AS EXPECTED  Pain managed with PRN PO and IV meds at this time.

## 2018-05-03 NOTE — PROGRESS NOTES
Report received from NOC shift RN.   A/O X 4. Room air.   Diet advanced to regular diet.   VSS. Labs reviewed.   BS normoactive X 4. Last reported BM was 4/30/2018. +void.   Abdominal dressing on RLQ CDI.   PIV on left FA is infiltrated.   Patient is ambulating with standby assist and use of cane.   Call light at bedside.

## 2018-05-03 NOTE — PROGRESS NOTES
Assessment completed.  AA&Ox4. VSS on RA. Denies SOB.  Reporting 7/10 pain. Medicated per MAR.   WTD change to right abdomen complete. Pt tolerated well. Small SS drainage noted.   Diet advanced to clears. Denies N/V.  + void. - BM. + flatus per pt.  Pt up SBA using single point cane.   All needs met at this time. Call light within reach. Pt calls appropriately.

## 2018-05-03 NOTE — PROGRESS NOTES
Patient arrived back in unit by bed.   A/O X 4. Pt on 2L O2 by cannula.  NPO w/ice chips until 2030 then clear liquid diet.   Pt reporting lower back pain of 9/10 d/t transfer in OR.   PIV switched back to TKO and ABX.   Call light at bedside.

## 2018-05-03 NOTE — PROGRESS NOTES
[facility] Hospitalist Progress Note    Date of Service: 5/3/2018    Chief Complaint  62 y.o. male with past medical history of morbid obesity , sleep apnea , chronic atrial fibrillation , DVT , chronic anticoagulation , hypertension , mood disorder , anxiolytic dependence , narcotic dependent admitted 2018 with Abdominal Pain, NV. Patient underwent a lap nilson on 2018, ERCP 4/3 w/ stent. He felt well at discharge but returns with gradual worsening of the above symptoms. [facility] where he [facility] had a fever of 103.9. CT demonstrated fluid around the pancreatic head and gallbladder fossa-he was subsequently transferred here    Interval Problem Update  s/p ERCP w/ stent  Discussed anticoag w/ Dr Nika SEGOVIA to resume  Labs for AM  Cultures NGTD> stop ATb    Consultants/Specialty  Gastroenterology- [facility]  Surgery- [facility]    Disposition  Anticipate home        Review of Systems   Constitutional: Negative for chills and fever.   HENT: Negative for sore throat.    Eyes: Negative for blurred vision.   Respiratory: Negative for cough, shortness of breath and wheezing.    Cardiovascular: Negative for chest pain.   Gastrointestinal: Positive for abdominal pain (minimal). Negative for nausea and vomiting.   Genitourinary: Negative for dysuria.   Musculoskeletal: Positive for back pain (from bed- chronic issue).   Skin: Negative for itching and rash.   Neurological: Negative for dizziness and headaches.   Psychiatric/Behavioral: The patient is nervous/anxious.       Physical Exam  Laboratory/Imaging   Hemodynamics  Temp (24hrs), Av.3 °C (97.4 °F), Min:36.2 °C (97.1 °F), Max:36.6 °C (97.8 °F)   Temperature: 36.4 °C (97.5 °F)  Pulse  Av.8  Min: 58  Max: 107 Heart Rate (Monitored): 70  Blood Pressure: (!) 96/51, NIBP: 134/90      Respiratory      Respiration: 18, Pulse Oximetry: 93 %     Work Of Breathing / Effort: Mild  RUL Breath Sounds: Clear, RML Breath Sounds: Diminished, RLL Breath Sounds:  Diminished, CEASAR Breath Sounds: Clear, LLL Breath Sounds: Diminished    Fluids    Intake/Output Summary (Last 24 hours) at 05/03/18 1009  Last data filed at 05/03/18 0810   Gross per 24 hour   Intake             1160 ml   Output                0 ml   Net             1160 ml       Nutrition  Orders Placed This Encounter   Procedures   • DIET ORDER     Standing Status:   Standing     Number of Occurrences:   1     Order Specific Question:   Diet:     Answer:   Clear Liquid [10]     Physical Exam   Constitutional: He is oriented to person, place, and time. He appears well-developed. No distress.   Morbidly obese   HENT:   Head: Normocephalic and atraumatic.   Mouth/Throat: Oropharynx is clear and moist.   Eyes: Conjunctivae and EOM are normal. Pupils are equal, round, and reactive to light. Right eye exhibits no discharge. Left eye exhibits no discharge. No scleral icterus.   Neck: Neck supple.   Cardiovascular: Normal rate.    irreg   Pulmonary/Chest: Effort normal. No respiratory distress. He has no wheezes. He has no rales. He exhibits no tenderness.   Poor excursion and diminished but otherwise clear   Abdominal: Soft. He exhibits no distension (less so while supine). There is no tenderness. There is no rebound and no guarding.   Decreased bowel sounds-nothing by mouth   Musculoskeletal: He exhibits no edema or tenderness.   Neurological: He is alert and oriented to person, place, and time. No cranial nerve deficit.   Skin: Skin is warm and dry. He is not diaphoretic.   Psychiatric: He has a normal mood and affect.   Nursing note and vitals reviewed.                               Assessment/Plan     * Right upper quadrant abdominal pain- (present on admission)   Assessment & Plan    PO pain meds- reduced        Mood disorder (HCC)- (present on admission)   Assessment & Plan    On Seroquel        Anxiolytic dependence (HCC)- (present on admission)   Assessment & Plan    On chronic Klonipin- prior Alprazolam         Opiate dependence (HCC)- (present on admission)   Assessment & Plan    Recurring Norco Rx on         Chronic respiratory failure with hypoxia (HCC)- (present on admission)   Assessment & Plan    Home O2 3L        Hepatitis C infection- (present on admission)   Assessment & Plan    Recent RNA <15        BMI 45.0-49.9, adult (HCC)- (present on admission)   Assessment & Plan    48.5        Bile leak- (present on admission)   Assessment & Plan    Presented with persistent pain and nausea and vomiting  Lap Choley and ERCP ~ 4 weeks ago  s/p ERCP, stent, sx improved        Sepsis (HCC)- (present on admission)   Assessment & Plan    Ruled out  No fever since admission  Cultures negative  IV lost  DC atb        SANTOSH on CPAP- (present on admission)   Assessment & Plan    Patient did not tolerate the CPAP machine here        COPD (chronic obstructive pulmonary disease) (HCC)- (present on admission)   Assessment & Plan    O2 dep 3L  No exacerbation  RT protocol  Continue home meds        Chronic atrial fibrillation (HCC)- (present on admission)   Assessment & Plan    Continue digoxin and atenolol  Resume Xeralto         Circulating anticoagulants (HCC)- (present on admission)   Assessment & Plan    Resume Xarelto         History of DVT (deep vein thrombosis)- (present on admission)   Assessment & Plan    Resuming Xarelto          Quality-Core Measures   Reviewed items::  Labs reviewed and Medications reviewed  Cummings catheter::  No Cummings  DVT: resumed OK Xarelto.  DVT prophylaxis - mechanical:  SCDs  Antibiotics:  Treating active infection/contamination beyond 24 hours perioperative coverage  Assessed for rehabilitation services:  Patient returned to prior level of function, rehabilitation not indicated at this time

## 2018-05-04 VITALS
WEIGHT: 315 LBS | TEMPERATURE: 97 F | OXYGEN SATURATION: 96 % | HEART RATE: 95 BPM | HEIGHT: 74 IN | RESPIRATION RATE: 18 BRPM | DIASTOLIC BLOOD PRESSURE: 80 MMHG | BODY MASS INDEX: 40.43 KG/M2 | SYSTOLIC BLOOD PRESSURE: 104 MMHG

## 2018-05-04 LAB
ALBUMIN SERPL BCP-MCNC: 2.8 G/DL (ref 3.2–4.9)
ALBUMIN/GLOB SERPL: 0.9 G/DL
ALP SERPL-CCNC: 77 U/L (ref 30–99)
ALT SERPL-CCNC: <5 U/L (ref 2–50)
ANION GAP SERPL CALC-SCNC: 9 MMOL/L (ref 0–11.9)
AST SERPL-CCNC: 7 U/L (ref 12–45)
BILIRUB SERPL-MCNC: 0.5 MG/DL (ref 0.1–1.5)
BUN SERPL-MCNC: 15 MG/DL (ref 8–22)
CALCIUM SERPL-MCNC: 8.7 MG/DL (ref 8.5–10.5)
CHLORIDE SERPL-SCNC: 108 MMOL/L (ref 96–112)
CO2 SERPL-SCNC: 23 MMOL/L (ref 20–33)
CREAT SERPL-MCNC: 0.7 MG/DL (ref 0.5–1.4)
ERYTHROCYTE [DISTWIDTH] IN BLOOD BY AUTOMATED COUNT: 46.8 FL (ref 35.9–50)
GLOBULIN SER CALC-MCNC: 3.2 G/DL (ref 1.9–3.5)
GLUCOSE SERPL-MCNC: 112 MG/DL (ref 65–99)
HCT VFR BLD AUTO: 33.8 % (ref 42–52)
HGB BLD-MCNC: 10.5 G/DL (ref 14–18)
LIPASE SERPL-CCNC: 26 U/L (ref 11–82)
MCH RBC QN AUTO: 27.4 PG (ref 27–33)
MCHC RBC AUTO-ENTMCNC: 31.1 G/DL (ref 33.7–35.3)
MCV RBC AUTO: 88.3 FL (ref 81.4–97.8)
PLATELET # BLD AUTO: 225 K/UL (ref 164–446)
PMV BLD AUTO: 11 FL (ref 9–12.9)
POTASSIUM SERPL-SCNC: 3.6 MMOL/L (ref 3.6–5.5)
PROT SERPL-MCNC: 6 G/DL (ref 6–8.2)
RBC # BLD AUTO: 3.83 M/UL (ref 4.7–6.1)
SODIUM SERPL-SCNC: 140 MMOL/L (ref 135–145)
WBC # BLD AUTO: 5.6 K/UL (ref 4.8–10.8)

## 2018-05-04 PROCEDURE — 83690 ASSAY OF LIPASE: CPT

## 2018-05-04 PROCEDURE — A9270 NON-COVERED ITEM OR SERVICE: HCPCS | Performed by: HOSPITALIST

## 2018-05-04 PROCEDURE — A9270 NON-COVERED ITEM OR SERVICE: HCPCS | Performed by: INTERNAL MEDICINE

## 2018-05-04 PROCEDURE — 700102 HCHG RX REV CODE 250 W/ 637 OVERRIDE(OP): Performed by: HOSPITALIST

## 2018-05-04 PROCEDURE — 80053 COMPREHEN METABOLIC PANEL: CPT

## 2018-05-04 PROCEDURE — 36415 COLL VENOUS BLD VENIPUNCTURE: CPT

## 2018-05-04 PROCEDURE — 700102 HCHG RX REV CODE 250 W/ 637 OVERRIDE(OP): Performed by: INTERNAL MEDICINE

## 2018-05-04 PROCEDURE — 85027 COMPLETE CBC AUTOMATED: CPT

## 2018-05-04 PROCEDURE — 99239 HOSP IP/OBS DSCHRG MGMT >30: CPT | Performed by: INTERNAL MEDICINE

## 2018-05-04 RX ORDER — SUCRALFATE 1 G/1
1 TABLET ORAL EVERY 6 HOURS
Qty: 60 TAB | Refills: 0 | Status: SHIPPED | OUTPATIENT
Start: 2018-05-04 | End: 2018-07-09

## 2018-05-04 RX ADMIN — BUDESONIDE AND FORMOTEROL FUMARATE DIHYDRATE 2 PUFF: 160; 4.5 AEROSOL RESPIRATORY (INHALATION) at 09:44

## 2018-05-04 RX ADMIN — HYDROCODONE BITARTRATE AND ACETAMINOPHEN 1 TABLET: 10; 325 TABLET ORAL at 03:17

## 2018-05-04 RX ADMIN — CLONAZEPAM 1 MG: 1 TABLET ORAL at 12:29

## 2018-05-04 RX ADMIN — GEMFIBROZIL 600 MG: 600 TABLET ORAL at 09:46

## 2018-05-04 RX ADMIN — ATENOLOL 25 MG: 50 TABLET ORAL at 12:30

## 2018-05-04 RX ADMIN — SUCRALFATE 1 G: 1 TABLET ORAL at 12:29

## 2018-05-04 RX ADMIN — SPIRONOLACTONE 25 MG: 50 TABLET ORAL at 09:46

## 2018-05-04 RX ADMIN — THEOPHYLLINE 300 MG: 300 TABLET, EXTENDED RELEASE ORAL at 09:46

## 2018-05-04 RX ADMIN — HYDROCODONE BITARTRATE AND ACETAMINOPHEN 1 TABLET: 10; 325 TABLET ORAL at 09:43

## 2018-05-04 RX ADMIN — HYDROCODONE BITARTRATE AND ACETAMINOPHEN 1 TABLET: 10; 325 TABLET ORAL at 13:56

## 2018-05-04 RX ADMIN — SUCRALFATE 1 G: 1 TABLET ORAL at 05:48

## 2018-05-04 RX ADMIN — TIOTROPIUM BROMIDE 1 CAPSULE: 18 CAPSULE ORAL; RESPIRATORY (INHALATION) at 09:45

## 2018-05-04 RX ADMIN — NYSTATIN: 100000 POWDER TOPICAL at 09:43

## 2018-05-04 RX ADMIN — CLONAZEPAM 1 MG: 1 TABLET ORAL at 05:49

## 2018-05-04 RX ADMIN — SUCRALFATE 1 G: 1 TABLET ORAL at 00:04

## 2018-05-04 RX ADMIN — ACETAMINOPHEN 650 MG: 325 TABLET, FILM COATED ORAL at 09:54

## 2018-05-04 ASSESSMENT — ENCOUNTER SYMPTOMS
DIZZINESS: 0
VOMITING: 0
BLURRED VISION: 0
WHEEZING: 0
SORE THROAT: 0
BACK PAIN: 1
NERVOUS/ANXIOUS: 1
NAUSEA: 0
CHILLS: 0
HEADACHES: 0
SHORTNESS OF BREATH: 0
ABDOMINAL PAIN: 1
FEVER: 0
COUGH: 0

## 2018-05-04 ASSESSMENT — PAIN SCALES - GENERAL
PAINLEVEL_OUTOF10: 5
PAINLEVEL_OUTOF10: 7
PAINLEVEL_OUTOF10: 9

## 2018-05-04 NOTE — CARE PLAN
Problem: Safety  Goal: Will remain free from falls  Outcome: PROGRESSING AS EXPECTED  Treaded slipper socks worn. Bed locked and in lowest position. Pt remains free from falls.    Problem: Venous Thromboembolism (VTW)/Deep Vein Thrombosis (DVT) Prevention:  Goal: Patient will participate in Venous Thrombosis (VTE)/Deep Vein Thrombosis (DVT)Prevention Measures  Outcome: PROGRESSING AS EXPECTED  SCDs in place.

## 2018-05-04 NOTE — DISCHARGE PLANNING
Spoke with pt at the bedside.  Pt states he is on 3 L NC baseline, but does not have his portable oxygen tank with him or regulator.  Pt states he uses [employer] [facility] for his DME.  [facility] contacted [employer] [facility] who is verifying pt is on service and than will bring a tank out to the pt.    RNCM available as needed for further assistance.

## 2018-05-04 NOTE — CARE PLAN
Problem: Discharge Barriers/Planning  Goal: Patient's continuum of care needs will be met  Outcome: PROGRESSING AS EXPECTED    Intervention: Assess potential discharge barriers on admission and throughout hospital stay  Patient expecting to be discharged today. Will keep him updated due to him having to travel to [place]. Waiting for MD clearance      Problem: Pain Management  Goal: Pain level will decrease to patient's comfort goal  Outcome: PROGRESSING AS EXPECTED    Intervention: Follow pain managment plan developed in collaboration with patient and Interdisciplinary Team  Pain medication given due to spike in pain to a 9/10. Will continue to monitor. Patient repositioned.

## 2018-05-04 NOTE — PROGRESS NOTES
GI follow up. Pt discharged earlier today. Lives in [place]. I'll arrange outpatient ERCP in 8 weeks.

## 2018-05-04 NOTE — DISCHARGE INSTRUCTIONS
Discharge Instructions    Discharged to home by car with relative. Discharged via wheelchair, hospital escort: Yes.  Special equipment needed: Cane    Be sure to schedule a follow-up appointment with your primary care doctor or any specialists as instructed.     Discharge Plan:   Diet Plan: Discussed  Activity Level: Discussed  Smoking Cessation Offered: Patient Counseled  Confirmed Follow up Appointment: Patient to Call and Schedule Appointment  Confirmed Symptoms Management: Discussed  Medication Reconciliation Updated: Yes  Pneumococcal Vaccine Administered/Refused: Not given - Patient refused pneumococcal vaccine (pt states he reveiced vaccine 3 months ago)  Influenza Vaccine Indication: Patient Refuses (states he had both vaccines 3 months ago)    I understand that a diet low in cholesterol, fat, and sodium is recommended for good health. Unless I have been given specific instructions below for another diet, I accept this instruction as my diet prescription.   Other diet: regular    Special Instructions: None    · Is patient discharged on Warfarin / Coumadin?   No     Patient to follow up with GI doctor in 8 weeks    Endoscopic Retrograde Cholangiopancreatography (ERCP), Care After  Refer to this sheet in the next few weeks. These instructions provide you with information on caring for yourself after your procedure. Your health care provider may also give you more specific instructions. Your treatment has been planned according to current medical practices, but problems sometimes occur. Call your health care provider if you have any problems or questions after your procedure.   WHAT TO EXPECT AFTER THE PROCEDURE   After your procedure, it is typical to feel:   · Soreness in your throat.    · Sick to your stomach (nauseous).    · Bloated.  · Dizzy.    · Fatigued.  HOME CARE INSTRUCTIONS  · Have a friend or family member stay with you for the first 24 hours after your procedure.  · Start taking your usual  medicines and eating normally as soon as you feel well enough to do so or as directed by your health care provider.  SEEK MEDICAL CARE IF:  · You have abdominal pain.    · You develop signs of infection, such as:    ¨ Chills.    ¨ Feeling unwell.    SEEK IMMEDIATE MEDICAL CARE IF:  · You have difficulty swallowing.  · You have worsening throat, chest, or abdominal pain.  · You vomit.  · You have bloody or very black stools.  · You have a fever.     This information is not intended to replace advice given to you by your health care provider. Make sure you discuss any questions you have with your health care provider.     Document Released: 10/08/2014 Document Reviewed: 10/08/2014  [employer] Interactive Patient Education ©2016 [employer]        Depression / Suicide Risk    As you are discharged from this [facility] facility, it is important to learn how to keep safe from harming yourself.    Recognize the warning signs:  · Abrupt changes in personality, positive or negative- including increase in energy   · Giving away possessions  · Change in eating patterns- significant weight changes-  positive or negative  · Change in sleeping patterns- unable to sleep or sleeping all the time   · Unwillingness or inability to communicate  · Depression  · Unusual sadness, discouragement and loneliness  · Talk of wanting to die  · Neglect of personal appearance   · Rebelliousness- reckless behavior  · Withdrawal from people/activities they love  · Confusion- inability to concentrate     If you or a loved one observes any of these behaviors or has concerns about self-harm, here's what you can do:  · Talk about it- your feelings and reasons for harming yourself  · Remove any means that you might use to hurt yourself (examples: pills, rope, extension cords, firearm)  · Get professional help from the community (Mental Health, Substance Abuse, psychological counseling)  · Do not be alone:Call your Safe Contact- someone whom you  trust who will be there for you.  · Call your local CRISIS HOTLINE 202-3022 or 832-832-2902  · Call your local Children's Mobile Crisis Response Team [place] (861) 834-6961 or www.Paddle[employer]  · Call the toll free National Suicide Prevention Hotlines   · National Suicide Prevention Lifeline 122-303-JJIB (1835)  · National [place] Line Network 800-SUICIDE (765-9482)

## 2018-05-04 NOTE — FACE TO FACE
Face to Face Note  -  Durable Medical Equipment    Jasmin Ramos M.D. - NPI: [id number]  I certify that this patient is under my care and that they had a durable medical equipment(DME)face to face encounter by myself that meets the physician DME face-to-face encounter requirements with this patient on:    Date of encounter:   Patient:                    MRN:                       YOB: 2018  Henry Kumari  [id number]  1955     The encounter with the patient was in whole, or in part, for the following medical condition, which is the primary reason for durable medical equipment:  COPD    I certify that, based on my findings, the following durable medical equipment is medically necessary:  Oxygen.    HOME O2 Saturation Measurements:(Values must be present for Home Oxygen orders)  Room air sat at rest: 86  Room air sat with amb: 82  With liters of O2: 1, O2 sat at rest with O2: 97  With Liters of O2: 3, O2 sat with amb with O2 : 94  Is the patient mobile?: Yes    My Clinical findings support the need for the above equipment due to:  Other - SOB, wheezing    Supporting Symptoms: destauration on room air

## 2018-05-04 NOTE — DISCHARGE PLANNING
Call placed to [facility] with [facility], patient will be verified for services and tank will be delivered to patient at bedside to D/C.

## 2018-05-04 NOTE — PROGRESS NOTES
Gastroenterology Progress Note     Author: Anatoliy Evans   Date & Time Created: 5/3/2018 5:16 PM  Problem: bile leak requiring stent replacement.     Interval History:  Has covered stent placed yesterday to treat bile leak. Says he's comfortable and doing fine today. Hopes to go home tomorrow. Tolerating diet. Lives in [place].     Review of Systems:  Review of Systems   Respiratory: Negative.    Cardiovascular: Negative.    Gastrointestinal: Negative.        Physical Exam:  Physical Exam   Constitutional: He is oriented to person, place, and time.   Obese   Cardiovascular: Normal rate and regular rhythm.    Pulmonary/Chest: Effort normal and breath sounds normal.   Abdominal: Soft. Bowel sounds are normal.   Musculoskeletal: He exhibits no edema.   Neurological: He is alert and oriented to person, place, and time.   Skin: Skin is warm and dry.   Psychiatric: He has a normal mood and affect.       Labs:        Invalid input(s): [id number]      No results for input(s): SODIUM, POTASSIUM, CHLORIDE, CO2, BUN, CREATININE, MAGNESIUM, PHOSPHORUS, CALCIUM in the last 72 hours.  No results for input(s): ALTSGPT, ASTSGOT, ALKPHOSPHAT, TBILIRUBIN, DBILIRUBIN, GAMMAGT, AMYLASE, LIPASE, ALB, PREALBUMIN, GLUCOSE in the last 72 hours.  No results for input(s): RBC, HEMOGLOBIN, HEMATOCRIT, PLATELETCT, PROTHROMBTM, APTT, INR, IRON, FERRITIN, TOTIRONBC in the last 72 hours.      Hemodynamics:  Temp (24hrs), Av.3 °C (97.4 °F), Min:36.2 °C (97.1 °F), Max:36.4 °C (97.5 °F)  Temperature: 36.3 °C (97.4 °F)  Pulse  Av.1  Min: 58  Max: 126   Blood Pressure: (!) 98/50, NIBP: 134/90     Respiratory:    Respiration: 18, Pulse Oximetry: 95 %     Work Of Breathing / Effort: Mild  RUL Breath Sounds: Clear, RML Breath Sounds: Diminished, RLL Breath Sounds: Diminished, CEASAR Breath Sounds: Clear, LLL Breath Sounds: Diminished  Fluids:    Intake/Output Summary (Last 24 hours) at 18 1716  Last data filed at 18 1350   Gross  per 24 hour   Intake              940 ml   Output                0 ml   Net              940 ml       GI/Nutrition:  Orders Placed This Encounter   Procedures   • DIET ORDER     Standing Status:   Standing     Number of Occurrences:   1     Order Specific Question:   Diet:     Answer:   Regular [1]     Medical Decision Making, by Problem:  Active Hospital Problems    Diagnosis   • *Right upper quadrant abdominal pain [R10.11]   • BMI 45.0-49.9, adult (HCC) [Z68.42]   • Hepatitis C infection [B19.20]   • Chronic respiratory failure with hypoxia (HCC) [J96.11]   • Opiate dependence (HCC) [F11.20]   • 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 Bile leak treated with second larger covered stent.  2. Recent gallstone pancreatitis.  3. Obesity.     Plan:  OK to DC home in am if stable.  Will arrange stent removal in about 8 weeks.     Quality-Core Measures   Reviewed items::  Labs reviewed, Medications reviewed and Radiology images reviewed

## 2018-05-04 NOTE — PROGRESS NOTES
Assessment completed.  AA&Ox4. VSS on RA. Denies SOB.  Reporting 7/10 pain. Medicated per MAR.   Dressing to right abdominal wound CDI. Will change in AM.  Tolerating regular diet. Denies N/V.  + void. + BM.   Pt up with SBA using single point cane.   All needs met at this time. Call light within reach. Pt calls appropriately.

## 2018-05-04 NOTE — PROGRESS NOTES
Bedside report received.  Assessment complete.  A&O x 4. Patient calls appropriately.  Patient up with stand by assist. Use of single point cane.   Patient has 9/10 pain. MAR medication given along with Tylenol  Denies N&V. Tolerating regular diet.  Surgical site to RLQ has new dressing that is CDI.  + void, + flatus  Patient denies SOB.  SCD's refused.  Patient is questioning when he will get to leave, will keep him updated.  Review plan with of care with patient. Call light and personal belongings with in reach. Hourly rounding in place. All needs met at this time.

## 2018-05-04 NOTE — PROGRESS NOTES
Patient discharged to home in [place] with sister via wheelchair escort. Patient has oxygen for ride home, prescriptions, belongings, discharge instructions with him. All lines discontinued. DC instructions signed. Patient has no further questions or concerns.

## 2018-07-09 ENCOUNTER — APPOINTMENT (OUTPATIENT)
Dept: ADMISSIONS | Facility: MEDICAL CENTER | Age: 63
End: 2018-07-09
Attending: INTERNAL MEDICINE
Payer: MEDICAID

## 2018-07-09 DIAGNOSIS — Z01.812 PRE-OPERATIVE LABORATORY EXAMINATION: ICD-10-CM

## 2018-07-09 DIAGNOSIS — Z01.810 PRE-OPERATIVE CARDIOVASCULAR EXAMINATION: ICD-10-CM

## 2018-07-09 NOTE — OR NURSING
Preadmit appt completed. Patient instructed to continue regularly prescribed medications through day before surgery. Pt will confirm with  what day to stop Xarelto (he thinks he was told two days before procedure).  Instructed to take the following medications the day of surgery with a sip of water per anesthesia protocol:atenolol,klonopin, digoxin, norco as needed, theodur, spiriva. Pt will bring rescue inhaler, oxygen, BiPAP. Pt will use advair in AM. Labs and EKG requested from [facility] in [place] and results faxed to Dr Aguirre. Secure emailed Dr Cancino the pt info and anesthesia summary.   Dr DUNBAR Response:  Looks ok to proceed from my end.  Thank you!    Christian Cancino M.D.  [facility]

## 2018-07-18 ENCOUNTER — HOSPITAL ENCOUNTER (OUTPATIENT)
Facility: MEDICAL CENTER | Age: 63
End: 2018-07-18
Attending: INTERNAL MEDICINE | Admitting: INTERNAL MEDICINE
Payer: MEDICAID

## 2018-07-18 ENCOUNTER — APPOINTMENT (OUTPATIENT)
Dept: RADIOLOGY | Facility: MEDICAL CENTER | Age: 63
End: 2018-07-18
Attending: INTERNAL MEDICINE
Payer: MEDICAID

## 2018-07-18 VITALS
HEIGHT: 74 IN | RESPIRATION RATE: 20 BRPM | OXYGEN SATURATION: 97 % | HEART RATE: 76 BPM | TEMPERATURE: 98.4 F | DIASTOLIC BLOOD PRESSURE: 75 MMHG | WEIGHT: 315 LBS | SYSTOLIC BLOOD PRESSURE: 137 MMHG | BODY MASS INDEX: 40.43 KG/M2

## 2018-07-18 PROCEDURE — 700101 HCHG RX REV CODE 250

## 2018-07-18 PROCEDURE — 700105 HCHG RX REV CODE 258: Performed by: INTERNAL MEDICINE

## 2018-07-18 PROCEDURE — 160046 HCHG PACU - 1ST 60 MINS PHASE II: Performed by: INTERNAL MEDICINE

## 2018-07-18 PROCEDURE — 110371 HCHG SHELL REV 272: Performed by: INTERNAL MEDICINE

## 2018-07-18 PROCEDURE — 160009 HCHG ANES TIME/MIN: Performed by: INTERNAL MEDICINE

## 2018-07-18 PROCEDURE — 700111 HCHG RX REV CODE 636 W/ 250 OVERRIDE (IP)

## 2018-07-18 PROCEDURE — 160002 HCHG RECOVERY MINUTES (STAT): Performed by: INTERNAL MEDICINE

## 2018-07-18 PROCEDURE — 160048 HCHG OR STATISTICAL LEVEL 1-5: Performed by: INTERNAL MEDICINE

## 2018-07-18 PROCEDURE — A9270 NON-COVERED ITEM OR SERVICE: HCPCS

## 2018-07-18 PROCEDURE — 160036 HCHG PACU - EA ADDL 30 MINS PHASE I: Performed by: INTERNAL MEDICINE

## 2018-07-18 PROCEDURE — 700117 HCHG RX CONTRAST REV CODE 255

## 2018-07-18 PROCEDURE — 160208 HCHG ENDO MINUTES - EA ADDL 1 MIN LEVEL 4: Performed by: INTERNAL MEDICINE

## 2018-07-18 PROCEDURE — 160035 HCHG PACU - 1ST 60 MINS PHASE I: Performed by: INTERNAL MEDICINE

## 2018-07-18 PROCEDURE — 74328 X-RAY BILE DUCT ENDOSCOPY: CPT

## 2018-07-18 PROCEDURE — 502240 HCHG MISC OR SUPPLY RC 0272: Performed by: INTERNAL MEDICINE

## 2018-07-18 PROCEDURE — 500066 HCHG BITE BLOCK, ECT: Performed by: INTERNAL MEDICINE

## 2018-07-18 PROCEDURE — 160203 HCHG ENDO MINUTES - 1ST 30 MINS LEVEL 4: Performed by: INTERNAL MEDICINE

## 2018-07-18 PROCEDURE — 700102 HCHG RX REV CODE 250 W/ 637 OVERRIDE(OP)

## 2018-07-18 PROCEDURE — 160025 RECOVERY II MINUTES (STATS): Performed by: INTERNAL MEDICINE

## 2018-07-18 RX ORDER — ACETAMINOPHEN 500 MG
TABLET ORAL
Status: COMPLETED
Start: 2018-07-18 | End: 2018-07-18

## 2018-07-18 RX ORDER — SODIUM CHLORIDE, SODIUM LACTATE, POTASSIUM CHLORIDE, CALCIUM CHLORIDE 600; 310; 30; 20 MG/100ML; MG/100ML; MG/100ML; MG/100ML
1000 INJECTION, SOLUTION INTRAVENOUS
Status: DISCONTINUED | OUTPATIENT
Start: 2018-07-18 | End: 2018-07-18 | Stop reason: HOSPADM

## 2018-07-18 RX ORDER — CELECOXIB 200 MG/1
CAPSULE ORAL
Status: COMPLETED
Start: 2018-07-18 | End: 2018-07-18

## 2018-07-18 RX ADMIN — SODIUM CHLORIDE, POTASSIUM CHLORIDE, SODIUM LACTATE AND CALCIUM CHLORIDE 1000 ML: 600; 310; 30; 20 INJECTION, SOLUTION INTRAVENOUS at 12:00

## 2018-07-18 RX ADMIN — CELECOXIB 200 MG: 200 CAPSULE ORAL at 13:57

## 2018-07-18 RX ADMIN — ACETAMINOPHEN 1000 MG: 500 TABLET, COATED ORAL at 13:57

## 2018-07-18 ASSESSMENT — PAIN SCALES - GENERAL
PAINLEVEL_OUTOF10: 6
PAINLEVEL_OUTOF10: 0
PAINLEVEL_OUTOF10: 7
PAINLEVEL_OUTOF10: 6
PAINLEVEL_OUTOF10: 7
PAINLEVEL_OUTOF10: 6

## 2018-07-18 NOTE — DISCHARGE INSTRUCTIONS
ENDOSCOPY HOME CARE INSTRUCTIONS    GASTROSCOPY OR ERCP  1. Don't eat or drink anything for about an hour after the test. You can then resume your regular diet.  2. Don't drive or drink alcohol for 24 hours. The medication you received will make you too drowsy.  3. Don't take any coffee, tea, or aspirin products until after you see your doctor. These can harm the lining of your stomach.  4. If you begin to vomit bloody material, or develop black or bloody stools, call your doctor as soon as possible.  5. If you have any neck, chest, abdominal pain or temp of 100 degrees, call your doctor.  6. See your doctor as scheduled  7. Additional instructions: May resume xarelto today.  Continue taking proton pump inhibitor, follow-up as needed.   8. Prescriptions: N/A.   Tylenol 1000mg and Celebrex 200mg given at 1:51pm for back pain.      Dr. Aguirre  [phone].     You should call 911 if you develop problems with breathing or chest pain.  If any questions arise, call your doctor. If your doctor is not available, please feel free to call [phone]. You can also call the HEALTH HOTLINE open 24 hours/day, 7 days/week and speak to a nurse at [phone], or toll free [phone].    Depression / Suicide Risk    As you are discharged from this [facility] facility, it is important to learn how to keep safe from harming yourself.    Recognize the warning signs:  · Abrupt changes in personality, positive or negative- including increase in energy   · Giving away possessions  · Change in eating patterns- significant weight changes-  positive or negative  · Change in sleeping patterns- unable to sleep or sleeping all the time   · Unwillingness or inability to communicate  · Depression  · Unusual sadness, discouragement and loneliness  · Talk of wanting to die  · Neglect of personal appearance   · Rebelliousness- reckless behavior  · Withdrawal from people/activities they love  · Confusion- inability to concentrate     If  you or a loved one observes any of these behaviors or has concerns about self-harm, here's what you can do:  · Talk about it- your feelings and reasons for harming yourself  · Remove any means that you might use to hurt yourself (examples: pills, rope, extension cords, firearm)  · Get professional help from the community (Mental Health, Substance Abuse, psychological counseling)  · Do not be alone:Call your Safe Contact- someone whom you trust who will be there for you.  · Call your local CRISIS HOTLINE 375-4893 or 107-841-2057  · Call your local Children's Mobile Crisis Response Team [place] (984) 226-7039 or www.[employer]  · Call the toll free National Suicide Prevention Hotlines   · National Suicide Prevention Lifeline 255-981-BJXN (9787)  · [place] ibabybox Line Network 800-SUICIDE (728-7638)    I acknowledge receipt and understanding of these Home Care Instructions.    Discharge Education for patients on SANTOSH (Obstructive Sleep Apnea) Protocol    Prior to receiving sedation or anesthesia, we screen all patients for Obstructive Sleep Apnea.  During your screening, you were identified as having suspected, but not confirmed Obstructive Sleep Apnea(SANTOSH).    What is Obstructive Sleep Apnea?  Sleep apnea (AP-ne-ah) is a common disorder which involves breathing pauses that occur during sleep.  These can last from 10 seconds to a minute or longer.  Normal breathing resumes often with a loud snort or choking sound.    Sleep apnea occurs in all age groups and both genders but is more common in men and people over 40 years of age.  It has been estimated that as many as 18 million Americans have sleep apnea.  Most people who have sleep apnea don’t know they have it because it only occurs during sleep.  A family member and/or bed partner may first notice the signs of sleep apnea.  Sleep apnea is a chronic (ongoing) condition that disrupts the quality and quantity of your sleep repeatedly throughout the night.  This  often results in excessive daytime sleepiness or fatigue during the day.  It may also contribute to high blood pressure, heart problems, and complications following medications used for surgery and procedures.    To establish a definitive diagnosis, further testing from a specialist would be needed.  We recommend that you follow up with your primary care physician.    We recommend that you should be with an adult observer for at least 24 hours after your sedation/anesthesia.  If you have a CPAP machine, you should wear it during any sleep period (day or night) for the week following your procedure.  We encourage you to sleep on your side or in a sitting position, even with napping.  Lying flat on your back increases the risk of apnea and airway obstruction during your post procedure recovery period.    It is important to prevent over-sedation that could increase your risk for apnea.  Please take all pain medication as directed by your physician.  If you are not getting pain relief, please contact your physician to discuss possible approaches to relieving pain while minimizing medications that can affect your breathing and oxygen levels.

## 2018-07-18 NOTE — PROCEDURES
Esophagogastroduodenoscopy/Endoscopic Retrograde Cholangiopancreatography    Date of Procedure:  7/18/2018  Primary Care Provider:    Attending Physician:  Ariel Aguirre MD  Indications: Bile leak, nausea        Instrument: Olympus Flexible Sideviewing Endoscope  Sedation: Anesthesiologist: Blair Abbott M.D./General    Surgical Staff:  Circulator: Singh Omalley R.N.  Endoscopy Technician: Rachel Kennedy    Pre-Anesthesia Assessment:  Prior to the procedure, a History and Physical was performed, and patient medications and allergies were reviewed. The patient’s tolerance of previous anesthesia was also reviewed. The risks and benefits of the procedure and the sedation options and risks were discussed with the patient including but not limited to infection, bleeding, aspiration, perforation, adverse medication reaction, missed diagnosis, missed lesions, and pancreatitis. The patient verbalized understanding. All questions were answered, and informed consent was obtained        After I obtained informed consent from the patient, the patient was placed in the supine position. Appropriate time-out protocol was followed: the correct patient, the correct procedure, and the correct equipment in the room were confirmed. Throughout the procedure, the patient’s blood pressure, pulse, and oxygen saturations were monitored continuously. The Olymus flexible sideviewing duodenoscope was inserted through the oropharynx, esophagus intubated, then advanced to the gastrointestinal tract to the major papilla. The duct(s) were cannulated and contrast was injected I personally interpreted the ductal images.  Findings and interventions were performed and documented below. Air was then withdrawn and the duodenoscope was removed. The patient tolerated the procedure well. There were no immediate postoperative complications    Findings:    EGD:  Esophagus: normal, no irritation  Stomach: mild gastric erythema, not  biopsied due to previous biopsy. No active bleeding.  Duodenum: 2nd portion examined, no abnormalities seen.    ERCP:    film showed right upper quadrant metal stent. Stent appeared widely patent. The stent was removed intact with rat tooth forceps with entire scope withdrawn. The scope was then re-inserted. The biliary orifice appeared widely patent. Cannulation of the biliary system was pure with 0.025 wire/9-12mm balloon. Cannulation was pure; throughout the entire procedure, the pancreatic duct was never cannulated or injected. Wire was advanced to the intrahepatic duct. Multiple sweeps were performed without stone or debris. Stepwise occlusion cholangiogram without filling defect of leak. Cystic duct did fill but without extravasation of contrast. Bilateral film under diaphragm negative for free air.    Impressions:   1. Resolution of bile leak without extravasation of contrast; removal of in-situ covered metal biliary stent  2. Mild gastric erythema.    Recommendations:   1. Monitor for post procedure complications  2. Follow-up as needed.  3. Continue PPI.

## 2018-07-18 NOTE — PROGRESS NOTES
Indication:Bile leak, nausea.   Risks, benefits, and alternatives were discussed with with consenting person(s). Consenting person(s) were given an opportunity to ask questions and discuss other options. Risks including but not limited to failed or incomplete ERCP, stent migration, ineffective therapy, radiation exposure, pancreatitis (with potential future complications), contrast reaction, perforation, infection, bleeding, missed lesion(s), cardiac and/or pulmonary event, aspiration, stroke, possible need for surgery, hospitalization possibly prolonged, discomfort, unsuccessful and/or incomplete procedure, possible need for repeat procedures and/or additional testings, damage to adjacent organs and/or vascular structures, medication reaction, disability, death, and other adverse events possibly life-threatening. Discussion was undertaken with Layman's terms. Consenting persons stated understanding and acceptance of these risks, and wished to proceed. Consent was given in clear state of mind. Discussed possible replacement with metal stent.

## 2018-07-18 NOTE — OR NURSING
1346- Pt to pacu via gurney with side rails up.  Pt awake, drowsy.  VSS. Pt c/o back and B hip pain, order from Dr Abbott for tylenol and celebrex. See Mar.  1413- Sister updated via phone.  1416- Pt states pain tolerable at this time, rates at 6/10.  Pt states pain never gets better than 5/10.  1449- Report to Oralia COBB in stage 2.

## 2018-10-04 LAB
HAV IGM SERPL QL IA: NEGATIVE
HBV CORE IGM SER QL: NEGATIVE
HBV SURFACE AG SER QL: NEGATIVE
HCV AB SER QL: REACTIVE

## 2019-03-16 NOTE — PROGRESS NOTES
Bedside shift change report given to Cali Shelley RN (oncoming nurse). Report included the following information SBAR, Kardex, MAR and Recent Results. SHIFT SUMMARY:        CONCERNS TO ADDRESS WITH MD:          [facility] NURSING NOTE   Admission Date 3/11/2019   Admission Diagnosis Acute on chronic systolic heart failure ([facility] [address] [I50.23]   Consults IP CONSULT TO PALLIATIVE CARE - PROVIDER  IP CONSULT TO PRIMARY CARE PROVIDER  IP CONSULT TO CARDIOLOGY      Cardiac Monitoring [x] Yes [] No      Purposeful Hourly Rounding [x] Yes    Milvia Score Total Score: 3   Milvia score 3 or > [x] Bed Alarm [] Avasys [] 1:1 sitter [] Patient refused (Signed refusal form in chart)   Kimani Score Kimani Score: 17   Kimani score 14 or < [] PMT consult [] Wound Care consult    []  Specialty bed  [] Nutrition consult      Influenza Vaccine Received Flu Vaccine for Current Season (usually Sept-March): Yes           Oxygen needs? [] Room air Oxygen @  [x]1L    []2L    []3L   []4L    []5L   []6L via  NC   Chronic home O2 use?  [x] Yes [] No  Perform O2 challenge test and document in progress note using smartphrase (.Homeoxygen)      Last bowel movement Last Bowel Movement Date: 03/15/19      Urinary Catheter       External Female Catheter 03/11/19-Urine Output (mL): 700 ml     LDAs               Peripheral IV 03/14/19 Distal;Posterior;Right Forearm (Active)   Site Assessment Clean, dry, & intact 3/15/2019  7:11 PM   Phlebitis Assessment 0 3/15/2019  7:11 PM   Infiltration Assessment 0 3/15/2019  7:11 PM   Dressing Status Clean, dry, & intact 3/15/2019  7:11 PM   Dressing Type Transparent 3/15/2019  7:11 PM   Hub Color/Line Status Blue;Patent 3/15/2019  7:11 PM          External Female Catheter 03/11/19 (Active)   Site Assessment Clean, dry, & intact 3/15/2019  3:31 AM   Repositioned Yes 3/15/2019  3:31 AM   Perineal Care Yes 3/15/2019  3:31 AM   Wick Changed No 3/15/2019  3:31 AM   Suction Canister/Tubing Changed No 3/15/2019  3:31 AM   Urine Output Pt A&O x's 4, VSS, NC at 3L, O2 saturation at 96%, pain not being controlled at this time with current pain medication regimen. Pt ambulated with FWW, steady gait from cardiac chair to bed, +flatus, -BM, +void. Pt tolerating regular diet. Pt has 4 lap sites to abdomen, CDI and RQ dressing, ELLA with moderate output to RLQ. All questions and concerns have been addressed at this time. Bariatric accommodations have been met, bed in locked/lowest position, call light and personal items within reach, bed alarm on, will continue to monitor.    (mL) 700 ml 3/13/2019 10:59 PM                   Readmission Risk Assessment Tool Score High Risk            34       Total Score        3 Has Seen PCP in Last 6 Months (Yes=3, No=0)    2 . Living with Significant Other. Assisted Living. LTAC. SNF. or   Rehab    4 IP Visits Last 12 Months (1-3=4, 4=9, >4=11)    5 Pt.  Coverage (Medicare=5 , Medicaid, or Self-Pay=4)    20 Charlson Comorbidity Score (Age + Comorbid Conditions)        Criteria that do not apply:    Patient Length of Stay (>5 days = 3)       Expected Length of Stay 4d 2h   Actual Length of Stay 5

## 2022-06-27 NOTE — ASSESSMENT & PLAN NOTE
Today's day 5 out of 7 of IV Rocephin and Flagyl. This can be switched over to oral once the patient's oral intake is adequate.  S/p ERCP 4/3, Cystic duct leak secondary to gangrenous gallbladder, treated with biliary sphincterotomy placement of decompressive 10Fr x 9cm plastic biliary stent     PROCEDURES:  FL guided PICC insertion 27-Jun-2022 13:47:29  Pravin Jean Baptiste

## 2023-12-26 ENCOUNTER — HOSPITAL ENCOUNTER (OUTPATIENT)
Dept: RADIOLOGY | Facility: MEDICAL CENTER | Age: 68
End: 2023-12-26
Payer: MEDICARE

## 2023-12-26 ENCOUNTER — APPOINTMENT (OUTPATIENT)
Dept: RADIOLOGY | Facility: MEDICAL CENTER | Age: 68
DRG: 853 | End: 2023-12-26
Attending: EMERGENCY MEDICINE
Payer: MEDICARE

## 2023-12-26 ENCOUNTER — HOSPITAL ENCOUNTER (INPATIENT)
Facility: MEDICAL CENTER | Age: 68
LOS: 10 days | DRG: 853 | End: 2024-01-05
Attending: EMERGENCY MEDICINE | Admitting: INTERNAL MEDICINE
Payer: MEDICARE

## 2023-12-26 DIAGNOSIS — R53.1 GENERALIZED WEAKNESS: ICD-10-CM

## 2023-12-26 DIAGNOSIS — J96.11 CHRONIC RESPIRATORY FAILURE WITH HYPOXIA (HCC): ICD-10-CM

## 2023-12-26 DIAGNOSIS — L03.90 CELLULITIS, UNSPECIFIED CELLULITIS SITE: ICD-10-CM

## 2023-12-26 DIAGNOSIS — S42.352A CLOSED DISPLACED COMMINUTED FRACTURE OF SHAFT OF LEFT HUMERUS, INITIAL ENCOUNTER: ICD-10-CM

## 2023-12-26 DIAGNOSIS — F13.20 ANXIOLYTIC DEPENDENCE (HCC): ICD-10-CM

## 2023-12-26 LAB
ALBUMIN SERPL BCP-MCNC: 3.4 G/DL (ref 3.2–4.9)
ALBUMIN/GLOB SERPL: 0.9 G/DL
ALP SERPL-CCNC: 159 U/L (ref 30–99)
ALT SERPL-CCNC: 13 U/L (ref 2–50)
ANION GAP SERPL CALC-SCNC: 15 MMOL/L (ref 7–16)
ANISOCYTOSIS BLD QL SMEAR: ABNORMAL
APPEARANCE UR: ABNORMAL
AST SERPL-CCNC: 43 U/L (ref 12–45)
BACTERIA #/AREA URNS HPF: NEGATIVE /HPF
BASOPHILS # BLD AUTO: 0 % (ref 0–1.8)
BASOPHILS # BLD: 0 K/UL (ref 0–0.12)
BILIRUB SERPL-MCNC: 1.4 MG/DL (ref 0.1–1.5)
BILIRUB UR QL STRIP.AUTO: NEGATIVE
BUN SERPL-MCNC: 34 MG/DL (ref 8–22)
CALCIUM ALBUM COR SERPL-MCNC: 9.3 MG/DL (ref 8.5–10.5)
CALCIUM SERPL-MCNC: 8.8 MG/DL (ref 8.5–10.5)
CHLORIDE SERPL-SCNC: 103 MMOL/L (ref 96–112)
CO2 SERPL-SCNC: 19 MMOL/L (ref 20–33)
COLOR UR: YELLOW
CREAT SERPL-MCNC: 1.24 MG/DL (ref 0.5–1.4)
CRP SERPL HS-MCNC: 22.76 MG/DL (ref 0–0.75)
EKG IMPRESSION: NORMAL
EOSINOPHIL # BLD AUTO: 0 K/UL (ref 0–0.51)
EOSINOPHIL NFR BLD: 0 % (ref 0–6.9)
EPI CELLS #/AREA URNS HPF: ABNORMAL /HPF
ERYTHROCYTE [DISTWIDTH] IN BLOOD BY AUTOMATED COUNT: 45.3 FL (ref 35.9–50)
FLUAV RNA SPEC QL NAA+PROBE: NEGATIVE
FLUBV RNA SPEC QL NAA+PROBE: NEGATIVE
GFR SERPLBLD CREATININE-BSD FMLA CKD-EPI: 63 ML/MIN/1.73 M 2
GLOBULIN SER CALC-MCNC: 3.7 G/DL (ref 1.9–3.5)
GLUCOSE SERPL-MCNC: 86 MG/DL (ref 65–99)
GLUCOSE UR STRIP.AUTO-MCNC: NEGATIVE MG/DL
HCT VFR BLD AUTO: 39.8 % (ref 42–52)
HGB BLD-MCNC: 12.9 G/DL (ref 14–18)
KETONES UR STRIP.AUTO-MCNC: NEGATIVE MG/DL
LACTATE SERPL-SCNC: 2.1 MMOL/L (ref 0.5–2)
LACTATE SERPL-SCNC: 2.4 MMOL/L (ref 0.5–2)
LEUKOCYTE ESTERASE UR QL STRIP.AUTO: NEGATIVE
LYMPHOCYTES # BLD AUTO: 0.15 K/UL (ref 1–4.8)
LYMPHOCYTES NFR BLD: 0.8 % (ref 22–41)
MANUAL DIFF BLD: NORMAL
MCH RBC QN AUTO: 28.6 PG (ref 27–33)
MCHC RBC AUTO-ENTMCNC: 32.4 G/DL (ref 32.3–36.5)
MCV RBC AUTO: 88.2 FL (ref 81.4–97.8)
METAMYELOCYTES NFR BLD MANUAL: 0.9 %
MICRO URNS: ABNORMAL
MICROCYTES BLD QL SMEAR: ABNORMAL
MONOCYTES # BLD AUTO: 0.49 K/UL (ref 0–0.85)
MONOCYTES NFR BLD AUTO: 2.6 % (ref 0–13.4)
MORPHOLOGY BLD-IMP: NORMAL
NEUTROPHILS # BLD AUTO: 17.9 K/UL (ref 1.82–7.42)
NEUTROPHILS NFR BLD: 87.2 % (ref 44–72)
NEUTS BAND NFR BLD MANUAL: 8.5 % (ref 0–10)
NITRITE UR QL STRIP.AUTO: NEGATIVE
NRBC # BLD AUTO: 0 K/UL
NRBC BLD-RTO: 0 /100 WBC (ref 0–0.2)
PH UR STRIP.AUTO: 5.5 [PH] (ref 5–8)
PLATELET # BLD AUTO: 226 K/UL (ref 164–446)
PLATELET BLD QL SMEAR: NORMAL
PMV BLD AUTO: 11.2 FL (ref 9–12.9)
POTASSIUM SERPL-SCNC: 4.7 MMOL/L (ref 3.6–5.5)
PROT SERPL-MCNC: 7.1 G/DL (ref 6–8.2)
PROT UR QL STRIP: NEGATIVE MG/DL
RBC # BLD AUTO: 4.51 M/UL (ref 4.7–6.1)
RBC # URNS HPF: ABNORMAL /HPF
RBC BLD AUTO: PRESENT
RBC UR QL AUTO: NEGATIVE
RSV RNA SPEC QL NAA+PROBE: NEGATIVE
SARS-COV-2 RNA RESP QL NAA+PROBE: NOTDETECTED
SODIUM SERPL-SCNC: 137 MMOL/L (ref 135–145)
SP GR UR STRIP.AUTO: >=1.03
SPECIMEN SOURCE: NORMAL
UROBILINOGEN UR STRIP.AUTO-MCNC: 0.2 MG/DL
WBC # BLD AUTO: 18.7 K/UL (ref 4.8–10.8)
WBC #/AREA URNS HPF: ABNORMAL /HPF

## 2023-12-26 PROCEDURE — 86140 C-REACTIVE PROTEIN: CPT

## 2023-12-26 PROCEDURE — 81001 URINALYSIS AUTO W/SCOPE: CPT

## 2023-12-26 PROCEDURE — 700105 HCHG RX REV CODE 258: Mod: UD | Performed by: EMERGENCY MEDICINE

## 2023-12-26 PROCEDURE — 71045 X-RAY EXAM CHEST 1 VIEW: CPT

## 2023-12-26 PROCEDURE — 0241U HCHG SARS-COV-2 COVID-19 NFCT DS RESP RNA 4 TRGT MIC: CPT

## 2023-12-26 PROCEDURE — 85027 COMPLETE CBC AUTOMATED: CPT

## 2023-12-26 PROCEDURE — 99223 1ST HOSP IP/OBS HIGH 75: CPT | Mod: AI | Performed by: INTERNAL MEDICINE

## 2023-12-26 PROCEDURE — 87040 BLOOD CULTURE FOR BACTERIA: CPT | Mod: 91

## 2023-12-26 PROCEDURE — 83605 ASSAY OF LACTIC ACID: CPT | Mod: 91

## 2023-12-26 PROCEDURE — 770020 HCHG ROOM/CARE - TELE (206)

## 2023-12-26 PROCEDURE — 85007 BL SMEAR W/DIFF WBC COUNT: CPT

## 2023-12-26 PROCEDURE — 700102 HCHG RX REV CODE 250 W/ 637 OVERRIDE(OP): Performed by: INTERNAL MEDICINE

## 2023-12-26 PROCEDURE — 99285 EMERGENCY DEPT VISIT HI MDM: CPT

## 2023-12-26 PROCEDURE — 36415 COLL VENOUS BLD VENIPUNCTURE: CPT

## 2023-12-26 PROCEDURE — 80053 COMPREHEN METABOLIC PANEL: CPT

## 2023-12-26 PROCEDURE — A9270 NON-COVERED ITEM OR SERVICE: HCPCS | Performed by: INTERNAL MEDICINE

## 2023-12-26 PROCEDURE — 700111 HCHG RX REV CODE 636 W/ 250 OVERRIDE (IP): Performed by: INTERNAL MEDICINE

## 2023-12-26 PROCEDURE — 700105 HCHG RX REV CODE 258: Performed by: INTERNAL MEDICINE

## 2023-12-26 PROCEDURE — C9803 HOPD COVID-19 SPEC COLLECT: HCPCS | Performed by: EMERGENCY MEDICINE

## 2023-12-26 PROCEDURE — 700102 HCHG RX REV CODE 250 W/ 637 OVERRIDE(OP): Mod: UD | Performed by: EMERGENCY MEDICINE

## 2023-12-26 PROCEDURE — 93005 ELECTROCARDIOGRAM TRACING: CPT | Performed by: EMERGENCY MEDICINE

## 2023-12-26 PROCEDURE — A9270 NON-COVERED ITEM OR SERVICE: HCPCS | Mod: UD | Performed by: EMERGENCY MEDICINE

## 2023-12-26 RX ORDER — METOPROLOL SUCCINATE 100 MG/1
100 TABLET, EXTENDED RELEASE ORAL DAILY
Status: DISCONTINUED | OUTPATIENT
Start: 2023-12-27 | End: 2024-01-05 | Stop reason: HOSPADM

## 2023-12-26 RX ORDER — FLUOXETINE 40 MG/1
40 CAPSULE ORAL DAILY
COMMUNITY

## 2023-12-26 RX ORDER — BUPRENORPHINE 2 MG/1
2 TABLET SUBLINGUAL 2 TIMES DAILY
Status: ON HOLD | COMMUNITY
End: 2024-01-05

## 2023-12-26 RX ORDER — SODIUM CHLORIDE, SODIUM LACTATE, POTASSIUM CHLORIDE, AND CALCIUM CHLORIDE .6; .31; .03; .02 G/100ML; G/100ML; G/100ML; G/100ML
500 INJECTION, SOLUTION INTRAVENOUS
Status: COMPLETED | OUTPATIENT
Start: 2023-12-26 | End: 2023-12-28

## 2023-12-26 RX ORDER — ROSUVASTATIN CALCIUM 10 MG/1
10 TABLET, COATED ORAL EVERY EVENING
Status: ON HOLD | COMMUNITY
End: 2024-01-29

## 2023-12-26 RX ORDER — SPIRONOLACTONE 25 MG/1
25 TABLET ORAL DAILY
Status: DISCONTINUED | OUTPATIENT
Start: 2023-12-27 | End: 2023-12-27

## 2023-12-26 RX ORDER — METOPROLOL SUCCINATE 100 MG/1
100 TABLET, EXTENDED RELEASE ORAL DAILY
COMMUNITY

## 2023-12-26 RX ORDER — ACETAMINOPHEN 325 MG/1
650 TABLET ORAL EVERY 6 HOURS PRN
Status: DISCONTINUED | OUTPATIENT
Start: 2023-12-26 | End: 2024-01-05 | Stop reason: HOSPADM

## 2023-12-26 RX ORDER — DIGOXIN 250 MCG
125 TABLET ORAL DAILY
Status: DISCONTINUED | OUTPATIENT
Start: 2023-12-27 | End: 2023-12-26

## 2023-12-26 RX ORDER — ROSUVASTATIN CALCIUM 5 MG/1
10 TABLET, COATED ORAL EVERY EVENING
Status: DISCONTINUED | OUTPATIENT
Start: 2023-12-26 | End: 2024-01-05 | Stop reason: HOSPADM

## 2023-12-26 RX ORDER — BISACODYL 10 MG
10 SUPPOSITORY, RECTAL RECTAL
Status: DISCONTINUED | OUTPATIENT
Start: 2023-12-26 | End: 2024-01-05 | Stop reason: HOSPADM

## 2023-12-26 RX ORDER — CLONAZEPAM 1 MG/1
1 TABLET ORAL 2 TIMES DAILY
Status: DISCONTINUED | OUTPATIENT
Start: 2023-12-26 | End: 2024-01-05 | Stop reason: HOSPADM

## 2023-12-26 RX ORDER — POLYETHYLENE GLYCOL 3350 17 G/17G
1 POWDER, FOR SOLUTION ORAL
Status: DISCONTINUED | OUTPATIENT
Start: 2023-12-26 | End: 2024-01-05 | Stop reason: HOSPADM

## 2023-12-26 RX ORDER — ACETAMINOPHEN 325 MG/1
650 TABLET ORAL ONCE
Status: COMPLETED | OUTPATIENT
Start: 2023-12-26 | End: 2023-12-26

## 2023-12-26 RX ORDER — QUETIAPINE FUMARATE 100 MG/1
100 TABLET, FILM COATED ORAL 2 TIMES DAILY
Status: DISCONTINUED | OUTPATIENT
Start: 2023-12-26 | End: 2024-01-05 | Stop reason: HOSPADM

## 2023-12-26 RX ORDER — LABETALOL HYDROCHLORIDE 5 MG/ML
10 INJECTION, SOLUTION INTRAVENOUS EVERY 4 HOURS PRN
Status: DISCONTINUED | OUTPATIENT
Start: 2023-12-26 | End: 2024-01-05 | Stop reason: HOSPADM

## 2023-12-26 RX ORDER — AMOXICILLIN 250 MG
2 CAPSULE ORAL 2 TIMES DAILY
Status: DISCONTINUED | OUTPATIENT
Start: 2023-12-26 | End: 2024-01-05 | Stop reason: HOSPADM

## 2023-12-26 RX ORDER — ONDANSETRON 2 MG/ML
4 INJECTION INTRAMUSCULAR; INTRAVENOUS EVERY 4 HOURS PRN
Status: DISCONTINUED | OUTPATIENT
Start: 2023-12-26 | End: 2024-01-05 | Stop reason: HOSPADM

## 2023-12-26 RX ORDER — SODIUM CHLORIDE, SODIUM LACTATE, POTASSIUM CHLORIDE, AND CALCIUM CHLORIDE .6; .31; .03; .02 G/100ML; G/100ML; G/100ML; G/100ML
1000 INJECTION, SOLUTION INTRAVENOUS ONCE
Status: COMPLETED | OUTPATIENT
Start: 2023-12-26 | End: 2023-12-26

## 2023-12-26 RX ORDER — ATENOLOL 25 MG/1
25 TABLET ORAL 2 TIMES DAILY
Status: DISCONTINUED | OUTPATIENT
Start: 2023-12-26 | End: 2023-12-26

## 2023-12-26 RX ORDER — OXYCODONE HYDROCHLORIDE 10 MG/1
10 TABLET ORAL
Status: DISCONTINUED | OUTPATIENT
Start: 2023-12-26 | End: 2024-01-05 | Stop reason: HOSPADM

## 2023-12-26 RX ORDER — ALBUTEROL SULFATE 90 UG/1
2 INHALANT RESPIRATORY (INHALATION) EVERY 6 HOURS PRN
Status: DISCONTINUED | OUTPATIENT
Start: 2023-12-26 | End: 2024-01-05 | Stop reason: HOSPADM

## 2023-12-26 RX ORDER — HYDROMORPHONE HYDROCHLORIDE 1 MG/ML
0.5 INJECTION, SOLUTION INTRAMUSCULAR; INTRAVENOUS; SUBCUTANEOUS
Status: DISCONTINUED | OUTPATIENT
Start: 2023-12-26 | End: 2024-01-05 | Stop reason: HOSPADM

## 2023-12-26 RX ORDER — ONDANSETRON 4 MG/1
4 TABLET, ORALLY DISINTEGRATING ORAL EVERY 4 HOURS PRN
Status: DISCONTINUED | OUTPATIENT
Start: 2023-12-26 | End: 2024-01-05 | Stop reason: HOSPADM

## 2023-12-26 RX ORDER — OXYCODONE HYDROCHLORIDE 5 MG/1
5 TABLET ORAL
Status: DISCONTINUED | OUTPATIENT
Start: 2023-12-26 | End: 2024-01-05 | Stop reason: HOSPADM

## 2023-12-26 RX ORDER — BUPRENORPHINE 2 MG/1
2 TABLET SUBLINGUAL 2 TIMES DAILY
Status: DISCONTINUED | OUTPATIENT
Start: 2023-12-26 | End: 2023-12-26

## 2023-12-26 RX ORDER — THEOPHYLLINE 300 MG/1
300 TABLET, EXTENDED RELEASE ORAL 2 TIMES DAILY
Status: DISCONTINUED | OUTPATIENT
Start: 2023-12-26 | End: 2024-01-05 | Stop reason: HOSPADM

## 2023-12-26 RX ADMIN — ROSUVASTATIN CALCIUM 10 MG: 20 TABLET, FILM COATED ORAL at 20:03

## 2023-12-26 RX ADMIN — OXYCODONE HYDROCHLORIDE 10 MG: 10 TABLET ORAL at 23:27

## 2023-12-26 RX ADMIN — HYDROMORPHONE HYDROCHLORIDE 0.5 MG: 1 INJECTION, SOLUTION INTRAMUSCULAR; INTRAVENOUS; SUBCUTANEOUS at 21:18

## 2023-12-26 RX ADMIN — CEFAZOLIN 2 G: 2 INJECTION, POWDER, FOR SOLUTION INTRAMUSCULAR; INTRAVENOUS at 22:20

## 2023-12-26 RX ADMIN — ACETAMINOPHEN 650 MG: 325 TABLET, FILM COATED ORAL at 15:17

## 2023-12-26 RX ADMIN — SODIUM CHLORIDE, POTASSIUM CHLORIDE, SODIUM LACTATE AND CALCIUM CHLORIDE 1000 ML: 600; 310; 30; 20 INJECTION, SOLUTION INTRAVENOUS at 15:17

## 2023-12-26 RX ADMIN — QUETIAPINE FUMARATE 100 MG: 100 TABLET ORAL at 20:05

## 2023-12-26 RX ADMIN — OXYCODONE HYDROCHLORIDE 10 MG: 10 TABLET ORAL at 19:04

## 2023-12-26 RX ADMIN — CLONAZEPAM 1 MG: 1 TABLET ORAL at 20:04

## 2023-12-26 RX ADMIN — THEOPHYLLINE 300 MG: 300 TABLET, EXTENDED RELEASE ORAL at 20:05

## 2023-12-26 ASSESSMENT — ENCOUNTER SYMPTOMS
DIZZINESS: 0
NAUSEA: 1
HEADACHES: 0
FALLS: 1
FEVER: 1
WEAKNESS: 1

## 2023-12-26 ASSESSMENT — PAIN DESCRIPTION - PAIN TYPE: TYPE: ACUTE PAIN

## 2023-12-26 ASSESSMENT — PAIN SCALES - WONG BAKER: WONGBAKER_NUMERICALRESPONSE: HURTS AS MUCH AS POSSIBLE

## 2023-12-26 NOTE — ED PROVIDER NOTES
ED Provider Note    CHIEF COMPLAINT  Chief Complaint   Patient presents with    Extremity Fracture     Pt sent from [place] ER after he fell in the early hours of the morning and sustained a left proximal humerus fracture. Pt takes Eliquis for DVT and Pe's. Pt received 300mcg of fentanyl IV and 3mg Versed IV from [facility] in route. Pt arrives in left arm sling.        EXTERNAL RECORDS REVIEWED  Other reviewed the patient's transfer records from [facility].  The patient presented to their hospital after he had a fall with an obvious deformity to the left upper extremity.  The patient was found to have a proximal humerus fracture and a splint was applied and he was transferred here for higher level of care.    HPI/ROS  LIMITATION TO HISTORY   Select: Patient is a poor historian and may be slightly confused  OUTSIDE HISTORIAN(S):  EMS provided signout at the bedside    Henry Kumari is a 68 y.o. male who presents as a transfer from [facility].  The patient presented there after a fall.  In speaking with the patient he states that he has been weak over the last several days.  Has been having hard time caring for himself.  He has chronic panniculitis that has been utilizing alcohol for.  The patient had a fall due to his generalized weakness causing a humerus fracture there was noted at the transferring facility and he was transferred here for higher level of care.  The patient is on anticoagulation for history of DVTs as well as pulmonary emboli.  He is on home oxygen and continues to abuse tobacco products.  The patient has not noted any fevers.  He states he does not have any dysuria.  He is unaware of any change in his breathing patterns.  He does have the generalized weakness mentioned above.  The patient also suffered from multiple skin tears to the right upper extremity and he did receive tetanus prophylaxis as well as Rocephin.  Patient was found to have  a leukocytosis with a white blood cell count of 18,200 and slight elevation in his creatinine at 1.74.  Blood cultures were ordered and are currently pending at [facility].    PAST MEDICAL HISTORY   has a past medical history of Arrhythmia, Arthritis, Blood clotting disorder (HCC) (1976, ?2000), Breath shortness, Congestive heart failure (HCC), Emphysema of lung (HCC), Glaucoma, Heart burn, Hepatitis C (?2000), High cholesterol, Hypertension, Indigestion, and Sleep apnea.    SURGICAL HISTORY   has a past surgical history that includes nilson by laparoscopy (4/2/2018); ercp in or (N/A, 4/3/2018); ercp in or (N/A, 5/2/2018); other abdominal surgery (2018); other abdominal surgery (20018); other (1976); ercp (7/18/2018); and ercp w/rem fb and/or stent chg (7/18/2018).    FAMILY HISTORY  No family history on file.    SOCIAL HISTORY  Social History     Tobacco Use    Smoking status: Every Day     Current packs/day: 0.50     Average packs/day: 0.5 packs/day for 47.0 years (23.5 ttl pk-yrs)     Types: Cigarettes    Smokeless tobacco: Never   Substance and Sexual Activity    Alcohol use: Yes     Comment: 2 drinks a week    Drug use: Not on file    Sexual activity: Not on file       CURRENT MEDICATIONS  Home Medications       Reviewed by Lanie Montague R.N. (Registered Nurse) on 12/26/23 at 1448  Med List Status: Not Addressed     Medication Last Dose Status   albuterol 108 (90 Base) MCG/ACT Aero Soln inhalation aerosol  Active   atenolol (TENORMIN) 25 MG Tab  Active   clonazePAM (KLONOPIN) 1 MG Tab  Active   digoxin (LANOXIN) 125 MCG Tab  Active   fluticasone-salmeterol (ADVAIR) 250-50 MCG/DOSE AEROSOL POWDER, BREATH ACTIVATED  Active   furosemide (LASIX) 40 MG Tab  Active   gemfibrozil (LOPID) 600 MG Tab  Active   HYDROcodone/acetaminophen (NORCO)  MG Tab  Active   lisinopril (PRINIVIL, ZESTRIL) 40 MG tablet  Active   QUEtiapine (SEROQUEL) 100 MG Tab  Active   rivaroxaban (XARELTO) 20 MG  "Tab tablet  Active   simvastatin (ZOCOR) 20 MG Tab  Active   spironolactone (ALDACTONE) 25 MG Tab  Active   theophylline SR (THEODUR) 300 MG TABLET SR 12 HR  Active   tiotropium (SPIRIVA) 18 MCG Cap  Active                    ALLERGIES  Allergies   Allergen Reactions    Tape Rash     Pt states tape removes his skin         PHYSICAL EXAM  VITAL SIGNS: /61   Pulse (!) 120   Temp (!) 38.2 °C (100.7 °F) (Temporal)   Resp (!) 25   Ht 1.88 m (6' 2\")   Wt (!) 168 kg (370 lb)   SpO2 90%   BMI 47.51 kg/m²    In general the patient Appears unkempt and ill    HEENT unremarkable    Pulmonary the patient's lungs are symmetrically diminished throughout    Cardiovascular S1-S2 with a tachycardic rate    GI the patient does have diffuse panniculitis to the lower half of the abdomen with no other focal tenderness    Skin the panniculitis described above as well as brawny changes to the lower extremities the patient also has multiple skin tears to the lateral aspect of the right elbow    Extremities patient has a diffuse ecchymosis and a deformity of the left proximal humerus where he has a splint in place    Neurologic examination is grossly intact    DIAGNOSTIC STUDIES   Results for orders placed or performed during the hospital encounter of 12/26/23   CBC With Differential   Result Value Ref Range    WBC 18.7 (H) 4.8 - 10.8 K/uL    RBC 4.51 (L) 4.70 - 6.10 M/uL    Hemoglobin 12.9 (L) 14.0 - 18.0 g/dL    Hematocrit 39.8 (L) 42.0 - 52.0 %    MCV 88.2 81.4 - 97.8 fL    MCH 28.6 27.0 - 33.0 pg    MCHC 32.4 32.3 - 36.5 g/dL    RDW 45.3 35.9 - 50.0 fL    Platelet Count 226 164 - 446 K/uL    MPV 11.2 9.0 - 12.9 fL    Neutrophils-Polys 87.20 (H) 44.00 - 72.00 %    Lymphocytes 0.80 (L) 22.00 - 41.00 %    Monocytes 2.60 0.00 - 13.40 %    Eosinophils 0.00 0.00 - 6.90 %    Basophils 0.00 0.00 - 1.80 %    Nucleated RBC 0.00 0.00 - 0.20 /100 WBC    Neutrophils (Absolute) 17.90 (H) 1.82 - 7.42 K/uL    Lymphs (Absolute) 0.15 (L) 1.00 " - 4.80 K/uL    Monos (Absolute) 0.49 0.00 - 0.85 K/uL    Eos (Absolute) 0.00 0.00 - 0.51 K/uL    Baso (Absolute) 0.00 0.00 - 0.12 K/uL    NRBC (Absolute) 0.00 K/uL    Anisocytosis 1+     Microcytosis 1+    Comp Metabolic Panel   Result Value Ref Range    Sodium 137 135 - 145 mmol/L    Potassium 4.7 3.6 - 5.5 mmol/L    Chloride 103 96 - 112 mmol/L    Co2 19 (L) 20 - 33 mmol/L    Anion Gap 15.0 7.0 - 16.0    Glucose 86 65 - 99 mg/dL    Bun 34 (H) 8 - 22 mg/dL    Creatinine 1.24 0.50 - 1.40 mg/dL    Calcium 8.8 8.5 - 10.5 mg/dL    Correct Calcium 9.3 8.5 - 10.5 mg/dL    AST(SGOT) 43 12 - 45 U/L    ALT(SGPT) 13 2 - 50 U/L    Alkaline Phosphatase 159 (H) 30 - 99 U/L    Total Bilirubin 1.4 0.1 - 1.5 mg/dL    Albumin 3.4 3.2 - 4.9 g/dL    Total Protein 7.1 6.0 - 8.2 g/dL    Globulin 3.7 (H) 1.9 - 3.5 g/dL    A-G Ratio 0.9 g/dL   Lactic Acid   Result Value Ref Range    Lactic Acid 2.4 (H) 0.5 - 2.0 mmol/L   Urinalysis    Specimen: Urine, Clean Catch   Result Value Ref Range    Color Yellow     Character Hazy (A)     Specific Gravity >=1.030 <1.035    Ph 5.5 5.0 - 8.0    Glucose Negative Negative mg/dL    Ketones Negative Negative mg/dL    Protein Negative Negative mg/dL    Bilirubin Negative Negative    Urobilinogen, Urine 0.2 Negative    Nitrite Negative Negative    Leukocyte Esterase Negative Negative    Occult Blood Negative Negative    Micro Urine Req Microscopic    CoV-2, Flu A/B, And RSV by PCR ([employer])    Specimen: Respirate   Result Value Ref Range    Influenza virus A RNA Negative Negative    Influenza virus B, PCR Negative Negative    RSV, PCR Negative Negative    SARS-CoV-2 by PCR NotDetected     SARS-CoV-2 Source NP Swab    C Reactive Protein Quantitative (Non-Cardiac)   Result Value Ref Range    Stat C-Reactive Protein 22.76 (H) 0.00 - 0.75 mg/dL   ESTIMATED GFR   Result Value Ref Range    GFR (CKD-EPI) 63 >60 mL/min/1.73 m 2   MORPHOLOGY   Result Value Ref Range    RBC Morphology Present    PERIPHERAL SMEAR  REVIEW   Result Value Ref Range    Peripheral Smear Review see below    DIFFERENTIAL MANUAL   Result Value Ref Range    Bands-Stabs 8.50 0.00 - 10.00 %    Metamyelocytes 0.90 %    Manual Diff Status PERFORMED    PLATELET ESTIMATE   Result Value Ref Range    Plt Estimation Normal    URINE MICROSCOPIC (W/UA)   Result Value Ref Range    WBC 20-50 (A) /hpf    RBC 10-20 (A) /hpf    Bacteria Negative None /hpf    Epithelial Cells Few /hpf   EKG   Result Value Ref Range    Report       [facility]    Test Date:  2023  Pt Name:    GRANT POPE               Department: ER  MRN:        [id number]                      Room:        14  Gender:     Male                         Technician: [id number]  :        1955                   Requested By:RICK HAYDEN  Order #:    [id number]                    Reading MD: RICK HAYDEN MD    Measurements  Intervals                                Axis  Rate:       117                          P:          0  ME:         0                            QRS:        39  QRSD:       81                           T:          54  QT:         325  QTc:        454    Interpretive Statements  Twelve-lead EKG shows atrial fibrillation twelve-lead EKG shows atrial  fibrillation with a ventricular to 117, otherwise normal intervals, normal  QRS, no ST segment elevation or depression, normal T waves.  Electronically Signed On 2023 17:48:57 PST by RICK HAYDEN MD         EKG  I have independently interpreted this EKG  See my interpretation above      RADIOLOGY  I have independently interpreted the diagnostic imaging associated with this visit and am waiting the final reading from the radiologist.   My preliminary interpretation is as follows: X-ray of the humerus from the outlying facility was reviewed and the patient has a midshaft comminuted humerus fracture that is displaced  Radiologist interpretation:   DX-CHEST-PORTABLE (1 VIEW)   Final  Result         Cardiomegaly.      No pulmonary infiltrates or consolidations are noted.         OUTSIDE IMAGES-CT HEAD   Final Result      OUTSIDE IMAGES-DX UPPER EXTREMITY, LEFT   Final Result      OUTSIDE IMAGES-DX CHEST   Final Result      OUTSIDE IMAGES-DX UPPER EXTREMITY, LEFT   Final Result            COURSE & MEDICAL DECISION MAKING    This is 68-year-old male who presents to the emergency department as a transfer for an extremity fracture.  I did review the x-rays and the patient does have a comminuted and displaced midshaft fracture of the left humerus.  The patient also presents with a fever.  Clinically does have evidence of panniculitis and this could potentially be the source.  A viral etiology would also be in the differential.  The patient has a leukocytosis as well as a lactic acidosis therefore he did receive a sepsis bolus and I did not want to over bolus the patient due to the potential of heart failure and his body habitus.  The patient did receive Rocephin before transfer for potential sepsis and I suspect this would be appropriate as panniculitis would be the most likely source.  Otherwise I do not see any clear evidence of infection.  The patient has responded to IV fluids as well as antipyretics.  The patient feels better.  FINAL DIAGNOSIS  1.  Generalized weakness  2.  Left midshaft displaced humerus fracture  3.  Rule out sepsis  4.  Panniculitis  5.  Multiple skin tears to the right elbow    Disposition  The patient will be admitted in guarded condition       Electronically signed by: Huber Medina M.D., 12/26/2023 2:59 PM

## 2023-12-26 NOTE — ED TRIAGE NOTES
"Chief Complaint   Patient presents with    Extremity Fracture     Pt sent from [place] ER after he fell in the early hours of the morning and sustained a left proximal humerus fracture. Pt takes Eliquis for DVT and Pe's. Pt received 300mcg of fentanyl IV and 3mg Versed IV from [facility] in route. Pt arrives in left arm sling.      /61   Pulse (!) 120   Temp (!) 38.2 °C (100.7 °F) (Temporal)   Resp (!) 25   Ht 1.88 m (6' 2\")   Wt (!) 168 kg (370 lb)   SpO2 90%   BMI 47.51 kg/m²     "

## 2023-12-27 PROBLEM — E87.20 LACTIC ACIDOSIS: Status: ACTIVE | Noted: 2023-12-27

## 2023-12-27 PROBLEM — S42.302A LEFT HUMERAL FRACTURE: Status: ACTIVE | Noted: 2023-12-27

## 2023-12-27 PROBLEM — L03.90 CELLULITIS: Status: ACTIVE | Noted: 2023-12-27

## 2023-12-27 LAB
ALBUMIN SERPL BCP-MCNC: 2.7 G/DL (ref 3.2–4.9)
ALBUMIN/GLOB SERPL: 0.7 G/DL
ALP SERPL-CCNC: 135 U/L (ref 30–99)
ALT SERPL-CCNC: 11 U/L (ref 2–50)
ANION GAP SERPL CALC-SCNC: 13 MMOL/L (ref 7–16)
ANISOCYTOSIS BLD QL SMEAR: ABNORMAL
AST SERPL-CCNC: 36 U/L (ref 12–45)
BASOPHILS # BLD AUTO: 0 % (ref 0–1.8)
BASOPHILS # BLD: 0 K/UL (ref 0–0.12)
BILIRUB SERPL-MCNC: 1.1 MG/DL (ref 0.1–1.5)
BUN SERPL-MCNC: 31 MG/DL (ref 8–22)
CALCIUM ALBUM COR SERPL-MCNC: 9.5 MG/DL (ref 8.5–10.5)
CALCIUM SERPL-MCNC: 8.5 MG/DL (ref 8.5–10.5)
CHLORIDE SERPL-SCNC: 101 MMOL/L (ref 96–112)
CO2 SERPL-SCNC: 22 MMOL/L (ref 20–33)
CREAT SERPL-MCNC: 0.95 MG/DL (ref 0.5–1.4)
EOSINOPHIL # BLD AUTO: 0 K/UL (ref 0–0.51)
EOSINOPHIL NFR BLD: 0 % (ref 0–6.9)
ERYTHROCYTE [DISTWIDTH] IN BLOOD BY AUTOMATED COUNT: 46.1 FL (ref 35.9–50)
GFR SERPLBLD CREATININE-BSD FMLA CKD-EPI: 87 ML/MIN/1.73 M 2
GLOBULIN SER CALC-MCNC: 3.8 G/DL (ref 1.9–3.5)
GLUCOSE SERPL-MCNC: 84 MG/DL (ref 65–99)
HCT VFR BLD AUTO: 37.8 % (ref 42–52)
HGB BLD-MCNC: 12.1 G/DL (ref 14–18)
LACTATE SERPL-SCNC: 1.4 MMOL/L (ref 0.5–2)
LACTATE SERPL-SCNC: 3.1 MMOL/L (ref 0.5–2)
LYMPHOCYTES # BLD AUTO: 0.22 K/UL (ref 1–4.8)
LYMPHOCYTES NFR BLD: 1.8 % (ref 22–41)
MAGNESIUM SERPL-MCNC: 1.8 MG/DL (ref 1.5–2.5)
MANUAL DIFF BLD: NORMAL
MCH RBC QN AUTO: 28.7 PG (ref 27–33)
MCHC RBC AUTO-ENTMCNC: 32 G/DL (ref 32.3–36.5)
MCV RBC AUTO: 89.8 FL (ref 81.4–97.8)
MICROCYTES BLD QL SMEAR: ABNORMAL
MONOCYTES # BLD AUTO: 0.21 K/UL (ref 0–0.85)
MONOCYTES NFR BLD AUTO: 1.7 % (ref 0–13.4)
MORPHOLOGY BLD-IMP: NORMAL
NEUTROPHILS # BLD AUTO: 11.87 K/UL (ref 1.82–7.42)
NEUTROPHILS NFR BLD: 94.8 % (ref 44–72)
NEUTS BAND NFR BLD MANUAL: 1.7 % (ref 0–10)
NRBC # BLD AUTO: 0 K/UL
NRBC BLD-RTO: 0 /100 WBC (ref 0–0.2)
OVALOCYTES BLD QL SMEAR: NORMAL
PHOSPHATE SERPL-MCNC: 2.2 MG/DL (ref 2.5–4.5)
PLATELET # BLD AUTO: 168 K/UL (ref 164–446)
PLATELET BLD QL SMEAR: NORMAL
PMV BLD AUTO: 10.8 FL (ref 9–12.9)
POIKILOCYTOSIS BLD QL SMEAR: NORMAL
POLYCHROMASIA BLD QL SMEAR: NORMAL
POTASSIUM SERPL-SCNC: 4.4 MMOL/L (ref 3.6–5.5)
PROT SERPL-MCNC: 6.5 G/DL (ref 6–8.2)
RBC # BLD AUTO: 4.21 M/UL (ref 4.7–6.1)
RBC BLD AUTO: PRESENT
SODIUM SERPL-SCNC: 136 MMOL/L (ref 135–145)
WBC # BLD AUTO: 12.3 K/UL (ref 4.8–10.8)

## 2023-12-27 PROCEDURE — 85007 BL SMEAR W/DIFF WBC COUNT: CPT

## 2023-12-27 PROCEDURE — 36415 COLL VENOUS BLD VENIPUNCTURE: CPT

## 2023-12-27 PROCEDURE — 700102 HCHG RX REV CODE 250 W/ 637 OVERRIDE(OP): Performed by: STUDENT IN AN ORGANIZED HEALTH CARE EDUCATION/TRAINING PROGRAM

## 2023-12-27 PROCEDURE — 700105 HCHG RX REV CODE 258: Performed by: INTERNAL MEDICINE

## 2023-12-27 PROCEDURE — A9270 NON-COVERED ITEM OR SERVICE: HCPCS | Performed by: INTERNAL MEDICINE

## 2023-12-27 PROCEDURE — 83735 ASSAY OF MAGNESIUM: CPT

## 2023-12-27 PROCEDURE — 700111 HCHG RX REV CODE 636 W/ 250 OVERRIDE (IP): Performed by: INTERNAL MEDICINE

## 2023-12-27 PROCEDURE — 700111 HCHG RX REV CODE 636 W/ 250 OVERRIDE (IP): Mod: JZ | Performed by: STUDENT IN AN ORGANIZED HEALTH CARE EDUCATION/TRAINING PROGRAM

## 2023-12-27 PROCEDURE — 85027 COMPLETE CBC AUTOMATED: CPT

## 2023-12-27 PROCEDURE — 83605 ASSAY OF LACTIC ACID: CPT | Mod: 91

## 2023-12-27 PROCEDURE — 302146: Performed by: STUDENT IN AN ORGANIZED HEALTH CARE EDUCATION/TRAINING PROGRAM

## 2023-12-27 PROCEDURE — A9270 NON-COVERED ITEM OR SERVICE: HCPCS | Performed by: STUDENT IN AN ORGANIZED HEALTH CARE EDUCATION/TRAINING PROGRAM

## 2023-12-27 PROCEDURE — 80053 COMPREHEN METABOLIC PANEL: CPT

## 2023-12-27 PROCEDURE — 99233 SBSQ HOSP IP/OBS HIGH 50: CPT | Performed by: STUDENT IN AN ORGANIZED HEALTH CARE EDUCATION/TRAINING PROGRAM

## 2023-12-27 PROCEDURE — 84100 ASSAY OF PHOSPHORUS: CPT

## 2023-12-27 PROCEDURE — 700102 HCHG RX REV CODE 250 W/ 637 OVERRIDE(OP): Performed by: INTERNAL MEDICINE

## 2023-12-27 PROCEDURE — 700105 HCHG RX REV CODE 258: Performed by: STUDENT IN AN ORGANIZED HEALTH CARE EDUCATION/TRAINING PROGRAM

## 2023-12-27 PROCEDURE — 770020 HCHG ROOM/CARE - TELE (206)

## 2023-12-27 RX ORDER — NYSTATIN 100000 [USP'U]/G
POWDER TOPICAL 2 TIMES DAILY
Status: DISCONTINUED | OUTPATIENT
Start: 2023-12-27 | End: 2023-12-28

## 2023-12-27 RX ORDER — SODIUM CHLORIDE, SODIUM LACTATE, POTASSIUM CHLORIDE, CALCIUM CHLORIDE 600; 310; 30; 20 MG/100ML; MG/100ML; MG/100ML; MG/100ML
INJECTION, SOLUTION INTRAVENOUS CONTINUOUS
Status: DISCONTINUED | OUTPATIENT
Start: 2023-12-27 | End: 2023-12-27

## 2023-12-27 RX ORDER — SODIUM CHLORIDE 9 MG/ML
INJECTION, SOLUTION INTRAVENOUS CONTINUOUS
Status: DISCONTINUED | OUTPATIENT
Start: 2023-12-27 | End: 2023-12-30 | Stop reason: ALTCHOICE

## 2023-12-27 RX ORDER — ENOXAPARIN SODIUM 100 MG/ML
40 INJECTION SUBCUTANEOUS DAILY
Status: DISCONTINUED | OUTPATIENT
Start: 2023-12-27 | End: 2023-12-29

## 2023-12-27 RX ADMIN — CEFAZOLIN 2 G: 2 INJECTION, POWDER, FOR SOLUTION INTRAMUSCULAR; INTRAVENOUS at 06:07

## 2023-12-27 RX ADMIN — OXYCODONE HYDROCHLORIDE 10 MG: 10 TABLET ORAL at 19:54

## 2023-12-27 RX ADMIN — SODIUM CHLORIDE: 9 INJECTION, SOLUTION INTRAVENOUS at 08:48

## 2023-12-27 RX ADMIN — THEOPHYLLINE 300 MG: 300 TABLET, EXTENDED RELEASE ORAL at 19:54

## 2023-12-27 RX ADMIN — MOMETASONE FUROATE AND FORMOTEROL FUMARATE DIHYDRATE 1 PUFF: 200; 5 AEROSOL RESPIRATORY (INHALATION) at 07:28

## 2023-12-27 RX ADMIN — CEFAZOLIN 2 G: 2 INJECTION, POWDER, FOR SOLUTION INTRAMUSCULAR; INTRAVENOUS at 14:28

## 2023-12-27 RX ADMIN — ROSUVASTATIN CALCIUM 10 MG: 20 TABLET, FILM COATED ORAL at 17:43

## 2023-12-27 RX ADMIN — SODIUM CHLORIDE: 9 INJECTION, SOLUTION INTRAVENOUS at 20:01

## 2023-12-27 RX ADMIN — DOCUSATE SODIUM 50 MG AND SENNOSIDES 8.6 MG 2 TABLET: 8.6; 5 TABLET, FILM COATED ORAL at 06:03

## 2023-12-27 RX ADMIN — CEFAZOLIN 2 G: 2 INJECTION, POWDER, FOR SOLUTION INTRAMUSCULAR; INTRAVENOUS at 22:40

## 2023-12-27 RX ADMIN — THEOPHYLLINE 300 MG: 300 TABLET, EXTENDED RELEASE ORAL at 06:04

## 2023-12-27 RX ADMIN — DIBASIC SODIUM PHOSPHATE, MONOBASIC POTASSIUM PHOSPHATE AND MONOBASIC SODIUM PHOSPHATE 500 MG: 852; 155; 130 TABLET ORAL at 17:44

## 2023-12-27 RX ADMIN — QUETIAPINE FUMARATE 100 MG: 100 TABLET ORAL at 06:03

## 2023-12-27 RX ADMIN — HYDROMORPHONE HYDROCHLORIDE 0.5 MG: 1 INJECTION, SOLUTION INTRAMUSCULAR; INTRAVENOUS; SUBCUTANEOUS at 01:11

## 2023-12-27 RX ADMIN — DOCUSATE SODIUM 50 MG AND SENNOSIDES 8.6 MG 2 TABLET: 8.6; 5 TABLET, FILM COATED ORAL at 17:43

## 2023-12-27 RX ADMIN — ENOXAPARIN SODIUM 40 MG: 100 INJECTION SUBCUTANEOUS at 17:44

## 2023-12-27 RX ADMIN — OXYCODONE HYDROCHLORIDE 10 MG: 10 TABLET ORAL at 12:36

## 2023-12-27 RX ADMIN — CLONAZEPAM 1 MG: 1 TABLET ORAL at 06:04

## 2023-12-27 RX ADMIN — SODIUM CHLORIDE, POTASSIUM CHLORIDE, SODIUM LACTATE AND CALCIUM CHLORIDE: 600; 310; 30; 20 INJECTION, SOLUTION INTRAVENOUS at 05:53

## 2023-12-27 RX ADMIN — CLONAZEPAM 1 MG: 1 TABLET ORAL at 17:43

## 2023-12-27 RX ADMIN — METOPROLOL SUCCINATE 100 MG: 100 TABLET, EXTENDED RELEASE ORAL at 06:03

## 2023-12-27 RX ADMIN — QUETIAPINE FUMARATE 100 MG: 100 TABLET ORAL at 17:43

## 2023-12-27 RX ADMIN — FLUOXETINE HYDROCHLORIDE 20 MG: 20 CAPSULE ORAL at 06:04

## 2023-12-27 ASSESSMENT — PATIENT HEALTH QUESTIONNAIRE - PHQ9
1. LITTLE INTEREST OR PLEASURE IN DOING THINGS: NOT AT ALL
SUM OF ALL RESPONSES TO PHQ9 QUESTIONS 1 AND 2: 0
2. FEELING DOWN, DEPRESSED, IRRITABLE, OR HOPELESS: NOT AT ALL

## 2023-12-27 ASSESSMENT — COGNITIVE AND FUNCTIONAL STATUS - GENERAL
EATING MEALS: TOTAL
TOILETING: TOTAL
CLIMB 3 TO 5 STEPS WITH RAILING: TOTAL
DRESSING REGULAR LOWER BODY CLOTHING: TOTAL
DRESSING REGULAR UPPER BODY CLOTHING: TOTAL
TURNING FROM BACK TO SIDE WHILE IN FLAT BAD: UNABLE
HELP NEEDED FOR BATHING: TOTAL
MOVING FROM LYING ON BACK TO SITTING ON SIDE OF FLAT BED: UNABLE
DAILY ACTIVITIY SCORE: 6
STANDING UP FROM CHAIR USING ARMS: TOTAL
SUGGESTED CMS G CODE MODIFIER MOBILITY: CN
SUGGESTED CMS G CODE MODIFIER DAILY ACTIVITY: CN
WALKING IN HOSPITAL ROOM: TOTAL
MOBILITY SCORE: 6
MOVING TO AND FROM BED TO CHAIR: UNABLE
PERSONAL GROOMING: TOTAL

## 2023-12-27 ASSESSMENT — LIFESTYLE VARIABLES
HAVE PEOPLE ANNOYED YOU BY CRITICIZING YOUR DRINKING: NO
EVER FELT BAD OR GUILTY ABOUT YOUR DRINKING: NO
HOW MANY TIMES IN THE PAST YEAR HAVE YOU HAD 5 OR MORE DRINKS IN A DAY: 1
TOTAL SCORE: 0
ON A TYPICAL DAY WHEN YOU DRINK ALCOHOL HOW MANY DRINKS DO YOU HAVE: 0
TOTAL SCORE: 0
TOTAL SCORE: 0
CONSUMPTION TOTAL: POSITIVE
EVER HAD A DRINK FIRST THING IN THE MORNING TO STEADY YOUR NERVES TO GET RID OF A HANGOVER: NO
ALCOHOL_USE: YES
DOES PATIENT WANT TO STOP DRINKING: NO
HAVE YOU EVER FELT YOU SHOULD CUT DOWN ON YOUR DRINKING: NO
AVERAGE NUMBER OF DAYS PER WEEK YOU HAVE A DRINK CONTAINING ALCOHOL: 0

## 2023-12-27 ASSESSMENT — FIBROSIS 4 INDEX: FIB4 SCORE: 3.59

## 2023-12-27 ASSESSMENT — PAIN DESCRIPTION - PAIN TYPE: TYPE: ACUTE PAIN

## 2023-12-27 NOTE — ASSESSMENT & PLAN NOTE
This is Sepsis Present on admission  SIRS criteria identified on my evaluation include: Fever, with temperature greater than 100.9 deg F, Tachycardia, with heart rate greater than 90 BPM, and Leukocytosis, with WBC greater than 12,000  Clinical indicators of end organ dysfunction include Lactic Acid greater than 2  Source is skin/panniculitis   Sepsis protocol initiated  Crystalloid Fluid Administration: Resuscitation volume of 1000 ordered. Reason that resuscitation volume of less than 30ml/kg was ordered concern for causing harm given CHF  IV antibiotics as appropriate for source of sepsis  Reassessment: I have reassessed the patient's hemodynamic status

## 2023-12-27 NOTE — CARE PLAN
"  Problem: Pain - Standard  Goal: Alleviation of pain or a reduction in pain to the patient’s comfort goal  Outcome: Progressing  Note: Pain was managed with PRN medication     Problem: Knowledge Deficit - Standard  Goal: Patient and family/care givers will demonstrate understanding of plan of care, disease process/condition, diagnostic tests and medications  Outcome: Progressing  Note: Discussed plan of care and verbalized understanding    The patient is Watcher - Medium risk of patient condition declining or worsening    Shift Goals  Clinical Goals: Monitor VS; comfort and pain management  Patient Goals: pain management    Progress made toward(s) clinical / shift goals:  Patient unable to participate with activity related to fracture on left arm and physical condition, on bariatric bed and only can be position on his right side, call light within reach and bed alarm kept on,/67   Pulse 100   Temp 36.8 °C (98.2 °F) (Temporal)   Resp 20   Ht 1.88 m (6' 2\")   Wt (!) 175 kg (386 lb)   SpO2 94%       Patient is not progressing towards the following goals:      Problem: Mobility  Goal: Patient's capacity to carry out activities will improve  Outcome: Not Progressing     Problem: Self Care  Goal: Patient will have the ability to perform ADLs independently or with assistance (bathe, groom, dress, toilet and feed)  Outcome: Not Progressing     Problem: Wound/ / Incision Healing  Goal: Patient's wound/surgical incision will decrease in size and heals properly  Outcome: Not Progressing     "

## 2023-12-27 NOTE — PROGRESS NOTES
Bedside report received from night RN, pt care assumed, assessment completed. Pt is A&O4, pain 2, afib on the monitor. Updated on POC, questions answered. Bed in lowest, locked position, treaded socks on, call light and belongings within reach.

## 2023-12-27 NOTE — PROGRESS NOTES
Hospital Medicine Daily Progress Note    Date of Service  12/27/2023    Chief Complaint  Henry Kumari is a 68 y.o. male admitted 12/26/2023 with fall, humerus fracture.    Hospital Course  Henry Kumari is a 68 y.o. male with a h/o COPD (2L at baseline), CHF, afib, prior DVTs/PE who presented 12/26/2023 as a transfer from [facility].  The patient presented there after a fall, humerus fracture.     He has chronic panniculitis that has been utilizing alcohol for.  The patient had a fall due to his generalized weakness causing a humerus fracture there was noted at the transferring facility and he was transferred here for higher level of care.  The patient is on anticoagulation for history of DVTs as well as pulmonary emboli.  He is on home oxygen and continues to abuse tobacco products. He states he does not have any dysuria.  The patient also suffered from multiple skin tears to the right upper extremity and he did receive tetanus prophylaxis as well as Rocephin.  Patient was found to have a leukocytosis with a white blood cell count of 18,200 and slight elevation in his creatinine at 1.74.  Blood cultures were ordered and are currently pending at [facility].      In ER he was found to be febrile to 38.2, tachycardic to the 120s (in A-fib) normal blood pressure, normal oxygen saturation on room air.  Labs remarkable for WBC 18, CO2 19, BUN 34, creatinine 1.24, lactic acid 2.4 improved to 2 point.  Urinalysis hazy 20-50WBC, chest x-ray negative, CRP 22, viral panel negative.    Interval Problem Update  Patient was seen and examined at bedside  Reported left abdominal pain, abdominal rash and mild pain.  He denies dysuria urgency frequency  BMI 49    Telemetry report reviewed, A-fib with a heart rate 100s    Blood culture pending  Abdominal panniculitis/cellulitis  Continue Ancef  wbc 18>12    Lactic acid 3.1>1.4    Left humeral shaft  fracture-evaluated by surgery, plan for ORIF once infection improving  SANTOSH -patient uses CPAP at home, I ordered CPAP    A-fib -Xarelto on hold  Hx of DVTs/PE    COPD -2 L, at baseline    I have discussed this patient's plan of care and discharge plan at IDT rounds today with Case Management, Nursing, Nursing leadership, and other members of the IDT team.    Consultants/Specialty      Code Status  Full Code    Disposition  The patient is not medically cleared for discharge to home or a post-acute facility.      I have placed the appropriate orders for post-discharge needs.    Review of Systems  All 12 systems were reviewed and negative except as mentioned above       Physical Exam  Temp:  [36.4 °C (97.5 °F)-36.9 °C (98.4 °F)] 36.9 °C (98.4 °F)  Pulse:  [] 112  Resp:  [6-24] 20  BP: ()/(38-70) 95/58  SpO2:  [88 %-99 %] 89 %    Physical Exam  Constitutional:       Appearance: He is obese. He is ill-appearing.   HENT:      Head: Normocephalic.      Nose: Nose normal.      Mouth/Throat:      Mouth: Mucous membranes are moist.   Eyes:      Extraocular Movements: Extraocular movements intact.      Conjunctiva/sclera: Conjunctivae normal.   Cardiovascular:      Rate and Rhythm: Normal rate and regular rhythm.      Pulses: Normal pulses.      Heart sounds: Normal heart sounds.   Pulmonary:      Effort: Pulmonary effort is normal.      Breath sounds: No wheezing.   Abdominal:      Palpations: Abdomen is soft.      Tenderness: There is abdominal tenderness. There is no guarding.   Musculoskeletal:         General: Tenderness present.      Cervical back: Normal range of motion.      Comments: Left arm sling in place   Skin:     General: Skin is warm.      Findings: Lesion and rash present.      Comments: Lower abdominal diffuse skin erythema, rash and tenderness   Neurological:      Mental Status: He is alert and oriented to person, place, and time. Mental status is at baseline.   Psychiatric:         Mood and  Affect: Mood normal.         Fluids    Intake/Output Summary (Last 24 hours) at 12/27/2023 1508  Last data filed at 12/27/2023 1125  Gross per 24 hour   Intake 250 ml   Output 450 ml   Net -200 ml       Laboratory  Recent Labs     12/26/23  1435 12/27/23  0341   WBC 18.7* 12.3*   RBC 4.51* 4.21*   HEMOGLOBIN 12.9* 12.1*   HEMATOCRIT 39.8* 37.8*   MCV 88.2 89.8   MCH 28.6 28.7   MCHC 32.4 32.0*   RDW 45.3 46.1   PLATELETCT 226 168   MPV 11.2 10.8     Recent Labs     12/26/23  1435 12/27/23  0341   SODIUM 137 136   POTASSIUM 4.7 4.4   CHLORIDE 103 101   CO2 19* 22   GLUCOSE 86 84   BUN 34* 31*   CREATININE 1.24 0.95   CALCIUM 8.8 8.5                   Imaging  DX-CHEST-PORTABLE (1 VIEW)   Final Result         Cardiomegaly.      No pulmonary infiltrates or consolidations are noted.         OUTSIDE IMAGES-CT HEAD   Final Result      OUTSIDE IMAGES-DX UPPER EXTREMITY, LEFT   Final Result      OUTSIDE IMAGES-DX CHEST   Final Result      OUTSIDE IMAGES-DX UPPER EXTREMITY, LEFT   Final Result           Assessment/Plan  * Sepsis (HCC)- (present on admission)  Assessment & Plan  This is Sepsis Present on admission  SIRS criteria identified on my evaluation include: Fever, with temperature greater than 100.9 deg F, Tachycardia, with heart rate greater than 90 BPM, and Leukocytosis, with WBC greater than 12,000  Clinical indicators of end organ dysfunction include Lactic Acid greater than 2  Source is skin/panniculitis   Sepsis protocol initiated  Crystalloid Fluid Administration: Resuscitation volume of 1000 ordered. Reason that resuscitation volume of less than 30ml/kg was ordered concern for causing harm given CHF  IV antibiotics as appropriate for source of sepsis  Reassessment: I have reassessed the patient's hemodynamic status    Lactic acidosis  Assessment & Plan  Due to sepsis    Lactic acid 3.1>1.4  Continue IV fluid    Left humeral fracture  Assessment & Plan  Left humeral shaft fracture  -evaluated by surgery,   plan  for ORIF once infection improving    Cellulitis  Assessment & Plan  Lower abdominal diffused erythema, tenderness  History of panniculitis    Continue Ancef    Opiate dependence (HCC)- (present on admission)  Assessment & Plan  Takes buprenorphine at home for chronic pain  In acute pain so giving prn narcotics    Chronic respiratory failure with hypoxia (HCC)- (present on admission)  Assessment & Plan  Due to COPD and obese  Baseline 2 L  Monitor    Morbid obesity (HCC)- (present on admission)  Assessment & Plan  BMI 49  Weight loss education    SANTOSH on CPAP- (present on admission)  Assessment & Plan  I ordered a CPAP    COPD (chronic obstructive pulmonary disease) (HCC)- (present on admission)  Assessment & Plan  Not in exacerbation  Continue inhalers and albuterol prn    Chronic atrial fibrillation (HCC)- (present on admission)  Assessment & Plan  Continue home metoprolol SR    Home Xarelto on hold, possible left arm fracture surgery in the coming days    History of DVT (deep vein thrombosis)- (present on admission)  Assessment & Plan  home Xarelto on hold for possible left arm fracture surgery in the coming days         VTE prophylaxis: SCD , Lovenox PPx  home Xarelto on hold, possible left arm fracture surgery in the coming days    I have performed a physical exam and reviewed and updated ROS and Plan today (12/27/2023). In review of yesterday's note (12/26/2023), there are no changes except as documented above.    I spent greater than 54 minutes for chart review, obtaining history independently, performing medically appropriate examination,  documenting , ordering medications, tests, or procedures, referring and communicating with other health care professionals, Independently interpreting results and communicating results with patient/family/caregiver. More than 50% of time was spent in face-to-face clinical encounter.

## 2023-12-27 NOTE — ASSESSMENT & PLAN NOTE
Left humeral shaft fracture  -evaluated by surgery,     12/28 s/p ORIF today  PT OT and supportive care

## 2023-12-27 NOTE — H&P
"Hospital Medicine History & Physical Note    Date of Service  12/27/2023    Primary Care Physician  Pcp Unknown    Consultants  None    Specialist Names: n/a    Code Status  Full Code    Chief Complaint  Chief Complaint   Patient presents with    Extremity Fracture     Pt sent from [place] ER after he fell in the early hours of the morning and sustained a left proximal humerus fracture. Pt takes Eliquis for DVT and Pe's. Pt received 300mcg of fentanyl IV and 3mg Versed IV from [facility] in route. Pt arrives in left arm sling.        History of Presenting Illness  Henry Kumari is a 68 y.o. male with a h/o COPD (2L at baseline), CHF, afib, prior DVTs/PE who presented 12/26/2023 with fall, humerus fracture.    Been getting weaker and weaker for \"years\", moved into his nieces place, has mother in law quarters.  However he started having falls, went to hospital recently for fall then was discharged home however dame day Tripped in the yard.  [place] sick last few days, had nausea.  Otherwise no new symptoms.     In ER he was found to be febrile to 38.2, tachycardic to the 120s (in A-fib) normal blood pressure, normal oxygen saturation on room air.  Labs remarkable for WBC 18, CO2 19, BUN 34, creatinine 1.24, lactic acid 2.4 improved to 2 point.  Urinalysis hazy 20-50WBC, chest x-ray negative, CRP 22, viral panel negative.    I discussed the plan of care with patient.    Review of Systems  Review of Systems   Constitutional:  Positive for fever and malaise/fatigue.   Gastrointestinal:  Positive for nausea.   Genitourinary:  Negative for dysuria, frequency and urgency.   Musculoskeletal:  Positive for falls.   Neurological:  Positive for weakness. Negative for dizziness and headaches.       Past Medical History   has a past medical history of Arrhythmia, Arthritis, Blood clotting disorder (HCC) (1976, ?2000), Breath shortness, Congestive heart failure (HCC), Emphysema of lung (HCC), Glaucoma, Heart burn, Hepatitis C " (?2000), High cholesterol, Hypertension, Indigestion, and Sleep apnea.    Surgical History   has a past surgical history that includes nilson by laparoscopy (4/2/2018); ercp in or (N/A, 4/3/2018); ercp in or (N/A, 5/2/2018); other abdominal surgery (2018); other abdominal surgery (20018); other (1976); ercp (7/18/2018); and ercp w/rem fb and/or stent chg (7/18/2018).     Family History  family history is not on file.   Family history reviewed with patient. There is no family history that is pertinent to the chief complaint.     Social History   reports that he has been smoking cigarettes. He has a 23.5 pack-year smoking history. He has never used smokeless tobacco. He reports current alcohol use.    Allergies  Allergies   Allergen Reactions    Tape Rash     Pt states tape removes his skin         Medications  Prior to Admission Medications   Prescriptions Last Dose Informant Patient Reported? Taking?   HYDROcodone/acetaminophen (NORCO)  MG Tab  Patient Yes No   Sig: Take 1 Tab by mouth 4 times a day. Scheduled.   QUEtiapine (SEROQUEL) 100 MG Tab  Patient Yes No   Sig: Take 100 mg by mouth every bedtime.   albuterol 108 (90 Base) MCG/ACT Aero Soln inhalation aerosol  Patient Yes No   Sig: Inhale 2 Puffs by mouth every 6 hours as needed for Shortness of Breath.   atenolol (TENORMIN) 25 MG Tab  Patient Yes No   Sig: Take 25 mg by mouth 2 times a day.   clonazePAM (KLONOPIN) 1 MG Tab  Patient Yes No   Sig: Take 1 mg by mouth 3 times a day.   digoxin (LANOXIN) 125 MCG Tab  Patient Yes No   Sig: Take 125 mcg by mouth every day.   fluticasone-salmeterol (ADVAIR) 250-50 MCG/DOSE AEROSOL POWDER, BREATH ACTIVATED  Patient Yes No   Sig: Inhale 1 Puff by mouth every day.   furosemide (LASIX) 40 MG Tab  Patient Yes No   Sig: Take 40 mg by mouth as needed.   gemfibrozil (LOPID) 600 MG Tab  Patient Yes No   Sig: Take 600 mg by mouth 2 times a day.   lisinopril (PRINIVIL, ZESTRIL) 40 MG tablet  Patient Yes No   Sig: Take 40 mg  "by mouth 1 time daily as needed. If BP is high.   rivaroxaban (XARELTO) 20 MG Tab tablet  Patient Yes No   Sig: Take 20 mg by mouth every day.   simvastatin (ZOCOR) 20 MG Tab  Patient Yes No   Sig: Take 20 mg by mouth 1 time daily as needed. \"If I eat a lot of meat.\"   spironolactone (ALDACTONE) 25 MG Tab  Patient Yes No   Sig: Take 25 mg by mouth every day.   theophylline SR (THEODUR) 300 MG TABLET SR 12 HR  Patient Yes No   Sig: Take 300 mg by mouth 2 times a day.   tiotropium (SPIRIVA) 18 MCG Cap  Patient Yes No   Sig: Inhale 18 mcg by mouth every day.      Facility-Administered Medications: None       Physical Exam  Temp:  [36.5 °C (97.7 °F)-38.2 °C (100.7 °F)] 36.5 °C (97.7 °F)  Pulse:  [] 116  Resp:  [6-25] 24  BP: (100-132)/(38-70) 124/38  SpO2:  [88 %-100 %] 88 %  Blood Pressure : 119/58   Temperature: 36.9 °C (98.4 °F)   Pulse: (!) 103   Respiration: 19   Pulse Oximetry: 98 %       Physical Exam  Constitutional:       General: He is not in acute distress.     Appearance: He is obese. He is ill-appearing. He is not toxic-appearing or diaphoretic.   HENT:      Head: Normocephalic and atraumatic.      Nose: Nose normal.      Mouth/Throat:      Mouth: Mucous membranes are moist.   Eyes:      General: No scleral icterus.     Conjunctiva/sclera: Conjunctivae normal.   Cardiovascular:      Rate and Rhythm: Tachycardia present. Rhythm irregular.      Heart sounds: No murmur heard.     No friction rub. No gallop.   Pulmonary:      Effort: Pulmonary effort is normal.      Breath sounds: Normal breath sounds.   Abdominal:      General: Bowel sounds are normal. There is no distension.      Palpations: Abdomen is soft.      Tenderness: There is no abdominal tenderness.   Musculoskeletal:      Cervical back: Normal range of motion.      Comments: LUE in sling and wrapped   Skin:     Findings: Bruising (scattered bruising and skin tears to arms) and rash (panniculitis) present.   Neurological:      Mental Status: He " "is alert and oriented to person, place, and time.         Laboratory:  Recent Labs     12/26/23  1435   WBC 18.7*   RBC 4.51*   HEMOGLOBIN 12.9*   HEMATOCRIT 39.8*   MCV 88.2   MCH 28.6   MCHC 32.4   RDW 45.3   PLATELETCT 226   MPV 11.2     Recent Labs     12/26/23  1435   SODIUM 137   POTASSIUM 4.7   CHLORIDE 103   CO2 19*   GLUCOSE 86   BUN 34*   CREATININE 1.24   CALCIUM 8.8     Recent Labs     12/26/23  1435   ALTSGPT 13   ASTSGOT 43   ALKPHOSPHAT 159*   TBILIRUBIN 1.4   GLUCOSE 86         No results for input(s): \"NTPROBNP\" in the last 72 hours.      No results for input(s): \"TROPONINT\" in the last 72 hours.    Imaging:  DX-CHEST-PORTABLE (1 VIEW)   Final Result         Cardiomegaly.      No pulmonary infiltrates or consolidations are noted.         OUTSIDE IMAGES-CT HEAD   Final Result      OUTSIDE IMAGES-DX UPPER EXTREMITY, LEFT   Final Result      OUTSIDE IMAGES-DX CHEST   Final Result      OUTSIDE IMAGES-DX UPPER EXTREMITY, LEFT   Final Result          X-Ray:  I have personally reviewed the images and compared with prior images.    Assessment/Plan:  Justification for Admission Status  I anticipate this patient will require at least two midnights for appropriate medical management, necessitating inpatient admission because sepsis evaluation     Patient will need a Telemetry bed on MEDICAL service .  The need is secondary to sepsis.    * Sepsis (HCC)- (present on admission)  Assessment & Plan  This is Sepsis Present on admission  SIRS criteria identified on my evaluation include: Fever, with temperature greater than 100.9 deg F, Tachycardia, with heart rate greater than 90 BPM, and Leukocytosis, with WBC greater than 12,000  Clinical indicators of end organ dysfunction include Lactic Acid greater than 2  Source is skin/panniculitis vs UTI?  Sepsis protocol initiated  Crystalloid Fluid Administration: Resuscitation volume of 1000 ordered. Reason that resuscitation volume of less than 30ml/kg was ordered " concern for causing harm given CHF  IV antibiotics as appropriate for source of sepsis  Reassessment: I have reassessed the patient's hemodynamic status    Keflex  Ucx  Blood cx  LA improving slightly    Opiate dependence ([facility])- (present on admission)  Assessment & Plan  Takes buprenorphine at home for chronic pain  In acute pain so giving prn narcotics    COPD (chronic obstructive pulmonary disease) ([facility])- (present on admission)  Assessment & Plan  Not in exacerbation  Continue inhalers and albuterol prn    Chronic atrial fibrillation ([facility])- (present on admission)  Assessment & Plan  Continue home metoprolol SR    History of DVT (deep vein thrombosis)- (present on admission)  Assessment & Plan  Continue xarelto        VTE prophylaxis: therapeutic anticoagulation with xarelto

## 2023-12-27 NOTE — ASSESSMENT & PLAN NOTE
Lower abdominal diffused erythema, tenderness  History of panniculitis    12/31 had 4 days Ancef, now on Augmentin for possible pneumonia    01/02/24  Continue Augmentin

## 2023-12-27 NOTE — ASSESSMENT & PLAN NOTE
Takes buprenorphine at home for chronic pain  In acute pain so giving prn narcotics    01/02/24  Continue home buprenorphine

## 2023-12-27 NOTE — ASSESSMENT & PLAN NOTE
Not in exacerbation  Continue inhalers and albuterol prn    01/02/24  Some wheezing noted today.  2 LPM stable baseline oxygen needs.  Patient is medically cleared to discharge today  Continue home inhalers including Spiriva and Dulera and albuterol as needed  Continuous pulse oximetry monitoring

## 2023-12-27 NOTE — PROGRESS NOTES
4 Eyes Skin Assessment Completed by JORDYN Nguyen and JORDYN Hall.    Head WDL  Ears WDL  Nose WDL  Mouth WDL  Neck WDL  Breast/Chest WDL  Shoulder Blades WDL  Spine WDL  (R) Arm/Elbow/Hand Redness, Bruising, Scab, Swelling, and Discoloration  (L) Arm/Elbow/Hand Bruising  Abdomen Redness and Rash  Groin Redness  Scrotum/Coccyx/Buttocks Redness  (R) Leg WDL  (L) Leg WDL  (R) Heel/Foot/Toe WDL  (L) Heel/Foot/Toe WDL          Devices In Places Tele Box      Interventions In Place NC W/Ear Foams, TAP System, Pillows, Low Air Loss Mattress, and Pressure Redistribution Mattress    Possible Skin Injury Yes    Pictures Uploaded Into Epic Yes  Wound Consult Placed Yes  RN Wound Prevention Protocol Ordered Yes

## 2023-12-27 NOTE — CARE PLAN
The patient is Stable - Low risk of patient condition declining or worsening    Shift Goals  Clinical Goals: Pain management, immbolization of L humerus, prevent further skin breakdown  Patient Goals: pain control, rest    Progress made toward(s) clinical / shift goals:    Problem: Knowledge Deficit - Standard  Goal: Patient and family/care givers will demonstrate understanding of plan of care, disease process/condition, diagnostic tests and medications  Description: Target End Date:  1-3 days or as soon as patient condition allows    Document in Patient Education    1.  Patient and family/caregiver oriented to unit, equipment, visitation policy and means for communicating concern  2.  Complete/review Learning Assessment  3.  Assess knowledge level of disease process/condition, treatment plan, diagnostic tests and medications  4.  Explain disease process/condition, treatment plan, diagnostic tests and medications  Outcome: Progressing  Note: Pt is updated on plan of care, pt's questions and concerns are addressed at the bedside, pt's needs are met.      Problem: Skin Integrity  Goal: Skin integrity is maintained or improved  Description: Target End Date:  Prior to discharge or change in level of care    Document interventions on Skin Risk/Antony flowsheet groups and corresponding LDA    1.  Assess and monitor skin integrity, appearance and/or temperature  2.  Assess risk factors for impaired skin integrity and/or pressures ulcers  3.  Implement precautions to protect skin integrity in collaboration with interdisciplinary team  4.  Implement pressure ulcer prevention protocol if at risk for skin breakdown  5.  Confirm wound care consult if at risk for skin breakdown  6.  Ensure patient use of pressure relieving devices  (Low air loss bed, waffle overlay, heel protectors, ROHO cushion, etc)  Outcome: Progressing  Note: Pt assessed for risk for skin breakdown, pt assessed for skin breakdown with second nurse, pt's  wounds thoroughly documented and wound consult placed.      Problem: Fall Risk  Goal: Patient will remain free from falls  Description: Target End Date:  Prior to discharge or change in level of care    Document interventions on the [facility] Fall Risk Assessment    1.  Assess for fall risk factors  2.  Implement fall precautions  Outcome: Progressing  Note: Pt has fall precautions in place, pt educated on risk for falls, pt assessed for risk for falls.       Patient is not progressing towards the following goals:

## 2023-12-27 NOTE — ASSESSMENT & PLAN NOTE
Due to COPD and obese  Baseline 2 L    1/1 patient sounds congested, chest x-ray with mild pulmonary edema, I started IV Lasix    01/02/24  Stable 2 LPM.  Continue furosemide 40 mg IV twice daily    01/03/24  Stable 2 LPM.  Change furosemide to 40 mg by mouth daily

## 2023-12-27 NOTE — DIETARY
NUTRITION SERVICES: BMI - Pt with BMI >40 (=Body mass index is 49.56 kg/m².), Class III obesity. Weight loss counseling not appropriate in acute care setting. RECOMMEND - If appropriate at DC please refer to outpatient nutrition services for weight management.

## 2023-12-27 NOTE — PROGRESS NOTES
Outside x-rays of the left arm show a comminuted humeral shaft fracture.  This likely would benefit from operative fixation with an intramedullary nail.  Surgery for this fracture is not urgent.  I would wait until the sepsis and possible bacteremia has resolved first.  Possible operative fixation in the coming days depending on patient's overall clinical progress as well as needing clearance for surgery.

## 2023-12-27 NOTE — DISCHARGE PLANNING
Care Transition Team Assessment  LMSW spoke with pt to conduct assessment and verify demographics. Pt stated that his PCP is Dr. Obey Crawford in [place]. Pt stated his address on file is incorrect and the [address] [place] [address] [place], [address]. Pt stated that he lives next to his niece in a added room that he states is an apartment. Pt was independent with some ADLS but used HH to help him bath. HH agency with [facility] or [facility].Pt could not recall. Pt stated that he uses all the DME including walker, wheelchair, and O2. Pt stated his baseline of O2 is 2L and the O2 company is [employer]. Pt stated that his sister can transport him home.     Information Source  Information Given By: Patient  Who is responsible for making decisions for patient? : Patient    Readmission Evaluation  Is this a readmission?: No    Elopement Risk  Legal Hold: No  Ambulatory or Self Mobile in Wheelchair: No-Not an Elopement Risk    Interdisciplinary Discharge Planning  Primary Care Physician: Dr. Obey Crawford (per pt)  Lives with - Patient's Self Care Capacity: Alone and Unable to Care For Self  Patient or legal guardian wants to designate a caregiver: No  Support Systems: None  Housing / Facility: [address] [facility]  Durable Medical Equipment: Home Oxygen    Discharge Preparedness  What is your plan after discharge?: Uncertain - pending medical team collaboration  What are your discharge supports?: Other (comment), Sibling (niece)  Prior Functional Level: Uses Walker, Uses Cane, Uses Wheelchair, Needs Assist with Activities of Daily Living, Independent with Activities of Daily Living  Difficulity with ADLs: Bathing, Toileting, Walking  Difficulity with IADLs:  (HH helps with bathing)    Functional Assesment  Prior Functional Level: Uses Walker, Uses Cane, Uses Wheelchair, Needs Assist with Activities of Daily Living, Independent with Activities of Daily Living    Finances  Financial Barriers to Discharge: No    Vision /  Hearing Impairment  Vision Impairment : No  Right Eye Vision: Wears Glasses  Left Eye Vision: Wears Glasses  Hearing Impairment : No      Advance Directive  Advance Directive?: None    Domestic Abuse  Have you ever been the victim of abuse or violence?: No  Physical Abuse or Sexual Abuse: No  Verbal Abuse or Emotional Abuse: No  Possible Abuse/Neglect Reported to:: Not Applicable    Psychological Assessment  History of Substance Abuse: None  History of Psychiatric Problems: No  Non-compliant with Treatment: No  Newly Diagnosed Illness: No    Discharge Risks or Barriers  Discharge risks or barriers?: No    Anticipated Discharge Information  Discharge Disposition: Discharged to home/self care (01)  Discharge Address: [address] [place] [address]  Discharge Contact Phone Number: [phone]

## 2023-12-27 NOTE — DISCHARGE PLANNING
Case Management Discharge Planning    Admission Date: 12/26/2023  GMLOS: 3.6  ALOS: 1    6-Clicks ADL Score: 6  6-Clicks Mobility Score: 6  PT and/or OT Eval ordered: No  Post-acute Referrals Ordered: NA  Post-acute Choice Obtained: NA  Has referral(s) been sent to post-acute provider:  [facility]      Anticipated Discharge Dispo: Discharge Disposition: Discharged to home/self care (01)  Per chart review pt resides in [place]       Family support:  Name Relation Home Work Mobile   Rosario Randhawa [phone]   Current Insurance on file:Medicare and Medicaid FFS    DME Needed: pending hospital course    Action(s) Taken: chart reviewed    Pt discussed during IDT rounds, SNF blanket sent, PASRR completed    Escalations Completed: None    Medically Clear: No    Next Steps: f/u with pt and medical team to discuss dc needs and barriers.    Assessment to be completed to assess for HCM needs.    Barriers to Discharge: Medical clearance    Is the patient up for discharge tomorrow: No

## 2023-12-27 NOTE — ASSESSMENT & PLAN NOTE
History of A-fib on metoprolol and Xarelto, rate controlled    12/28 developed A-fib with RVR   Patient had surgery for left femoral fracture  Continue metoprolol  Optimize electrolytes potassium over 4 and mag over 2  I ordered IV mag 2 g    Continue Xarelto

## 2023-12-27 NOTE — PROGRESS NOTES
Telemetry Report  Rhythm Afib  Heart Rate 102  Ectopy     IN  QRS 0.077  QT 0.333              Per telemetry room monitor

## 2023-12-27 NOTE — ED NOTES
Med rec updated and complete. Allergies reviewed .  Confirmed name and date of birth. Pt denies antibiotic use in last 30 days at home. Pt is taking xarelto not eliquis.  Last dose  Of xarelto 12/25/23. Pt takes the dose in the morning.    Home pharmacy  [facility] = [phone]

## 2023-12-27 NOTE — ED NOTES
BS report from Lanie COBB  Pt resting in NAD, VSS, Call light in reach  Pt pending admission for L humerus fx and NPO at midnight  Pt is on oxygen 2L NC at baseline, not on isolation, and here for GLF and transferred from [place]  Pt is A&O x4, utilizes urinal and pending med admin at this time, has not ambulate thus far  Fall risk precautions in place, bed in low and locked position, rails up

## 2023-12-28 ENCOUNTER — APPOINTMENT (OUTPATIENT)
Dept: RADIOLOGY | Facility: MEDICAL CENTER | Age: 68
DRG: 853 | End: 2023-12-28
Attending: ORTHOPAEDIC SURGERY
Payer: MEDICARE

## 2023-12-28 ENCOUNTER — ANESTHESIA (OUTPATIENT)
Dept: SURGERY | Facility: MEDICAL CENTER | Age: 68
DRG: 853 | End: 2023-12-28
Payer: MEDICARE

## 2023-12-28 ENCOUNTER — ANESTHESIA EVENT (OUTPATIENT)
Dept: SURGERY | Facility: MEDICAL CENTER | Age: 68
DRG: 853 | End: 2023-12-28
Payer: MEDICARE

## 2023-12-28 PROBLEM — I48.91 ATRIAL FIBRILLATION WITH RVR (HCC): Status: ACTIVE | Noted: 2018-03-30

## 2023-12-28 PROBLEM — E83.39 HYPOPHOSPHATEMIA: Status: ACTIVE | Noted: 2023-12-28

## 2023-12-28 LAB
ALBUMIN SERPL BCP-MCNC: 2.4 G/DL (ref 3.2–4.9)
BUN SERPL-MCNC: 20 MG/DL (ref 8–22)
CALCIUM ALBUM COR SERPL-MCNC: 9.2 MG/DL (ref 8.5–10.5)
CALCIUM SERPL-MCNC: 7.9 MG/DL (ref 8.5–10.5)
CHLORIDE SERPL-SCNC: 105 MMOL/L (ref 96–112)
CO2 SERPL-SCNC: 20 MMOL/L (ref 20–33)
CREAT SERPL-MCNC: 0.51 MG/DL (ref 0.5–1.4)
EKG IMPRESSION: NORMAL
ERYTHROCYTE [DISTWIDTH] IN BLOOD BY AUTOMATED COUNT: 43.3 FL (ref 35.9–50)
GFR SERPLBLD CREATININE-BSD FMLA CKD-EPI: 110 ML/MIN/1.73 M 2
GLUCOSE SERPL-MCNC: 88 MG/DL (ref 65–99)
HCT VFR BLD AUTO: 30.7 % (ref 42–52)
HGB BLD-MCNC: 10 G/DL (ref 14–18)
MAGNESIUM SERPL-MCNC: 1.9 MG/DL (ref 1.5–2.5)
MCH RBC QN AUTO: 28.8 PG (ref 27–33)
MCHC RBC AUTO-ENTMCNC: 32.6 G/DL (ref 32.3–36.5)
MCV RBC AUTO: 88.5 FL (ref 81.4–97.8)
PHOSPHATE SERPL-MCNC: 2.2 MG/DL (ref 2.5–4.5)
PLATELET # BLD AUTO: 147 K/UL (ref 164–446)
PMV BLD AUTO: 11.4 FL (ref 9–12.9)
POTASSIUM SERPL-SCNC: 4.1 MMOL/L (ref 3.6–5.5)
RBC # BLD AUTO: 3.47 M/UL (ref 4.7–6.1)
SODIUM SERPL-SCNC: 136 MMOL/L (ref 135–145)
WBC # BLD AUTO: 8.8 K/UL (ref 4.8–10.8)

## 2023-12-28 PROCEDURE — A9270 NON-COVERED ITEM OR SERVICE: HCPCS | Performed by: STUDENT IN AN ORGANIZED HEALTH CARE EDUCATION/TRAINING PROGRAM

## 2023-12-28 PROCEDURE — 700102 HCHG RX REV CODE 250 W/ 637 OVERRIDE(OP): Performed by: STUDENT IN AN ORGANIZED HEALTH CARE EDUCATION/TRAINING PROGRAM

## 2023-12-28 PROCEDURE — 93005 ELECTROCARDIOGRAM TRACING: CPT | Performed by: STUDENT IN AN ORGANIZED HEALTH CARE EDUCATION/TRAINING PROGRAM

## 2023-12-28 PROCEDURE — 24516 TX HUMRL SHAFT FX IMED IMPLT: CPT | Mod: LT | Performed by: ORTHOPAEDIC SURGERY

## 2023-12-28 PROCEDURE — 700111 HCHG RX REV CODE 636 W/ 250 OVERRIDE (IP): Performed by: INTERNAL MEDICINE

## 2023-12-28 PROCEDURE — 36415 COLL VENOUS BLD VENIPUNCTURE: CPT

## 2023-12-28 PROCEDURE — 85027 COMPLETE CBC AUTOMATED: CPT

## 2023-12-28 PROCEDURE — 700111 HCHG RX REV CODE 636 W/ 250 OVERRIDE (IP): Performed by: ORTHOPAEDIC SURGERY

## 2023-12-28 PROCEDURE — 160048 HCHG OR STATISTICAL LEVEL 1-5: Performed by: ORTHOPAEDIC SURGERY

## 2023-12-28 PROCEDURE — 160029 HCHG SURGERY MINUTES - 1ST 30 MINS LEVEL 4: Performed by: ORTHOPAEDIC SURGERY

## 2023-12-28 PROCEDURE — 73060 X-RAY EXAM OF HUMERUS: CPT | Mod: LT

## 2023-12-28 PROCEDURE — 770001 HCHG ROOM/CARE - MED/SURG/GYN PRIV*

## 2023-12-28 PROCEDURE — 700105 HCHG RX REV CODE 258: Performed by: STUDENT IN AN ORGANIZED HEALTH CARE EDUCATION/TRAINING PROGRAM

## 2023-12-28 PROCEDURE — 700102 HCHG RX REV CODE 250 W/ 637 OVERRIDE(OP)

## 2023-12-28 PROCEDURE — A9270 NON-COVERED ITEM OR SERVICE: HCPCS

## 2023-12-28 PROCEDURE — 160009 HCHG ANES TIME/MIN: Performed by: ORTHOPAEDIC SURGERY

## 2023-12-28 PROCEDURE — C1713 ANCHOR/SCREW BN/BN,TIS/BN: HCPCS | Performed by: ORTHOPAEDIC SURGERY

## 2023-12-28 PROCEDURE — 160035 HCHG PACU - 1ST 60 MINS PHASE I: Performed by: ORTHOPAEDIC SURGERY

## 2023-12-28 PROCEDURE — 99233 SBSQ HOSP IP/OBS HIGH 50: CPT | Performed by: STUDENT IN AN ORGANIZED HEALTH CARE EDUCATION/TRAINING PROGRAM

## 2023-12-28 PROCEDURE — 24516 TX HUMRL SHAFT FX IMED IMPLT: CPT | Mod: 80ROC,LT | Performed by: STUDENT IN AN ORGANIZED HEALTH CARE EDUCATION/TRAINING PROGRAM

## 2023-12-28 PROCEDURE — 160002 HCHG RECOVERY MINUTES (STAT): Performed by: ORTHOPAEDIC SURGERY

## 2023-12-28 PROCEDURE — 700111 HCHG RX REV CODE 636 W/ 250 OVERRIDE (IP): Mod: JZ | Performed by: STUDENT IN AN ORGANIZED HEALTH CARE EDUCATION/TRAINING PROGRAM

## 2023-12-28 PROCEDURE — A9270 NON-COVERED ITEM OR SERVICE: HCPCS | Performed by: INTERNAL MEDICINE

## 2023-12-28 PROCEDURE — 93010 ELECTROCARDIOGRAM REPORT: CPT | Performed by: STUDENT IN AN ORGANIZED HEALTH CARE EDUCATION/TRAINING PROGRAM

## 2023-12-28 PROCEDURE — 700105 HCHG RX REV CODE 258: Performed by: ORTHOPAEDIC SURGERY

## 2023-12-28 PROCEDURE — 700105 HCHG RX REV CODE 258: Performed by: INTERNAL MEDICINE

## 2023-12-28 PROCEDURE — 700102 HCHG RX REV CODE 250 W/ 637 OVERRIDE(OP): Performed by: INTERNAL MEDICINE

## 2023-12-28 PROCEDURE — 83735 ASSAY OF MAGNESIUM: CPT

## 2023-12-28 PROCEDURE — 160041 HCHG SURGERY MINUTES - EA ADDL 1 MIN LEVEL 4: Performed by: ORTHOPAEDIC SURGERY

## 2023-12-28 PROCEDURE — 0PSG06Z REPOSITION LEFT HUMERAL SHAFT WITH INTRAMEDULLARY INTERNAL FIXATION DEVICE, OPEN APPROACH: ICD-10-PCS | Performed by: ORTHOPAEDIC SURGERY

## 2023-12-28 PROCEDURE — 80069 RENAL FUNCTION PANEL: CPT

## 2023-12-28 PROCEDURE — 110371 HCHG SHELL REV 272: Performed by: ORTHOPAEDIC SURGERY

## 2023-12-28 PROCEDURE — 700101 HCHG RX REV CODE 250: Performed by: STUDENT IN AN ORGANIZED HEALTH CARE EDUCATION/TRAINING PROGRAM

## 2023-12-28 PROCEDURE — 160036 HCHG PACU - EA ADDL 30 MINS PHASE I: Performed by: ORTHOPAEDIC SURGERY

## 2023-12-28 DEVICE — IMPLANTABLE DEVICE: Type: IMPLANTABLE DEVICE | Site: ARM | Status: FUNCTIONAL

## 2023-12-28 DEVICE — SCREW BONE TITANIUM FULL THREAD L50 MM OD5 MM LOCK STERILE T2 INTRAMEDULLARY NAIL SYSTEM: Type: IMPLANTABLE DEVICE | Site: ARM | Status: FUNCTIONAL

## 2023-12-28 RX ORDER — OXYCODONE HCL 5 MG/5 ML
5 SOLUTION, ORAL ORAL
Status: COMPLETED | OUTPATIENT
Start: 2023-12-28 | End: 2023-12-28

## 2023-12-28 RX ORDER — DIPHENHYDRAMINE HYDROCHLORIDE 50 MG/ML
12.5 INJECTION INTRAMUSCULAR; INTRAVENOUS
Status: DISCONTINUED | OUTPATIENT
Start: 2023-12-28 | End: 2023-12-28 | Stop reason: HOSPADM

## 2023-12-28 RX ORDER — MAGNESIUM SULFATE HEPTAHYDRATE 40 MG/ML
2 INJECTION, SOLUTION INTRAVENOUS ONCE
Status: COMPLETED | OUTPATIENT
Start: 2023-12-28 | End: 2023-12-28

## 2023-12-28 RX ORDER — HALOPERIDOL 5 MG/ML
1 INJECTION INTRAMUSCULAR
Status: DISCONTINUED | OUTPATIENT
Start: 2023-12-28 | End: 2023-12-28 | Stop reason: HOSPADM

## 2023-12-28 RX ORDER — SUCCINYLCHOLINE CHLORIDE 20 MG/ML
INJECTION INTRAMUSCULAR; INTRAVENOUS PRN
Status: DISCONTINUED | OUTPATIENT
Start: 2023-12-28 | End: 2023-12-28 | Stop reason: SURG

## 2023-12-28 RX ORDER — EPHEDRINE SULFATE 50 MG/ML
5 INJECTION, SOLUTION INTRAVENOUS
Status: DISCONTINUED | OUTPATIENT
Start: 2023-12-28 | End: 2023-12-28 | Stop reason: HOSPADM

## 2023-12-28 RX ORDER — ONDANSETRON 2 MG/ML
4 INJECTION INTRAMUSCULAR; INTRAVENOUS
Status: DISCONTINUED | OUTPATIENT
Start: 2023-12-28 | End: 2023-12-28 | Stop reason: HOSPADM

## 2023-12-28 RX ORDER — EPHEDRINE SULFATE 50 MG/ML
INJECTION, SOLUTION INTRAVENOUS PRN
Status: DISCONTINUED | OUTPATIENT
Start: 2023-12-28 | End: 2023-12-28 | Stop reason: SURG

## 2023-12-28 RX ORDER — HYDROMORPHONE HYDROCHLORIDE 1 MG/ML
0.4 INJECTION, SOLUTION INTRAMUSCULAR; INTRAVENOUS; SUBCUTANEOUS
Status: DISCONTINUED | OUTPATIENT
Start: 2023-12-28 | End: 2023-12-28 | Stop reason: HOSPADM

## 2023-12-28 RX ORDER — OXYCODONE HCL 5 MG/5 ML
10 SOLUTION, ORAL ORAL
Status: COMPLETED | OUTPATIENT
Start: 2023-12-28 | End: 2023-12-28

## 2023-12-28 RX ORDER — DEXAMETHASONE SODIUM PHOSPHATE 4 MG/ML
INJECTION, SOLUTION INTRA-ARTICULAR; INTRALESIONAL; INTRAMUSCULAR; INTRAVENOUS; SOFT TISSUE PRN
Status: DISCONTINUED | OUTPATIENT
Start: 2023-12-28 | End: 2023-12-28 | Stop reason: SURG

## 2023-12-28 RX ORDER — SODIUM CHLORIDE, SODIUM LACTATE, POTASSIUM CHLORIDE, CALCIUM CHLORIDE 600; 310; 30; 20 MG/100ML; MG/100ML; MG/100ML; MG/100ML
INJECTION, SOLUTION INTRAVENOUS CONTINUOUS
Status: DISCONTINUED | OUTPATIENT
Start: 2023-12-28 | End: 2023-12-28 | Stop reason: HOSPADM

## 2023-12-28 RX ORDER — LIDOCAINE HYDROCHLORIDE 20 MG/ML
INJECTION, SOLUTION EPIDURAL; INFILTRATION; INTRACAUDAL; PERINEURAL PRN
Status: DISCONTINUED | OUTPATIENT
Start: 2023-12-28 | End: 2023-12-28 | Stop reason: SURG

## 2023-12-28 RX ORDER — CEFAZOLIN SODIUM 1 G/3ML
INJECTION, POWDER, FOR SOLUTION INTRAMUSCULAR; INTRAVENOUS PRN
Status: DISCONTINUED | OUTPATIENT
Start: 2023-12-28 | End: 2023-12-28 | Stop reason: SURG

## 2023-12-28 RX ORDER — HYDROMORPHONE HYDROCHLORIDE 1 MG/ML
0.1 INJECTION, SOLUTION INTRAMUSCULAR; INTRAVENOUS; SUBCUTANEOUS
Status: DISCONTINUED | OUTPATIENT
Start: 2023-12-28 | End: 2023-12-28 | Stop reason: HOSPADM

## 2023-12-28 RX ORDER — HYDROMORPHONE HYDROCHLORIDE 1 MG/ML
0.2 INJECTION, SOLUTION INTRAMUSCULAR; INTRAVENOUS; SUBCUTANEOUS
Status: DISCONTINUED | OUTPATIENT
Start: 2023-12-28 | End: 2023-12-28 | Stop reason: HOSPADM

## 2023-12-28 RX ORDER — ROCURONIUM BROMIDE 10 MG/ML
INJECTION, SOLUTION INTRAVENOUS PRN
Status: DISCONTINUED | OUTPATIENT
Start: 2023-12-28 | End: 2023-12-28 | Stop reason: SURG

## 2023-12-28 RX ORDER — NYSTATIN 100000 [USP'U]/G
POWDER TOPICAL 3 TIMES DAILY
Status: DISPENSED | OUTPATIENT
Start: 2023-12-28 | End: 2024-01-04

## 2023-12-28 RX ORDER — LABETALOL HYDROCHLORIDE 5 MG/ML
5 INJECTION, SOLUTION INTRAVENOUS
Status: DISCONTINUED | OUTPATIENT
Start: 2023-12-28 | End: 2023-12-28 | Stop reason: HOSPADM

## 2023-12-28 RX ORDER — ALBUTEROL SULFATE 5 MG/ML
2.5 SOLUTION RESPIRATORY (INHALATION)
Status: DISCONTINUED | OUTPATIENT
Start: 2023-12-28 | End: 2023-12-28 | Stop reason: HOSPADM

## 2023-12-28 RX ORDER — HYDRALAZINE HYDROCHLORIDE 20 MG/ML
5 INJECTION INTRAMUSCULAR; INTRAVENOUS
Status: DISCONTINUED | OUTPATIENT
Start: 2023-12-28 | End: 2023-12-28 | Stop reason: HOSPADM

## 2023-12-28 RX ADMIN — OXYCODONE HYDROCHLORIDE 10 MG: 5 SOLUTION ORAL at 14:41

## 2023-12-28 RX ADMIN — QUETIAPINE FUMARATE 100 MG: 100 TABLET ORAL at 04:20

## 2023-12-28 RX ADMIN — THEOPHYLLINE 300 MG: 300 TABLET, EXTENDED RELEASE ORAL at 04:20

## 2023-12-28 RX ADMIN — DIBASIC SODIUM PHOSPHATE, MONOBASIC POTASSIUM PHOSPHATE AND MONOBASIC SODIUM PHOSPHATE 500 MG: 852; 155; 130 TABLET ORAL at 16:44

## 2023-12-28 RX ADMIN — SUGAMMADEX 400 MG: 100 INJECTION, SOLUTION INTRAVENOUS at 13:58

## 2023-12-28 RX ADMIN — MAGNESIUM SULFATE HEPTAHYDRATE 2 G: 2 INJECTION, SOLUTION INTRAVENOUS at 18:03

## 2023-12-28 RX ADMIN — SODIUM CHLORIDE: 9 INJECTION, SOLUTION INTRAVENOUS at 06:14

## 2023-12-28 RX ADMIN — TIOTROPIUM BROMIDE INHALATION SPRAY 2.5 MCG: 3.12 SPRAY, METERED RESPIRATORY (INHALATION) at 04:21

## 2023-12-28 RX ADMIN — DOCUSATE SODIUM 50 MG AND SENNOSIDES 8.6 MG 2 TABLET: 8.6; 5 TABLET, FILM COATED ORAL at 16:53

## 2023-12-28 RX ADMIN — FENTANYL CITRATE 100 MCG: 50 INJECTION, SOLUTION INTRAMUSCULAR; INTRAVENOUS at 13:44

## 2023-12-28 RX ADMIN — DOCUSATE SODIUM 50 MG AND SENNOSIDES 8.6 MG 2 TABLET: 8.6; 5 TABLET, FILM COATED ORAL at 04:20

## 2023-12-28 RX ADMIN — EPHEDRINE SULFATE 10 MG: 50 INJECTION, SOLUTION INTRAVENOUS at 13:04

## 2023-12-28 RX ADMIN — MOMETASONE FUROATE AND FORMOTEROL FUMARATE DIHYDRATE 1 PUFF: 200; 5 AEROSOL RESPIRATORY (INHALATION) at 06:13

## 2023-12-28 RX ADMIN — FENTANYL CITRATE 25 MCG: 50 INJECTION, SOLUTION INTRAMUSCULAR; INTRAVENOUS at 15:02

## 2023-12-28 RX ADMIN — NYSTATIN: 100000 POWDER TOPICAL at 16:53

## 2023-12-28 RX ADMIN — DEXAMETHASONE SODIUM PHOSPHATE 4 MG: 4 INJECTION INTRA-ARTICULAR; INTRALESIONAL; INTRAMUSCULAR; INTRAVENOUS; SOFT TISSUE at 13:12

## 2023-12-28 RX ADMIN — METOPROLOL SUCCINATE 100 MG: 100 TABLET, EXTENDED RELEASE ORAL at 04:20

## 2023-12-28 RX ADMIN — THEOPHYLLINE 300 MG: 300 TABLET, EXTENDED RELEASE ORAL at 16:44

## 2023-12-28 RX ADMIN — OXYCODONE HYDROCHLORIDE 10 MG: 10 TABLET ORAL at 20:20

## 2023-12-28 RX ADMIN — PROPOFOL 300 MG: 10 INJECTION, EMULSION INTRAVENOUS at 13:04

## 2023-12-28 RX ADMIN — CEFAZOLIN 2 G: 2 INJECTION, POWDER, FOR SOLUTION INTRAMUSCULAR; INTRAVENOUS at 20:23

## 2023-12-28 RX ADMIN — OXYCODONE HYDROCHLORIDE 10 MG: 10 TABLET ORAL at 23:53

## 2023-12-28 RX ADMIN — OXYCODONE HYDROCHLORIDE 10 MG: 10 TABLET ORAL at 08:38

## 2023-12-28 RX ADMIN — OXYCODONE HYDROCHLORIDE 10 MG: 10 TABLET ORAL at 04:20

## 2023-12-28 RX ADMIN — NYSTATIN 1 APPLICATION: 100000 POWDER TOPICAL at 04:26

## 2023-12-28 RX ADMIN — CEFAZOLIN 2 G: 2 INJECTION, POWDER, FOR SOLUTION INTRAMUSCULAR; INTRAVENOUS at 06:16

## 2023-12-28 RX ADMIN — NYSTATIN: 100000 POWDER TOPICAL at 01:42

## 2023-12-28 RX ADMIN — ONDANSETRON 4 MG: 2 INJECTION INTRAMUSCULAR; INTRAVENOUS at 13:58

## 2023-12-28 RX ADMIN — FLUOXETINE HYDROCHLORIDE 20 MG: 20 CAPSULE ORAL at 04:21

## 2023-12-28 RX ADMIN — ROSUVASTATIN CALCIUM 10 MG: 20 TABLET, FILM COATED ORAL at 16:58

## 2023-12-28 RX ADMIN — CLONAZEPAM 1 MG: 1 TABLET ORAL at 04:20

## 2023-12-28 RX ADMIN — ROCURONIUM BROMIDE 50 MG: 50 INJECTION, SOLUTION INTRAVENOUS at 13:10

## 2023-12-28 RX ADMIN — FENTANYL CITRATE 25 MCG: 50 INJECTION, SOLUTION INTRAMUSCULAR; INTRAVENOUS at 14:51

## 2023-12-28 RX ADMIN — CLONAZEPAM 1 MG: 1 TABLET ORAL at 16:44

## 2023-12-28 RX ADMIN — SUCCINYLCHOLINE CHLORIDE 240 MG: 20 INJECTION, SOLUTION INTRAMUSCULAR; INTRAVENOUS at 13:05

## 2023-12-28 RX ADMIN — CEFAZOLIN 3 G: 1 INJECTION, POWDER, FOR SOLUTION INTRAMUSCULAR; INTRAVENOUS at 13:06

## 2023-12-28 RX ADMIN — SODIUM CHLORIDE, POTASSIUM CHLORIDE, SODIUM LACTATE AND CALCIUM CHLORIDE 250 ML: 600; 310; 30; 20 INJECTION, SOLUTION INTRAVENOUS at 12:59

## 2023-12-28 RX ADMIN — QUETIAPINE FUMARATE 100 MG: 100 TABLET ORAL at 16:58

## 2023-12-28 RX ADMIN — FENTANYL CITRATE 100 MCG: 50 INJECTION, SOLUTION INTRAMUSCULAR; INTRAVENOUS at 12:59

## 2023-12-28 RX ADMIN — DIBASIC SODIUM PHOSPHATE, MONOBASIC POTASSIUM PHOSPHATE AND MONOBASIC SODIUM PHOSPHATE 500 MG: 852; 155; 130 TABLET ORAL at 04:20

## 2023-12-28 RX ADMIN — LIDOCAINE HYDROCHLORIDE 100 MG: 20 INJECTION, SOLUTION EPIDURAL; INFILTRATION; INTRACAUDAL at 13:04

## 2023-12-28 RX ADMIN — ENOXAPARIN SODIUM 40 MG: 100 INJECTION SUBCUTANEOUS at 16:53

## 2023-12-28 ASSESSMENT — PAIN DESCRIPTION - PAIN TYPE
TYPE: ACUTE PAIN
TYPE: SURGICAL PAIN
TYPE: SURGICAL PAIN
TYPE: ACUTE PAIN
TYPE: SURGICAL PAIN
TYPE: SURGICAL PAIN

## 2023-12-28 ASSESSMENT — FIBROSIS 4 INDEX: FIB4 SCORE: 5.02

## 2023-12-28 NOTE — DISCHARGE PLANNING
1018  Agency/Facility Name: [facility]  Outcome: DPA received phone call from [facility] asking for the CM of Pt. This DPA transferred call to [facility].

## 2023-12-28 NOTE — THERAPY
Physical Therapy Contact Note    Patient Name: Henry Kumari  Age:  68 y.o., Sex:  male  Medical Record #: [id number]  Today's Date: 12/28/2023 12/28/23 0759   Interdisciplinary Plan of Care Collaboration   Collaboration Comments PT consult received.  Per chart review pt scheduled for IMN humerus today, will HOLD and follow up post-op as able/appropriate.

## 2023-12-28 NOTE — DOCUMENTATION QUERY
[facility]                                                                       Query Response Note      PATIENT:               GRANT POPE  ACCT #:                  [id number]  MRN:                     [id number]  :                      1955  ADMIT DATE:       2023 2:13 PM  DISCH DATE:          RESPONDING  PROVIDER #:        [id number]           QUERY TEXT:    On , documentation in the Dietary consultation identifies patient with Class III obesity with BMI > 40 (=Body mass index is 49.56). Please confirm/clarify the documented diagnosis of Class III obesity for this admission.    The patient's Clinical Indicators include:  NOTED    - Dietary Consultation:  BMI - Pt with BMI >40 (=Body mass index is 49.56 kg/m².), Class III obesity. RECOMMEND - If appropriate at DC please refer to outpatient nutrition services for weight management.   Chronic Panniculitis   Sepsis     Thank you,   Marianne Quigley RN, CCS  Clinical Documentation Integrity  Connect via Voalte  Options provided:   -- Class III obesity (morbid)   -- Obesity   -- Overweight   -- Other explanation, Pls specify      Query created by: Marianne Quigley on 2023 2:29 PM    RESPONSE TEXT:    Class III obesity (morbid)          Electronically signed by:  DIGNA HADLEY MD 2023 9:20 PM

## 2023-12-28 NOTE — OP REPORT
DATE OF OPERATION: 12/28/2023     PREOPERATIVE DIAGNOSIS: Left humeral shaft fracture    POSTOPERATIVE DIAGNOSIS: Same    PROCEDURE PERFORMED: Open treatment of left humeral shaft fracture with insertion of intramedullary implant    SURGEON: Corey Cruz M.D.     ASSISTANT: Hector Pavon    ANESTHESIOLOGIST: Wanda    ANESTHESIA: General    ESTIMATED BLOOD LOSS: 10 mL    INDICATIONS: The patient is a 68 y.o. male with a left humeral shaft fracture resulting from a fall . The patient denies antecedent pain, and was found to have a normal neurovascular exam and skin envelope.  Radiographs reviewed by myself demonstrated the humerus fracture.  Given these findings, surgical treatment of the humerus fracture with an intramedullary device was indicated.  I discussed the risks and benefits of the procedure, including the risks of infection, wound healing complication, neurovascular injury, persistent shoulder pain, rotator cuff injury, malunion, non-union, malrotation, and the medical risks of anesthesia including DVT, PE, MI, stroke, and death.  Benefits include early mobilization, improved chance of union, and reduction in the medical risks of humerus fractures.  Alternatives to surgery were also discussed, including non-operative management.  The patient signed the informed consent and the operative extremity was marked.      PROCEDURE:  The patient underwent anesthesia, and was positioned supine on a radiolucent table and all bony prominences were well padded.  Preoperative antibiotics were administered. Sequential compression devices were employed. The correct operative site was prepped and draped into a sterile field. A procedural pause was conducted to verify correct patient, correct extremity, presence of the surgeons initials on the operative extremity.    A guidepin for a Babar humeral nail was inserted in the appropriate starting point under flouroscopic guidance. A 2 cm incision was made in line with the  pin and dissection carried down to bone with care taken not to injure the rotator cuff. Protective sleeve was inserted atraumatically and an entry reamer was utilized to gain access to the canal. A ball-tip guide wire was advanced down the humerus to a center-center position in elbow just proximal to the olecrannon fossa, its position confirmed on fluoroscopy.  We then measured for, and selected a 280 x 8 [place] humeral nail.  The humeral nail was advanced over the guide pin to an appropriate depth, confirmed by fluoroscopy.    Two distal 5.0mm cross lock screws of appropriate length were placed using the freehand technique and three proximal 5.0mm cross lock screws were placed using the jig and soft tissue sleeves. Final fluoroscopic images were obtained demonstrating good fracture reduction and good position of hardware.    All wounds were irrigated with normal saline, and closed in layers, with 2-0 Vicryl in the subcutaneous tissue, and staples in the skin.  Sterile dressings were applied.  Sling was applied     The patient tolerated the procedure well. There were no apparent complications. All sponge, needle, and instrument counts were correct on two separate occasions. He was awakened, extubated, and transferred to the recovery room in satisfactory condition.     The use of Hector Pavon as a surgical assistant was necessary for assistance with exposure, retraction, fracture reduction, instrumentation, and closure.    Post-Operative Plan:    1.  The patient should bear weight as tolerated on their operative extremity.  A sling may be used as needed, and may be discontinued when no longer required.  2.  IV antibiotics - may be continued for 24 hours, but are not required.  3.  DVT prophylaxis - SCD's and Lovenox 40 mg SQ daily while inpatient.  The patient may transition to Aspirin 325 mg PO BID as an outpatient  4.  Discharge planning  ____________________________________   Corey Cruz M.D.   DD:  12/28/2023  3:04 PM

## 2023-12-28 NOTE — PROGRESS NOTES
Report received from outgoing nurse, care transferred at 1900. Patient currently in Afib 80-95 on the monitor and A&Ox 4. Patient reporting increasing pain, primarily in left arm. Whiteboard updated with plan for the day and names of staff. No other questions or concerns at this time from the patient. Call light within reach, fall precautions in place.

## 2023-12-28 NOTE — ANESTHESIA PROCEDURE NOTES
Airway    Date/Time: 12/28/2023 1:06 PM    Performed by: Talon Muñoz D.O.  Authorized by: Talon Muñoz D.O.    Location:  OR  Urgency:  Elective  Difficult Airway: No    Indications for Airway Management:  Anesthesia      Spontaneous Ventilation: absent    Sedation Level:  Deep  Preoxygenated: Yes    Patient Position:  Sniffing  Final Airway Type:  Endotracheal airway  Final Endotracheal Airway:  ETT  Cuffed: Yes    Technique Used for Successful ETT Placement:  Video laryngoscopy    Insertion Site:  Oral  Blade Type:  Glide  Laryngoscope Blade/Videolaryngoscope Blade Size:  4  ETT Size (mm):  7.5  Measured from:  Teeth  ETT to Teeth (cm):  22  Placement Verified by: auscultation and capnometry    Cormack-Lehane Classification:  Grade I - full view of glottis  Number of Attempts at Approach:  1

## 2023-12-28 NOTE — CARE PLAN
The patient is Watcher - Medium risk of patient condition declining or worsening    Shift Goals  Clinical Goals: abx/IVF, skin integrity, VS  Patient Goals: pain control  Family Goals: na      Patient is not progressing towards the following goals:    Problem: Pain - Standard  Goal: Alleviation of pain or a reduction in pain to the patient’s comfort goal  Outcome: Not Progressing- LUE pain, shoulder pain, prn regimen in place. OR for surgery today     Problem: Skin Integrity  Goal: Skin integrity is maintained or improved  Outcome: Not Progressing- q2h turns, orders in place

## 2023-12-28 NOTE — PROGRESS NOTES
Negative blood cultures so far.  OR availability for today.  Will plan for IMN left humerus today.  NPO until surgery

## 2023-12-28 NOTE — ANESTHESIA TIME REPORT
Anesthesia Start and Stop Event Times       Date Time Event    12/28/2023 1259 Ready for Procedure     1259 Anesthesia Start     1425 Anesthesia Stop          Responsible Staff  12/28/23      Name Role Begin End    Talon Muñoz D.O. Anesth 1259 1428          Overtime Reason:  no overtime (within assigned shift)    Comments:

## 2023-12-28 NOTE — DISCHARGE PLANNING
LMSW spoke with Samira from [facility] in [place]. She stated that they are interested in taking pt but would like more information sent to them when we know more of his discharge plan. PT/OT have not seen pt at the moment.   CM will give [facility] a call with an update.

## 2023-12-28 NOTE — ANESTHESIA PREPROCEDURE EVALUATION
Case: [id number] Date/Time: 12/28/23 1307    Procedure: INSERTION, INTRAMEDULLARY WIL, HUMERUS (Left)    Location: [facility] OR 16 / SURGERY [facility]    Surgeons: Corey Cruz M.D.            Relevant Problems   ANESTHESIA   (positive) SANTOSH on CPAP      PULMONARY   (positive) COPD (chronic obstructive pulmonary disease) (HCC)      CARDIAC   (positive) Chronic atrial fibrillation (HCC)         (positive) Hepatitis C infection       Physical Exam    Airway   Mallampati: IV  TM distance: >3 FB  Neck ROM: full       Cardiovascular - normal exam  Rhythm: regular  Rate: normal  (+) murmur     Dental - normal exam  (+) upper dentures, lower dentures        Facial Hair   Pulmonary - normal exam  (+) rhonchi, wheezes     Abdominal    Neurological - normal exam                   Anesthesia Plan    ASA 3   ASA physical status 3 criteria: COPD - poorly controlled and morbid obesity - BMI greater than or equal to 40    Plan - general       Airway plan will be ETT          Induction: intravenous    Postoperative Plan: Postoperative administration of opioids is intended.    Pertinent diagnostic labs and testing reviewed    Informed Consent:    Anesthetic plan and risks discussed with patient.    Use of blood products discussed with: patient whom consented to blood products.

## 2023-12-28 NOTE — THERAPY
Occupational Therapy Contact Note    Patient Name: Henry Kumari  Age:  68 y.o., Sex:  male  Medical Record #: [id number]  Today's Date: 12/28/2023 12/28/23 1228   Interdisciplinary Plan of Care Collaboration   Collaboration Comments OT referral received. Pt in OR. Will attempt eval again.

## 2023-12-28 NOTE — ANESTHESIA POSTPROCEDURE EVALUATION
Patient: Henry Kumari    Procedure Summary       Date: 12/28/23 Room / Location: [place] [address] / SURGERY [facility]    Anesthesia Start: 1259 Anesthesia Stop: 1425    Procedure: INSERTION, INTRAMEDULLARY WIL, HUMERUS (Left: Arm Upper) Diagnosis: (left humeral fracture)    Surgeons: Corey Cruz M.D. Responsible Provider: Talon Muñoz D.O.    Anesthesia Type: general ASA Status: 3            Final Anesthesia Type: general  Last vitals  BP   Blood Pressure : (!) 156/76    Temp   36.1 °C (97 °F)    Pulse   99   Resp   19    SpO2   94 %      Anesthesia Post Evaluation    Patient location during evaluation: PACU  Patient participation: complete - patient participated  Level of consciousness: awake and alert    Airway patency: patent  Anesthetic complications: no  Cardiovascular status: adequate  Respiratory status: acceptable  Hydration status: euvolemic    PONV: none          No notable events documented.     Nurse Pain Score: 3 (NPRS)

## 2023-12-28 NOTE — WOUND TEAM
[facility]  Inpatient Services  Wound and Skin Care Brief Evaluation    Admission Date: 12/26/2023     Last order of IP CONSULT TO WOUND CARE was found on 12/27/2023 from Hospital Encounter on 12/26/2023     HPI, PMH, SH: Reviewed    Chief Complaint   Patient presents with    Extremity Fracture     Pt sent from [place] [facility] after he fell in the early hours of the morning and sustained a left proximal humerus fracture. Pt takes Eliquis for DVT and Pe's. Pt received 300mcg of fentanyl IV and 3mg Versed IV from [facility] in route. Pt arrives in left arm sling.      Diagnosis: Sepsis (HCC) [A41.9]    Unit where seen by Wound Team: T820/00     Wound consult placed regarding pannus. Chart and images reviewed. This discussed with bedside RN Lori. Skin care protocol initiated for bedside nursing. Interdry cloths and nystatin powder ordered per protocol.     No pressure injuries or advanced wound care needs identified. Wound consult completed. No further follow up unless indicated and consulted.          PREVENTATIVE INTERVENTIONS:    Q shift Antony - performed per nursing policy  Q shift pressure point assessments - performed per nursing policy

## 2023-12-29 LAB
ALBUMIN SERPL BCP-MCNC: 2.5 G/DL (ref 3.2–4.9)
BUN SERPL-MCNC: 17 MG/DL (ref 8–22)
CALCIUM ALBUM COR SERPL-MCNC: 9.3 MG/DL (ref 8.5–10.5)
CALCIUM SERPL-MCNC: 8.1 MG/DL (ref 8.5–10.5)
CHLORIDE SERPL-SCNC: 103 MMOL/L (ref 96–112)
CO2 SERPL-SCNC: 21 MMOL/L (ref 20–33)
CREAT SERPL-MCNC: 0.5 MG/DL (ref 0.5–1.4)
ERYTHROCYTE [DISTWIDTH] IN BLOOD BY AUTOMATED COUNT: 42.9 FL (ref 35.9–50)
GFR SERPLBLD CREATININE-BSD FMLA CKD-EPI: 111 ML/MIN/1.73 M 2
GLUCOSE SERPL-MCNC: 109 MG/DL (ref 65–99)
HCT VFR BLD AUTO: 30.9 % (ref 42–52)
HGB BLD-MCNC: 10 G/DL (ref 14–18)
MAGNESIUM SERPL-MCNC: 2 MG/DL (ref 1.5–2.5)
MCH RBC QN AUTO: 28.3 PG (ref 27–33)
MCHC RBC AUTO-ENTMCNC: 32.4 G/DL (ref 32.3–36.5)
MCV RBC AUTO: 87.5 FL (ref 81.4–97.8)
PHOSPHATE SERPL-MCNC: 2.8 MG/DL (ref 2.5–4.5)
PLATELET # BLD AUTO: 180 K/UL (ref 164–446)
PMV BLD AUTO: 11.5 FL (ref 9–12.9)
POTASSIUM SERPL-SCNC: 3.9 MMOL/L (ref 3.6–5.5)
RBC # BLD AUTO: 3.53 M/UL (ref 4.7–6.1)
SODIUM SERPL-SCNC: 135 MMOL/L (ref 135–145)
WBC # BLD AUTO: 7 K/UL (ref 4.8–10.8)

## 2023-12-29 PROCEDURE — 36415 COLL VENOUS BLD VENIPUNCTURE: CPT

## 2023-12-29 PROCEDURE — 83735 ASSAY OF MAGNESIUM: CPT

## 2023-12-29 PROCEDURE — 97166 OT EVAL MOD COMPLEX 45 MIN: CPT

## 2023-12-29 PROCEDURE — A9270 NON-COVERED ITEM OR SERVICE: HCPCS | Performed by: STUDENT IN AN ORGANIZED HEALTH CARE EDUCATION/TRAINING PROGRAM

## 2023-12-29 PROCEDURE — 700105 HCHG RX REV CODE 258: Performed by: ORTHOPAEDIC SURGERY

## 2023-12-29 PROCEDURE — 99232 SBSQ HOSP IP/OBS MODERATE 35: CPT | Performed by: STUDENT IN AN ORGANIZED HEALTH CARE EDUCATION/TRAINING PROGRAM

## 2023-12-29 PROCEDURE — 97162 PT EVAL MOD COMPLEX 30 MIN: CPT

## 2023-12-29 PROCEDURE — A9270 NON-COVERED ITEM OR SERVICE: HCPCS | Performed by: INTERNAL MEDICINE

## 2023-12-29 PROCEDURE — 97535 SELF CARE MNGMENT TRAINING: CPT

## 2023-12-29 PROCEDURE — 700102 HCHG RX REV CODE 250 W/ 637 OVERRIDE(OP): Performed by: STUDENT IN AN ORGANIZED HEALTH CARE EDUCATION/TRAINING PROGRAM

## 2023-12-29 PROCEDURE — 770001 HCHG ROOM/CARE - MED/SURG/GYN PRIV*

## 2023-12-29 PROCEDURE — 80069 RENAL FUNCTION PANEL: CPT

## 2023-12-29 PROCEDURE — 99406 BEHAV CHNG SMOKING 3-10 MIN: CPT

## 2023-12-29 PROCEDURE — 700111 HCHG RX REV CODE 636 W/ 250 OVERRIDE (IP): Performed by: ORTHOPAEDIC SURGERY

## 2023-12-29 PROCEDURE — 85027 COMPLETE CBC AUTOMATED: CPT

## 2023-12-29 PROCEDURE — 700102 HCHG RX REV CODE 250 W/ 637 OVERRIDE(OP): Performed by: INTERNAL MEDICINE

## 2023-12-29 PROCEDURE — 97167 OT EVAL HIGH COMPLEX 60 MIN: CPT

## 2023-12-29 PROCEDURE — 97530 THERAPEUTIC ACTIVITIES: CPT

## 2023-12-29 PROCEDURE — 94664 DEMO&/EVAL PT USE INHALER: CPT

## 2023-12-29 RX ADMIN — OXYCODONE HYDROCHLORIDE 10 MG: 10 TABLET ORAL at 04:32

## 2023-12-29 RX ADMIN — CLONAZEPAM 1 MG: 1 TABLET ORAL at 17:39

## 2023-12-29 RX ADMIN — THEOPHYLLINE 300 MG: 300 TABLET, EXTENDED RELEASE ORAL at 04:44

## 2023-12-29 RX ADMIN — DIBASIC SODIUM PHOSPHATE, MONOBASIC POTASSIUM PHOSPHATE AND MONOBASIC SODIUM PHOSPHATE 500 MG: 852; 155; 130 TABLET ORAL at 12:22

## 2023-12-29 RX ADMIN — NYSTATIN: 100000 POWDER TOPICAL at 12:25

## 2023-12-29 RX ADMIN — FLUOXETINE HYDROCHLORIDE 20 MG: 20 CAPSULE ORAL at 04:47

## 2023-12-29 RX ADMIN — DOCUSATE SODIUM 50 MG AND SENNOSIDES 8.6 MG 2 TABLET: 8.6; 5 TABLET, FILM COATED ORAL at 17:39

## 2023-12-29 RX ADMIN — DIBASIC SODIUM PHOSPHATE, MONOBASIC POTASSIUM PHOSPHATE AND MONOBASIC SODIUM PHOSPHATE 500 MG: 852; 155; 130 TABLET ORAL at 04:44

## 2023-12-29 RX ADMIN — CLONAZEPAM 1 MG: 1 TABLET ORAL at 04:51

## 2023-12-29 RX ADMIN — THEOPHYLLINE 300 MG: 300 TABLET, EXTENDED RELEASE ORAL at 17:39

## 2023-12-29 RX ADMIN — QUETIAPINE FUMARATE 100 MG: 100 TABLET ORAL at 17:39

## 2023-12-29 RX ADMIN — RIVAROXABAN 20 MG: 20 TABLET, FILM COATED ORAL at 08:58

## 2023-12-29 RX ADMIN — CEFAZOLIN 2 G: 2 INJECTION, POWDER, FOR SOLUTION INTRAMUSCULAR; INTRAVENOUS at 04:56

## 2023-12-29 RX ADMIN — OXYCODONE HYDROCHLORIDE 10 MG: 10 TABLET ORAL at 22:32

## 2023-12-29 RX ADMIN — CEFAZOLIN 2 G: 2 INJECTION, POWDER, FOR SOLUTION INTRAMUSCULAR; INTRAVENOUS at 14:58

## 2023-12-29 RX ADMIN — METOPROLOL SUCCINATE 100 MG: 100 TABLET, EXTENDED RELEASE ORAL at 04:44

## 2023-12-29 RX ADMIN — OXYCODONE HYDROCHLORIDE 10 MG: 10 TABLET ORAL at 12:24

## 2023-12-29 RX ADMIN — OXYCODONE HYDROCHLORIDE 10 MG: 10 TABLET ORAL at 09:08

## 2023-12-29 RX ADMIN — MOMETASONE FUROATE AND FORMOTEROL FUMARATE DIHYDRATE 1 PUFF: 200; 5 AEROSOL RESPIRATORY (INHALATION) at 04:45

## 2023-12-29 RX ADMIN — NYSTATIN: 100000 POWDER TOPICAL at 04:46

## 2023-12-29 RX ADMIN — TIOTROPIUM BROMIDE INHALATION SPRAY 2.5 MCG: 3.12 SPRAY, METERED RESPIRATORY (INHALATION) at 04:46

## 2023-12-29 RX ADMIN — DOCUSATE SODIUM 50 MG AND SENNOSIDES 8.6 MG 2 TABLET: 8.6; 5 TABLET, FILM COATED ORAL at 04:45

## 2023-12-29 RX ADMIN — ROSUVASTATIN CALCIUM 10 MG: 20 TABLET, FILM COATED ORAL at 17:39

## 2023-12-29 RX ADMIN — QUETIAPINE FUMARATE 100 MG: 100 TABLET ORAL at 04:45

## 2023-12-29 ASSESSMENT — COGNITIVE AND FUNCTIONAL STATUS - GENERAL
TURNING FROM BACK TO SIDE WHILE IN FLAT BAD: UNABLE
MOVING FROM LYING ON BACK TO SITTING ON SIDE OF FLAT BED: UNABLE
SUGGESTED CMS G CODE MODIFIER DAILY ACTIVITY: CL
DAILY ACTIVITIY SCORE: 12
STANDING UP FROM CHAIR USING ARMS: A LOT
PERSONAL GROOMING: A LITTLE
MOVING TO AND FROM BED TO CHAIR: UNABLE
DRESSING REGULAR UPPER BODY CLOTHING: A LOT
DRESSING REGULAR LOWER BODY CLOTHING: TOTAL
HELP NEEDED FOR BATHING: A LOT
CLIMB 3 TO 5 STEPS WITH RAILING: TOTAL
EATING MEALS: A LITTLE
WALKING IN HOSPITAL ROOM: TOTAL
SUGGESTED CMS G CODE MODIFIER MOBILITY: CM
TOILETING: TOTAL
MOBILITY SCORE: 7

## 2023-12-29 ASSESSMENT — GAIT ASSESSMENTS: GAIT LEVEL OF ASSIST: UNABLE TO PARTICIPATE

## 2023-12-29 ASSESSMENT — ACTIVITIES OF DAILY LIVING (ADL): TOILETING: INDEPENDENT

## 2023-12-29 ASSESSMENT — PAIN DESCRIPTION - PAIN TYPE: TYPE: ACUTE PAIN

## 2023-12-29 ASSESSMENT — FIBROSIS 4 INDEX: FIB4 SCORE: 4.1

## 2023-12-29 NOTE — PROGRESS NOTES
"    Orthopedic PA Progress Note    Interval changes:  Patient doing well postop.  LUE dressings are CDI  WBAT LUE  No pending ortho procedures  Follow up with Dr. Cruz 2 weeks postop  Cleared for DC from orthopedic standpoint pending therapy recs and medical optimization    ROS - Patient denies any new issues.  Denies any numbness or tingling. Pain well controlled.    BP (!) 143/71   Pulse 90   Temp 36.7 °C (98.1 °F) (Temporal)   Resp 16   Ht 1.88 m (6' 2\")   Wt (!) 196 kg (431 lb)   SpO2 95%     Patient seen and examined  No acute distress  Breathing non labored  RRR  LUE: Surgical dressing is clean, dry, and intact. Patient clearly fires forearm flexors, forearm extensors, and moves all five fingers without issue . Sensation is intact to light touch throughout median, ulnar, and radial nerve distributions. Strong and palpable radial pulses with capillary refill less than 2 seconds.     Recent Labs     12/27/23  0341 12/28/23  0413 12/29/23  0156   WBC 12.3* 8.8 7.0   RBC 4.21* 3.47* 3.53*   HEMOGLOBIN 12.1* 10.0* 10.0*   HEMATOCRIT 37.8* 30.7* 30.9*   MCV 89.8 88.5 87.5   MCH 28.7 28.8 28.3   MCHC 32.0* 32.6 32.4   RDW 46.1 43.3 42.9   PLATELETCT 168 147* 180   MPV 10.8 11.4 11.5       Active Hospital Problems    Diagnosis     Hypophosphatemia [E83.39]     Cellulitis [L03.90]     Left humeral fracture [S42.302A]     Lactic acidosis [E87.20]     Opiate dependence (HCC) [F11.20]     Chronic respiratory failure with hypoxia ([facility]) [J96.11]     Morbid obesity ([facility]) [E66.01]     Sepsis ([facility]) [A41.9]     History of DVT (deep vein thrombosis) [Z86.718]     Atrial fibrillation with RVR ([facility]) [I48.91]     COPD (chronic obstructive pulmonary disease) ([facility]) [J44.9]     SANTOSH on CPAP [G47.33]        Assessment/Plan:  Patient doing well postop.  LUE dressings are CDI  WBAT LUE  No pending ortho procedures  Follow up with Dr. Cruz 2 weeks postop  Cleared for DC from orthopedic standpoint pending therapy recs and " medical optimization    POD#1 S/p   Open treatment of left humeral shaft fracture with insertion of intramedullary implant   Wt bearing status - WBAT LUE  Wound care/Drains - Dressings to be changed every other day by nursing. Or PRN for saturation starting POD#2  Future Procedures - None planned   Lovenox: Start 12/29, Duration-until ambulatory > 150'  Sutures/Staples out- 14-21 days post operatively. Removal will completed by ortho JULIETTE's unless transferred.  DVT Prophylaxis outpatient: ASA 81 mg PO BID x4 weeks  PT/OT-initiated  Antibiotics:  Perioperative completed  DVT Prophylaxis- TEDS/SCDs/Foot pumps  [place]-not needed per ortho  Case Coordination for Discharge Planning - Disposition per therapy recs.

## 2023-12-29 NOTE — CARE PLAN
The patient is Stable - Low risk of patient condition declining or worsening    Shift Goals  Clinical Goals: abx/IVF, skin integrity, VS  Patient Goals: pain control  Family Goals: na    Progress made toward(s) clinical / shift goals:  Progressing      Problem: Pain - Standard  Goal: Alleviation of pain or a reduction in pain to the patient’s comfort goal  Outcome: Progressing  Note: Pt reporting pain 7/10 in left arm surgical site, medication administered per MAR     Problem: Knowledge Deficit - Standard  Goal: Patient and family/care givers will demonstrate understanding of plan of care, disease process/condition, diagnostic tests and medications  Outcome: Progressing  Note: Pt verbalizes understanding of plan of care

## 2023-12-29 NOTE — PROGRESS NOTES
Hospital Medicine Daily Progress Note    Date of Service  12/28/2023    Chief Complaint  Henry Kumari is a 68 y.o. male admitted 12/26/2023 with fall, humerus fracture.    Hospital Course  Henry Kumari is a 68 y.o. male with a h/o COPD (2L at baseline), CHF, afib, prior DVTs/PE who presented 12/26/2023 as a transfer from [facility].  The patient presented there after a fall, humerus fracture.     He has chronic panniculitis that has been utilizing alcohol for.  The patient had a fall due to his generalized weakness causing a humerus fracture there was noted at the transferring facility and he was transferred here for higher level of care.  The patient is on anticoagulation for history of DVTs as well as pulmonary emboli.  He is on home oxygen and continues to abuse tobacco products. He states he does not have any dysuria.  The patient also suffered from multiple skin tears to the right upper extremity and he did receive tetanus prophylaxis as well as Rocephin.  Patient was found to have a leukocytosis with a white blood cell count of 18,200 and slight elevation in his creatinine at 1.74.  Blood cultures were ordered and are currently pending at [facility].      In ER he was found to be febrile to 38.2, tachycardic to the 120s (in A-fib) normal blood pressure, normal oxygen saturation on room air.  Labs remarkable for WBC 18, CO2 19, BUN 34, creatinine 1.24, lactic acid 2.4 improved to 2 point.  Urinalysis hazy 20-50WBC, chest x-ray negative, CRP 22, viral panel negative.    Left humeral shaft fracture    Interval Problem Update  Patient was seen and examined at bedside  Reported left abdominal pain, abdominal rash and mild pain.  He denies dysuria urgency frequency  BMI 49    Patient underwent ORIF for left humeral fracture.  he developed A-fib with RVR, transferred back to telemetry floor.  Heart rate improved later on.  Continue close monitor on  telemetry.  Optimize electrolytes, potassium over 4 and mag over 2.  I ordered 2 g IV mag    Phos 2.2 -replaced    Blood culture pending  Abdominal panniculitis/cellulitis  Continue Ancef  wbc 18>12    Lactic acid 3.1>1.4  SANTOSH -CPAP    A-fib -Xarelto on hold  Hx of DVTs/PE    COPD -2 L, at baseline    I have discussed this patient's plan of care and discharge plan at IDT rounds today with Case Management, Nursing, Nursing leadership, and other members of the IDT team.    Consultants/Specialty      Code Status  Full Code    Disposition  The patient is not medically cleared for discharge to home or a post-acute facility.      I have placed the appropriate orders for post-discharge needs.    Review of Systems  All 12 systems were reviewed and negative except as mentioned above       Physical Exam  Temp:  [36 °C (96.8 °F)-36.6 °C (97.9 °F)] 36.6 °C (97.9 °F)  Pulse:  [] 89  Resp:  [11-24] 18  BP: ()/(58-98) 144/87  SpO2:  [94 %-98 %] 98 %    Physical Exam  Constitutional:       Appearance: He is obese. He is ill-appearing.   HENT:      Head: Normocephalic.      Nose: Nose normal.      Mouth/Throat:      Mouth: Mucous membranes are moist.   Eyes:      Extraocular Movements: Extraocular movements intact.      Conjunctiva/sclera: Conjunctivae normal.   Cardiovascular:      Rate and Rhythm: Normal rate and regular rhythm.      Pulses: Normal pulses.      Heart sounds: Normal heart sounds.   Pulmonary:      Effort: Pulmonary effort is normal.      Breath sounds: No wheezing.   Abdominal:      Palpations: Abdomen is soft.      Tenderness: There is abdominal tenderness. There is no guarding.   Musculoskeletal:         General: Tenderness present.      Cervical back: Normal range of motion.      Comments: Left arm sling in place   Skin:     General: Skin is warm.      Findings: Lesion and rash present.      Comments: Lower abdominal diffuse skin erythema, rash and tenderness   Neurological:      Mental Status: He is  alert and oriented to person, place, and time. Mental status is at baseline.   Psychiatric:         Mood and Affect: Mood normal.         Fluids    Intake/Output Summary (Last 24 hours) at 12/28/2023 1721  Last data filed at 12/28/2023 0815  Gross per 24 hour   Intake --   Output 500 ml   Net -500 ml       Laboratory  Recent Labs     12/26/23  1435 12/27/23  0341 12/28/23  0413   WBC 18.7* 12.3* 8.8   RBC 4.51* 4.21* 3.47*   HEMOGLOBIN 12.9* 12.1* 10.0*   HEMATOCRIT 39.8* 37.8* 30.7*   MCV 88.2 89.8 88.5   MCH 28.6 28.7 28.8   MCHC 32.4 32.0* 32.6   RDW 45.3 46.1 43.3   PLATELETCT 226 168 147*   MPV 11.2 10.8 11.4     Recent Labs     12/26/23  1435 12/27/23  0341 12/28/23  0413   SODIUM 137 136 136   POTASSIUM 4.7 4.4 4.1   CHLORIDE 103 101 105   CO2 19* 22 20   GLUCOSE 86 84 88   BUN 34* 31* 20   CREATININE 1.24 0.95 0.51   CALCIUM 8.8 8.5 7.9*                   Imaging  DX-PORTABLE FLUOROSCOPY < 1 HOUR   Preliminary Result      Portable fluoroscopy utilized for 1 minute 30 seconds.      INTERPRETING LOCATION: [address] [place] [address], [address]      DX-HUMERUS 2+ LEFT   Preliminary Result      Digitized intraoperative radiograph is submitted for review. This examination is not for diagnostic purpose but for guidance during a surgical procedure. Please see the patient's chart for full procedural details.         INTERPRETING LOCATION: [address] [place] [address], [id number]      DX-CHEST-PORTABLE (1 VIEW)   Final Result         Cardiomegaly.      No pulmonary infiltrates or consolidations are noted.         OUTSIDE IMAGES-CT HEAD   Final Result      OUTSIDE IMAGES-DX UPPER EXTREMITY, LEFT   Final Result      OUTSIDE IMAGES-DX CHEST   Final Result      OUTSIDE IMAGES-DX UPPER EXTREMITY, LEFT   Final Result           Assessment/Plan  * Sepsis (HCC)- (present on admission)  Assessment & Plan  This is Sepsis Present on admission  SIRS criteria identified on my evaluation include: Fever, with temperature greater than 100.9 deg F,  Tachycardia, with heart rate greater than 90 BPM, and Leukocytosis, with WBC greater than 12,000  Clinical indicators of end organ dysfunction include Lactic Acid greater than 2  Source is skin/panniculitis   Sepsis protocol initiated  Crystalloid Fluid Administration: Resuscitation volume of 1000 ordered. Reason that resuscitation volume of less than 30ml/kg was ordered concern for causing harm given CHF  IV antibiotics as appropriate for source of sepsis  Reassessment: I have reassessed the patient's hemodynamic status    Left humeral fracture  Assessment & Plan  Left humeral shaft fracture  -evaluated by surgery,     12/28 s/p ORIF today  PT OT and supportive care    Hypophosphatemia  Assessment & Plan  Replaced    Lactic acidosis  Assessment & Plan  Due to sepsis    Lactic acid 3.1>1.4  Continue IV fluid    Cellulitis  Assessment & Plan  Lower abdominal diffused erythema, tenderness  History of panniculitis    Continue Ancef    Opiate dependence (HCC)- (present on admission)  Assessment & Plan  Takes buprenorphine at home for chronic pain  In acute pain so giving prn narcotics    Chronic respiratory failure with hypoxia ([facility])- (present on admission)  Assessment & Plan  Due to COPD and obese  Baseline 2 L  Monitor    Morbid obesity ([facility])- (present on admission)  Assessment & Plan  BMI 49  Weight loss education    SANTOSH on CPAP- (present on admission)  Assessment & Plan  I ordered a CPAP    COPD (chronic obstructive pulmonary disease) ([facility])- (present on admission)  Assessment & Plan  Not in exacerbation  Continue inhalers and albuterol prn    Atrial fibrillation with RVR ([facility])- (present on admission)  Assessment & Plan  History of A-fib on metoprolol and Xarelto, rate controlled    12/28 developed A-fib with RVR   Patient had surgery for left femoral fracture  Continue metoprolol  Continue monitor on telemetry  Optimize electrolytes potassium over 4 and mag over 2  I ordered IV mag 2 g    History of DVT (deep vein  thrombosis)- (present on admission)  Assessment & Plan  home Xarelto on hold for possible left arm fracture surgery in the coming days         VTE prophylaxis: SCD , Lovenox PPx  home Xarelto on hold, possible left arm fracture surgery in the coming days    I have performed a physical exam and reviewed and updated ROS and Plan today (12/28/2023). In review of yesterday's note (12/27/2023), there are no changes except as documented above.    I spent greater than 51 minutes for chart review, obtaining history independently, performing medically appropriate examination,  documenting , ordering medications, tests, or procedures, referring and communicating with other health care professionals, Independently interpreting results and communicating results with patient/family/caregiver. More than 50% of time was spent in face-to-face clinical encounter.      ROS

## 2023-12-29 NOTE — THERAPY
"Occupational Therapy   Initial Evaluation     Patient Name: Henry Kumari  Age:  68 y.o., Sex:  male  Medical Record #: [id number]  Today's Date: 12/29/2023     Precautions: Fall Risk, Weight Bearing As Tolerated Left Upper Extremity  Comments: per Ortho note; no formal order    Assessment    Patient is 68 y.o. male with h/o morbid obesity, CHF, a-fib, COPD (on home O2), DVT, PE, chronic panniculitis, admitted following GLF with L humerus fx. Pt underwent ORIF. Course complicated by a-fib with RVR. Pt seen for OT eval and treatment. See grid below for details. Pt is [facility] bed. Assisted to/from [facility] for BM. Instructed pt on AROM to LUE (emphasis on distal motion for now). Pt lives in [place] with support from family for all I-ADL as well as HH bathing aide. Reports he changes clothes 1x/week in conjunction with shower. States family is struggling to continue assisting him. May benefit from more supportive living environment. Will continue to follow pt for acute OT to maximize functional independence and safety.     Plan    Occupational Therapy Initial Treatment Plan   Treatment Interventions: Self Care / Activities of Daily Living, Adaptive Equipment, Neuro Re-Education / Balance, Therapeutic Exercises, Therapeutic Activity  Treatment Frequency: 3 Times per Week  Duration: Until Therapy Goals Met    DC Equipment Recommendations: Unable to determine at this time  Discharge Recommendations: Recommend post-acute placement for additional occupational therapy services prior to discharge home     Subjective    \"I need to go #2.\"     Objective       12/29/23 1121   Prior Living Situation   Prior Services Other (Comments);Skilled Home Health Services  (HH PT and bathing aide; assist for all I-ADL)   Housing / Facility 1 Story Apartment / Condo   Steps Into Home 1  (10-12\" step down)   Steps In Home 0   Rail Both Rail (Steps into Home)   Elevator No   Bathroom Set up Bathtub / Shower Combination;Shower Chair   Equipment " Owned 4-Wheel Walker;Front-Wheel Walker;Tub / Shower Seat;Oxygen   Lives with - Patient's Self Care Capacity Alone and Unable to Care For Self  (assist for all I-ADL)   Comments Pt lives in [place] of niece's home. Reports rarely leaving the home. Has HH for bathing and dressing 1x/week.   Prior Level of ADL Function   Self Feeding Independent   Grooming / Hygiene Independent   Bathing Requires Assist   Dressing Requires Assist   Toileting Independent   Prior Level of IADL Function   Laundry Dependent   Home Management Requires Assist   Shopping Dependent   Prior Level Of Mobility Independent With Device in Home  (uses 4WW or rolls around in office chair in home; little to no community mobility)   Driving / Transportation Relatives / Others Provide Transportation   Occupation (Pre-Hospital Vocational) Retired Due To Age   Leisure Interests Television   Vitals   O2 (LPM) 2   O2 Delivery Device Silicone Nasal Cannula   Cognition    Level of Consciousness Alert   Attention Impaired   Comments pleasant & cooperative, but loquacious and tangential   Active ROM Upper Body   Dominant Hand Right   Comments RUE WNL; L shoulder NT; L elbow AA 0-80, wrist and hand slightly limited by pain & swelling   Strength Upper Body   Comments RUE WNL; LUE NT proximally due to fx, distal grossly WNL   Sensation Upper Body   Comments RUE WNL; LUE variably numb PTA (pt believe he may have CTS)   Coordination Upper Body   Comments RUE WNL; LUE absent reach and limited FMC due to fx/splint   Balance Assessment   Sitting Balance (Static) Fair -   Sitting Balance (Dynamic) Fair -   Standing Balance (Static) Poor +   Standing Balance (Dynamic) Poor   Weight Shift Sitting Fair   Weight Shift Standing Poor   Comments HHA for stand   Bed Mobility    Supine to Sit Moderate Assist  (HOB elevated)   Sit to Supine Moderate Assist   Scooting Minimal Assist  (seated)   ADL Assessment   Grooming Supervision  (face wipe)   Upper Body Dressing Minimal  Assist  (gown change)   Lower Body Dressing Total Assist  (don B socks)   Toileting Total Assist  (BM on BSC)   Functional Mobility   Sit to Stand Minimal Assist   Toilet Transfers Moderate Assist   Transfer Method Stand Pivot  (HHA of 2)   Mobility Supine > < EOB, STS, pivot to/from BSC   Short Term Goals   Short Term Goal # 1 Pt will complete ADL transfers with supv   Short Term Goal # 2 Pt will complete gown change with min A using clifford technique   Short Term Goal # 3 Pt will complete seated oral care using L hand as stabilizer   Short Term Goal # 4 Pt will complete toileting with min A   Education Group   Role of Occupational Therapist Patient Response Patient;Acceptance;Explanation;Verbal Demonstration   Upper Ext ROM Patient Response Patient;Acceptance;Explanation;Demonstration;Verbal Demonstration;Action Demonstration;Reinforcement Needed  (AROM to LUE)   Transfers Patient Response Patient;Acceptance;Explanation;Verbal Demonstration;Action Demonstration;Reinforcement Needed  (pivot to/from BSC)

## 2023-12-29 NOTE — PROGRESS NOTES
Bedside report completed with Edy COBB, assumed pt care. Pt is sleeping in bed with CPAP on. Refusing bed alarm with night RN. Bed In low and locked position, call light within reach. Will continue to monitor.

## 2023-12-29 NOTE — PROGRESS NOTES
Hospital Medicine Daily Progress Note    Date of Service  12/29/2023    Chief Complaint  Henry Kumari is a 68 y.o. male admitted 12/26/2023 with fall, humerus fracture.    Hospital Course  Henry Kumari is a 68 y.o. male with a h/o COPD (2L at baseline), CHF, afib, prior DVTs/PE who presented 12/26/2023 as a transfer from [facility].  The patient presented there after a fall, humerus fracture.     He has chronic panniculitis that has been utilizing alcohol for.  The patient had a fall due to his generalized weakness causing a humerus fracture there was noted at the transferring facility and he was transferred here for higher level of care.  The patient is on anticoagulation for history of DVTs as well as pulmonary emboli.  He is on home oxygen and continues to abuse tobacco products. He states he does not have any dysuria.  The patient also suffered from multiple skin tears to the right upper extremity and he did receive tetanus prophylaxis as well as Rocephin.  Patient was found to have a leukocytosis with a white blood cell count of 18,200 and slight elevation in his creatinine at 1.74.  Blood cultures were ordered and are currently pending at [facility].      In ER he was found to be febrile to 38.2, tachycardic to the 120s (in A-fib) normal blood pressure, normal oxygen saturation on room air.  Labs remarkable for WBC 18, CO2 19, BUN 34, creatinine 1.24, lactic acid 2.4 improved to 2 point.  Urinalysis hazy 20-50WBC, chest x-ray negative, CRP 22, viral panel negative.    Left humeral shaft fracture - s/p ORIF 12/28    He denies dysuria urgency frequency    Interval Problem Update  Patient was seen and examined at bedside  BMI 49    Left humeral shaft fracture - s/p ORIF 12/28  I resumed the Xarelto    Abdominal panniculitis/cellulitis -continue Ancef  wbc 18>8    SANTOSH -CPAP  COPD -2 L, at baseline    PT OT recommended SNF    I have discussed  this patient's plan of care and discharge plan at IDT rounds today with Case Management, Nursing, Nursing leadership, and other members of the IDT team.    Consultants/Specialty      Code Status  Full Code    Disposition  The patient is not medically cleared for discharge to home or a post-acute facility.      I have placed the appropriate orders for post-discharge needs.    Review of Systems  All 12 systems were reviewed and negative except as mentioned above       Physical Exam  Temp:  [36.3 °C (97.3 °F)-36.7 °C (98.1 °F)] 36.7 °C (98.1 °F)  Pulse:  [78-94] 90  Resp:  [14-20] 16  BP: (117-164)/(69-87) 143/71  SpO2:  [92 %-98 %] 95 %    Physical Exam  Constitutional:       Appearance: He is obese. He is ill-appearing.   HENT:      Head: Normocephalic.      Nose: Nose normal.      Mouth/Throat:      Mouth: Mucous membranes are moist.   Eyes:      Extraocular Movements: Extraocular movements intact.      Conjunctiva/sclera: Conjunctivae normal.   Cardiovascular:      Rate and Rhythm: Normal rate and regular rhythm.      Pulses: Normal pulses.      Heart sounds: Normal heart sounds.   Pulmonary:      Effort: Pulmonary effort is normal.      Breath sounds: No wheezing.   Abdominal:      Palpations: Abdomen is soft.      Tenderness: There is abdominal tenderness. There is no guarding.   Musculoskeletal:         General: Tenderness present.      Cervical back: Normal range of motion.      Comments: Left arm sling in place   Skin:     General: Skin is warm.      Findings: Lesion and rash present.      Comments: Lower abdominal diffuse skin erythema, rash and tenderness   Neurological:      Mental Status: He is alert and oriented to person, place, and time. Mental status is at baseline.   Psychiatric:         Mood and Affect: Mood normal.         Fluids    Intake/Output Summary (Last 24 hours) at 12/29/2023 1524  Last data filed at 12/29/2023 0800  Gross per 24 hour   Intake --   Output 250 ml   Net -250 ml        Laboratory  Recent Labs     12/27/23  0341 12/28/23  0413 12/29/23  0156   WBC 12.3* 8.8 7.0   RBC 4.21* 3.47* 3.53*   HEMOGLOBIN 12.1* 10.0* 10.0*   HEMATOCRIT 37.8* 30.7* 30.9*   MCV 89.8 88.5 87.5   MCH 28.7 28.8 28.3   MCHC 32.0* 32.6 32.4   RDW 46.1 43.3 42.9   PLATELETCT 168 147* 180   MPV 10.8 11.4 11.5     Recent Labs     12/27/23  0341 12/28/23  0413 12/29/23  0156   SODIUM 136 136 135   POTASSIUM 4.4 4.1 3.9   CHLORIDE 101 105 103   CO2 22 20 21   GLUCOSE 84 88 109*   BUN 31* 20 17   CREATININE 0.95 0.51 0.50   CALCIUM 8.5 7.9* 8.1*                   Imaging  DX-PORTABLE FLUOROSCOPY < 1 HOUR   Final Result      Portable fluoroscopy utilized for 1 minute 30 seconds.      INTERPRETING LOCATION: [address] [place] [address], [id number]      DX-HUMERUS 2+ LEFT   Final Result      Digitized intraoperative radiograph is submitted for review. This examination is not for diagnostic purpose but for guidance during a surgical procedure. Please see the patient's chart for full procedural details.         INTERPRETING LOCATION: [address] [place] [address], [id number]      DX-CHEST-PORTABLE (1 VIEW)   Final Result         Cardiomegaly.      No pulmonary infiltrates or consolidations are noted.         OUTSIDE IMAGES-CT HEAD   Final Result      OUTSIDE IMAGES-DX UPPER EXTREMITY, LEFT   Final Result      OUTSIDE IMAGES-DX CHEST   Final Result      OUTSIDE IMAGES-DX UPPER EXTREMITY, LEFT   Final Result           Assessment/Plan  * Sepsis (HCC)- (present on admission)  Assessment & Plan  This is Sepsis Present on admission  SIRS criteria identified on my evaluation include: Fever, with temperature greater than 100.9 deg F, Tachycardia, with heart rate greater than 90 BPM, and Leukocytosis, with WBC greater than 12,000  Clinical indicators of end organ dysfunction include Lactic Acid greater than 2  Source is skin/panniculitis   Sepsis protocol initiated  Crystalloid Fluid Administration: Resuscitation volume of 1000 ordered. Reason that  resuscitation volume of less than 30ml/kg was ordered concern for causing harm given CHF  IV antibiotics as appropriate for source of sepsis  Reassessment: I have reassessed the patient's hemodynamic status    Left humeral fracture  Assessment & Plan  Left humeral shaft fracture  -evaluated by surgery,     12/28 s/p ORIF today  PT OT and supportive care    Hypophosphatemia  Assessment & Plan  Replaced    Lactic acidosis  Assessment & Plan  Due to sepsis    Lactic acid 3.1>1.4  Continue IV fluid    Cellulitis  Assessment & Plan  Lower abdominal diffused erythema, tenderness  History of panniculitis    Continue Ancef    Opiate dependence (HCC)- (present on admission)  Assessment & Plan  Takes buprenorphine at home for chronic pain  In acute pain so giving prn narcotics    Chronic respiratory failure with hypoxia ([facility])- (present on admission)  Assessment & Plan  Due to COPD and obese  Baseline 2 L  Monitor    Morbid obesity ([facility])- (present on admission)  Assessment & Plan  BMI 49  Weight loss education    SANTOSH on CPAP- (present on admission)  Assessment & Plan  I ordered a CPAP    COPD (chronic obstructive pulmonary disease) ([facility])- (present on admission)  Assessment & Plan  Not in exacerbation  Continue inhalers and albuterol prn    Atrial fibrillation with RVR ([facility])- (present on admission)  Assessment & Plan  History of A-fib on metoprolol and Xarelto, rate controlled    12/28 developed A-fib with RVR   Patient had surgery for left femoral fracture  Continue metoprolol  Continue monitor on telemetry  Optimize electrolytes potassium over 4 and mag over 2  I ordered IV mag 2 g    History of DVT (deep vein thrombosis)- (present on admission)  Assessment & Plan  home Xarelto on hold for possible left arm fracture surgery in the coming days         VTE prophylaxis: Xarelto      I have performed a physical exam and reviewed and updated ROS and Plan today (12/29/2023). In review of yesterday's note (12/28/2023), there are no  changes except as documented above.      I spent 36 minutes for chart review, meeting and examining the patient, documenting, ordering medications and tests, interpreting the results independently, and communicating with the other health professionals.

## 2023-12-29 NOTE — THERAPY
"Physical Therapy   Initial Evaluation     Patient Name: Henry Kumari  Age:  68 y.o., Sex:  male  Medical Record #: [id number]  Today's Date: 12/29/2023     Precautions  Precautions: Fall Risk  Comments: LUE WBAT per op-note    Assessment  Patient is 68 y.o. male admitted after GLF with L proximal humerus fx and sepsis.  PMH includes COPD, A fib, DVT, and chronic opiate dependence.  Pt now POD #1 IMN of L humerus.  PMH includes COPD on 2L baseline, CHF, A fib, DVT/PE.  Pt presented for PT evaluation with impaired balance, activity tolerance, strength, and high pain levels.  Pt mobilized as detailed below with grossly mod A and HHA with RUE for stand & pivot to BS.  Therapeutic activity provided in conjunction with acute PT evaluation including: STS from BSC, pivot back to bed, and upright sitting balance on BSC.  Pt is below his functional baseline, recommend post acute placement.     Plan    Physical Therapy Initial Treatment Plan   Treatment Plan : Bed Mobility, Equipment, Gait Training, Neuro Re-Education / Balance, Self Care / Home Evaluation, Stair Training, Therapeutic Activities, Therapeutic Exercise  Treatment Frequency: 4 Times per Week  Duration: Until Therapy Goals Met    DC Equipment Recommendations: Unable to determine at this time  Discharge Recommendations: Recommend post-acute placement for additional physical therapy services prior to discharge home     Objective     12/29/23 1120   Precautions   Precautions Fall Risk   Comments LUE WBAT per op-note   Pain 0 - 10 Group   Therapist Pain Assessment During Activity;Nurse Notified (Not quantified, LUE very sensitive & painful)   Prior Living Situation   Prior Services Intermittent Physical Support for ADL Per Family;Skilled Home Health Services   Housing / Facility 1 Story House   Steps Into Home 1 (reported step is large, at least 10\" high)   Equipment Owned Front-Wheel Walker;4-Wheel Walker   Lives with - Patient's Self Care Capacity (Pt reported " "he lives in a \"mother in law\" unit alone, his niece lives in the main house.)   Prior Level of Functional Mobility   Bed Mobility Independent   Transfer Status Independent   Ambulation Independent   Ambulation Distance household distances, decreasing recently   Assistive Devices Used Front-Wheel Walker   Stairs Independent (with difficulty)   Cognition    Cognition / Consciousness X   Level of Consciousness Alert   Comments Pleasant & cooperative, hyperverbose & emotionally labile   Active ROM Lower Body    Active ROM Lower Body  WDL   Comments limited by body habitus   Strength Lower Body   Lower Body Strength  WDL   Comments Generalized weakness, WFL with functional mobility   Sensation Lower Body   Comments Denied N/T   Other Treatments   Other Treatments Provided Therapeutic activity provided in conjunction with acute PT evaluation including: STS from BSC, pivot back to bed, and upright sitting balance on BSC.   Balance Assessment   Sitting Balance (Static) Fair -   Sitting Balance (Dynamic) Fair -   Standing Balance (Static) Poor +   Standing Balance (Dynamic) Poor   Weight Shift Sitting Fair   Weight Shift Standing Poor   Bed Mobility    Supine to Sit Moderate Assist   Sit to Supine Moderate Assist   Scooting Minimal Assist   Gait Analysis   Gait Level Of Assist Unable to Participate   Functional Mobility   Sit to Stand Moderate Assist   Bed, Chair, Wheelchair Transfer Moderate Assist   Toilet Transfers Moderate Assist   Transfer Method Stand Pivot   Mobility supine > EOB >  BSC > back to bed   Activity Tolerance   Sitting in Chair 8+ min on BSC   Sitting Edge of Bed 5+ min   Standing ~2 min total   Comments limited by pain   Edema / Skin Assessment   Edema / Skin  X   Comments LUE very swollen, purple bruising   Short Term Goals    Short Term Goal # 1 Pt will perform supine <> sit with SPV in 6 visits to progress bed mobility   Short Term Goal # 2 Pt will perform STS/functional transfers with LRAD and SPV in " 6 visits to progress OOB mobility   Short Term Goal # 3 Pt will ambulate 100 ft with LRAD and SPV in 6 visits to progress functional gait

## 2023-12-29 NOTE — RESPIRATORY CARE
"COPD EDUCATION by COPD CLINICAL EDUCATOR  12/29/2023 at 2:38 PM by Lela Zuñiga, RRT     Patient interviewed by COPD education team. Patient refused COPD program at this time. An Action Plan was completed in EMR. Smoking Cessation Intervention and education completed, 3 minutes spent on smoking cessation education with patient.  Provided smoking cessation packet with \"Tips to Quit\" and brochure for \"Free Smoking Cessation Classes\".   COPD Assessment  COPD Clinical Specialists ONLY  COPD Education Initiated: Yes--Short Intervention  Is this a COPD exacerbation patient?: No  DME Company: [employer]  DME Equipment Type: 2lpm O2  Physician Name: JUHI Harrison  Pulmonologist Name: in Past in [place] (unknown name)  Smoking Cessation: Yes  $ Smoking Cessation 3-10 Minutes: Symptomatic (continues to smoke considering quitting has our info)  Home Health Care:  (TBD 6 clicks pending and choice)  Mobile Urgent Care Services: N/A  Geriatric Specialty Group: N/A  $ Demo/Eval of SVN's, MDI's and Aerosols: Yes    PFT Results  (OP) Pulmonary Function Testing:  (none available in EMR)  Interdisciplinary Rounds: Attendance at Rounds (30 Min)  No results found for: \"PFT\"    Meds to Beds        MY COPD ACTION PLAN     It is recommended that patients and physicians /healthcare providers complete this action plan together. This plan should be discussed at each physician visit and updated as needed.    The green, yellow and red zones show groups of symptoms of COPD. This list of symptoms is not comprehensive, and you may experience other symptoms. In the \"Actions\" column, your healthcare provider has recommended actions for you to take based on your symptoms.    Patient Name: Henry Kumari   YOB: 1955   Last Updated on:     Green Zone:  I am doing well today Actions     Usual activitiy and exercise level   Take daily medications     Usual amounts of cough and phlegm/mucus   Use oxygen as prescribed     Sleep well at " "night   Continue regular exercise/diet plan     Appetite is good   At all times avoid cigarette smoke, inhaled irritants     Daily Medications (these medications are taken every day):    Advair  1 puff  Twice daily        Yellow Zone:  I am having a bad day or a COPD flare Actions     More breathless than usual   Continue daily medications     I have less energy for my daily activities   Use quick relief inhaler as ordered     Increased or thicker phlegm/mucus   Use oxygen as prescribed     Using quick relief inhaler/nebulizer more often   Get plenty of rest     Swelling of ankles more than usual   Use pursed lip breathing     More coughing than usual   At all times avoid cigarette smoke, inhaled irritants     I feel like I have a \"chest cold\"     Poor sleep and my symptoms woke me up     My appetite is not good     My medicine is not helping      Call provider immediately if symptoms don’t improve     Continue daily medications, add rescue medications:    albuterol  2 puffs  Every 6 hrs as needed       Medications to be used during a flare up, (as Discussed with Provider):              Red Zone:  I need urgent medical care Actions     Severe shortness of breath even at rest   Call 911 or seek medical care immediately     Not able to do any activity because of breathing      Fever or shaking chills      Feeling confused or very drowsy       Chest pains      Coughing up blood                  "

## 2023-12-30 LAB
ALBUMIN SERPL BCP-MCNC: 2.6 G/DL (ref 3.2–4.9)
BUN SERPL-MCNC: 18 MG/DL (ref 8–22)
CALCIUM ALBUM COR SERPL-MCNC: 9.4 MG/DL (ref 8.5–10.5)
CALCIUM SERPL-MCNC: 8.3 MG/DL (ref 8.5–10.5)
CHLORIDE SERPL-SCNC: 106 MMOL/L (ref 96–112)
CO2 SERPL-SCNC: 22 MMOL/L (ref 20–33)
CREAT SERPL-MCNC: 0.53 MG/DL (ref 0.5–1.4)
ERYTHROCYTE [DISTWIDTH] IN BLOOD BY AUTOMATED COUNT: 43.7 FL (ref 35.9–50)
GFR SERPLBLD CREATININE-BSD FMLA CKD-EPI: 109 ML/MIN/1.73 M 2
GLUCOSE SERPL-MCNC: 93 MG/DL (ref 65–99)
HCT VFR BLD AUTO: 34.2 % (ref 42–52)
HGB BLD-MCNC: 10.9 G/DL (ref 14–18)
MAGNESIUM SERPL-MCNC: 1.8 MG/DL (ref 1.5–2.5)
MCH RBC QN AUTO: 28.5 PG (ref 27–33)
MCHC RBC AUTO-ENTMCNC: 31.9 G/DL (ref 32.3–36.5)
MCV RBC AUTO: 89.5 FL (ref 81.4–97.8)
PHOSPHATE SERPL-MCNC: 2.8 MG/DL (ref 2.5–4.5)
PLATELET # BLD AUTO: 208 K/UL (ref 164–446)
PMV BLD AUTO: 11.1 FL (ref 9–12.9)
POTASSIUM SERPL-SCNC: 3.6 MMOL/L (ref 3.6–5.5)
RBC # BLD AUTO: 3.82 M/UL (ref 4.7–6.1)
SODIUM SERPL-SCNC: 139 MMOL/L (ref 135–145)
WBC # BLD AUTO: 5.2 K/UL (ref 4.8–10.8)

## 2023-12-30 PROCEDURE — 83735 ASSAY OF MAGNESIUM: CPT

## 2023-12-30 PROCEDURE — 700102 HCHG RX REV CODE 250 W/ 637 OVERRIDE(OP): Performed by: STUDENT IN AN ORGANIZED HEALTH CARE EDUCATION/TRAINING PROGRAM

## 2023-12-30 PROCEDURE — 80069 RENAL FUNCTION PANEL: CPT

## 2023-12-30 PROCEDURE — A9270 NON-COVERED ITEM OR SERVICE: HCPCS | Performed by: INTERNAL MEDICINE

## 2023-12-30 PROCEDURE — 85027 COMPLETE CBC AUTOMATED: CPT

## 2023-12-30 PROCEDURE — 36415 COLL VENOUS BLD VENIPUNCTURE: CPT

## 2023-12-30 PROCEDURE — 770001 HCHG ROOM/CARE - MED/SURG/GYN PRIV*

## 2023-12-30 PROCEDURE — 700101 HCHG RX REV CODE 250: Performed by: STUDENT IN AN ORGANIZED HEALTH CARE EDUCATION/TRAINING PROGRAM

## 2023-12-30 PROCEDURE — 99232 SBSQ HOSP IP/OBS MODERATE 35: CPT | Performed by: STUDENT IN AN ORGANIZED HEALTH CARE EDUCATION/TRAINING PROGRAM

## 2023-12-30 PROCEDURE — 94640 AIRWAY INHALATION TREATMENT: CPT

## 2023-12-30 PROCEDURE — 700102 HCHG RX REV CODE 250 W/ 637 OVERRIDE(OP): Performed by: INTERNAL MEDICINE

## 2023-12-30 PROCEDURE — A9270 NON-COVERED ITEM OR SERVICE: HCPCS | Performed by: STUDENT IN AN ORGANIZED HEALTH CARE EDUCATION/TRAINING PROGRAM

## 2023-12-30 RX ORDER — IPRATROPIUM BROMIDE AND ALBUTEROL SULFATE 2.5; .5 MG/3ML; MG/3ML
3 SOLUTION RESPIRATORY (INHALATION)
Status: DISCONTINUED | OUTPATIENT
Start: 2023-12-30 | End: 2024-01-05 | Stop reason: HOSPADM

## 2023-12-30 RX ADMIN — RIVAROXABAN 20 MG: 20 TABLET, FILM COATED ORAL at 04:41

## 2023-12-30 RX ADMIN — OXYCODONE HYDROCHLORIDE 10 MG: 10 TABLET ORAL at 11:02

## 2023-12-30 RX ADMIN — CLONAZEPAM 1 MG: 1 TABLET ORAL at 17:39

## 2023-12-30 RX ADMIN — ROSUVASTATIN CALCIUM 10 MG: 20 TABLET, FILM COATED ORAL at 17:39

## 2023-12-30 RX ADMIN — FLUOXETINE HYDROCHLORIDE 20 MG: 20 CAPSULE ORAL at 04:41

## 2023-12-30 RX ADMIN — METOPROLOL SUCCINATE 100 MG: 100 TABLET, EXTENDED RELEASE ORAL at 04:41

## 2023-12-30 RX ADMIN — MOMETASONE FUROATE AND FORMOTEROL FUMARATE DIHYDRATE 1 PUFF: 200; 5 AEROSOL RESPIRATORY (INHALATION) at 04:42

## 2023-12-30 RX ADMIN — OXYCODONE HYDROCHLORIDE 10 MG: 10 TABLET ORAL at 20:55

## 2023-12-30 RX ADMIN — CLONAZEPAM 1 MG: 1 TABLET ORAL at 04:41

## 2023-12-30 RX ADMIN — THEOPHYLLINE 300 MG: 300 TABLET, EXTENDED RELEASE ORAL at 04:42

## 2023-12-30 RX ADMIN — NYSTATIN: 100000 POWDER TOPICAL at 12:00

## 2023-12-30 RX ADMIN — TIOTROPIUM BROMIDE INHALATION SPRAY 2.5 MCG: 3.12 SPRAY, METERED RESPIRATORY (INHALATION) at 04:42

## 2023-12-30 RX ADMIN — THEOPHYLLINE 300 MG: 300 TABLET, EXTENDED RELEASE ORAL at 17:39

## 2023-12-30 RX ADMIN — NYSTATIN: 100000 POWDER TOPICAL at 04:44

## 2023-12-30 RX ADMIN — OXYCODONE HYDROCHLORIDE 10 MG: 10 TABLET ORAL at 03:33

## 2023-12-30 RX ADMIN — OXYCODONE HYDROCHLORIDE 10 MG: 10 TABLET ORAL at 17:39

## 2023-12-30 RX ADMIN — DOCUSATE SODIUM 50 MG AND SENNOSIDES 8.6 MG 2 TABLET: 8.6; 5 TABLET, FILM COATED ORAL at 04:41

## 2023-12-30 RX ADMIN — QUETIAPINE FUMARATE 100 MG: 100 TABLET ORAL at 17:39

## 2023-12-30 RX ADMIN — IPRATROPIUM BROMIDE AND ALBUTEROL SULFATE 3 ML: 2.5; .5 SOLUTION RESPIRATORY (INHALATION) at 10:16

## 2023-12-30 RX ADMIN — NYSTATIN: 100000 POWDER TOPICAL at 17:42

## 2023-12-30 RX ADMIN — QUETIAPINE FUMARATE 100 MG: 100 TABLET ORAL at 04:41

## 2023-12-30 ASSESSMENT — CHA2DS2 SCORE
SEX: MALE
AGE 65 TO 74: YES
CHF OR LEFT VENTRICULAR DYSFUNCTION: NO
VASCULAR DISEASE: NO
CHA2DS2 VASC SCORE: 4
DIABETES: NO
AGE 75 OR GREATER: NO
PRIOR STROKE OR TIA OR THROMBOEMBOLISM: YES
HYPERTENSION: YES

## 2023-12-30 ASSESSMENT — PAIN DESCRIPTION - PAIN TYPE
TYPE: ACUTE PAIN
TYPE: ACUTE PAIN

## 2023-12-30 ASSESSMENT — FIBROSIS 4 INDEX: FIB4 SCORE: 4.1

## 2023-12-30 NOTE — CARE PLAN
The patient is Stable - Low risk of patient condition declining or worsening    Shift Goals  Clinical Goals: Discharge  Patient Goals: Discharge  Family Goals: CLOTILDE    Progress made toward(s) clinical / shift goals:      Problem: Pain - Standard  Goal: Alleviation of pain or a reduction in pain to the patient’s comfort goal  Description: Target End Date:  Prior to discharge or change in level of care    Document on Vitals flowsheet    1.  Document pain using the appropriate pain scale per order or unit policy  2.  Educate and implement non-pharmacologic comfort measures (i.e. relaxation, distraction, massage, cold/heat therapy, etc.)  3.  Pain management medications as ordered  4.  Reassess pain after pain med administration per policy  5.  If opiods administered assess patient's response to pain medication is appropriate per POSS sedation scale  6.  Follow pain management plan developed in collaboration with patient and interdisciplinary team (including palliative care or pain specialists if applicable)  Outcome: Progressing     Problem: Knowledge Deficit - Standard  Goal: Patient and family/care givers will demonstrate understanding of plan of care, disease process/condition, diagnostic tests and medications  Description: Target End Date:  1-3 days or as soon as patient condition allows    Document in Patient Education    1.  Patient and family/caregiver oriented to unit, equipment, visitation policy and means for communicating concern  2.  Complete/review Learning Assessment  3.  Assess knowledge level of disease process/condition, treatment plan, diagnostic tests and medications  4.  Explain disease process/condition, treatment plan, diagnostic tests and medications  Outcome: Progressing     Problem: Hemodynamics  Goal: Patient's hemodynamics, fluid balance and neurologic status will be stable or improve  Description: Target End Date:  Prior to discharge or change in level of care    Document on Assessment and  I/O flowsheet templates    1.  Monitor vital signs, pulse oximetry and cardiac monitor per provider order and/or policy  2.  Maintain blood pressure per provider order  3.  Hemodynamic monitoring per provider order  4.  Manage IV fluids and IV infusions  5.  Monitor intake and output  6.  Daily weights per unit policy or provider order  7.  Assess peripheral pulses and capillary refill  8.  Assess color and body temperature  9.  Position patient for maximum circulation/cardiac output  10. Monitor for signs/symptoms of excessive bleeding  11. Assess mental status, restlessness and changes in level of consciousness  12. Monitor temperature and report fever or hypothermia to provider immediately. Consideration of targeted temperature management.  Outcome: Progressing     Problem: Fluid Volume  Goal: Fluid volume balance will be maintained  Description: Target End Date:  Prior to discharge or change in level of care    Document on I/O flowsheet    1.  Monitor intake and output as ordered  2.  Promote oral intake as appropriate  3.  Report inadequate intake or output to physician  4.  Administer IV therapy as ordered  5.  Weights per provider order  6.  Assess for signs and symptoms of bleeding  7.  Monitor for signs of fluid overload (respiratory changes, edema, weight gain, increased abdominal girth)  8.  Monitor of signs for inadequate fluid volume (poor skin turgor, dry mucous membranes)  9.  Instruct patient on adherence to fluid restrictions  Outcome: Progressing     Problem: Urinary - Renal Perfusion  Goal: Ability to achieve and maintain adequate renal perfusion and functioning will improve  Description: Target End Date:  Prior to discharge or change in level of care    Document on I/O and Assessment flowsheet    1.  Urine output will remain greater than 0.5ml/Kg/HR  2.  Monitor amount and/or characteristics of urine per order/policy. Specific gravity per order/policy  3.  Assess signs and symptoms of renal  dysfunction  Outcome: Progressing     Problem: Respiratory  Goal: Patient will achieve/maintain optimum respiratory ventilation and gas exchange  Description: Target End Date:  Prior to discharge or change in level of care    Document on Assessment flowsheet    1.  Assess and monitor rate, rhythm, depth and effort of respiration  2.  Breath sounds assessed qshift and/or as needed  3.  Assess O2 saturation, administer/titrate oxygen as ordered  4.  Position patient for maximum ventilatory efficiency  5.  Turn, cough, and deep breath with splinting to improve effectiveness  6.  Collaborate with RT to administer medication/treatments per order  7.  Encourage use of incentive spirometer and encourage patient to cough after use and utilize splinting techniques if applicable  8.  Airway suctioning  9.  Monitor sputum production for changes in color, consistency and frequency  10. Perform frequent oral hygiene  11. Alternate physical activity with rest periods  Outcome: Progressing     Problem: Mechanical Ventilation  Goal: Safe management of artificial airway and ventilation  Description: Target End Date:  when vent discontinued    Document on VAP flowsheet and Airway LDA    1.  Daily awakening trials per provider order/policy  2.  Suctioning and care of ET/Trach tube (document on LDA)  3.  Collaborate with RT to administer medications/treatments  4.  Ambu bag at bedside and available for transport  5.  Trach patient - replacement trach at bedside  6.  Provide communication tools if applicable  Outcome: Progressing  Goal: Successful weaning off mechanical ventilator, spontaneously maintains adequate gas exchange  Description: Target End Date:  when vent discontinued    1.  Follow universal weaning protocol for patients on mechanical ventilation per order  2.  Review contraindication list.  Obtain provider order to wean in presence of contraindication.  3.  Obtain Gerald Sedation-Agitation Score  4.  Gerald Score 1-2:   sedation vacation  5.  Gerald Score 3-4:  Collaborate with provider and/or RT to determine readiness for trial  6.  Begin 2 hour trial of weaning following protocol  7.  Evaluate for fatigue parameters per protocol  8.  Fatigue parameters triggered:  Stop wean and return to previous ASV% setting or increase % minute volume to offset work or breathing  Outcome: Progressing  Goal: Patient will be able to express needs and understand communication  Description: Target End Date:  when vent discontinued    1.  Assess ability to communicate and understand  2.  Provide communication tools  3.  Collaborate with Speech Therapy for [facility]  Outcome: Progressing     Problem: Physical Regulation  Goal: Diagnostic test results will improve  Description: Target End Date:  Prior to discharge or change in level of care    1.  Monitor lactic acid levels  2.  Monitor ABG's  3.  Monitor diagnostic test results  Outcome: Progressing  Goal: Signs and symptoms of infection will decrease  Description: Target End Date:  Prior to discharge or change in level of care    1.  Remove potential routes of infection, such as central lines and urinary catheter  2.  Follow facility protocol for changing IV tubing and sites  3.  Collaborate with Infectious Disease  4.  Antibiotic therapy per provider order  5.  Note drug effects and monitor for antibiotic toxicity  Outcome: Progressing     Problem: Skin Integrity  Goal: Skin integrity is maintained or improved  Description: Target End Date:  Prior to discharge or change in level of care    Document interventions on Skin Risk/Antony flowsheet groups and corresponding LDA    1.  Assess and monitor skin integrity, appearance and/or temperature  2.  Assess risk factors for impaired skin integrity and/or pressures ulcers  3.  Implement precautions to protect skin integrity in collaboration with interdisciplinary team  4.  Implement pressure ulcer prevention protocol if at risk for skin breakdown  5.  Confirm  wound care consult if at risk for skin breakdown  6.  Ensure patient use of pressure relieving devices  (Low air loss bed, waffle overlay, heel protectors, ROHO cushion, etc)  Outcome: Progressing     Problem: Fall Risk  Goal: Patient will remain free from falls  Description: Target End Date:  Prior to discharge or change in level of care    Document interventions on the [facility] Fall Risk Assessment    1.  Assess for fall risk factors  2.  Implement fall precautions  Outcome: Progressing     Problem: Psychosocial  Goal: Patient's level of anxiety will decrease  Description: Target End Date:  1-3 days or as soon as patient condition allows    1.  Collaborate with patient and family/caregiver to identify triggers and develop strategies to cope with anxiety  2.  Implement stimuli reduction, calming techniques  3.  Pharmacologic management per provider order  4.  Encourage patient/family/care giver participation  5.  Collaborate with interdisciplinary team including Psychologist or Behavioral Health Team as needed  Outcome: Progressing  Goal: Patient's ability to verbalize feelings about condition will improve  Description: Target End Date:  Prior to discharge or change in level of care    1.  Discuss coping with medical condition and its effects  2.  Encourage patient participation in care  3.  Encourage acknowledgement of body changes and accompanying emotions  4.  Perform depression screening  Outcome: Progressing  Goal: Patient's ability to re-evaluate and adapt role responsibilities will improve  Description: Target End Date:  Prior to discharge or change in level of care    1.  Assess family support  2.  Encourage support system participation in care  3.  Encouraged verbalization of feelings regarding caregiver responsibilities  4.  Discuss changes in role and responsibilities caused by patient's condition  Outcome: Progressing  Goal: Patient and family will demonstrate ability to cope with life altering  diagnosis and/or procedure  Description: Target End Date:  1-3 days or as soon as patient condition allows    1. Expect initial shock and disbelief following diagnosis of cancer and traumatizing procedures (disfiguring surgery, colostomy, amputation).  2.  Assess patient and family/caregiver for stage of grief currently being experienced. Explain process as appropriate.  3.  Provide open, nonjudgmental environment. Use therapeutic communication skills of Active-Listening, acknowledgment, and so on.  4.  Encourage verbalization of thoughts or concerns and accept expressions of sadness, anger, rejection. Acknowledge normality of these feelings  5.  Be aware of mood swings, hostility, and other acting-out behavior. Set limits on inappropriate behavior, redirect negative thinking  6.  Be aware of debilitating depression. Ask patient direct questions about state of mind.  7.  Visit frequently and provide physical contact as appropriate, or provide frequent phone support as appropriate for setting. Arrange for care provider and support person to stay with patient as needed  8.  Reinforce teaching regarding disease process and treatments and provide information as appropriate about dying. Be honest; do not give false hope while providing emotional support  9.  Review past life experiences, role changes, and coping skills. Talk about things that interest the patient  Outcome: Progressing  Goal: Spiritual and cultural needs incorporated into hospitalization  Description: Target End Date:  End of day 1    1.  Encourage verbalization of feelings, concerns, expectations and needs  2.  Collaborate with Case Management/  3.  Collaborate with Pastoral Care to meet spiritual needs  Outcome: Progressing     Problem: Communication  Goal: The ability to communicate needs accurately and effectively will improve  Description: Target End Date:  End of day 1    1.  Assess ability to communicate and understand  2.  Provide  augmentative or alternative methods of communication devices  3.  Use /language line as appropriate  4.  Collaborate with Speech Therapy as needed  Outcome: Progressing     Problem: Risk for Aspiration  Goal: Patient's risk for aspiration will be absent or decrease  Description: Target End Date:  Prior to discharge or change in level of care    1.   Complete dysphagia screening on admission  2.   NPO until dysphagia screening complete or medically cleared  3.   Collaborate with Speech Therapy, Clinical Dietitian and interdisciplinary team  4.   Implement aspiration precautions  5.   Assist patient up to chair for meals  6.   Elevate head of bed 90 degrees if patient is unable to get out of bed  7.   Encourage small bites  8.   Ensure foods/liquids are of appropriate consistency  9.   Assess for any signs/symptoms of aspiration  10. Assess breath sounds and vital signs after oral intake  Outcome: Progressing     Problem: Mobility  Goal: Patient's capacity to carry out activities will improve  Description: Target End Date:  Prior to discharge or change in level of care    1.  Assess for barriers to mobility/activity  2.  Implement activity per interdisciplinary team recommendations  3.  Target activity level identified and patient/family/caregiver aware of goal  4.  Provide assistive devices  5.  Instruct patient/caregiver on proper use of assistive/adaptive devices  6.  Schedule activities and rest periods to decrease effects of fatigue  7.  Encourage mobilization to extent of ability  8.  Maintain proper body alignment  9.  Provide adequate pain management to allow progressive mobilization  10. Implement pace maker precautions as needed  Outcome: Progressing     Problem: Self Care  Goal: Patient will have the ability to perform ADLs independently or with assistance (bathe, groom, dress, toilet and feed)  Description: Target End Date:  Prior to discharge or change in level of care    Document on ADL  flowsheet    1.  Assess the capability and level of deficiency to perform ADLs  2.  Encourage family/care giver involvement  3.  Provide assistive devices  4.  Consider PT/OT evaluations  5.  Maintain support, give positive feedback, encourage self-care allowing extra time and verbal cuing as needed  6.  Avoid doing something for patients they can do themselves, but provide assistance as needed  7.  Assist in anticipating/planning individual needs  8.  Collaborate with Case Management and  to meet discharge needs  Outcome: Progressing     Problem: Infection - Standard  Goal: Patient will remain free from infection  Description: Target End Date:  Prior to discharge or change in level of care    1.  Utilize Standard Precautions at all times to reduce the risk of transmission of microorganisms from both recognized and  unrecognized sources of infection  2.  Infection prevention handouts provided (general/device/diagnosis specific) and documented in Patient Education  3.  Educate patient and family/caregiver on isolation precautions if applicable  Outcome: Progressing     Problem: Wound/ / Incision Healing  Goal: Patient's wound/surgical incision will decrease in size and heals properly  Description: Target End Date:  Prior to discharge or change in level of care    Document on LDA    1.  Assess and document surgical incision/wound  2.  Provide incision/wound care per policy and/or provider orders  3.  Manage surgical drains per policy if applicable  4.  Encourage adequate nutrition to promote wound healing  5.  Collaborate with Clinical Dietician  Outcome: Progressing

## 2023-12-30 NOTE — PROGRESS NOTES
Bedside report completed with Edvin COBB, assumed pt care. Pt is sleeping In bed on 2LNC. Bed in low and locked position, call light within reach. Will continue to monitor.

## 2023-12-30 NOTE — PROGRESS NOTES
Report received by day shift RN. Pt awake alert and oriented x 4 with c/o pain in left shoulder/arm. Pt repositioned for comfort. Discussed POC. All needs met at this time.

## 2023-12-30 NOTE — PROGRESS NOTES
"    Orthopedic PA Progress Note    Interval changes:  Patient doing well.  LUE dressings are CDI with min serosanguineous drainage to distal dressing- changed   WBAT LUE  Cleared for DC from orthopedic standpoint pending therapy recs and medical optimization    ROS - Patient denies any new issues.  Denies any numbness or tingling. Pain well controlled.    /70   Pulse 80   Temp 36.6 °C (97.9 °F) (Temporal)   Resp 18   Ht 1.88 m (6' 2\")   Wt (!) 171 kg (378 lb 1.4 oz)   SpO2 96%     Patient seen and examined  No acute distress  Breathing non labored  RRR  LUE: Dressing with min serosang drainage. Changed today. Patient clearly fires forearm flexors, forearm extensors, and moves all five fingers without issue . Sensation is intact to light touch throughout median, ulnar, and radial nerve distributions. Strong and palpable radial pulses with capillary refill less than 2 seconds.     Recent Labs     12/28/23  0413 12/29/23  0156 12/30/23  0242   WBC 8.8 7.0 5.2   RBC 3.47* 3.53* 3.82*   HEMOGLOBIN 10.0* 10.0* 10.9*   HEMATOCRIT 30.7* 30.9* 34.2*   MCV 88.5 87.5 89.5   MCH 28.8 28.3 28.5   MCHC 32.6 32.4 31.9*   RDW 43.3 42.9 43.7   PLATELETCT 147* 180 208   MPV 11.4 11.5 11.1         Active Hospital Problems    Diagnosis     Hypophosphatemia [E83.39]     Cellulitis [L03.90]     Left humeral fracture [S42.302A]     Lactic acidosis [E87.20]     Opiate dependence ([facility]) [F11.20]     Chronic respiratory failure with hypoxia ([facility]) [J96.11]     Morbid obesity ([facility]) [E66.01]     Sepsis ([facility]) [A41.9]     History of DVT (deep vein thrombosis) [Z86.718]     Atrial fibrillation with RVR ([facility]) [I48.91]     COPD (chronic obstructive pulmonary disease) ([facility]) [J44.9]     SANTOSH on CPAP [G47.33]        Assessment/Plan:  Patient doing well.  LUE dressings are CDI with min serosanguineous drainage to distal dressing- changed   WBAT LUE  Cleared for DC from orthopedic standpoint pending therapy recs and medical " optimization    POD#2 S/p   Open treatment of left humeral shaft fracture with insertion of intramedullary implant   Wt bearing status - WBAT LUE  Wound care/Drains - Dressings to be changed every other day by nursing. Or PRN for saturation starting POD#2  Future Procedures - None planned   Lovenox: Start 12/29, Duration-until ambulatory > 150'  Sutures/Staples out- 14-21 days post operatively. Removal will completed by ortho JULIETTE's unless transferred.  DVT Prophylaxis outpatient: ASA 81 mg PO BID x4 weeks  PT/OT-initiated  Antibiotics:  Perioperative completed  DVT Prophylaxis- TEDS/SCDs/Foot pumps  [place]-not needed per ortho  Case Coordination for Discharge Planning - Disposition per therapy recs.

## 2023-12-30 NOTE — CARE PLAN
The patient is Stable - Low risk of patient condition declining or worsening    Shift Goals  Clinical Goals: Pain control, mobility  Patient Goals: Pain control  Family Goals: CLOTILDE    Progress made toward(s) clinical / shift goals:      Problem: Pain - Standard  Goal: Alleviation of pain or a reduction in pain to the patient’s comfort goal  Description: Target End Date:  Prior to discharge or change in level of care    Document on Vitals flowsheet    1.  Document pain using the appropriate pain scale per order or unit policy  2.  Educate and implement non-pharmacologic comfort measures (i.e. relaxation, distraction, massage, cold/heat therapy, etc.)  3.  Pain management medications as ordered  4.  Reassess pain after pain med administration per policy  5.  If opiods administered assess patient's response to pain medication is appropriate per POSS sedation scale  6.  Follow pain management plan developed in collaboration with patient and interdisciplinary team (including palliative care or pain specialists if applicable)  Outcome: Progressing     Problem: Knowledge Deficit - Standard  Goal: Patient and family/care givers will demonstrate understanding of plan of care, disease process/condition, diagnostic tests and medications  Description: Target End Date:  1-3 days or as soon as patient condition allows    Document in Patient Education    1.  Patient and family/caregiver oriented to unit, equipment, visitation policy and means for communicating concern  2.  Complete/review Learning Assessment  3.  Assess knowledge level of disease process/condition, treatment plan, diagnostic tests and medications  4.  Explain disease process/condition, treatment plan, diagnostic tests and medications  Outcome: Progressing     Problem: Hemodynamics  Goal: Patient's hemodynamics, fluid balance and neurologic status will be stable or improve  Description: Target End Date:  Prior to discharge or change in level of care    Document on  Assessment and I/O flowsheet templates    1.  Monitor vital signs, pulse oximetry and cardiac monitor per provider order and/or policy  2.  Maintain blood pressure per provider order  3.  Hemodynamic monitoring per provider order  4.  Manage IV fluids and IV infusions  5.  Monitor intake and output  6.  Daily weights per unit policy or provider order  7.  Assess peripheral pulses and capillary refill  8.  Assess color and body temperature  9.  Position patient for maximum circulation/cardiac output  10. Monitor for signs/symptoms of excessive bleeding  11. Assess mental status, restlessness and changes in level of consciousness  12. Monitor temperature and report fever or hypothermia to provider immediately. Consideration of targeted temperature management.  Outcome: Progressing     Problem: Fluid Volume  Goal: Fluid volume balance will be maintained  Description: Target End Date:  Prior to discharge or change in level of care    Document on I/O flowsheet    1.  Monitor intake and output as ordered  2.  Promote oral intake as appropriate  3.  Report inadequate intake or output to physician  4.  Administer IV therapy as ordered  5.  Weights per provider order  6.  Assess for signs and symptoms of bleeding  7.  Monitor for signs of fluid overload (respiratory changes, edema, weight gain, increased abdominal girth)  8.  Monitor of signs for inadequate fluid volume (poor skin turgor, dry mucous membranes)  9.  Instruct patient on adherence to fluid restrictions  Outcome: Progressing     Problem: Urinary - Renal Perfusion  Goal: Ability to achieve and maintain adequate renal perfusion and functioning will improve  Description: Target End Date:  Prior to discharge or change in level of care    Document on I/O and Assessment flowsheet    1.  Urine output will remain greater than 0.5ml/Kg/HR  2.  Monitor amount and/or characteristics of urine per order/policy. Specific gravity per order/policy  3.  Assess signs and symptoms  of renal dysfunction  Outcome: Progressing     Problem: Respiratory  Goal: Patient will achieve/maintain optimum respiratory ventilation and gas exchange  Description: Target End Date:  Prior to discharge or change in level of care    Document on Assessment flowsheet    1.  Assess and monitor rate, rhythm, depth and effort of respiration  2.  Breath sounds assessed qshift and/or as needed  3.  Assess O2 saturation, administer/titrate oxygen as ordered  4.  Position patient for maximum ventilatory efficiency  5.  Turn, cough, and deep breath with splinting to improve effectiveness  6.  Collaborate with RT to administer medication/treatments per order  7.  Encourage use of incentive spirometer and encourage patient to cough after use and utilize splinting techniques if applicable  8.  Airway suctioning  9.  Monitor sputum production for changes in color, consistency and frequency  10. Perform frequent oral hygiene  11. Alternate physical activity with rest periods  Outcome: Progressing     Problem: Mechanical Ventilation  Goal: Safe management of artificial airway and ventilation  Description: Target End Date:  when vent discontinued    Document on VAP flowsheet and Airway LDA    1.  Daily awakening trials per provider order/policy  2.  Suctioning and care of ET/Trach tube (document on LDA)  3.  Collaborate with RT to administer medications/treatments  4.  Ambu bag at bedside and available for transport  5.  Trach patient - replacement trach at bedside  6.  Provide communication tools if applicable  Outcome: Progressing  Goal: Successful weaning off mechanical ventilator, spontaneously maintains adequate gas exchange  Description: Target End Date:  when vent discontinued    1.  Follow universal weaning protocol for patients on mechanical ventilation per order  2.  Review contraindication list.  Obtain provider order to wean in presence of contraindication.  3.  Obtain Gerald Sedation-Agitation Score  4.  Gerald Score  1-2:  sedation vacation  5.  Gerald Score 3-4:  Collaborate with provider and/or RT to determine readiness for trial  6.  Begin 2 hour trial of weaning following protocol  7.  Evaluate for fatigue parameters per protocol  8.  Fatigue parameters triggered:  Stop wean and return to previous ASV% setting or increase % minute volume to offset work or breathing  Outcome: Progressing  Goal: Patient will be able to express needs and understand communication  Description: Target End Date:  when vent discontinued    1.  Assess ability to communicate and understand  2.  Provide communication tools  3.  Collaborate with Speech Therapy for [facility]  Outcome: Progressing     Problem: Physical Regulation  Goal: Diagnostic test results will improve  Description: Target End Date:  Prior to discharge or change in level of care    1.  Monitor lactic acid levels  2.  Monitor ABG's  3.  Monitor diagnostic test results  Outcome: Progressing  Goal: Signs and symptoms of infection will decrease  Description: Target End Date:  Prior to discharge or change in level of care    1.  Remove potential routes of infection, such as central lines and urinary catheter  2.  Follow facility protocol for changing IV tubing and sites  3.  Collaborate with Infectious Disease  4.  Antibiotic therapy per provider order  5.  Note drug effects and monitor for antibiotic toxicity  Outcome: Progressing     Problem: Skin Integrity  Goal: Skin integrity is maintained or improved  Description: Target End Date:  Prior to discharge or change in level of care    Document interventions on Skin Risk/Antony flowsheet groups and corresponding LDA    1.  Assess and monitor skin integrity, appearance and/or temperature  2.  Assess risk factors for impaired skin integrity and/or pressures ulcers  3.  Implement precautions to protect skin integrity in collaboration with interdisciplinary team  4.  Implement pressure ulcer prevention protocol if at risk for skin breakdown  5.   Confirm wound care consult if at risk for skin breakdown  6.  Ensure patient use of pressure relieving devices  (Low air loss bed, waffle overlay, heel protectors, ROHO cushion, etc)  Outcome: Progressing     Problem: Fall Risk  Goal: Patient will remain free from falls  Description: Target End Date:  Prior to discharge or change in level of care    Document interventions on the [facility] Fall Risk Assessment    1.  Assess for fall risk factors  2.  Implement fall precautions  Outcome: Progressing     Problem: Psychosocial  Goal: Patient's level of anxiety will decrease  Description: Target End Date:  1-3 days or as soon as patient condition allows    1.  Collaborate with patient and family/caregiver to identify triggers and develop strategies to cope with anxiety  2.  Implement stimuli reduction, calming techniques  3.  Pharmacologic management per provider order  4.  Encourage patient/family/care giver participation  5.  Collaborate with interdisciplinary team including Psychologist or Behavioral Health Team as needed  Outcome: Progressing  Goal: Patient's ability to verbalize feelings about condition will improve  Description: Target End Date:  Prior to discharge or change in level of care    1.  Discuss coping with medical condition and its effects  2.  Encourage patient participation in care  3.  Encourage acknowledgement of body changes and accompanying emotions  4.  Perform depression screening  Outcome: Progressing  Goal: Patient's ability to re-evaluate and adapt role responsibilities will improve  Description: Target End Date:  Prior to discharge or change in level of care    1.  Assess family support  2.  Encourage support system participation in care  3.  Encouraged verbalization of feelings regarding caregiver responsibilities  4.  Discuss changes in role and responsibilities caused by patient's condition  Outcome: Progressing  Goal: Patient and family will demonstrate ability to cope with life  altering diagnosis and/or procedure  Description: Target End Date:  1-3 days or as soon as patient condition allows    1. Expect initial shock and disbelief following diagnosis of cancer and traumatizing procedures (disfiguring surgery, colostomy, amputation).  2.  Assess patient and family/caregiver for stage of grief currently being experienced. Explain process as appropriate.  3.  Provide open, nonjudgmental environment. Use therapeutic communication skills of Active-Listening, acknowledgment, and so on.  4.  Encourage verbalization of thoughts or concerns and accept expressions of sadness, anger, rejection. Acknowledge normality of these feelings  5.  Be aware of mood swings, hostility, and other acting-out behavior. Set limits on inappropriate behavior, redirect negative thinking  6.  Be aware of debilitating depression. Ask patient direct questions about state of mind.  7.  Visit frequently and provide physical contact as appropriate, or provide frequent phone support as appropriate for setting. Arrange for care provider and support person to stay with patient as needed  8.  Reinforce teaching regarding disease process and treatments and provide information as appropriate about dying. Be honest; do not give false hope while providing emotional support  9.  Review past life experiences, role changes, and coping skills. Talk about things that interest the patient  Outcome: Progressing  Goal: Spiritual and cultural needs incorporated into hospitalization  Description: Target End Date:  End of day 1    1.  Encourage verbalization of feelings, concerns, expectations and needs  2.  Collaborate with Case Management/  3.  Collaborate with Pastoral Care to meet spiritual needs  Outcome: Progressing     Problem: Communication  Goal: The ability to communicate needs accurately and effectively will improve  Description: Target End Date:  End of day 1    1.  Assess ability to communicate and understand  2.   Provide augmentative or alternative methods of communication devices  3.  Use /language line as appropriate  4.  Collaborate with Speech Therapy as needed  Outcome: Progressing     Problem: Risk for Aspiration  Goal: Patient's risk for aspiration will be absent or decrease  Description: Target End Date:  Prior to discharge or change in level of care    1.   Complete dysphagia screening on admission  2.   NPO until dysphagia screening complete or medically cleared  3.   Collaborate with Speech Therapy, Clinical Dietitian and interdisciplinary team  4.   Implement aspiration precautions  5.   Assist patient up to chair for meals  6.   Elevate head of bed 90 degrees if patient is unable to get out of bed  7.   Encourage small bites  8.   Ensure foods/liquids are of appropriate consistency  9.   Assess for any signs/symptoms of aspiration  10. Assess breath sounds and vital signs after oral intake  Outcome: Progressing     Problem: Mobility  Goal: Patient's capacity to carry out activities will improve  Description: Target End Date:  Prior to discharge or change in level of care    1.  Assess for barriers to mobility/activity  2.  Implement activity per interdisciplinary team recommendations  3.  Target activity level identified and patient/family/caregiver aware of goal  4.  Provide assistive devices  5.  Instruct patient/caregiver on proper use of assistive/adaptive devices  6.  Schedule activities and rest periods to decrease effects of fatigue  7.  Encourage mobilization to extent of ability  8.  Maintain proper body alignment  9.  Provide adequate pain management to allow progressive mobilization  10. Implement pace maker precautions as needed  Outcome: Progressing     Problem: Self Care  Goal: Patient will have the ability to perform ADLs independently or with assistance (bathe, groom, dress, toilet and feed)  Description: Target End Date:  Prior to discharge or change in level of care    Document on  ADL flowsheet    1.  Assess the capability and level of deficiency to perform ADLs  2.  Encourage family/care giver involvement  3.  Provide assistive devices  4.  Consider PT/OT evaluations  5.  Maintain support, give positive feedback, encourage self-care allowing extra time and verbal cuing as needed  6.  Avoid doing something for patients they can do themselves, but provide assistance as needed  7.  Assist in anticipating/planning individual needs  8.  Collaborate with Case Management and  to meet discharge needs  Outcome: Progressing     Problem: Infection - Standard  Goal: Patient will remain free from infection  Description: Target End Date:  Prior to discharge or change in level of care    1.  Utilize Standard Precautions at all times to reduce the risk of transmission of microorganisms from both recognized and  unrecognized sources of infection  2.  Infection prevention handouts provided (general/device/diagnosis specific) and documented in Patient Education  3.  Educate patient and family/caregiver on isolation precautions if applicable  Outcome: Progressing     Problem: Wound/ / Incision Healing  Goal: Patient's wound/surgical incision will decrease in size and heals properly  Description: Target End Date:  Prior to discharge or change in level of care    Document on LDA    1.  Assess and document surgical incision/wound  2.  Provide incision/wound care per policy and/or provider orders  3.  Manage surgical drains per policy if applicable  4.  Encourage adequate nutrition to promote wound healing  5.  Collaborate with Clinical Dietician  Outcome: Progressing

## 2023-12-30 NOTE — DISCHARGE PLANNING
Agency/Facility Name: [facility]  Spoke To: Brand[facility]  Outcome: [facility] needs to cancel transport, no bariatric bed available at this time.      RN JAMEY Dewitt notified via Teams.

## 2023-12-30 NOTE — DISCHARGE PLANNING
Case Management Discharge Planning    Admission Date: 12/26/2023  GMLOS: 3.6  ALOS: 4    6-Clicks ADL Score: 12  6-Clicks Mobility Score: 7  PT and/or OT Eval ordered: Yes  Post-acute Referrals Ordered: Yes  Post-acute Choice Obtained: Yes  Has referral(s) been sent to post-acute provider:  Yes      Anticipated Discharge Dispo: Discharge Disposition: D/T to SNF with Medicare cert in anticipation of skilled care (03)  Discharge Address: [facility] [place] [address]  Discharge Contact Phone Number: [phone]    DME Needed: No    Action(s) Taken:   Pt was discussed in morning rounds, pt is [facility] and can DC to SNF.  Pt was accepted to [facility] and [facility].  RNCM placed call to [facility]; they cannot admit pt over the weekend. RNCM placed call to [facility]; no answer, left  requesting a call back  RNCM placed call to [facility] who stated they can accept pt and to have transport set up at 1300.    RNCM was informed [facility] needs to cancel as they do not have a bariatric bed at this time.    Escalations Completed: None    Medically Clear: Yes    Next Steps: f/u with SNF referrals    Barriers to Discharge: Pending Placement

## 2023-12-31 ENCOUNTER — APPOINTMENT (OUTPATIENT)
Dept: RADIOLOGY | Facility: MEDICAL CENTER | Age: 68
DRG: 853 | End: 2023-12-31
Attending: STUDENT IN AN ORGANIZED HEALTH CARE EDUCATION/TRAINING PROGRAM
Payer: MEDICARE

## 2023-12-31 PROBLEM — J18.9 PNEUMONIA: Status: ACTIVE | Noted: 2023-12-31

## 2023-12-31 LAB
ALBUMIN SERPL BCP-MCNC: 2.5 G/DL (ref 3.2–4.9)
BACTERIA BLD CULT: NORMAL
BACTERIA BLD CULT: NORMAL
BUN SERPL-MCNC: 14 MG/DL (ref 8–22)
CALCIUM ALBUM COR SERPL-MCNC: 9.3 MG/DL (ref 8.5–10.5)
CALCIUM SERPL-MCNC: 8.1 MG/DL (ref 8.5–10.5)
CHLORIDE SERPL-SCNC: 106 MMOL/L (ref 96–112)
CO2 SERPL-SCNC: 24 MMOL/L (ref 20–33)
CREAT SERPL-MCNC: 0.51 MG/DL (ref 0.5–1.4)
ERYTHROCYTE [DISTWIDTH] IN BLOOD BY AUTOMATED COUNT: 43.5 FL (ref 35.9–50)
GFR SERPLBLD CREATININE-BSD FMLA CKD-EPI: 110 ML/MIN/1.73 M 2
GLUCOSE SERPL-MCNC: 91 MG/DL (ref 65–99)
HCT VFR BLD AUTO: 30.3 % (ref 42–52)
HGB BLD-MCNC: 9.8 G/DL (ref 14–18)
MAGNESIUM SERPL-MCNC: 1.5 MG/DL (ref 1.5–2.5)
MCH RBC QN AUTO: 28.2 PG (ref 27–33)
MCHC RBC AUTO-ENTMCNC: 32.3 G/DL (ref 32.3–36.5)
MCV RBC AUTO: 87.3 FL (ref 81.4–97.8)
PHOSPHATE SERPL-MCNC: 3.1 MG/DL (ref 2.5–4.5)
PLATELET # BLD AUTO: 211 K/UL (ref 164–446)
PMV BLD AUTO: 10.6 FL (ref 9–12.9)
POTASSIUM SERPL-SCNC: 3.6 MMOL/L (ref 3.6–5.5)
RBC # BLD AUTO: 3.47 M/UL (ref 4.7–6.1)
SIGNIFICANT IND 70042: NORMAL
SIGNIFICANT IND 70042: NORMAL
SITE SITE: NORMAL
SITE SITE: NORMAL
SODIUM SERPL-SCNC: 139 MMOL/L (ref 135–145)
SOURCE SOURCE: NORMAL
SOURCE SOURCE: NORMAL
WBC # BLD AUTO: 6.2 K/UL (ref 4.8–10.8)

## 2023-12-31 PROCEDURE — 99232 SBSQ HOSP IP/OBS MODERATE 35: CPT | Performed by: STUDENT IN AN ORGANIZED HEALTH CARE EDUCATION/TRAINING PROGRAM

## 2023-12-31 PROCEDURE — 94640 AIRWAY INHALATION TREATMENT: CPT

## 2023-12-31 PROCEDURE — 700111 HCHG RX REV CODE 636 W/ 250 OVERRIDE (IP): Mod: JZ | Performed by: STUDENT IN AN ORGANIZED HEALTH CARE EDUCATION/TRAINING PROGRAM

## 2023-12-31 PROCEDURE — 36415 COLL VENOUS BLD VENIPUNCTURE: CPT

## 2023-12-31 PROCEDURE — 770001 HCHG ROOM/CARE - MED/SURG/GYN PRIV*

## 2023-12-31 PROCEDURE — 700111 HCHG RX REV CODE 636 W/ 250 OVERRIDE (IP): Mod: JZ | Performed by: INTERNAL MEDICINE

## 2023-12-31 PROCEDURE — 80069 RENAL FUNCTION PANEL: CPT

## 2023-12-31 PROCEDURE — 97602 WOUND(S) CARE NON-SELECTIVE: CPT

## 2023-12-31 PROCEDURE — A9270 NON-COVERED ITEM OR SERVICE: HCPCS | Performed by: INTERNAL MEDICINE

## 2023-12-31 PROCEDURE — 83735 ASSAY OF MAGNESIUM: CPT

## 2023-12-31 PROCEDURE — 700101 HCHG RX REV CODE 250: Performed by: STUDENT IN AN ORGANIZED HEALTH CARE EDUCATION/TRAINING PROGRAM

## 2023-12-31 PROCEDURE — A9270 NON-COVERED ITEM OR SERVICE: HCPCS | Performed by: STUDENT IN AN ORGANIZED HEALTH CARE EDUCATION/TRAINING PROGRAM

## 2023-12-31 PROCEDURE — 700102 HCHG RX REV CODE 250 W/ 637 OVERRIDE(OP): Performed by: INTERNAL MEDICINE

## 2023-12-31 PROCEDURE — 85027 COMPLETE CBC AUTOMATED: CPT

## 2023-12-31 PROCEDURE — 94760 N-INVAS EAR/PLS OXIMETRY 1: CPT

## 2023-12-31 PROCEDURE — 71045 X-RAY EXAM CHEST 1 VIEW: CPT

## 2023-12-31 PROCEDURE — 700101 HCHG RX REV CODE 250: Performed by: ORTHOPAEDIC SURGERY

## 2023-12-31 PROCEDURE — 700102 HCHG RX REV CODE 250 W/ 637 OVERRIDE(OP): Performed by: STUDENT IN AN ORGANIZED HEALTH CARE EDUCATION/TRAINING PROGRAM

## 2023-12-31 RX ORDER — GUAIFENESIN 600 MG/1
600 TABLET, EXTENDED RELEASE ORAL 2 TIMES DAILY
Status: DISCONTINUED | OUTPATIENT
Start: 2023-12-31 | End: 2024-01-05 | Stop reason: HOSPADM

## 2023-12-31 RX ORDER — POTASSIUM CHLORIDE 1500 MG/1
40 TABLET, EXTENDED RELEASE ORAL ONCE
Status: COMPLETED | OUTPATIENT
Start: 2023-12-31 | End: 2023-12-31

## 2023-12-31 RX ORDER — MAGNESIUM SULFATE HEPTAHYDRATE 40 MG/ML
2 INJECTION, SOLUTION INTRAVENOUS ONCE
Status: COMPLETED | OUTPATIENT
Start: 2023-12-31 | End: 2023-12-31

## 2023-12-31 RX ORDER — SODIUM HYPOCHLORITE 1.25 MG/ML
SOLUTION TOPICAL 2 TIMES DAILY
Status: DISCONTINUED | OUTPATIENT
Start: 2023-12-31 | End: 2024-01-05 | Stop reason: HOSPADM

## 2023-12-31 RX ADMIN — SODIUM HYPOCHLORITE 10 ML: 1.25 SOLUTION TOPICAL at 18:00

## 2023-12-31 RX ADMIN — QUETIAPINE FUMARATE 100 MG: 100 TABLET ORAL at 05:22

## 2023-12-31 RX ADMIN — ROSUVASTATIN CALCIUM 10 MG: 20 TABLET, FILM COATED ORAL at 17:52

## 2023-12-31 RX ADMIN — QUETIAPINE FUMARATE 100 MG: 100 TABLET ORAL at 17:52

## 2023-12-31 RX ADMIN — GUAIFENESIN 600 MG: 600 TABLET, EXTENDED RELEASE ORAL at 17:52

## 2023-12-31 RX ADMIN — HYDROMORPHONE HYDROCHLORIDE 0.5 MG: 1 INJECTION, SOLUTION INTRAMUSCULAR; INTRAVENOUS; SUBCUTANEOUS at 15:50

## 2023-12-31 RX ADMIN — MAGNESIUM SULFATE HEPTAHYDRATE 2 G: 2 INJECTION, SOLUTION INTRAVENOUS at 09:10

## 2023-12-31 RX ADMIN — POTASSIUM CHLORIDE 40 MEQ: 1500 TABLET, EXTENDED RELEASE ORAL at 08:41

## 2023-12-31 RX ADMIN — OXYCODONE HYDROCHLORIDE 10 MG: 10 TABLET ORAL at 09:15

## 2023-12-31 RX ADMIN — CLONAZEPAM 1 MG: 1 TABLET ORAL at 05:21

## 2023-12-31 RX ADMIN — NYSTATIN: 100000 POWDER TOPICAL at 07:30

## 2023-12-31 RX ADMIN — DOCUSATE SODIUM 50 MG AND SENNOSIDES 8.6 MG 2 TABLET: 8.6; 5 TABLET, FILM COATED ORAL at 05:22

## 2023-12-31 RX ADMIN — THEOPHYLLINE 300 MG: 300 TABLET, EXTENDED RELEASE ORAL at 05:45

## 2023-12-31 RX ADMIN — THEOPHYLLINE 300 MG: 300 TABLET, EXTENDED RELEASE ORAL at 17:53

## 2023-12-31 RX ADMIN — TIOTROPIUM BROMIDE INHALATION SPRAY 2.5 MCG: 3.12 SPRAY, METERED RESPIRATORY (INHALATION) at 05:45

## 2023-12-31 RX ADMIN — METOPROLOL SUCCINATE 100 MG: 100 TABLET, EXTENDED RELEASE ORAL at 05:21

## 2023-12-31 RX ADMIN — OXYCODONE HYDROCHLORIDE 10 MG: 10 TABLET ORAL at 20:11

## 2023-12-31 RX ADMIN — CLONAZEPAM 1 MG: 1 TABLET ORAL at 17:52

## 2023-12-31 RX ADMIN — DOCUSATE SODIUM 50 MG AND SENNOSIDES 8.6 MG 2 TABLET: 8.6; 5 TABLET, FILM COATED ORAL at 17:52

## 2023-12-31 RX ADMIN — GUAIFENESIN 600 MG: 600 TABLET, EXTENDED RELEASE ORAL at 09:11

## 2023-12-31 RX ADMIN — HYDROMORPHONE HYDROCHLORIDE 0.5 MG: 1 INJECTION, SOLUTION INTRAMUSCULAR; INTRAVENOUS; SUBCUTANEOUS at 05:20

## 2023-12-31 RX ADMIN — NYSTATIN: 100000 POWDER TOPICAL at 18:00

## 2023-12-31 RX ADMIN — OXYCODONE HYDROCHLORIDE 10 MG: 10 TABLET ORAL at 03:02

## 2023-12-31 RX ADMIN — AMOXICILLIN AND CLAVULANATE POTASSIUM 1 TABLET: 875; 125 TABLET, FILM COATED ORAL at 11:49

## 2023-12-31 RX ADMIN — ONDANSETRON 4 MG: 2 INJECTION INTRAMUSCULAR; INTRAVENOUS at 13:12

## 2023-12-31 RX ADMIN — MOMETASONE FUROATE AND FORMOTEROL FUMARATE DIHYDRATE 1 PUFF: 200; 5 AEROSOL RESPIRATORY (INHALATION) at 05:23

## 2023-12-31 RX ADMIN — IPRATROPIUM BROMIDE AND ALBUTEROL SULFATE 3 ML: 2.5; .5 SOLUTION RESPIRATORY (INHALATION) at 08:58

## 2023-12-31 RX ADMIN — HYDROMORPHONE HYDROCHLORIDE 0.5 MG: 1 INJECTION, SOLUTION INTRAMUSCULAR; INTRAVENOUS; SUBCUTANEOUS at 22:27

## 2023-12-31 RX ADMIN — RIVAROXABAN 20 MG: 20 TABLET, FILM COATED ORAL at 05:21

## 2023-12-31 RX ADMIN — OXYCODONE HYDROCHLORIDE 10 MG: 10 TABLET ORAL at 13:12

## 2023-12-31 RX ADMIN — NYSTATIN: 100000 POWDER TOPICAL at 12:00

## 2023-12-31 RX ADMIN — FLUOXETINE HYDROCHLORIDE 20 MG: 20 CAPSULE ORAL at 05:21

## 2023-12-31 ASSESSMENT — PAIN DESCRIPTION - PAIN TYPE
TYPE: ACUTE PAIN

## 2023-12-31 NOTE — CARE PLAN
The patient is Stable - Low risk of patient condition declining or worsening    Shift Goals  Clinical Goals: monitor labs, dressing changes on arm, promote self care, pain free  Patient Goals: rest  Family Goals: CLOTILDE    Progress made toward(s) clinical / shift goals:    Problem: Pain - Standard  Goal: Alleviation of pain or a reduction in pain to the patient’s comfort goal  Outcome: Progressing  Flowsheets  Taken 12/30/2023 2003 by Sera Kauffman RMERCEDES  Pain Rating Scale (NPRS): 10  Taken 12/29/2023 1121 by Estephania Kaufman OT  Non Verbal Scale:   Calm   Unlabored Breathing  Taken 12/26/2023 2004 by Wendy Terry RMERCEDES Brooks Scale: 10  Note: Asks for medications appropriately     Problem: Knowledge Deficit - Standard  Goal: Patient and family/care givers will demonstrate understanding of plan of care, disease process/condition, diagnostic tests and medications  Outcome: Progressing       Patient is not progressing towards the following goals:

## 2023-12-31 NOTE — PROGRESS NOTES
Bedside report completed with Sera COBB, assumed pt care. Pt sleeping in bed, on 2LNC. Bed in low and locked position, call light within reach, refusing bed alarm. Will continue to monitor.

## 2023-12-31 NOTE — WOUND TEAM
[facility]  Inpatient Services  Initial Wound and Skin Care Evaluation    Admission Date: 12/26/2023     Last order of IP CONSULT TO WOUND CARE was found on 12/30/2023 from Hospital Encounter on 12/26/2023     HPI, PMH, SH: Reviewed    Past Surgical History:   Procedure Laterality Date    HUMERUS NAILING INTRAMEDULLARY Left 12/28/2023    Procedure: INSERTION, INTRAMEDULLARY WIL, HUMERUS;  Surgeon: Corey Cruz M.D.;  Location: SURGERY [facility];  Service: Orthopedics    ERCP  7/18/2018    Procedure: ERCP;  Surgeon: Ariel Aguirre M.D.;  Location: [facility];  Service: EUS    ERCP W/REM FB AND/OR STENT CHG  7/18/2018    Procedure: ERCP W/REM FB AND/OR STENT CHG - STENT EXCHANGE;  Surgeon: Ariel Aguirre M.D.;  Location: [facility];  Service: EUS    ERCP IN OR N/A 5/2/2018    Procedure: ERCP IN OR;  Surgeon: Ariel Aguirre M.D.;  Location: SURGERY [facility];  Service: Gastroenterology    ERCP IN OR N/A 4/3/2018    Procedure: ERCP IN OR- W/POSS STENT;  Surgeon: Ariel Aguirre M.D.;  Location: SURGERY SAME DAY [facility];  Service: Gastroenterology    CORNELIA BY LAPAROSCOPY  4/2/2018    Procedure: CORNELIA BY LAPAROSCOPY;  Surgeon: Meche Olson M.D.;  Location: [facility];  Service: General    OTHER ABDOMINAL SURGERY  2018    appendectomy    OTHER  1976    skin grafting to rt leg     OTHER ABDOMINAL SURGERY  20018    cholecystectomy     Social History     Tobacco Use    Smoking status: Every Day     Current packs/day: 0.50     Average packs/day: 0.5 packs/day for 47.0 years (23.5 ttl pk-yrs)     Types: Cigarettes    Smokeless tobacco: Never   Substance Use Topics    Alcohol use: Yes     Comment: 2 drinks a week     Chief Complaint   Patient presents with    Extremity Fracture     Pt sent from [facility] after he fell in the early hours of the morning and sustained a left proximal humerus fracture. Pt takes Eliquis for DVT and Pe's. Pt received 300mcg of fentanyl  IV and 3mg Versed IV from [facility] in route. Pt arrives in left arm sling.      Diagnosis: Sepsis (HCC) [A41.9]    Unit where seen by Wound Team: T820/00     WOUND CONSULT RELATED TO:  Left upper arm    WOUND TEAM PLAN OF CARE - Frequency of Follow-up:   Nursing to follow dressing orders written for wound care. Contact wound team if area fails to progress, deteriorates or with any questions/concerns if something comes up before next scheduled follow up (See below as to whether wound is following and frequency of wound follow up)   Weekly - Left upper arm    WOUND HISTORY:   Pt is a 68yr old male admitted as a transfer from [facility] after he fell and sustained a left proximal humerus fracture. Pt was on eliquis. Pt was taken to OR by Dr. Cruz for ORIF. Wound team was consulted regarding eschar to upper arm that was boggy.       WOUND ASSESSMENT/LDA  Wound 12/31/23 Full Thickness Wound Arm Upper Left (Active)   Date First Assessed: 12/31/23   Primary Wound Type: Full Thickness Wound  Location: Arm  Wound Orientation: Upper  Laterality: Left      Assessments 12/31/2023  1:00 PM   Wound Image        Site Assessment Red;Yellow;Drainage   Periwound Assessment Clean;Dry;Intact   Margins Attached edges;Defined edges   Closure Secondary intention   Drainage Amount Scant   Drainage Description Serosanguineous   Treatments Cleansed;Nonselective debridement;Site care;Offloading;Chemical debridement   Wound Cleansing Approved Wound Cleanser   Periwound Protectant No-sting Skin Prep   Dressing Status Clean;Dry;Intact   Dressing Changed Changed   Dressing Cleansing/Solutions 1/4 Strength Dakin's Solution   Dressing Options Moist Roll Gauze;Offloading Dressing - Sacral   Dressing Change/Treatment Frequency Every Shift, and As Needed   NEXT Dressing Change/Treatment Date 12/31/23   NEXT Weekly Photo (Inpatient Only) 01/07/24   Wound Team Following Weekly   Non-staged Wound Description Full thickness   Wound Length (cm) 2.6 cm    Wound Width (cm) 3.8 cm   Wound Depth (cm) 7 cm   Wound Surface Area (cm^2) 9.88 cm^2   Wound Volume (cm^3) 69.16 cm^3   Shape Circular   Wound Odor None   WOUND NURSE ONLY - Time Spent with Patient (mins) 60        Vascular:    GLORIA:   No results found.    Lab Values:    Lab Results   Component Value Date/Time    WBC 6.2 12/31/2023 12:58 AM    RBC 3.47 (L) 12/31/2023 12:58 AM    HEMOGLOBIN 9.8 (L) 12/31/2023 12:58 AM    HEMATOCRIT 30.3 (L) 12/31/2023 12:58 AM    CREACTPROT 22.76 (H) 12/26/2023 02:35 PM         Culture Results show:  No results found for this or any previous visit (from the past 720 hour(s)).    Pain Level/Medicated:  None, Tolerated without pain medication       INTERVENTIONS BY WOUND TEAM:  Chart and images reviewed. Discussed with bedside RN. All areas of concern (based on picture review, LDA review and discussion with bedside RN) have been thoroughly assessed. Documentation of areas based on significant findings. This RN in to assess patient. Performed standard wound care which includes appropriate positioning, dressing removal and non-selective debridement. Pictures and measurements obtained weekly if/when required.    Wound:  Left upper arm  Preparation for Dressing removal: Removed without difficulty  Cleansed/Non-selectively Debrided with:  Normal Saline and Gauze  Non-Excisional Conservative Sharp debridement: Eschar debrided away using scissors and forceps < 20cm2 debrided down to Adipose.  Large amount of old hematoma and serosnanginous fluid drained.  Becka wound: Cleansed with Wound cleanser and Gauze, Prepped with No Sting  Primary Dressing:  Wound cleanser moistened roll gauze  Secondary (Outer) Dressing: sacral offloading dressing    Advanced Wound Care Discharge Planning  Number of Clinicians necessary to complete wound care: 1  Is patient requiring IV pain medications for dressing changes:  No   Length of time for dressing change 30 min. (This does not include chart review,  pre-medication time, set up, clean up or time spent charting.)    Interdisciplinary consultation: Patient, Bedside RN, Denia DUQUE (Wound RN) and Provider Yamile Jung .  Pressure injury and staging reviewed with N/A.    EVALUATION / RATIONALE FOR TREATMENT:     Date:  12/31/23  Wound Status:  Initial evaluation    Pt has a wound to the LUE with significant depth and possible bone palpable at base. Images sent to PETERSON to review who will discuss with Dr. Masterson. Dakins wet to dry initiated.         Goals: Steady decrease in wound area and depth weekly.    NURSING PLAN OF CARE ORDERS:  Dressing changes: See Dressing Care orders    NUTRITION RECOMMENDATIONS   Wound Team Recommendations:  N/A    DIET ORDERS (From admission to next 24h)       Start     Ordered    12/28/23 1933  Diet Order Diet: Regular  ALL MEALS        Question:  Diet:  Answer:  Regular    12/28/23 1933                    PREVENTATIVE INTERVENTIONS:    Q shift Antony - performed per nursing policy  Q shift pressure point assessments - performed per nursing policy    Surface/Positioning  Bariatric JOSEPH - Currently in Place  Reposition q 2 hours - Currently in Place    Anticipated discharge plans:  Skilled Nursing and Pending bariatric bed         Vac Discharge Needs:  Vac Discharge plan is purely a recommendation from wound team and not a requirement for discharge unless otherwise stated by physician.  Not Applicable Pt not on a wound vac

## 2023-12-31 NOTE — PROGRESS NOTES
Assumed care of pt. Bedside report received from RN. Pt was updated on plan of care. AOx4. Pt pain level 10/10. Pt is medical. PT is medical. Call light, phone and personal belongings in reach. Bed alarm off due to pt in a JOSEPH bariatric bed that is not supposed to have a strip alarm and does not have a bed alarm. Bed in lowest position, and locked.

## 2023-12-31 NOTE — PROGRESS NOTES
Hospital Medicine Daily Progress Note    Date of Service  12/30/2023    Chief Complaint  Henry Kumari is a 68 y.o. male admitted 12/26/2023 with fall, humerus fracture.    Hospital Course  Henry Kumari is a 68 y.o. male with a h/o COPD (2L at baseline), CHF, afib, prior DVTs/PE who presented 12/26/2023 as a transfer from [facility].  The patient presented there after a fall, humerus fracture.     He has chronic panniculitis that has been utilizing alcohol for.  The patient had a fall due to his generalized weakness causing a humerus fracture there was noted at the transferring facility and he was transferred here for higher level of care.  The patient is on anticoagulation for history of DVTs as well as pulmonary emboli.  He is on home oxygen and continues to abuse tobacco products. He states he does not have any dysuria.  The patient also suffered from multiple skin tears to the right upper extremity and he did receive tetanus prophylaxis as well as Rocephin.  Patient was found to have a leukocytosis with a white blood cell count of 18,200 and slight elevation in his creatinine at 1.74.  Blood cultures were ordered and are currently pending at [facility].      In ER he was found to be febrile to 38.2, tachycardic to the 120s (in A-fib) normal blood pressure, normal oxygen saturation on room air.  Labs remarkable for WBC 18, CO2 19, BUN 34, creatinine 1.24, lactic acid 2.4 improved to 2 point.  Urinalysis hazy 20-50WBC, chest x-ray negative, CRP 22, viral panel negative.    Left humeral shaft fracture - s/p ORIF 12/28    He denies dysuria urgency frequency    Interval Problem Update  Patient was seen and examined at bedside  BMI 49    Left humeral shaft fracture - s/p ORIF 12/28  WBAT LUE  Sutures/Staples out- 14-21 days post operatively    I resumed the Xarelto    Abdominal panniculitis/cellulitis -continue Ancef  wbc 18>8    SANTOSH -CPAP  COPD -2  L, at baseline    PT OT recommended SNF    I have discussed this patient's plan of care and discharge plan at IDT rounds today with Case Management, Nursing, Nursing leadership, and other members of the IDT team.    Consultants/Specialty      Code Status  Full Code    Disposition  The patient is medically cleared for discharge to home or a post-acute facility.      I have placed the appropriate orders for post-discharge needs.    Review of Systems  All 12 systems were reviewed and negative except as mentioned above       Physical Exam  Temp:  [36.6 °C (97.9 °F)-36.8 °C (98.2 °F)] 36.6 °C (97.9 °F)  Pulse:  [64-95] 81  Resp:  [16-18] 18  BP: ()/(65-83) 90/65  SpO2:  [96 %-98 %] 97 %    Physical Exam  Constitutional:       Appearance: He is obese. He is ill-appearing.   HENT:      Head: Normocephalic.      Nose: Nose normal.      Mouth/Throat:      Mouth: Mucous membranes are moist.   Eyes:      Extraocular Movements: Extraocular movements intact.      Conjunctiva/sclera: Conjunctivae normal.   Cardiovascular:      Rate and Rhythm: Normal rate and regular rhythm.      Pulses: Normal pulses.      Heart sounds: Normal heart sounds.   Pulmonary:      Effort: Pulmonary effort is normal.      Breath sounds: No wheezing.   Abdominal:      Palpations: Abdomen is soft.      Tenderness: There is abdominal tenderness. There is no guarding.   Musculoskeletal:         General: Tenderness present.      Cervical back: Normal range of motion.      Comments: Left arm sling in place   Skin:     General: Skin is warm.      Findings: Lesion and rash present.      Comments: Lower abdominal diffuse skin erythema, rash and tenderness   Neurological:      Mental Status: He is alert and oriented to person, place, and time. Mental status is at baseline.   Psychiatric:         Mood and Affect: Mood normal.         Fluids    Intake/Output Summary (Last 24 hours) at 12/30/2023 1654  Last data filed at 12/30/2023 1552  Gross per 24 hour    Intake 1634.35 ml   Output 700 ml   Net 934.35 ml       Laboratory  Recent Labs     12/28/23  0413 12/29/23  0156 12/30/23  0242   WBC 8.8 7.0 5.2   RBC 3.47* 3.53* 3.82*   HEMOGLOBIN 10.0* 10.0* 10.9*   HEMATOCRIT 30.7* 30.9* 34.2*   MCV 88.5 87.5 89.5   MCH 28.8 28.3 28.5   MCHC 32.6 32.4 31.9*   RDW 43.3 42.9 43.7   PLATELETCT 147* 180 208   MPV 11.4 11.5 11.1     Recent Labs     12/28/23  0413 12/29/23  0156 12/30/23  0242   SODIUM 136 135 139   POTASSIUM 4.1 3.9 3.6   CHLORIDE 105 103 106   CO2 20 21 22   GLUCOSE 88 109* 93   BUN 20 17 18   CREATININE 0.51 0.50 0.53   CALCIUM 7.9* 8.1* 8.3*                   Imaging  DX-PORTABLE FLUOROSCOPY < 1 HOUR   Final Result      Portable fluoroscopy utilized for 1 minute 30 seconds.      INTERPRETING LOCATION: [address] [place] [address], [id number]      DX-HUMERUS 2+ LEFT   Final Result      Digitized intraoperative radiograph is submitted for review. This examination is not for diagnostic purpose but for guidance during a surgical procedure. Please see the patient's chart for full procedural details.         INTERPRETING LOCATION: [address] [place] [address], [id number]      DX-CHEST-PORTABLE (1 VIEW)   Final Result         Cardiomegaly.      No pulmonary infiltrates or consolidations are noted.         OUTSIDE IMAGES-CT HEAD   Final Result      OUTSIDE IMAGES-DX UPPER EXTREMITY, LEFT   Final Result      OUTSIDE IMAGES-DX CHEST   Final Result      OUTSIDE IMAGES-DX UPPER EXTREMITY, LEFT   Final Result           Assessment/Plan  * Sepsis (HCC)- (present on admission)  Assessment & Plan  This is Sepsis Present on admission  SIRS criteria identified on my evaluation include: Fever, with temperature greater than 100.9 deg F, Tachycardia, with heart rate greater than 90 BPM, and Leukocytosis, with WBC greater than 12,000  Clinical indicators of end organ dysfunction include Lactic Acid greater than 2  Source is skin/panniculitis   Sepsis protocol initiated  Crystalloid Fluid Administration:  Resuscitation volume of 1000 ordered. Reason that resuscitation volume of less than 30ml/kg was ordered concern for causing harm given CHF  IV antibiotics as appropriate for source of sepsis  Reassessment: I have reassessed the patient's hemodynamic status    Left humeral fracture  Assessment & Plan  Left humeral shaft fracture  -evaluated by surgery,     12/28 s/p ORIF today  PT OT and supportive care    Hypophosphatemia  Assessment & Plan  Replaced    Lactic acidosis  Assessment & Plan  Due to sepsis    Lactic acid 3.1>1.4  Continue IV fluid    Cellulitis  Assessment & Plan  Lower abdominal diffused erythema, tenderness  History of panniculitis    Continue Ancef    Opiate dependence (HCC)- (present on admission)  Assessment & Plan  Takes buprenorphine at home for chronic pain  In acute pain so giving prn narcotics    Chronic respiratory failure with hypoxia ([facility])- (present on admission)  Assessment & Plan  Due to COPD and obese  Baseline 2 L  Monitor    Morbid obesity ([facility])- (present on admission)  Assessment & Plan  BMI 49  Weight loss education    SANTOSH on CPAP- (present on admission)  Assessment & Plan  I ordered a CPAP    COPD (chronic obstructive pulmonary disease) ([facility])- (present on admission)  Assessment & Plan  Not in exacerbation  Continue inhalers and albuterol prn    Atrial fibrillation with RVR ([facility])- (present on admission)  Assessment & Plan  History of A-fib on metoprolol and Xarelto, rate controlled    12/28 developed A-fib with RVR   Patient had surgery for left femoral fracture  Continue metoprolol  Continue monitor on telemetry  Optimize electrolytes potassium over 4 and mag over 2  I ordered IV mag 2 g    History of DVT (deep vein thrombosis)- (present on admission)  Assessment & Plan  home Xarelto on hold for possible left arm fracture surgery in the coming days         VTE prophylaxis: Xarelto      I have performed a physical exam and reviewed and updated ROS and Plan today (12/30/2023). In  review of yesterday's note (12/29/2023), there are no changes except as documented above.      I spent 36 minutes for chart review, meeting and examining the patient, documenting, ordering medications and tests, interpreting the results independently, and communicating with the other health professionals.

## 2023-12-31 NOTE — DISCHARGE PLANNING
JAMEY RN following up with SNFs. JAMEY RN called [facility] to inquire if they have a bariatric bed yet. [facility] will have one possibly in 2 weeks.     JAMEY RN called [facility]F. No answer, VM left.     [id number] Call placed to [facility], no answer, VM left.     JAMEY RN called [facility] as is considering pt. Informed admissions not in today and unsure if working tomorrow.     JAMEY RN called [facility] as also considering pt, JAMEY RN spoke to [place] unsure will need to call tuesday to follow up.

## 2023-12-31 NOTE — PROGRESS NOTES
Hospital Medicine Daily Progress Note    Date of Service  12/31/2023    Chief Complaint  Henry Kumari is a 68 y.o. male admitted 12/26/2023 with fall, humerus fracture.    Hospital Course  Henry Kumari is a 68 y.o. male with a h/o COPD (2L at baseline), CHF, afib, prior DVTs/PE who presented 12/26/2023 as a transfer from [facility].  The patient presented there after a fall, humerus fracture.     He has chronic panniculitis that has been utilizing alcohol for.  The patient had a fall due to his generalized weakness causing a humerus fracture there was noted at the transferring facility and he was transferred here for higher level of care.  The patient is on anticoagulation for history of DVTs as well as pulmonary emboli.  He is on home oxygen and continues to abuse tobacco products. He states he does not have any dysuria.  The patient also suffered from multiple skin tears to the right upper extremity and he did receive tetanus prophylaxis as well as Rocephin.  Patient was found to have a leukocytosis with a white blood cell count of 18,200 and slight elevation in his creatinine at 1.74.  Blood cultures were ordered and are currently pending at [facility].      In ER he was found to be febrile to 38.2, tachycardic to the 120s (in A-fib) normal blood pressure, normal oxygen saturation on room air.  Labs remarkable for WBC 18, CO2 19, BUN 34, creatinine 1.24, lactic acid 2.4 improved to 2 point.  Urinalysis hazy 20-50WBC, chest x-ray negative, CRP 22, viral panel negative.    Left humeral shaft fracture - s/p ORIF 12/28    He denies dysuria urgency frequency    Interval Problem Update  Patient was seen and examined at bedside  BMI 49    Sounds congested, I ordered a chest x-ray personally reviewed, possible RLL pneumonia.  Patient just finished 4 days Ancef yesterday.  Normal white count. I started Augmentin    Hypomagnesemia-replaced    Left  humeral shaft fracture - s/p ORIF 12/28, some oozing from the incision site, I put Xarelto on hold, I discussed with Ortho team, no concerns  WBAT LUE  Sutures/Staples out- 14-21 days post operatively    Abdominal panniculitis/cellulitis -had 4 days Ancef, now on Augmentin for possible pneumonia    SANTOSH -CPAP  COPD -2 L, at baseline    PT OT recommended SNF    I have discussed this patient's plan of care and discharge plan at IDT rounds today with Case Management, Nursing, Nursing leadership, and other members of the IDT team.    Consultants/Specialty      Code Status  Full Code    Disposition  The patient is medically cleared for discharge to home or a post-acute facility.      I have placed the appropriate orders for post-discharge needs.    Review of Systems  All 12 systems were reviewed and negative except as mentioned above       Physical Exam  Temp:  [36.6 °C (97.9 °F)-36.7 °C (98.1 °F)] 36.7 °C (98.1 °F)  Pulse:  [77-91] 83  Resp:  [18] 18  BP: ()/(65-82) 135/70  SpO2:  [91 %-98 %] 98 %    Physical Exam  Constitutional:       Appearance: He is obese. He is ill-appearing.   HENT:      Head: Normocephalic.      Nose: Nose normal.      Mouth/Throat:      Mouth: Mucous membranes are moist.   Eyes:      Extraocular Movements: Extraocular movements intact.      Conjunctiva/sclera: Conjunctivae normal.   Cardiovascular:      Rate and Rhythm: Normal rate and regular rhythm.      Pulses: Normal pulses.      Heart sounds: Normal heart sounds.   Pulmonary:      Effort: Pulmonary effort is normal.      Breath sounds: No wheezing.   Abdominal:      Palpations: Abdomen is soft.      Tenderness: There is abdominal tenderness. There is no guarding.   Musculoskeletal:         General: Tenderness present.      Cervical back: Normal range of motion.      Comments: Left arm sling in place   Skin:     General: Skin is warm.      Findings: Lesion and rash present.      Comments: Lower abdominal diffuse skin erythema, rash and  tenderness   Neurological:      Mental Status: He is alert and oriented to person, place, and time. Mental status is at baseline.   Psychiatric:         Mood and Affect: Mood normal.         Fluids    Intake/Output Summary (Last 24 hours) at 12/31/2023 1536  Last data filed at 12/31/2023 0000  Gross per 24 hour   Intake 400 ml   Output 950 ml   Net -550 ml       Laboratory  Recent Labs     12/29/23  0156 12/30/23  0242 12/31/23  0058   WBC 7.0 5.2 6.2   RBC 3.53* 3.82* 3.47*   HEMOGLOBIN 10.0* 10.9* 9.8*   HEMATOCRIT 30.9* 34.2* 30.3*   MCV 87.5 89.5 87.3   MCH 28.3 28.5 28.2   MCHC 32.4 31.9* 32.3   RDW 42.9 43.7 43.5   PLATELETCT 180 208 211   MPV 11.5 11.1 10.6     Recent Labs     12/29/23  0156 12/30/23 0242 12/31/23  0058   SODIUM 135 139 139   POTASSIUM 3.9 3.6 3.6   CHLORIDE 103 106 106   CO2 21 22 24   GLUCOSE 109* 93 91   BUN 17 18 14   CREATININE 0.50 0.53 0.51   CALCIUM 8.1* 8.3* 8.1*                   Imaging  DX-CHEST-PORTABLE (1 VIEW)   Final Result      1.  Right basilar opacity which could be atelectasis or pneumonia      2.  Enlarged cardiac silhouette      DX-PORTABLE FLUOROSCOPY < 1 HOUR   Final Result      Portable fluoroscopy utilized for 1 minute 30 seconds.      INTERPRETING LOCATION: [address] [facility], [facility], [address]      DX-HUMERUS 2+ LEFT   Final Result      Digitized intraoperative radiograph is submitted for review. This examination is not for diagnostic purpose but for guidance during a surgical procedure. Please see the patient's chart for full procedural details.         INTERPRETING LOCATION: [address] [facility], [facility], [address]      DX-CHEST-PORTABLE (1 VIEW)   Final Result         Cardiomegaly.      No pulmonary infiltrates or consolidations are noted.         OUTSIDE IMAGES-CT HEAD   Final Result      OUTSIDE IMAGES-DX UPPER EXTREMITY, LEFT   Final Result      OUTSIDE IMAGES-DX CHEST   Final Result      OUTSIDE IMAGES-DX UPPER EXTREMITY, LEFT   Final Result           Assessment/Plan  * Sepsis  ([facility])- (present on admission)  Assessment & Plan  This is Sepsis Present on admission  SIRS criteria identified on my evaluation include: Fever, with temperature greater than 100.9 deg F, Tachycardia, with heart rate greater than 90 BPM, and Leukocytosis, with WBC greater than 12,000  Clinical indicators of end organ dysfunction include Lactic Acid greater than 2  Source is skin/panniculitis   Sepsis protocol initiated  Crystalloid Fluid Administration: Resuscitation volume of 1000 ordered. Reason that resuscitation volume of less than 30ml/kg was ordered concern for causing harm given CHF  IV antibiotics as appropriate for source of sepsis  Reassessment: I have reassessed the patient's hemodynamic status    Left humeral fracture  Assessment & Plan  Left humeral shaft fracture  -evaluated by surgery,     12/28 s/p ORIF today  PT OT and supportive care    Hypophosphatemia  Assessment & Plan  Replaced    Lactic acidosis  Assessment & Plan  Due to sepsis    Lactic acid 3.1>1.4  Continue IV fluid    Cellulitis  Assessment & Plan  Lower abdominal diffused erythema, tenderness  History of panniculitis    12/31 had 4 days Ancef, now on Augmentin for possible pneumonia    Opiate dependence ([facility])- (present on admission)  Assessment & Plan  Takes buprenorphine at home for chronic pain  In acute pain so giving prn narcotics    Chronic respiratory failure with hypoxia ([facility])- (present on admission)  Assessment & Plan  Due to COPD and obese  Baseline 2 L  Monitor    Morbid obesity ([facility])- (present on admission)  Assessment & Plan  BMI 49  Weight loss education    SANTOSH on CPAP- (present on admission)  Assessment & Plan  I ordered a CPAP    COPD (chronic obstructive pulmonary disease) ([facility])- (present on admission)  Assessment & Plan  Not in exacerbation  Continue inhalers and albuterol prn    Atrial fibrillation with RVR ([facility])- (present on admission)  Assessment & Plan  History of A-fib on metoprolol and Xarelto, rate  controlled    12/28 developed A-fib with RVR   Patient had surgery for left femoral fracture  Continue metoprolol  Continue monitor on telemetry  Optimize electrolytes potassium over 4 and mag over 2  I ordered IV mag 2 g    History of DVT (deep vein thrombosis)- (present on admission)  Assessment & Plan  home Xarelto on hold for possible left arm fracture surgery in the coming days         VTE prophylaxis: Xarelto      I have performed a physical exam and reviewed and updated ROS and Plan today (12/31/2023). In review of yesterday's note (12/30/2023), there are no changes except as documented above.      I spent 36 minutes for chart review, meeting and examining the patient, documenting, ordering medications and tests, interpreting the results independently, and communicating with the other health professionals.

## 2023-12-31 NOTE — WOUND TEAM
Assisted Lopez LOPEZ (Wound RN), with wound care, non-selective debridement performed using wound cleanser/NS and gauze. Please see Lopez LOPEZ (Wound RN) wound note for further wound care details.

## 2023-12-31 NOTE — PROGRESS NOTES
"PT is refusing q2h turns. RN educated pt on need for turns to prevent pressure injuries. PT states, \" I don't always need them, but I get what you're saying.\" Charge RN notified of pt refusal.   "

## 2024-01-01 PROBLEM — S41.102A: Status: ACTIVE | Noted: 2024-01-01

## 2024-01-01 LAB
ALBUMIN SERPL BCP-MCNC: 2.6 G/DL (ref 3.2–4.9)
BUN SERPL-MCNC: 10 MG/DL (ref 8–22)
CALCIUM ALBUM COR SERPL-MCNC: 9.2 MG/DL (ref 8.5–10.5)
CALCIUM SERPL-MCNC: 8.1 MG/DL (ref 8.5–10.5)
CHLORIDE SERPL-SCNC: 103 MMOL/L (ref 96–112)
CO2 SERPL-SCNC: 23 MMOL/L (ref 20–33)
CREAT SERPL-MCNC: 0.39 MG/DL (ref 0.5–1.4)
ERYTHROCYTE [DISTWIDTH] IN BLOOD BY AUTOMATED COUNT: 43.8 FL (ref 35.9–50)
GFR SERPLBLD CREATININE-BSD FMLA CKD-EPI: 119 ML/MIN/1.73 M 2
GLUCOSE SERPL-MCNC: 91 MG/DL (ref 65–99)
GRAM STN SPEC: NORMAL
HCT VFR BLD AUTO: 31.3 % (ref 42–52)
HGB BLD-MCNC: 10.3 G/DL (ref 14–18)
MAGNESIUM SERPL-MCNC: 1.5 MG/DL (ref 1.5–2.5)
MCH RBC QN AUTO: 28.4 PG (ref 27–33)
MCHC RBC AUTO-ENTMCNC: 32.9 G/DL (ref 32.3–36.5)
MCV RBC AUTO: 86.2 FL (ref 81.4–97.8)
PHOSPHATE SERPL-MCNC: 2.7 MG/DL (ref 2.5–4.5)
PLATELET # BLD AUTO: 173 K/UL (ref 164–446)
PMV BLD AUTO: 11.1 FL (ref 9–12.9)
POTASSIUM SERPL-SCNC: 3.9 MMOL/L (ref 3.6–5.5)
RBC # BLD AUTO: 3.63 M/UL (ref 4.7–6.1)
SIGNIFICANT IND 70042: NORMAL
SITE SITE: NORMAL
SODIUM SERPL-SCNC: 135 MMOL/L (ref 135–145)
SOURCE SOURCE: NORMAL
WBC # BLD AUTO: 7.1 K/UL (ref 4.8–10.8)

## 2024-01-01 PROCEDURE — A9270 NON-COVERED ITEM OR SERVICE: HCPCS | Performed by: INTERNAL MEDICINE

## 2024-01-01 PROCEDURE — A9270 NON-COVERED ITEM OR SERVICE: HCPCS | Performed by: STUDENT IN AN ORGANIZED HEALTH CARE EDUCATION/TRAINING PROGRAM

## 2024-01-01 PROCEDURE — 83880 ASSAY OF NATRIURETIC PEPTIDE: CPT

## 2024-01-01 PROCEDURE — 770001 HCHG ROOM/CARE - MED/SURG/GYN PRIV*

## 2024-01-01 PROCEDURE — 700102 HCHG RX REV CODE 250 W/ 637 OVERRIDE(OP): Performed by: INTERNAL MEDICINE

## 2024-01-01 PROCEDURE — 700111 HCHG RX REV CODE 636 W/ 250 OVERRIDE (IP): Mod: JZ | Performed by: STUDENT IN AN ORGANIZED HEALTH CARE EDUCATION/TRAINING PROGRAM

## 2024-01-01 PROCEDURE — 99232 SBSQ HOSP IP/OBS MODERATE 35: CPT | Performed by: STUDENT IN AN ORGANIZED HEALTH CARE EDUCATION/TRAINING PROGRAM

## 2024-01-01 PROCEDURE — 87070 CULTURE OTHR SPECIMN AEROBIC: CPT

## 2024-01-01 PROCEDURE — 700102 HCHG RX REV CODE 250 W/ 637 OVERRIDE(OP): Performed by: STUDENT IN AN ORGANIZED HEALTH CARE EDUCATION/TRAINING PROGRAM

## 2024-01-01 PROCEDURE — 700111 HCHG RX REV CODE 636 W/ 250 OVERRIDE (IP): Performed by: INTERNAL MEDICINE

## 2024-01-01 PROCEDURE — 85027 COMPLETE CBC AUTOMATED: CPT

## 2024-01-01 PROCEDURE — 36415 COLL VENOUS BLD VENIPUNCTURE: CPT

## 2024-01-01 PROCEDURE — 80069 RENAL FUNCTION PANEL: CPT

## 2024-01-01 PROCEDURE — 83735 ASSAY OF MAGNESIUM: CPT

## 2024-01-01 PROCEDURE — 87186 SC STD MICRODIL/AGAR DIL: CPT

## 2024-01-01 PROCEDURE — 87077 CULTURE AEROBIC IDENTIFY: CPT

## 2024-01-01 PROCEDURE — 700101 HCHG RX REV CODE 250: Performed by: STUDENT IN AN ORGANIZED HEALTH CARE EDUCATION/TRAINING PROGRAM

## 2024-01-01 PROCEDURE — 94640 AIRWAY INHALATION TREATMENT: CPT

## 2024-01-01 PROCEDURE — 87205 SMEAR GRAM STAIN: CPT

## 2024-01-01 RX ORDER — HYDROMORPHONE HYDROCHLORIDE 1 MG/ML
1 INJECTION, SOLUTION INTRAMUSCULAR; INTRAVENOUS; SUBCUTANEOUS
Status: DISCONTINUED | OUTPATIENT
Start: 2024-01-01 | End: 2024-01-05 | Stop reason: HOSPADM

## 2024-01-01 RX ORDER — FUROSEMIDE 10 MG/ML
40 INJECTION INTRAMUSCULAR; INTRAVENOUS
Status: DISCONTINUED | OUTPATIENT
Start: 2024-01-01 | End: 2024-01-03

## 2024-01-01 RX ADMIN — FUROSEMIDE 40 MG: 10 INJECTION, SOLUTION INTRAVENOUS at 16:39

## 2024-01-01 RX ADMIN — HYDROMORPHONE HYDROCHLORIDE 1 MG: 1 INJECTION, SOLUTION INTRAMUSCULAR; INTRAVENOUS; SUBCUTANEOUS at 10:37

## 2024-01-01 RX ADMIN — QUETIAPINE FUMARATE 100 MG: 100 TABLET ORAL at 04:48

## 2024-01-01 RX ADMIN — ROSUVASTATIN CALCIUM 10 MG: 20 TABLET, FILM COATED ORAL at 16:46

## 2024-01-01 RX ADMIN — CLONAZEPAM 1 MG: 1 TABLET ORAL at 04:47

## 2024-01-01 RX ADMIN — THEOPHYLLINE 300 MG: 300 TABLET, EXTENDED RELEASE ORAL at 04:48

## 2024-01-01 RX ADMIN — HYDROMORPHONE HYDROCHLORIDE 0.5 MG: 1 INJECTION, SOLUTION INTRAMUSCULAR; INTRAVENOUS; SUBCUTANEOUS at 06:10

## 2024-01-01 RX ADMIN — FLUOXETINE HYDROCHLORIDE 20 MG: 20 CAPSULE ORAL at 04:48

## 2024-01-01 RX ADMIN — OXYCODONE HYDROCHLORIDE 10 MG: 10 TABLET ORAL at 04:47

## 2024-01-01 RX ADMIN — TIOTROPIUM BROMIDE INHALATION SPRAY 2.5 MCG: 3.12 SPRAY, METERED RESPIRATORY (INHALATION) at 04:49

## 2024-01-01 RX ADMIN — FUROSEMIDE 40 MG: 10 INJECTION, SOLUTION INTRAVENOUS at 10:35

## 2024-01-01 RX ADMIN — NYSTATIN: 100000 POWDER TOPICAL at 12:00

## 2024-01-01 RX ADMIN — THEOPHYLLINE 300 MG: 300 TABLET, EXTENDED RELEASE ORAL at 16:47

## 2024-01-01 RX ADMIN — NYSTATIN: 100000 POWDER TOPICAL at 16:47

## 2024-01-01 RX ADMIN — NYSTATIN: 100000 POWDER TOPICAL at 04:51

## 2024-01-01 RX ADMIN — CLONAZEPAM 1 MG: 1 TABLET ORAL at 16:46

## 2024-01-01 RX ADMIN — AMOXICILLIN AND CLAVULANATE POTASSIUM 1 TABLET: 875; 125 TABLET, FILM COATED ORAL at 16:46

## 2024-01-01 RX ADMIN — MOMETASONE FUROATE AND FORMOTEROL FUMARATE DIHYDRATE 1 PUFF: 200; 5 AEROSOL RESPIRATORY (INHALATION) at 04:48

## 2024-01-01 RX ADMIN — METOPROLOL SUCCINATE 100 MG: 100 TABLET, EXTENDED RELEASE ORAL at 04:48

## 2024-01-01 RX ADMIN — AMOXICILLIN AND CLAVULANATE POTASSIUM 1 TABLET: 875; 125 TABLET, FILM COATED ORAL at 04:48

## 2024-01-01 RX ADMIN — DOCUSATE SODIUM 50 MG AND SENNOSIDES 8.6 MG 2 TABLET: 8.6; 5 TABLET, FILM COATED ORAL at 16:46

## 2024-01-01 RX ADMIN — HYDROMORPHONE HYDROCHLORIDE 0.5 MG: 1 INJECTION, SOLUTION INTRAMUSCULAR; INTRAVENOUS; SUBCUTANEOUS at 22:30

## 2024-01-01 RX ADMIN — IPRATROPIUM BROMIDE AND ALBUTEROL SULFATE 3 ML: 2.5; .5 SOLUTION RESPIRATORY (INHALATION) at 02:11

## 2024-01-01 RX ADMIN — GUAIFENESIN 600 MG: 600 TABLET, EXTENDED RELEASE ORAL at 04:48

## 2024-01-01 RX ADMIN — OXYCODONE HYDROCHLORIDE 10 MG: 10 TABLET ORAL at 21:08

## 2024-01-01 RX ADMIN — SODIUM HYPOCHLORITE 30 ML: 1.25 SOLUTION TOPICAL at 10:46

## 2024-01-01 RX ADMIN — GUAIFENESIN 600 MG: 600 TABLET, EXTENDED RELEASE ORAL at 16:46

## 2024-01-01 RX ADMIN — OXYCODONE HYDROCHLORIDE 10 MG: 10 TABLET ORAL at 15:08

## 2024-01-01 RX ADMIN — DOCUSATE SODIUM 50 MG AND SENNOSIDES 8.6 MG 2 TABLET: 8.6; 5 TABLET, FILM COATED ORAL at 04:48

## 2024-01-01 RX ADMIN — QUETIAPINE FUMARATE 100 MG: 100 TABLET ORAL at 16:46

## 2024-01-01 ASSESSMENT — PAIN DESCRIPTION - PAIN TYPE
TYPE: ACUTE PAIN
TYPE: ACUTE PAIN;SURGICAL PAIN
TYPE: ACUTE PAIN
TYPE: ACUTE PAIN
TYPE: ACUTE PAIN;SURGICAL PAIN
TYPE: ACUTE PAIN
TYPE: ACUTE PAIN
TYPE: ACUTE PAIN;CHRONIC PAIN

## 2024-01-01 ASSESSMENT — FIBROSIS 4 INDEX: FIB4 SCORE: 3.5

## 2024-01-01 NOTE — PROGRESS NOTES
"    Orthopedic PA Progress Note    Interval changes:  Patient doing well.  Notified by wound team about wound on arm  - Wet to dry dressings for now  - Will discuss further plan with attending  LUE dressings are CDI with min serosanguineous drainage to distal dressing- changed   WBAT LUE  Cleared for DC from orthopedic standpoint pending therapy recs and medical optimization    ROS - Patient denies any new issues.  Denies any numbness or tingling. Pain well controlled.    /72   Pulse 85   Temp 36.9 °C (98.4 °F) (Temporal)   Resp 18   Ht 1.88 m (6' 2\")   Wt (!) 171 kg (378 lb 1.4 oz)   SpO2 96%     Patient seen and examined  No acute distress  Breathing non labored  RRR  LUE: Dressing with min serosang drainage. Changed today by RN. Extensive bruising. Patient clearly fires forearm flexors, forearm extensors, and moves all five fingers without issue . Sensation is intact to light touch throughout median, ulnar, and radial nerve distributions. Strong and palpable radial pulses with capillary refill less than 2 seconds.     Recent Labs     12/29/23  0156 12/30/23  0242 12/31/23  0058   WBC 7.0 5.2 6.2   RBC 3.53* 3.82* 3.47*   HEMOGLOBIN 10.0* 10.9* 9.8*   HEMATOCRIT 30.9* 34.2* 30.3*   MCV 87.5 89.5 87.3   MCH 28.3 28.5 28.2   MCHC 32.4 31.9* 32.3   RDW 42.9 43.7 43.5   PLATELETCT 180 208 211   MPV 11.5 11.1 10.6         Active Hospital Problems    Diagnosis     Pneumonia [J18.9]      12/31 chest x-ray personally reviewed, possible RLL pneumonia.  Patient just finished 4 days Ancef yesterday.  Normal white count. I started Augmentin      Hypophosphatemia [E83.39]     Cellulitis [L03.90]     Left humeral fracture [S42.302A]     Lactic acidosis [E87.20]     Opiate dependence (HCC) [F11.20]     Chronic respiratory failure with hypoxia (HCC) [J96.11]     Morbid obesity (HCC) [E66.01]     Sepsis (HCC) [A41.9]     History of DVT (deep vein thrombosis) [Z86.718]     Atrial fibrillation with RVR (HCC) [I48.91]     " COPD (chronic obstructive pulmonary disease) ([facility]) [J44.9]     SANTOSH on CPAP [G47.33]        Assessment/Plan:  Notified by wound team about wound on arm  - Wet to dry dressings for now  - Will discuss further plan with attending  LUE dressings are CDI with min serosanguineous drainage to distal dressing- changed   WBAT LUE  Cleared for DC from orthopedic standpoint pending therapy recs and medical optimization    POD#3 S/p   Open treatment of left humeral shaft fracture with insertion of intramedullary implant   Wt bearing status - WBAT LUE  Wound care/Drains - Dressings to be changed every other day by nursing. Or PRN for saturation starting POD#2  Future Procedures - None planned   Lovenox: Start 12/29, Duration-until ambulatory > 150'  Sutures/Staples out- 14-21 days post operatively. Removal will completed by ortho JULIETTE's unless transferred.  DVT Prophylaxis outpatient: ASA 81 mg PO BID x4 weeks  PT/OT-initiated  Antibiotics:  Perioperative completed  DVT Prophylaxis- TEDS/SCDs/Foot pumps  Cummings-not needed per ortho  Case Coordination for Discharge Planning - Disposition per therapy recs.

## 2024-01-01 NOTE — PROGRESS NOTES
Notified Ortho PA Longero that pts left lower surgical incision on left arm had had a significant increase in drainage. Pt has had drainage on the lower surgical site for the past three days requiring qshift dressing changes. Ortho previously notified of this drainage prior but no concerns present.     Today, after wound care RN's looked at pt and redressed another wound located on his arm, they also changed this incisional dressing. Two hours later it was once again saturated. Notified ROMA Jung again expressing nursing concerns of increased bleeding. Pt arm is swollen and bruised. Will change dressing again making it the third dressing change of the day. Will document and continue to monitor.       Above picture of saturated dressing 2 hrs post wound care change.        Above picture from the previous day 12/30 requiring a second dressing change.

## 2024-01-01 NOTE — PROGRESS NOTES
Assumed care at 0700. Bedside report received from Susana. Patient's chart and MAR reviewed. Pt complains of chronic, generalized pain at this time. Was recently medicated per MAR.  Pt is A & O 4. Patient was updated on plan of care for the day. Questions answered and concerns addressed.  Pt denies any additional needs at this time. White board updated. Call light, phone and personal belongings within reach.

## 2024-01-01 NOTE — WOUND TEAM
Wound consult received regarding Left upper arm and right arm skin tears. Pt was evaluated by wound team on 12/31/23 and discussed wounds with PA-C for orthopedics. Orders are in place in regards to the left arm. Right arm xeroform ordered over skin tears. No further advanced wound care at this point until definitive plan from surgery team is determined.

## 2024-01-01 NOTE — PROGRESS NOTES
"    Orthopedic PA Progress Note    Interval changes:  Patient doing well.  LUE dressings are CDI   WBAT LUE  Cleared for DC from orthopedic standpoint pending therapy recs and medical optimization    ROS - Patient denies any new issues.  Denies any numbness or tingling. Pain well controlled.    /65   Pulse 79   Temp 36.7 °C (98.1 °F) (Temporal)   Resp 18   Ht 1.88 m (6' 2\")   Wt (!) 172 kg (379 lb)   SpO2 95%     Patient seen and examined  No acute distress  Breathing non labored  RRR  LUE: Dressing CDI. Extensive bruising. Patient clearly fires forearm flexors, forearm extensors, and moves all five fingers without issue . Sensation is intact to light touch throughout median, ulnar, and radial nerve distributions. Strong and palpable radial pulses with capillary refill less than 2 seconds.     Recent Labs     12/30/23  0242 12/31/23  0058 01/01/24  0404   WBC 5.2 6.2 7.1   RBC 3.82* 3.47* 3.63*   HEMOGLOBIN 10.9* 9.8* 10.3*   HEMATOCRIT 34.2* 30.3* 31.3*   MCV 89.5 87.3 86.2   MCH 28.5 28.2 28.4   MCHC 31.9* 32.3 32.9   RDW 43.7 43.5 43.8   PLATELETCT 208 211 173   MPV 11.1 10.6 11.1         Active Hospital Problems    Diagnosis     Pneumonia [J18.9]      12/31 chest x-ray personally reviewed, possible RLL pneumonia.  Patient just finished 4 days Ancef yesterday.  Normal white count. I started Augmentin      Hypophosphatemia [E83.39]     Cellulitis [L03.90]     Left humeral fracture [S42.302A]     Lactic acidosis [E87.20]     Opiate dependence (HCC) [F11.20]     Chronic respiratory failure with hypoxia ([facility]) [J96.11]     Morbid obesity ([facility]) [E66.01]     Sepsis ([facility]) [A41.9]     History of DVT (deep vein thrombosis) [Z86.718]     Atrial fibrillation with RVR ([facility]) [I48.91]     COPD (chronic obstructive pulmonary disease) ([facility]) [J44.9]     SANTOSH on CPAP [G47.33]        Assessment/Plan:  Patient doing well.  LUE dressings are CDI   WBAT LUE  Cleared for DC from orthopedic standpoint pending therapy recs and " medical optimization    POD#4 S/p   Open treatment of left humeral shaft fracture with insertion of intramedullary implant   Wt bearing status - WBAT LUE  Wound care/Drains - Dressings to be changed every other day by nursing. Or PRN for saturation starting POD#2  Future Procedures - None planned   Lovenox: Start 12/29, Duration-until ambulatory > 150'  Sutures/Staples out- 14-21 days post operatively. Removal will completed by ortho JULIETTE's unless transferred.  DVT Prophylaxis outpatient: ASA 81 mg PO BID x4 weeks  PT/OT-initiated  Antibiotics:  Perioperative completed  DVT Prophylaxis- TEDS/SCDs/Foot pumps  Cummings-not needed per ortho  Case Coordination for Discharge Planning - Disposition per therapy recs.

## 2024-01-01 NOTE — CARE PLAN
The patient is Stable - Low risk of patient condition declining or worsening    Shift Goals  Clinical Goals: Monitor labs, monitor for bleeding on his arm, pain control  Patient Goals: Pain control  Family Goals: CLOTILDE    Progress made toward(s) clinical / shift goals:      Problem: Pain - Standard  Goal: Alleviation of pain or a reduction in pain to the patient’s comfort goal  Description: Target End Date:  Prior to discharge or change in level of care    Document on Vitals flowsheet    1.  Document pain using the appropriate pain scale per order or unit policy  2.  Educate and implement non-pharmacologic comfort measures (i.e. relaxation, distraction, massage, cold/heat therapy, etc.)  3.  Pain management medications as ordered  4.  Reassess pain after pain med administration per policy  5.  If opiods administered assess patient's response to pain medication is appropriate per POSS sedation scale  6.  Follow pain management plan developed in collaboration with patient and interdisciplinary team (including palliative care or pain specialists if applicable)  Outcome: Progressing     Problem: Knowledge Deficit - Standard  Goal: Patient and family/care givers will demonstrate understanding of plan of care, disease process/condition, diagnostic tests and medications  Description: Target End Date:  1-3 days or as soon as patient condition allows    Document in Patient Education    1.  Patient and family/caregiver oriented to unit, equipment, visitation policy and means for communicating concern  2.  Complete/review Learning Assessment  3.  Assess knowledge level of disease process/condition, treatment plan, diagnostic tests and medications  4.  Explain disease process/condition, treatment plan, diagnostic tests and medications  Outcome: Progressing     Problem: Hemodynamics  Goal: Patient's hemodynamics, fluid balance and neurologic status will be stable or improve  Description: Target End Date:  Prior to discharge or  change in level of care    Document on Assessment and I/O flowsheet templates    1.  Monitor vital signs, pulse oximetry and cardiac monitor per provider order and/or policy  2.  Maintain blood pressure per provider order  3.  Hemodynamic monitoring per provider order  4.  Manage IV fluids and IV infusions  5.  Monitor intake and output  6.  Daily weights per unit policy or provider order  7.  Assess peripheral pulses and capillary refill  8.  Assess color and body temperature  9.  Position patient for maximum circulation/cardiac output  10. Monitor for signs/symptoms of excessive bleeding  11. Assess mental status, restlessness and changes in level of consciousness  12. Monitor temperature and report fever or hypothermia to provider immediately. Consideration of targeted temperature management.  Outcome: Progressing     Problem: Fluid Volume  Goal: Fluid volume balance will be maintained  Description: Target End Date:  Prior to discharge or change in level of care    Document on I/O flowsheet    1.  Monitor intake and output as ordered  2.  Promote oral intake as appropriate  3.  Report inadequate intake or output to physician  4.  Administer IV therapy as ordered  5.  Weights per provider order  6.  Assess for signs and symptoms of bleeding  7.  Monitor for signs of fluid overload (respiratory changes, edema, weight gain, increased abdominal girth)  8.  Monitor of signs for inadequate fluid volume (poor skin turgor, dry mucous membranes)  9.  Instruct patient on adherence to fluid restrictions  Outcome: Progressing     Problem: Urinary - Renal Perfusion  Goal: Ability to achieve and maintain adequate renal perfusion and functioning will improve  Description: Target End Date:  Prior to discharge or change in level of care    Document on I/O and Assessment flowsheet    1.  Urine output will remain greater than 0.5ml/Kg/HR  2.  Monitor amount and/or characteristics of urine per order/policy. Specific gravity per  order/policy  3.  Assess signs and symptoms of renal dysfunction  Outcome: Progressing     Problem: Respiratory  Goal: Patient will achieve/maintain optimum respiratory ventilation and gas exchange  Description: Target End Date:  Prior to discharge or change in level of care    Document on Assessment flowsheet    1.  Assess and monitor rate, rhythm, depth and effort of respiration  2.  Breath sounds assessed qshift and/or as needed  3.  Assess O2 saturation, administer/titrate oxygen as ordered  4.  Position patient for maximum ventilatory efficiency  5.  Turn, cough, and deep breath with splinting to improve effectiveness  6.  Collaborate with RT to administer medication/treatments per order  7.  Encourage use of incentive spirometer and encourage patient to cough after use and utilize splinting techniques if applicable  8.  Airway suctioning  9.  Monitor sputum production for changes in color, consistency and frequency  10. Perform frequent oral hygiene  11. Alternate physical activity with rest periods  Outcome: Progressing     Problem: Mechanical Ventilation  Goal: Safe management of artificial airway and ventilation  Description: Target End Date:  when vent discontinued    Document on VAP flowsheet and Airway LDA    1.  Daily awakening trials per provider order/policy  2.  Suctioning and care of ET/Trach tube (document on LDA)  3.  Collaborate with RT to administer medications/treatments  4.  Ambu bag at bedside and available for transport  5.  Trach patient - replacement trach at bedside  6.  Provide communication tools if applicable  Outcome: Progressing  Goal: Successful weaning off mechanical ventilator, spontaneously maintains adequate gas exchange  Description: Target End Date:  when vent discontinued    1.  Follow universal weaning protocol for patients on mechanical ventilation per order  2.  Review contraindication list.  Obtain provider order to wean in presence of contraindication.  3.  Obtain Gerald  Sedation-Agitation Score  4.  Gerald Score 1-2:  sedation vacation  5.  Gerald Score 3-4:  Collaborate with provider and/or RT to determine readiness for trial  6.  Begin 2 hour trial of weaning following protocol  7.  Evaluate for fatigue parameters per protocol  8.  Fatigue parameters triggered:  Stop wean and return to previous ASV% setting or increase % minute volume to offset work or breathing  Outcome: Progressing  Goal: Patient will be able to express needs and understand communication  Description: Target End Date:  when vent discontinued    1.  Assess ability to communicate and understand  2.  Provide communication tools  3.  Collaborate with Speech Therapy for [facility]  Outcome: Progressing     Problem: Physical Regulation  Goal: Diagnostic test results will improve  Description: Target End Date:  Prior to discharge or change in level of care    1.  Monitor lactic acid levels  2.  Monitor ABG's  3.  Monitor diagnostic test results  Outcome: Progressing  Goal: Signs and symptoms of infection will decrease  Description: Target End Date:  Prior to discharge or change in level of care    1.  Remove potential routes of infection, such as central lines and urinary catheter  2.  Follow facility protocol for changing IV tubing and sites  3.  Collaborate with Infectious Disease  4.  Antibiotic therapy per provider order  5.  Note drug effects and monitor for antibiotic toxicity  Outcome: Progressing     Problem: Skin Integrity  Goal: Skin integrity is maintained or improved  Description: Target End Date:  Prior to discharge or change in level of care    Document interventions on Skin Risk/Antony flowsheet groups and corresponding LDA    1.  Assess and monitor skin integrity, appearance and/or temperature  2.  Assess risk factors for impaired skin integrity and/or pressures ulcers  3.  Implement precautions to protect skin integrity in collaboration with interdisciplinary team  4.  Implement pressure ulcer prevention  protocol if at risk for skin breakdown  5.  Confirm wound care consult if at risk for skin breakdown  6.  Ensure patient use of pressure relieving devices  (Low air loss bed, waffle overlay, heel protectors, ROHO cushion, etc)  Outcome: Progressing     Problem: Fall Risk  Goal: Patient will remain free from falls  Description: Target End Date:  Prior to discharge or change in level of care    Document interventions on the [facility] Fall Risk Assessment    1.  Assess for fall risk factors  2.  Implement fall precautions  Outcome: Progressing     Problem: Psychosocial  Goal: Patient's level of anxiety will decrease  Description: Target End Date:  1-3 days or as soon as patient condition allows    1.  Collaborate with patient and family/caregiver to identify triggers and develop strategies to cope with anxiety  2.  Implement stimuli reduction, calming techniques  3.  Pharmacologic management per provider order  4.  Encourage patient/family/care giver participation  5.  Collaborate with interdisciplinary team including Psychologist or Behavioral Health Team as needed  Outcome: Progressing  Goal: Patient's ability to verbalize feelings about condition will improve  Description: Target End Date:  Prior to discharge or change in level of care    1.  Discuss coping with medical condition and its effects  2.  Encourage patient participation in care  3.  Encourage acknowledgement of body changes and accompanying emotions  4.  Perform depression screening  Outcome: Progressing  Goal: Patient's ability to re-evaluate and adapt role responsibilities will improve  Description: Target End Date:  Prior to discharge or change in level of care    1.  Assess family support  2.  Encourage support system participation in care  3.  Encouraged verbalization of feelings regarding caregiver responsibilities  4.  Discuss changes in role and responsibilities caused by patient's condition  Outcome: Progressing  Goal: Patient and family will  demonstrate ability to cope with life altering diagnosis and/or procedure  Description: Target End Date:  1-3 days or as soon as patient condition allows    1. Expect initial shock and disbelief following diagnosis of cancer and traumatizing procedures (disfiguring surgery, colostomy, amputation).  2.  Assess patient and family/caregiver for stage of grief currently being experienced. Explain process as appropriate.  3.  Provide open, nonjudgmental environment. Use therapeutic communication skills of Active-Listening, acknowledgment, and so on.  4.  Encourage verbalization of thoughts or concerns and accept expressions of sadness, anger, rejection. Acknowledge normality of these feelings  5.  Be aware of mood swings, hostility, and other acting-out behavior. Set limits on inappropriate behavior, redirect negative thinking  6.  Be aware of debilitating depression. Ask patient direct questions about state of mind.  7.  Visit frequently and provide physical contact as appropriate, or provide frequent phone support as appropriate for setting. Arrange for care provider and support person to stay with patient as needed  8.  Reinforce teaching regarding disease process and treatments and provide information as appropriate about dying. Be honest; do not give false hope while providing emotional support  9.  Review past life experiences, role changes, and coping skills. Talk about things that interest the patient  Outcome: Progressing  Goal: Spiritual and cultural needs incorporated into hospitalization  Description: Target End Date:  End of day 1    1.  Encourage verbalization of feelings, concerns, expectations and needs  2.  Collaborate with Case Management/  3.  Collaborate with Pastoral Care to meet spiritual needs  Outcome: Progressing     Problem: Communication  Goal: The ability to communicate needs accurately and effectively will improve  Description: Target End Date:  End of day 1    1.  Assess  ability to communicate and understand  2.  Provide augmentative or alternative methods of communication devices  3.  Use /language line as appropriate  4.  Collaborate with Speech Therapy as needed  Outcome: Progressing     Problem: Risk for Aspiration  Goal: Patient's risk for aspiration will be absent or decrease  Description: Target End Date:  Prior to discharge or change in level of care    1.   Complete dysphagia screening on admission  2.   NPO until dysphagia screening complete or medically cleared  3.   Collaborate with Speech Therapy, Clinical Dietitian and interdisciplinary team  4.   Implement aspiration precautions  5.   Assist patient up to chair for meals  6.   Elevate head of bed 90 degrees if patient is unable to get out of bed  7.   Encourage small bites  8.   Ensure foods/liquids are of appropriate consistency  9.   Assess for any signs/symptoms of aspiration  10. Assess breath sounds and vital signs after oral intake  Outcome: Progressing     Problem: Mobility  Goal: Patient's capacity to carry out activities will improve  Description: Target End Date:  Prior to discharge or change in level of care    1.  Assess for barriers to mobility/activity  2.  Implement activity per interdisciplinary team recommendations  3.  Target activity level identified and patient/family/caregiver aware of goal  4.  Provide assistive devices  5.  Instruct patient/caregiver on proper use of assistive/adaptive devices  6.  Schedule activities and rest periods to decrease effects of fatigue  7.  Encourage mobilization to extent of ability  8.  Maintain proper body alignment  9.  Provide adequate pain management to allow progressive mobilization  10. Implement pace maker precautions as needed  Outcome: Progressing     Problem: Self Care  Goal: Patient will have the ability to perform ADLs independently or with assistance (bathe, groom, dress, toilet and feed)  Description: Target End Date:  Prior to discharge  or change in level of care    Document on ADL flowsheet    1.  Assess the capability and level of deficiency to perform ADLs  2.  Encourage family/care giver involvement  3.  Provide assistive devices  4.  Consider PT/OT evaluations  5.  Maintain support, give positive feedback, encourage self-care allowing extra time and verbal cuing as needed  6.  Avoid doing something for patients they can do themselves, but provide assistance as needed  7.  Assist in anticipating/planning individual needs  8.  Collaborate with Case Management and  to meet discharge needs  Outcome: Progressing     Problem: Infection - Standard  Goal: Patient will remain free from infection  Description: Target End Date:  Prior to discharge or change in level of care    1.  Utilize Standard Precautions at all times to reduce the risk of transmission of microorganisms from both recognized and  unrecognized sources of infection  2.  Infection prevention handouts provided (general/device/diagnosis specific) and documented in Patient Education  3.  Educate patient and family/caregiver on isolation precautions if applicable  Outcome: Progressing     Problem: Wound/ / Incision Healing  Goal: Patient's wound/surgical incision will decrease in size and heals properly  Description: Target End Date:  Prior to discharge or change in level of care    Document on LDA    1.  Assess and document surgical incision/wound  2.  Provide incision/wound care per policy and/or provider orders  3.  Manage surgical drains per policy if applicable  4.  Encourage adequate nutrition to promote wound healing  5.  Collaborate with Clinical Dietician  Outcome: Progressing

## 2024-01-01 NOTE — CARE PLAN
The patient is Watcher - Medium risk of patient condition declining or worsening    Shift Goals  Clinical Goals: pain control, wound care, promote sleep  Patient Goals: pain conreol, sleep  Family Goals: CLOTILDE    Progress made toward(s) clinical / shift goals:    Problem: Pain - Standard  Goal: Alleviation of pain or a reduction in pain to the patient’s comfort goal  Outcome: Progressing     Problem: Knowledge Deficit - Standard  Goal: Patient and family/care givers will demonstrate understanding of plan of care, disease process/condition, diagnostic tests and medications  Outcome: Progressing     Problem: Hemodynamics  Goal: Patient's hemodynamics, fluid balance and neurologic status will be stable or improve  Outcome: Progressing     Problem: Urinary - Renal Perfusion  Goal: Ability to achieve and maintain adequate renal perfusion and functioning will improve  Outcome: Progressing     Problem: Respiratory  Goal: Patient will achieve/maintain optimum respiratory ventilation and gas exchange  Outcome: Progressing     Problem: Fall Risk  Goal: Patient will remain free from falls  Outcome: Progressing       Patient is not progressing towards the following goals:      Problem: Skin Integrity  Goal: Skin integrity is maintained or improved  Outcome: Not Progressing     Problem: Wound/ / Incision Healing  Goal: Patient's wound/surgical incision will decrease in size and heals properly  Outcome: Not Progressing    Wounds/skin tears on right arm with signs of infection.    Incision sites continue to bleed requiring multiple dressing changes per day.    Plan to consult wound care team and follow up with day team during day shift.  Also had rapid team add patient to their list for monitoring.

## 2024-01-02 ENCOUNTER — APPOINTMENT (OUTPATIENT)
Dept: RADIOLOGY | Facility: MEDICAL CENTER | Age: 69
DRG: 853 | End: 2024-01-02
Attending: EMERGENCY MEDICAL TECHNICIAN, INTERMEDIATE
Payer: MEDICARE

## 2024-01-02 PROBLEM — E66.813 CLASS 3 SEVERE OBESITY DUE TO EXCESS CALORIES WITH SERIOUS COMORBIDITY AND BODY MASS INDEX (BMI) OF 45.0 TO 49.9 IN ADULT (HCC): Status: ACTIVE | Noted: 2018-03-31

## 2024-01-02 LAB — NT-PROBNP SERPL IA-MCNC: 1603 PG/ML (ref 0–125)

## 2024-01-02 PROCEDURE — 97597 DBRDMT OPN WND 1ST 20 CM/<: CPT

## 2024-01-02 PROCEDURE — A9270 NON-COVERED ITEM OR SERVICE: HCPCS | Performed by: INTERNAL MEDICINE

## 2024-01-02 PROCEDURE — 700111 HCHG RX REV CODE 636 W/ 250 OVERRIDE (IP): Mod: JZ | Performed by: STUDENT IN AN ORGANIZED HEALTH CARE EDUCATION/TRAINING PROGRAM

## 2024-01-02 PROCEDURE — 700102 HCHG RX REV CODE 250 W/ 637 OVERRIDE(OP): Performed by: INTERNAL MEDICINE

## 2024-01-02 PROCEDURE — A9270 NON-COVERED ITEM OR SERVICE: HCPCS | Performed by: STUDENT IN AN ORGANIZED HEALTH CARE EDUCATION/TRAINING PROGRAM

## 2024-01-02 PROCEDURE — 700111 HCHG RX REV CODE 636 W/ 250 OVERRIDE (IP): Performed by: INTERNAL MEDICINE

## 2024-01-02 PROCEDURE — 302098 PASTE RING (FLAT): Performed by: ORTHOPAEDIC SURGERY

## 2024-01-02 PROCEDURE — 770001 HCHG ROOM/CARE - MED/SURG/GYN PRIV*

## 2024-01-02 PROCEDURE — 97530 THERAPEUTIC ACTIVITIES: CPT

## 2024-01-02 PROCEDURE — 700102 HCHG RX REV CODE 250 W/ 637 OVERRIDE(OP): Performed by: STUDENT IN AN ORGANIZED HEALTH CARE EDUCATION/TRAINING PROGRAM

## 2024-01-02 PROCEDURE — 99233 SBSQ HOSP IP/OBS HIGH 50: CPT | Performed by: STUDENT IN AN ORGANIZED HEALTH CARE EDUCATION/TRAINING PROGRAM

## 2024-01-02 RX ORDER — LIDOCAINE HYDROCHLORIDE 40 MG/ML
SOLUTION TOPICAL
Status: DISCONTINUED | OUTPATIENT
Start: 2024-01-02 | End: 2024-01-05 | Stop reason: HOSPADM

## 2024-01-02 RX ORDER — LIDOCAINE HYDROCHLORIDE 20 MG/ML
1 JELLY TOPICAL
Status: DISCONTINUED | OUTPATIENT
Start: 2024-01-02 | End: 2024-01-05 | Stop reason: HOSPADM

## 2024-01-02 RX ORDER — LIDOCAINE HYDROCHLORIDE 20 MG/ML
20 INJECTION, SOLUTION INFILTRATION; PERINEURAL
Status: DISCONTINUED | OUTPATIENT
Start: 2024-01-02 | End: 2024-01-05 | Stop reason: HOSPADM

## 2024-01-02 RX ADMIN — AMOXICILLIN AND CLAVULANATE POTASSIUM 1 TABLET: 875; 125 TABLET, FILM COATED ORAL at 05:08

## 2024-01-02 RX ADMIN — THEOPHYLLINE 300 MG: 300 TABLET, EXTENDED RELEASE ORAL at 06:18

## 2024-01-02 RX ADMIN — HYDROMORPHONE HYDROCHLORIDE 0.5 MG: 1 INJECTION, SOLUTION INTRAMUSCULAR; INTRAVENOUS; SUBCUTANEOUS at 17:26

## 2024-01-02 RX ADMIN — OXYCODONE HYDROCHLORIDE 10 MG: 10 TABLET ORAL at 00:59

## 2024-01-02 RX ADMIN — CLONAZEPAM 1 MG: 1 TABLET ORAL at 05:08

## 2024-01-02 RX ADMIN — CLONAZEPAM 1 MG: 1 TABLET ORAL at 17:35

## 2024-01-02 RX ADMIN — GUAIFENESIN 600 MG: 600 TABLET, EXTENDED RELEASE ORAL at 05:08

## 2024-01-02 RX ADMIN — SODIUM HYPOCHLORITE 5 ML: 1.25 SOLUTION TOPICAL at 21:00

## 2024-01-02 RX ADMIN — HYDROMORPHONE HYDROCHLORIDE 0.5 MG: 1 INJECTION, SOLUTION INTRAMUSCULAR; INTRAVENOUS; SUBCUTANEOUS at 13:31

## 2024-01-02 RX ADMIN — FUROSEMIDE 40 MG: 10 INJECTION, SOLUTION INTRAVENOUS at 05:10

## 2024-01-02 RX ADMIN — DOCUSATE SODIUM 50 MG AND SENNOSIDES 8.6 MG 2 TABLET: 8.6; 5 TABLET, FILM COATED ORAL at 17:34

## 2024-01-02 RX ADMIN — METOPROLOL SUCCINATE 100 MG: 100 TABLET, EXTENDED RELEASE ORAL at 05:09

## 2024-01-02 RX ADMIN — FLUOXETINE HYDROCHLORIDE 20 MG: 20 CAPSULE ORAL at 05:08

## 2024-01-02 RX ADMIN — HYDROMORPHONE HYDROCHLORIDE 0.5 MG: 1 INJECTION, SOLUTION INTRAMUSCULAR; INTRAVENOUS; SUBCUTANEOUS at 22:31

## 2024-01-02 RX ADMIN — OXYCODONE HYDROCHLORIDE 10 MG: 10 TABLET ORAL at 20:31

## 2024-01-02 RX ADMIN — FUROSEMIDE 40 MG: 10 INJECTION, SOLUTION INTRAVENOUS at 17:28

## 2024-01-02 RX ADMIN — NYSTATIN: 100000 POWDER TOPICAL at 12:58

## 2024-01-02 RX ADMIN — NYSTATIN: 100000 POWDER TOPICAL at 20:31

## 2024-01-02 RX ADMIN — GUAIFENESIN 600 MG: 600 TABLET, EXTENDED RELEASE ORAL at 17:33

## 2024-01-02 RX ADMIN — HYDROMORPHONE HYDROCHLORIDE 1 MG: 1 INJECTION, SOLUTION INTRAMUSCULAR; INTRAVENOUS; SUBCUTANEOUS at 10:25

## 2024-01-02 RX ADMIN — QUETIAPINE FUMARATE 100 MG: 100 TABLET ORAL at 05:08

## 2024-01-02 RX ADMIN — MOMETASONE FUROATE AND FORMOTEROL FUMARATE DIHYDRATE 1 PUFF: 200; 5 AEROSOL RESPIRATORY (INHALATION) at 05:10

## 2024-01-02 RX ADMIN — OXYCODONE HYDROCHLORIDE 10 MG: 10 TABLET ORAL at 08:08

## 2024-01-02 RX ADMIN — NYSTATIN: 100000 POWDER TOPICAL at 05:10

## 2024-01-02 RX ADMIN — ROSUVASTATIN CALCIUM 10 MG: 20 TABLET, FILM COATED ORAL at 17:33

## 2024-01-02 RX ADMIN — OXYCODONE HYDROCHLORIDE 10 MG: 10 TABLET ORAL at 05:08

## 2024-01-02 RX ADMIN — AMOXICILLIN AND CLAVULANATE POTASSIUM 1 TABLET: 875; 125 TABLET, FILM COATED ORAL at 17:33

## 2024-01-02 RX ADMIN — QUETIAPINE FUMARATE 100 MG: 100 TABLET ORAL at 17:36

## 2024-01-02 RX ADMIN — THEOPHYLLINE 300 MG: 300 TABLET, EXTENDED RELEASE ORAL at 17:34

## 2024-01-02 RX ADMIN — RIVAROXABAN 20 MG: 20 TABLET, FILM COATED ORAL at 05:08

## 2024-01-02 RX ADMIN — TIOTROPIUM BROMIDE INHALATION SPRAY 2.5 MCG: 3.12 SPRAY, METERED RESPIRATORY (INHALATION) at 05:16

## 2024-01-02 ASSESSMENT — PAIN DESCRIPTION - PAIN TYPE
TYPE: ACUTE PAIN
TYPE: ACUTE PAIN;SURGICAL PAIN
TYPE: ACUTE PAIN;SURGICAL PAIN
TYPE: ACUTE PAIN

## 2024-01-02 ASSESSMENT — COGNITIVE AND FUNCTIONAL STATUS - GENERAL
STANDING UP FROM CHAIR USING ARMS: A LOT
MOVING TO AND FROM BED TO CHAIR: UNABLE
CLIMB 3 TO 5 STEPS WITH RAILING: TOTAL
WALKING IN HOSPITAL ROOM: A LOT
MOVING FROM LYING ON BACK TO SITTING ON SIDE OF FLAT BED: UNABLE
MOBILITY SCORE: 8
TURNING FROM BACK TO SIDE WHILE IN FLAT BAD: UNABLE
SUGGESTED CMS G CODE MODIFIER MOBILITY: CM

## 2024-01-02 ASSESSMENT — ENCOUNTER SYMPTOMS
SHORTNESS OF BREATH: 0
COUGH: 0
ABDOMINAL PAIN: 0
NAUSEA: 1
HEADACHES: 0
WHEEZING: 1
DIZZINESS: 0
WEAKNESS: 1
BACK PAIN: 1
CHILLS: 0
PALPITATIONS: 0
VOMITING: 0
MYALGIAS: 0
FEVER: 0

## 2024-01-02 ASSESSMENT — GAIT ASSESSMENTS: GAIT LEVEL OF ASSIST: UNABLE TO PARTICIPATE

## 2024-01-02 NOTE — PROGRESS NOTES
Hospital Medicine Daily Progress Note    Date of Service  1/1/2024    Chief Complaint  Henry Kumari is a 68 y.o. male admitted 12/26/2023 with fall, humerus fracture.    Hospital Course  Henry Kumari is a 68 y.o. male with a h/o COPD (2L at baseline), CHF, afib, prior DVTs/PE who presented 12/26/2023 as a transfer from [facility].  The patient presented there after a fall, humerus fracture.     He has chronic panniculitis that has been utilizing alcohol for.  The patient had a fall due to his generalized weakness causing a humerus fracture there was noted at the transferring facility and he was transferred here for higher level of care.  The patient is on anticoagulation for history of DVTs as well as pulmonary emboli.  He is on home oxygen and continues to abuse tobacco products. He states he does not have any dysuria.  The patient also suffered from multiple skin tears to the right upper extremity and he did receive tetanus prophylaxis as well as Rocephin.  Patient was found to have a leukocytosis with a white blood cell count of 18,200 and slight elevation in his creatinine at 1.74.  Blood cultures were ordered and are currently pending at [facility].      In ER he was found to be febrile to 38.2, tachycardic to the 120s (in A-fib) normal blood pressure, normal oxygen saturation on room air.  Labs remarkable for WBC 18, CO2 19, BUN 34, creatinine 1.24, lactic acid 2.4 improved to 2 point.  Urinalysis hazy 20-50WBC, chest x-ray negative, CRP 22, viral panel negative.    Left humeral shaft fracture - s/p ORIF 12/28    He denies dysuria urgency frequency    Interval Problem Update  Patient was seen and examined at bedside  BMI 49    Patient sounds congested, x-ray showed mild pulmonary edema, I started IV Lasix twice daily  Continue Augmentin for RLL pneumonia    Hb stable-resume Xarelto    Left humeral shaft fracture - s/p ORIF 12/28, some oozing  from the incision site, I put Xarelto on hold, I discussed with Ortho team, no concerns  WBAT LUE  Sutures/Staples out- 14-21 days post operatively    Abdominal panniculitis/cellulitis -had 4 days Ancef, now on Augmentin for possible pneumonia    SANTOSH -CPAP  COPD -2 L, at baseline    PT OT recommended SNF    I have discussed this patient's plan of care and discharge plan at IDT rounds today with Case Management, Nursing, Nursing leadership, and other members of the IDT team.    Consultants/Specialty      Code Status  Full Code    Disposition  The patient is not medically cleared for discharge to home or a post-acute facility.      I have placed the appropriate orders for post-discharge needs.    Review of Systems  All 12 systems were reviewed and negative except as mentioned above       Physical Exam  Temp:  [36.5 °C (97.7 °F)-36.9 °C (98.4 °F)] 36.7 °C (98.1 °F)  Pulse:  [79-87] 79  Resp:  [16-18] 18  BP: (101-131)/(65-87) 101/65  SpO2:  [95 %-98 %] 95 %    Physical Exam  Constitutional:       Appearance: He is obese. He is ill-appearing.   HENT:      Head: Normocephalic.      Nose: Nose normal.      Mouth/Throat:      Mouth: Mucous membranes are moist.   Eyes:      Extraocular Movements: Extraocular movements intact.      Conjunctiva/sclera: Conjunctivae normal.   Cardiovascular:      Rate and Rhythm: Normal rate and regular rhythm.      Pulses: Normal pulses.      Heart sounds: Normal heart sounds.   Pulmonary:      Effort: Pulmonary effort is normal.      Breath sounds: No wheezing.   Abdominal:      Palpations: Abdomen is soft.      Tenderness: There is abdominal tenderness. There is no guarding.   Musculoskeletal:         General: Tenderness present.      Cervical back: Normal range of motion.      Comments: Left arm sling in place   Skin:     General: Skin is warm.      Findings: Lesion and rash present.      Comments: Lower abdominal diffuse skin erythema, rash and tenderness   Neurological:      Mental  Status: He is alert and oriented to person, place, and time. Mental status is at baseline.   Psychiatric:         Mood and Affect: Mood normal.         Fluids    Intake/Output Summary (Last 24 hours) at 1/1/2024 1625  Last data filed at 1/1/2024 1400  Gross per 24 hour   Intake --   Output 2270 ml   Net -2270 ml       Laboratory  Recent Labs     12/30/23  0242 12/31/23 0058 01/01/24  0404   WBC 5.2 6.2 7.1   RBC 3.82* 3.47* 3.63*   HEMOGLOBIN 10.9* 9.8* 10.3*   HEMATOCRIT 34.2* 30.3* 31.3*   MCV 89.5 87.3 86.2   MCH 28.5 28.2 28.4   MCHC 31.9* 32.3 32.9   RDW 43.7 43.5 43.8   PLATELETCT 208 211 173   MPV 11.1 10.6 11.1     Recent Labs     12/30/23  0242 12/31/23 0058 01/01/24  0404   SODIUM 139 139 135   POTASSIUM 3.6 3.6 3.9   CHLORIDE 106 106 103   CO2 22 24 23   GLUCOSE 93 91 91   BUN 18 14 10   CREATININE 0.53 0.51 0.39*   CALCIUM 8.3* 8.1* 8.1*                   Imaging  DX-CHEST-PORTABLE (1 VIEW)   Final Result      1.  Right basilar opacity which could be atelectasis or pneumonia      2.  Enlarged cardiac silhouette      DX-PORTABLE FLUOROSCOPY < 1 HOUR   Final Result      Portable fluoroscopy utilized for 1 minute 30 seconds.      INTERPRETING LOCATION: [address] [facility], [facility], [address]      DX-HUMERUS 2+ LEFT   Final Result      Digitized intraoperative radiograph is submitted for review. This examination is not for diagnostic purpose but for guidance during a surgical procedure. Please see the patient's chart for full procedural details.         INTERPRETING LOCATION: [address] , [facility], [address]      DX-CHEST-PORTABLE (1 VIEW)   Final Result         Cardiomegaly.      No pulmonary infiltrates or consolidations are noted.         OUTSIDE IMAGES-CT HEAD   Final Result      OUTSIDE IMAGES-DX UPPER EXTREMITY, LEFT   Final Result      OUTSIDE IMAGES-DX CHEST   Final Result      OUTSIDE IMAGES-DX UPPER EXTREMITY, LEFT   Final Result           Assessment/Plan  * Sepsis (HCC)- (present on admission)  Assessment &  Plan  This is Sepsis Present on admission  SIRS criteria identified on my evaluation include: Fever, with temperature greater than 100.9 deg F, Tachycardia, with heart rate greater than 90 BPM, and Leukocytosis, with WBC greater than 12,000  Clinical indicators of end organ dysfunction include Lactic Acid greater than 2  Source is skin/panniculitis   Sepsis protocol initiated  Crystalloid Fluid Administration: Resuscitation volume of 1000 ordered. Reason that resuscitation volume of less than 30ml/kg was ordered concern for causing harm given CHF  IV antibiotics as appropriate for source of sepsis  Reassessment: I have reassessed the patient's hemodynamic status    Left humeral fracture  Assessment & Plan  Left humeral shaft fracture  -evaluated by surgery,     12/28 s/p ORIF today  PT OT and supportive care    Open wound of multiple sites of left upper extremity without complication  Assessment & Plan  Multiple open wounds on both arms, the most concerned one is on left arm  Wound care consulted  Wound culture pending  I ordered a Dilaudid IV as needed for dressing change      Hypophosphatemia  Assessment & Plan  Replaced    Lactic acidosis  Assessment & Plan  Due to sepsis    Lactic acid 3.1>1.4  Continue IV fluid    Cellulitis  Assessment & Plan  Lower abdominal diffused erythema, tenderness  History of panniculitis    12/31 had 4 days Ancef, now on Augmentin for possible pneumonia    Opiate dependence (HCC)- (present on admission)  Assessment & Plan  Takes buprenorphine at home for chronic pain  In acute pain so giving prn narcotics    Chronic respiratory failure with hypoxia (HCC)- (present on admission)  Assessment & Plan  Due to COPD and obese  Baseline 2 L    1/1 patient sounds congested, chest x-ray with mild pulmonary edema, I started IV Lasix    Morbid obesity (HCC)- (present on admission)  Assessment & Plan  BMI 49  Weight loss education    SANTOSH on CPAP- (present on admission)  Assessment & Plan  I  ordered a CPAP    COPD (chronic obstructive pulmonary disease) ([facility])- (present on admission)  Assessment & Plan  Not in exacerbation  Continue inhalers and albuterol prn    Atrial fibrillation with RVR ([facility])- (present on admission)  Assessment & Plan  History of A-fib on metoprolol and Xarelto, rate controlled    12/28 developed A-fib with RVR   Patient had surgery for left femoral fracture  Continue metoprolol  Optimize electrolytes potassium over 4 and mag over 2  I ordered IV mag 2 g    Continue Xarelto    History of DVT (deep vein thrombosis)- (present on admission)  Assessment & Plan  home Xarelto on hold for possible left arm fracture surgery in the coming days         VTE prophylaxis: Xarelto      I have performed a physical exam and reviewed and updated ROS and Plan today (1/1/2024). In review of yesterday's note (12/31/2023), there are no changes except as documented above.      I spent 36 minutes for chart review, meeting and examining the patient, documenting, ordering medications and tests, interpreting the results independently, and communicating with the other health professionals.

## 2024-01-02 NOTE — PROGRESS NOTES
"    Orthopedic PA Progress Note    Interval changes:  Patient doing well.  LUE dressings are CDI   WBAT LUE  XR ordered of left arm to assess further bony abnormality  Wet-to-dry dressings over left arm open wound   NPO mn Wednesday night for possible surgery Thursday    ROS - Patient denies any new issues.  Denies any numbness or tingling. Pain well controlled.    BP (!) 164/83   Pulse 96   Temp 36.7 °C (98.1 °F) (Temporal)   Resp 18   Ht 1.88 m (6' 2\")   Wt (!) 172 kg (379 lb)   SpO2 95%     Patient seen and examined  No acute distress  Breathing non labored  RRR  LUE: Dressing CDI. Extensive bruising. Patient clearly fires forearm flexors, forearm extensors, and moves all five fingers without issue . Sensation is intact to light touch throughout median, ulnar, and radial nerve distributions. Strong and palpable radial pulses with capillary refill less than 2 seconds.     Recent Labs     12/31/23  0058 01/01/24  0404   WBC 6.2 7.1   RBC 3.47* 3.63*   HEMOGLOBIN 9.8* 10.3*   HEMATOCRIT 30.3* 31.3*   MCV 87.3 86.2   MCH 28.2 28.4   MCHC 32.3 32.9   RDW 43.5 43.8   PLATELETCT 211 173   MPV 10.6 11.1         Active Hospital Problems    Diagnosis     Open wound of multiple sites of left upper extremity without complication [S41.102A]     Pneumonia [J18.9]      12/31 chest x-ray personally reviewed, possible RLL pneumonia.  Patient just finished 4 days Ancef yesterday.  Normal white count. I started Augmentin      Hypophosphatemia [E83.39]     Cellulitis [L03.90]     Left humeral fracture [S42.302A]     Lactic acidosis [E87.20]     Opiate dependence (HCC) [F11.20]     Chronic respiratory failure with hypoxia (HCC) [J96.11]     Morbid obesity (HCC) [E66.01]     Sepsis (HCC) [A41.9]     History of DVT (deep vein thrombosis) [Z86.718]     Atrial fibrillation with RVR ([facility]) [I48.91]     COPD (chronic obstructive pulmonary disease) ([facility]) [J44.9]     SANTOSH on CPAP [G47.33]        Assessment/Plan:  XR ordered of left arm " to assess further bony abnormality  Wet-to-dry dressings over left arm open wound   NPO mn Wednesday night for possible surgery Thursday    POD#5 S/p   Open treatment of left humeral shaft fracture with insertion of intramedullary implant   Wt bearing status - WBAT LUE  Wound care/Drains - Dressings to be changed every other day by nursing. Or PRN for saturation starting POD#2  Future Procedures - possibly 1/4  Lovenox: Start 12/29, Duration-until ambulatory > 150'  Sutures/Staples out- 14-21 days post operatively. Removal will completed by ortho JULIETTE's unless transferred.  DVT Prophylaxis outpatient: ASA 81 mg PO BID x4 weeks  PT/OT-initiated  Antibiotics:  Perioperative completed  DVT Prophylaxis- TEDS/SCDs/Foot pumps  [place]-not needed per ortho  Case Coordination for Discharge Planning - Disposition per therapy recs.

## 2024-01-02 NOTE — CARE PLAN
The patient is Stable - Low risk of patient condition declining or worsening    Shift Goals  Clinical Goals: wound care, pain control  Patient Goals: pain control  Family Goals: n/a    Progress made toward(s) clinical / shift goals:  pt medicated per MAR, resting. Urinary frequency. Skin integrity, Q2 turns. Updated on POC. Will continue to monitor.    Patient is not progressing towards the following goals:

## 2024-01-02 NOTE — WOUND TEAM
[facility]  Inpatient Services  Wound and Skin Care Follow-up    Admission Date: 12/26/2023     Last order of IP CONSULT TO WOUND CARE was found on 1/1/2024 from Hospital Encounter on 12/26/2023     HPI, PMH, SH: Reviewed    Past Surgical History:   Procedure Laterality Date    HUMERUS NAILING INTRAMEDULLARY Left 12/28/2023    Procedure: INSERTION, INTRAMEDULLARY WIL, HUMERUS;  Surgeon: Corey Cruz M.D.;  Location: SURGERY [facility];  Service: Orthopedics    ERCP  7/18/2018    Procedure: ERCP;  Surgeon: Ariel Aguirre M.D.;  Location: [facility];  Service: EUS    ERCP W/REM FB AND/OR STENT CHG  7/18/2018    Procedure: ERCP W/REM FB AND/OR STENT CHG - STENT EXCHANGE;  Surgeon: Ariel Aguirre M.D.;  Location: [facility];  Service: EUS    ERCP IN OR N/A 5/2/2018    Procedure: ERCP IN OR;  Surgeon: Ariel Aguirre M.D.;  Location: SURGERY [facility];  Service: Gastroenterology    ERCP IN OR N/A 4/3/2018    Procedure: ERCP IN OR- W/POSS STENT;  Surgeon: Ariel Aguirre M.D.;  Location: SURGERY [facility] DAY [facility];  Service: Gastroenterology    CORNELIA BY LAPAROSCOPY  4/2/2018    Procedure: CORNELIA BY LAPAROSCOPY;  Surgeon: Meche Olson M.D.;  Location: [facility];  Service: General    OTHER ABDOMINAL SURGERY  2018    appendectomy    OTHER  1976    skin grafting to rt leg     OTHER ABDOMINAL SURGERY  20018    cholecystectomy     Social History     Tobacco Use    Smoking status: Every Day     Current packs/day: 0.50     Average packs/day: 0.5 packs/day for 47.0 years (23.5 ttl pk-yrs)     Types: Cigarettes    Smokeless tobacco: Never   Substance Use Topics    Alcohol use: Yes     Comment: 2 drinks a week     Chief Complaint   Patient presents with    Extremity Fracture     Pt sent from [place] [facility] after he fell in the early hours of the morning and sustained a left proximal humerus fracture. Pt takes Eliquis for DVT and Pe's. Pt received 300mcg of fentanyl IV and 3mg  Versed IV from [facility] in route. Pt arrives in left arm sling.      Diagnosis: Sepsis (HCC) [A41.9]    Unit where seen by Wound Team: T402/00     WOUND FOLLOW UP RELATED TO:   Left upper arm        WOUND TEAM PLAN OF CARE - Frequency of Follow-up:   Nursing to follow dressing orders written for wound care. Contact wound team if area fails to progress, deteriorates or with any questions/concerns if something comes up before next scheduled follow up (See below as to whether wound is following and frequency of wound follow up)  Dressing changes by wound team:                   Weekly - Left upper arm    WOUND HISTORY:       Pt is a 68yr old male admitted as a transfer from [facility] after he fell and sustained a left proximal humerus fracture. Pt was on eliquis. Pt was taken to OR by Dr. Cruz for ORIF. Wound team was consulted regarding eschar to upper arm that was boggy.       WOUND ASSESSMENT/LDA  Wound 12/31/23 Full Thickness Wound Arm Upper Left (Active)   Date First Assessed: 12/31/23   Primary Wound Type: Full Thickness Wound  Location: Arm  Wound Orientation: Upper  Laterality: Left      Assessments 1/2/2024 10:00 AM   Wound Image        Site Assessment Red;Yellow;White   Periwound Assessment Clean;Dry;Intact;Ecchymosis;Painful   Margins Attached edges;Defined edges   Closure Secondary intention   Drainage Amount Small   Drainage Description Serosanguineous   Treatments Cleansed;Nonselective debridement;Site care   Wound Cleansing Hydrogen Peroxide   Periwound Protectant No-sting Skin Prep   Dressing Status Clean;Dry;Intact   Dressing Changed Changed   Dressing Cleansing/Solutions 1/4 Strength Dakin's Solution   Dressing Options Moist Roll Gauze   Dressing Change/Treatment Frequency Every Shift, and As Needed   NEXT Dressing Change/Treatment Date 01/02/24   NEXT Weekly Photo (Inpatient Only) 01/09/24   Wound Team Following Weekly   Non-staged Wound Description Full thickness   Shape Oval   Wound Odor None    WOUND NURSE ONLY - Time Spent with Patient (mins) 60        Vascular:    GLORIA:   No results found.    Lab Values:    Lab Results   Component Value Date/Time    WBC 7.1 01/01/2024 04:04 AM    RBC 3.63 (L) 01/01/2024 04:04 AM    HEMOGLOBIN 10.3 (L) 01/01/2024 04:04 AM    HEMATOCRIT 31.3 (L) 01/01/2024 04:04 AM    CREACTPROT 22.76 (H) 12/26/2023 02:35 PM         Culture Results show:  No results found for this or any previous visit (from the past 720 hour(s)).    Pain Level/Medicated:  4% Lidocaine allowed to dwell on wound bed 20min prior and IV pain medications administered by bedside RN immediately prior (Pt educated that IV medication will not be available on outpatient basis)       INTERVENTIONS BY WOUND TEAM:  Chart and images reviewed. Discussed with bedside RN. All areas of concern (based on picture review, LDA review and discussion with bedside RN) have been thoroughly assessed. Documentation of areas based on significant findings. This RN in to assess patient. Performed standard wound care which includes appropriate positioning, dressing removal and non-selective debridement. Pictures and measurements obtained weekly if/when required.    Wound:  left upper arm  Preparation for Dressing removal: Dressing soaked with Lidocaine  Cleansed/Non-selectively Debrided with:  Wound cleanser and Gauze  Non-Excisional Conservative Sharp debridement: Slough and Necrotic Adipose debrided away using scissors and forceps < 20cm2 debrided down to Adipose and bone.  No Bleeding noted.  Becka wound: Cleansed with Wound cleanser and Gauze, Prepped with No Sting  Primary Dressing:  One continuous piece of dakins moistened roll gauze was packed into wound   Secondary (Outer) Dressing: Secured with sacral offloading dressing.    Advanced Wound Care Discharge Planning  Number of Clinicians necessary to complete wound care: 1-2  Is patient requiring IV pain medications for dressing changes:  No   Length of time for dressing change 30  min. (This does not include chart review, pre-medication time, set up, clean up or time spent charting.)    Interdisciplinary consultation: Patient, Bedside RN (Derrell), Denia DUQUE (Wound RN) and Provider Yamile hughes PA-C .  Pressure injury and staging reviewed with N/A.    EVALUATION / RATIONALE FOR TREATMENT:     Date:  01/02/24  Wound Status:  No change in wound     Wound with what appears to be a sharp piece of bone at base of wound and surrounding adipose. Pt continues to have hematoma visible in wound bed. Discussed with Yamile WINKLER. Will plan to continue with dakins wet to dry until Thursday when Dr. Cruz will re-evaluate pt for I&D. Pt has veraflo vac supplies in room.    Date:  12/31/23  Wound Status:  Initial evaluation    Pt has a wound to the LUE with significant depth and possible bone palpable at base. Images sent to PETERSON to review who will discuss with Dr. Masterson. Dakins wet to dry initiated.         Goals: Steady decrease in wound area and depth weekly.    NURSING PLAN OF CARE ORDERS:  Dressing changes: See Dressing Care orders    NUTRITION RECOMMENDATIONS   Wound Team Recommendations:  N/A     DIET ORDERS (From admission to next 24h)       Start     Ordered    12/28/23 1933  Diet Order Diet: Regular  ALL MEALS        Question:  Diet:  Answer:  Regular    12/28/23 1933                  Anticipated discharge plans:  TBD pt is pending return to OR for I&D of left upper arm         Vac Discharge Needs:  Vac Discharge plan is purely a recommendation from wound team and not a requirement for discharge unless otherwise stated by physician.  Pt may require vac after evaluation by Dr. Cruz on Thursday.

## 2024-01-02 NOTE — PROGRESS NOTES
Hospital Medicine Daily Progress Note    Date of Service  1/2/2024    Chief Complaint  Henry Kumari is a 68 y.o. male admitted 12/26/2023 with fall, humerus fracture.    Hospital Course  Henry Kumari is a 68 y.o. male with a h/o COPD (2L at baseline), CHF, afib, prior DVTs/PE who presented 12/26/2023 as a transfer from [facility].  The patient presented there after a fall, humerus fracture.     He has chronic panniculitis that has been utilizing alcohol for.  The patient had a fall due to his generalized weakness causing a humerus fracture there was noted at the transferring facility and he was transferred here for higher level of care.  The patient is on anticoagulation for history of DVTs as well as pulmonary emboli.  He is on home oxygen and continues to abuse tobacco products. He states he does not have any dysuria.  The patient also suffered from multiple skin tears to the right upper extremity and he did receive tetanus prophylaxis as well as Rocephin.  Patient was found to have a leukocytosis with a white blood cell count of 18,200 and slight elevation in his creatinine at 1.74.  Blood cultures were ordered and are currently pending at [facility].      In ER he was found to be febrile to 38.2, tachycardic to the 120s (in A-fib) normal blood pressure, normal oxygen saturation on room air.  Labs remarkable for WBC 18, CO2 19, BUN 34, creatinine 1.24, lactic acid 2.4 improved to 2 point.  Urinalysis hazy 20-50WBC, chest x-ray negative, CRP 22, viral panel negative.    Left humeral shaft fracture - s/p ORIF 12/28    He denies dysuria urgency frequency    Interval Problem Update  Patient was seen and examined at bedside  BMI 49    Patient sounds congested, x-ray showed mild pulmonary edema, I started IV Lasix twice daily  Continue Augmentin for RLL pneumonia    Hb stable-resume Xarelto    Left humeral shaft fracture - s/p ORIF 12/28, some oozing  from the incision site, I put Xarelto on hold, I discussed with Ortho team, no concerns  WBAT LUE  Sutures/Staples out- 14-21 days post operatively    Abdominal panniculitis/cellulitis -had 4 days Ancef, now on Augmentin for possible pneumonia    SANTOSH -CPAP  COPD -2 L, at baseline    PT OT recommended SNF    Above per previous hospitalist.    01/02/24  Patient continues on IV furosemide 40 mg IV twice daily.  Continues to be debilitated.  On 2 LPM oxygen via nasal cannula, which is his baseline.  He does have some wheezing today.  Complaining of chest pain, nausea, and constipation.  He is alert and orient x 3.  Awaiting acceptance to skilled nursing facility.    I have discussed this patient's plan of care and discharge plan at IDT rounds today with Case Management, Nursing, Nursing leadership, and other members of the IDT team.    Consultants/Specialty      Code Status  Full Code    Disposition  The patient is not medically cleared for discharge to home or a post-acute facility.  Anticipate discharge to: skilled nursing facility    I have placed the appropriate orders for post-discharge needs.    Review of Systems  Review of Systems   Constitutional:  Negative for chills and fever.   Respiratory:  Positive for wheezing. Negative for cough and shortness of breath.    Cardiovascular:  Positive for chest pain. Negative for palpitations.   Gastrointestinal:  Positive for nausea. Negative for abdominal pain and vomiting.   Genitourinary:  Negative for dysuria and hematuria.   Musculoskeletal:  Positive for back pain. Negative for joint pain and myalgias.   Neurological:  Positive for weakness. Negative for dizziness and headaches.            Physical Exam  Temp:  [36.5 °C (97.7 °F)-37.6 °C (99.7 °F)] 36.9 °C (98.4 °F)  Pulse:  [86-96] 95  Resp:  [16-20] 17  BP: (123-164)/(73-88) 123/78  SpO2:  [94 %-97 %] 95 %    Physical Exam  Constitutional:       General: He is not in acute distress.     Appearance: He is obese. He  is ill-appearing.   HENT:      Head: Normocephalic and atraumatic.      Mouth/Throat:      Mouth: Mucous membranes are dry.   Eyes:      General:         Right eye: No discharge.         Left eye: No discharge.      Conjunctiva/sclera: Conjunctivae normal.      Pupils: Pupils are equal, round, and reactive to light.   Cardiovascular:      Rate and Rhythm: Normal rate and regular rhythm.      Pulses: Normal pulses.      Heart sounds: Normal heart sounds. No murmur heard.  Pulmonary:      Effort: Pulmonary effort is normal.      Breath sounds: No wheezing.   Abdominal:      Palpations: Abdomen is soft.      Tenderness: There is abdominal tenderness. There is no guarding.   Musculoskeletal:         General: Tenderness present.      Cervical back: Neck supple. No tenderness.      Comments: Left arm sling in place   Skin:     General: Skin is warm.      Findings: Lesion and rash present.      Comments: Mild erythema in the lower part of abdomen.   Neurological:      Mental Status: He is alert and oriented to person, place, and time. Mental status is at baseline.   Psychiatric:         Mood and Affect: Mood normal.         Fluids    Intake/Output Summary (Last 24 hours) at 1/2/2024 1413  Last data filed at 1/2/2024 1357  Gross per 24 hour   Intake 90 ml   Output 3320 ml   Net -3230 ml       Laboratory  Recent Labs     12/31/23  0058 01/01/24  0404   WBC 6.2 7.1   RBC 3.47* 3.63*   HEMOGLOBIN 9.8* 10.3*   HEMATOCRIT 30.3* 31.3*   MCV 87.3 86.2   MCH 28.2 28.4   MCHC 32.3 32.9   RDW 43.5 43.8   PLATELETCT 211 173   MPV 10.6 11.1     Recent Labs     12/31/23  0058 01/01/24  0404   SODIUM 139 135   POTASSIUM 3.6 3.9   CHLORIDE 106 103   CO2 24 23   GLUCOSE 91 91   BUN 14 10   CREATININE 0.51 0.39*   CALCIUM 8.1* 8.1*                   Imaging  DX-CHEST-PORTABLE (1 VIEW)   Final Result      1.  Right basilar opacity which could be atelectasis or pneumonia      2.  Enlarged cardiac silhouette      DX-PORTABLE FLUOROSCOPY < 1  HOUR   Final Result      Portable fluoroscopy utilized for 1 minute 30 seconds.      INTERPRETING LOCATION: [address], [place], [address]      DX-HUMERUS 2+ LEFT   Final Result      Digitized intraoperative radiograph is submitted for review. This examination is not for diagnostic purpose but for guidance during a surgical procedure. Please see the patient's chart for full procedural details.         INTERPRETING LOCATION: [address], [place], [address]      DX-CHEST-PORTABLE (1 VIEW)   Final Result         Cardiomegaly.      No pulmonary infiltrates or consolidations are noted.         OUTSIDE IMAGES-CT HEAD   Final Result      OUTSIDE IMAGES-DX UPPER EXTREMITY, LEFT   Final Result      OUTSIDE IMAGES-DX CHEST   Final Result      OUTSIDE IMAGES-DX UPPER EXTREMITY, LEFT   Final Result           Assessment/Plan  * Sepsis (HCC)- (present on admission)  Assessment & Plan  This is Sepsis Present on admission  SIRS criteria identified on my evaluation include: Fever, with temperature greater than 100.9 deg F, Tachycardia, with heart rate greater than 90 BPM, and Leukocytosis, with WBC greater than 12,000  Clinical indicators of end organ dysfunction include Lactic Acid greater than 2  Source is skin/panniculitis   Sepsis protocol initiated  Crystalloid Fluid Administration: Resuscitation volume of 1000 ordered. Reason that resuscitation volume of less than 30ml/kg was ordered concern for causing harm given CHF  IV antibiotics as appropriate for source of sepsis  Reassessment: I have reassessed the patient's hemodynamic status    Open wound of multiple sites of left upper extremity without complication  Assessment & Plan  Multiple open wounds on both arms, the most concerned one is on left arm  Wound care consulted  Wound culture pending  I ordered a Dilaudid IV as needed for dressing change      Hypophosphatemia  Assessment & Plan  Replaced    Lactic acidosis  Assessment & Plan  Due to sepsis    Lactic acid  3.1>1.4  Continue IV fluid    Left humeral fracture  Assessment & Plan  Left humeral shaft fracture  -evaluated by surgery,     12/28 s/p ORIF today  PT OT and supportive care    Cellulitis  Assessment & Plan  Lower abdominal diffused erythema, tenderness  History of panniculitis    12/31 had 4 days Ancef, now on Augmentin for possible pneumonia    01/02/24  Continue Augmentin    Opiate dependence ([facility])- (present on admission)  Assessment & Plan  Takes buprenorphine at home for chronic pain  In acute pain so giving prn narcotics    01/02/24  Continue home buprenorphine    Chronic respiratory failure with hypoxia ([facility])- (present on admission)  Assessment & Plan  Due to COPD and obese  Baseline 2 L    1/1 patient sounds congested, chest x-ray with mild pulmonary edema, I started IV Lasix    01/02/24  Stable 2 LPM.  Continue furosemide 40 mg IV twice daily    Class 3 severe obesity due to excess calories with serious comorbidity and body mass index (BMI) of 45.0 to 49.9 in adult ([facility])- (present on admission)  Assessment & Plan  BMI 49  Weight loss education    SANTOSH on CPAP- (present on admission)  Assessment & Plan  Continue CPAP    COPD (chronic obstructive pulmonary disease) ([facility])- (present on admission)  Assessment & Plan  Not in exacerbation  Continue inhalers and albuterol prn    01/02/24  Some wheezing noted today.  2 LPM stable baseline oxygen needs.  Patient is medically cleared to discharge today  Continue home inhalers including Spiriva and Dulera and albuterol as needed  Continuous pulse oximetry monitoring    History of hepatitis C- (present on admission)  Assessment & Plan  As per history.    Atrial fibrillation with RVR ([facility])- (present on admission)  Assessment & Plan  History of A-fib on metoprolol and Xarelto, rate controlled    12/28 developed A-fib with RVR   Patient had surgery for left femoral fracture  Continue metoprolol  Optimize electrolytes potassium over 4 and mag over 2  I ordered IV mag 2  g    Continue Xarelto    History of DVT (deep vein thrombosis)- (present on admission)  Assessment & Plan  home Xarelto on hold for possible left arm fracture surgery in the coming days         VTE prophylaxis: Xarelto      I have performed a physical exam and reviewed and updated ROS and Plan today (1/2/2024). In review of yesterday's note (1/1/2024), there are no changes except as documented above.

## 2024-01-02 NOTE — PROGRESS NOTES
"Pt admitted to room 402 via transport in bariatric bed from tele8 at 2150.  Pt  pain reported at 7 on a scale of 0-10. Oriented to room call light and smoking policy.  Reviewed plan of care (equipment, incentive spirometer, sequential compression devices, medications, activity, diet, fall precautions, skin care, and pain) with patient and family. Welcome packet given and reviewed with pt , all questions answered. Education provided on oral hygiene program.     BP (!) 144/77 Comment: Rn notified  Pulse 93   Temp 37.5 °C (99.5 °F) (Temporal)   Resp 18   Ht 1.88 m (6' 2\")   Wt (!) 172 kg (379 lb)   SpO2 95%   BMI 48.66 kg/m²      Educated patient regarding pharmacologic and non pharmacologic modalities for pain management. Oriented to room call light and smoking policy.  Reviewed plan of care (equipment, incentive spirometer, sequential compression devices, medications, activity, diet, fall precautions, skin care, and pain) with patient and family. Welcome packet given and reviewed with patient, all questions answered. Education provided on oral hygiene program.    AA&Ox4. Denies CP/SOB.  See 2 RN skin note  Tolerating regular diet. Denies N/V.  + void. + BM. Last BM 01/01  Pt ambulates max assist.  All needs met at this time. Call light within reach. Pt calls appropriately. Bed low and locked, non skid socks in place. Hourly rounding in place.  "

## 2024-01-02 NOTE — ASSESSMENT & PLAN NOTE
Multiple open wounds on both arms, the most concerned one is on left arm  Wound care consulted  Wound culture pending  I ordered a Dilaudid IV as needed for dressing change      Plan for surgery tomorrow

## 2024-01-02 NOTE — PROGRESS NOTES
4 Eyes Skin Assessment Completed by JORDYN Archer and JORDYN Vásquez.    Head WDL  Ears WDL  Nose WDL  Mouth WDL  Neck WDL  Breast/Chest Redness and Bruising, wounds with DIP  Shoulder Blades Redness and Blanching  Spine Redness and Blanching  (R) Arm/Elbow/Hand Redness, Blanching, Bruising, Scab, and Discoloration, wound to RFA, PIV  (L) Arm/Elbow/Hand Redness, Blanching, Bruising, Scab, and Discoloration, wound to LFA, CAROLEE, shoulder  Abdomen Redness to lower abdomen, excoriation and redness to pannus with interdry in place, redness/excoriation to R hip, old appendectomy site  Groin Redness and Blanching  Scrotum/Coccyx/Buttocks Redness and Blanching  (R) Leg Scab and Bruising, discoloration  (L) Leg Scab and Bruising, discoloration  (R) Heel/Foot/Toe Redness, Blanching, and Scab  (L) Heel/Foot/Toe Redness, Blanching, and Scab          Devices In Places Blood Pressure Cuff, Pulse Ox, and Nasal Cannula      Interventions In Place Gray Ear Foams, InterDry, Sacral Mepilex, TAP System, Pillows, Q2 Turns, and Dri-Albert Pads, bariatric low air loss bed    Possible Skin Injury Yes previously done    Pictures Uploaded Into Epic Yes previously done  Wound Consult Placed Yes previously done  RN Wound Prevention Protocol Ordered Yes previously done

## 2024-01-02 NOTE — PROGRESS NOTES
Report called to JORDYN Beltran. Patient left T8 with transport via bariatric bed, a & O x4, 2 LO2 in use via NC.

## 2024-01-02 NOTE — DISCHARGE PLANNING
0800  Agency/Facility Name: [facility]  Spoke To: Anderson  Outcome: Per Anderson Admissions is not in yet but provided DPA with phone number. DPA to call again later. Phone number provided is [phone].  RN CM notified.    9925  Agency/Facility Name: [facility]  Spoke To:  Anderson  Outcome: Per Monica had some questions regarding Pt. DPA resent referral with updated notes for Pt.  JORDYN CONCEPCION notified.    4085  Agency/Facility Name: [facility]  Spoke To: Erika  Outcome: Per Erika asking for transport begin Paid by [facility] or maybe having the Family transport Pt to [facility]. DPA transferred call to RN CM.  RN JAMEY notified.

## 2024-01-02 NOTE — PROGRESS NOTES
Bedside report received from JORDYN Powell. Patient is alert and oriented x  4, 2 L O2 via NC, medical-no tele monitor. Fall precautions are in place. Call light is in reach.

## 2024-01-02 NOTE — PROGRESS NOTES
Report received from RN, assumed care at 0645  Pt is A0X4, and responds appropriately   Pt declines any SOB, chest pain, new onset of numbness/ tingling  Pt rates pain at 8/10, on a scale of 1-10, pt medicated per MAR  Pt is voiding adequately and without hesitancy  Pt has flatus, hypoactive bowel sounds, BM on 1/1  Pt ambulates with a 2x assist tp commode  Pt is tolerating a regular diet, pt denies any nausea/vomiting  Plan of care discussed, all questions answered. Explained importance of calling before getting OOB and pt verbalizes understanding. Explained importance of oral care. Call light is within reach, treaded slipper socks on, bed in lowest/ locked position, hourly rounding in place, all needs met at this time

## 2024-01-03 ENCOUNTER — APPOINTMENT (OUTPATIENT)
Dept: RADIOLOGY | Facility: MEDICAL CENTER | Age: 69
DRG: 853 | End: 2024-01-03
Attending: EMERGENCY MEDICAL TECHNICIAN, INTERMEDIATE
Payer: MEDICARE

## 2024-01-03 PROCEDURE — 700101 HCHG RX REV CODE 250: Performed by: ORTHOPAEDIC SURGERY

## 2024-01-03 PROCEDURE — 770001 HCHG ROOM/CARE - MED/SURG/GYN PRIV*

## 2024-01-03 PROCEDURE — 99232 SBSQ HOSP IP/OBS MODERATE 35: CPT | Performed by: STUDENT IN AN ORGANIZED HEALTH CARE EDUCATION/TRAINING PROGRAM

## 2024-01-03 PROCEDURE — 73060 X-RAY EXAM OF HUMERUS: CPT | Mod: LT

## 2024-01-03 PROCEDURE — 700102 HCHG RX REV CODE 250 W/ 637 OVERRIDE(OP): Performed by: STUDENT IN AN ORGANIZED HEALTH CARE EDUCATION/TRAINING PROGRAM

## 2024-01-03 PROCEDURE — A9270 NON-COVERED ITEM OR SERVICE: HCPCS | Performed by: INTERNAL MEDICINE

## 2024-01-03 PROCEDURE — 700111 HCHG RX REV CODE 636 W/ 250 OVERRIDE (IP): Mod: JZ | Performed by: STUDENT IN AN ORGANIZED HEALTH CARE EDUCATION/TRAINING PROGRAM

## 2024-01-03 PROCEDURE — 700111 HCHG RX REV CODE 636 W/ 250 OVERRIDE (IP): Performed by: INTERNAL MEDICINE

## 2024-01-03 PROCEDURE — A9270 NON-COVERED ITEM OR SERVICE: HCPCS | Performed by: STUDENT IN AN ORGANIZED HEALTH CARE EDUCATION/TRAINING PROGRAM

## 2024-01-03 PROCEDURE — 700102 HCHG RX REV CODE 250 W/ 637 OVERRIDE(OP): Performed by: INTERNAL MEDICINE

## 2024-01-03 RX ORDER — FUROSEMIDE 20 MG/1
40 TABLET ORAL
Status: DISCONTINUED | OUTPATIENT
Start: 2024-01-04 | End: 2024-01-05 | Stop reason: HOSPADM

## 2024-01-03 RX ADMIN — NYSTATIN: 100000 POWDER TOPICAL at 17:21

## 2024-01-03 RX ADMIN — AMOXICILLIN AND CLAVULANATE POTASSIUM 1 TABLET: 875; 125 TABLET, FILM COATED ORAL at 17:20

## 2024-01-03 RX ADMIN — FUROSEMIDE 40 MG: 10 INJECTION, SOLUTION INTRAVENOUS at 04:48

## 2024-01-03 RX ADMIN — SODIUM HYPOCHLORITE 5 ML: 1.25 SOLUTION TOPICAL at 06:00

## 2024-01-03 RX ADMIN — ROSUVASTATIN CALCIUM 10 MG: 20 TABLET, FILM COATED ORAL at 17:20

## 2024-01-03 RX ADMIN — HYDROMORPHONE HYDROCHLORIDE 1 MG: 1 INJECTION, SOLUTION INTRAMUSCULAR; INTRAVENOUS; SUBCUTANEOUS at 19:42

## 2024-01-03 RX ADMIN — GUAIFENESIN 600 MG: 600 TABLET, EXTENDED RELEASE ORAL at 05:02

## 2024-01-03 RX ADMIN — POLYETHYLENE GLYCOL 3350 1 PACKET: 17 POWDER, FOR SOLUTION ORAL at 13:42

## 2024-01-03 RX ADMIN — HYDROMORPHONE HYDROCHLORIDE 0.5 MG: 1 INJECTION, SOLUTION INTRAMUSCULAR; INTRAVENOUS; SUBCUTANEOUS at 13:42

## 2024-01-03 RX ADMIN — QUETIAPINE FUMARATE 100 MG: 100 TABLET ORAL at 05:02

## 2024-01-03 RX ADMIN — METOPROLOL SUCCINATE 100 MG: 100 TABLET, EXTENDED RELEASE ORAL at 05:02

## 2024-01-03 RX ADMIN — DOCUSATE SODIUM 50 MG AND SENNOSIDES 8.6 MG 2 TABLET: 8.6; 5 TABLET, FILM COATED ORAL at 05:02

## 2024-01-03 RX ADMIN — TIOTROPIUM BROMIDE INHALATION SPRAY 2.5 MCG: 3.12 SPRAY, METERED RESPIRATORY (INHALATION) at 05:04

## 2024-01-03 RX ADMIN — ONDANSETRON 4 MG: 4 TABLET, ORALLY DISINTEGRATING ORAL at 19:47

## 2024-01-03 RX ADMIN — OXYCODONE HYDROCHLORIDE 10 MG: 10 TABLET ORAL at 03:04

## 2024-01-03 RX ADMIN — DOCUSATE SODIUM 50 MG AND SENNOSIDES 8.6 MG 2 TABLET: 8.6; 5 TABLET, FILM COATED ORAL at 17:20

## 2024-01-03 RX ADMIN — CLONAZEPAM 1 MG: 1 TABLET ORAL at 05:02

## 2024-01-03 RX ADMIN — OXYCODONE HYDROCHLORIDE 10 MG: 10 TABLET ORAL at 09:02

## 2024-01-03 RX ADMIN — CLONAZEPAM 1 MG: 1 TABLET ORAL at 17:20

## 2024-01-03 RX ADMIN — GUAIFENESIN 600 MG: 600 TABLET, EXTENDED RELEASE ORAL at 17:20

## 2024-01-03 RX ADMIN — NYSTATIN: 100000 POWDER TOPICAL at 13:42

## 2024-01-03 RX ADMIN — ONDANSETRON 4 MG: 2 INJECTION INTRAMUSCULAR; INTRAVENOUS at 09:01

## 2024-01-03 RX ADMIN — NYSTATIN: 100000 POWDER TOPICAL at 06:00

## 2024-01-03 RX ADMIN — ONDANSETRON 4 MG: 2 INJECTION INTRAMUSCULAR; INTRAVENOUS at 04:49

## 2024-01-03 RX ADMIN — RIVAROXABAN 20 MG: 20 TABLET, FILM COATED ORAL at 05:01

## 2024-01-03 RX ADMIN — QUETIAPINE FUMARATE 100 MG: 100 TABLET ORAL at 17:20

## 2024-01-03 RX ADMIN — OXYCODONE HYDROCHLORIDE 10 MG: 10 TABLET ORAL at 16:15

## 2024-01-03 RX ADMIN — MOMETASONE FUROATE AND FORMOTEROL FUMARATE DIHYDRATE 1 PUFF: 200; 5 AEROSOL RESPIRATORY (INHALATION) at 05:04

## 2024-01-03 RX ADMIN — THEOPHYLLINE 300 MG: 300 TABLET, EXTENDED RELEASE ORAL at 17:20

## 2024-01-03 RX ADMIN — SODIUM HYPOCHLORITE 200 ML: 1.25 SOLUTION TOPICAL at 19:45

## 2024-01-03 RX ADMIN — FLUOXETINE HYDROCHLORIDE 20 MG: 20 CAPSULE ORAL at 05:02

## 2024-01-03 RX ADMIN — MAGNESIUM HYDROXIDE 30 ML: 1200 LIQUID ORAL at 05:02

## 2024-01-03 RX ADMIN — THEOPHYLLINE 300 MG: 300 TABLET, EXTENDED RELEASE ORAL at 05:04

## 2024-01-03 RX ADMIN — AMOXICILLIN AND CLAVULANATE POTASSIUM 1 TABLET: 875; 125 TABLET, FILM COATED ORAL at 05:02

## 2024-01-03 RX ADMIN — HYDROMORPHONE HYDROCHLORIDE 0.5 MG: 1 INJECTION, SOLUTION INTRAMUSCULAR; INTRAVENOUS; SUBCUTANEOUS at 04:48

## 2024-01-03 ASSESSMENT — ENCOUNTER SYMPTOMS
VOMITING: 0
DIZZINESS: 0
MYALGIAS: 0
CHILLS: 0
WEAKNESS: 1
PALPITATIONS: 0
BACK PAIN: 1
WHEEZING: 0
HEADACHES: 0
COUGH: 0
NAUSEA: 1
ABDOMINAL PAIN: 0
FEVER: 0
SHORTNESS OF BREATH: 0

## 2024-01-03 ASSESSMENT — PAIN SCALES - WONG BAKER
WONGBAKER_NUMERICALRESPONSE: HURTS JUST A LITTLE BIT
WONGBAKER_NUMERICALRESPONSE: HURTS EVEN MORE

## 2024-01-03 ASSESSMENT — PAIN DESCRIPTION - PAIN TYPE
TYPE: ACUTE PAIN

## 2024-01-03 NOTE — CARE PLAN
The patient is Stable - Low risk of patient condition declining or worsening    Shift Goals  Clinical Goals: pain control, wound care  Patient Goals: pain control, rest  Family Goals: n/a    Progress made toward(s) clinical / shift goals:  Pt pain was controlled with use of PRN pain medications. Pt was able to rest in bed during shift.   Problem: Knowledge Deficit - Standard  Goal: Patient and family/care givers will demonstrate understanding of plan of care, disease process/condition, diagnostic tests and medications  Outcome: Progressing     Problem: Pain - Standard  Goal: Alleviation of pain or a reduction in pain to the patient’s comfort goal  Outcome: Progressing     Problem: Fall Risk  Goal: Patient will remain free from falls  Outcome: Progressing        Patient is not progressing towards the following goals:

## 2024-01-03 NOTE — DISCHARGE PLANNING
ddendum         Case Management Discharge Planning     Admission Date: 12/26/2023  GMLOS: 9.9  ALOS: 8     6-Clicks ADL Score: 12  6-Clicks Mobility Score: 8  PT and/or OT Eval ordered: Yes  Post-acute Referrals Ordered: Yes  Post-acute Choice Obtained: Yes  Has referral(s) been sent to post-acute provider:  Yes        Anticipated Discharge Dispo: Discharge Disposition: D/T to SNF with Medicare cert in anticipation of skilled care (03)     Discharge Contact Phone Number: [phone]     DME Needed: No     Action(s) Taken: Updated Provider/Nurse on Discharge Plan     Pt was discussed in IDT rounds today with Dr Paul.   Pt is scheduled for Ortho Surgery/procedure tomorrow.      Plan is discharge to  [facility] when medically cleared.       Escalations Completed: None     Medically Clear: No     Next Steps:   CM to continue to assist Pt with discharge as needed     Barriers to Discharge:   Medical clearance     Is the patient up for discharge tomorrow: No

## 2024-01-03 NOTE — PROGRESS NOTES
AxOx4; on 2L of oxygen and CPAP at night with oxygen.  Denies SOB/chest pain  +numbness to BLE.  Verbalizes 7/10 pain. PRN pain medications in use per MAR.  Skin per skin note. LUE surgical site with drg- drg CDI. NWB to LUE - pending another x-ray.  No drains.  +void. LBM 1/1 per pt and per report.  Denies N/V. Tolerating regular diet.  Pt is a max assist OOB. Use of a FWW needed.  SCDs refused; xarelto in use for VTE prophylaxis.

## 2024-01-03 NOTE — PROGRESS NOTES
"      Orthopaedic Progress Note    Interval changes:  Patient doing well   Xrays do not demonstrate change from surgical alignment  LUE with large wound- will take patient to OR tomorrow for I&D and possible closure  NPO after midnight    ROS - Patient denies any new issues.  Pain well controlled.    /65   Pulse 98   Temp 36.7 °C (98.1 °F) (Temporal)   Resp 19   Ht 1.88 m (6' 2\")   Wt (!) 172 kg (379 lb)   SpO2 93%     Patient seen and examined  No acute distress  Breathing non labored  RRR  LUE with large deep wound to anterior lateral proximal bicepts with gauze packing, DNVI, moves all fingers, cap refill <2 sec.     Recent Labs     01/01/24  0404   WBC 7.1   RBC 3.63*   HEMOGLOBIN 10.3*   HEMATOCRIT 31.3*   MCV 86.2   MCH 28.4   MCHC 32.9   RDW 43.8   PLATELETCT 173   MPV 11.1       Active Hospital Problems    Diagnosis     Open wound of multiple sites of left upper extremity without complication [S41.102A]     Pneumonia [J18.9]      12/31 chest x-ray personally reviewed, possible RLL pneumonia.  Patient just finished 4 days Ancef yesterday.  Normal white count. I started Augmentin      Hypophosphatemia [E83.39]     Cellulitis [L03.90]     Left humeral fracture [S42.302A]     Lactic acidosis [E87.20]     Opiate dependence (HCC) [F11.20]     Chronic respiratory failure with hypoxia (HCC) [J96.11]     Class 3 severe obesity due to excess calories with serious comorbidity and body mass index (BMI) of 45.0 to 49.9 in adult (HCC) [E66.01, Z68.42]     Sepsis (HCC) [A41.9]     History of DVT (deep vein thrombosis) [Z86.718]     Atrial fibrillation with RVR (HCC) [I48.91]     COPD (chronic obstructive pulmonary disease) (HCC) [J44.9]     SANTOSH on CPAP [G47.33]     History of hepatitis C [Z86.19]        Assessment/Plan:  Patient doing well   Xrays do not demonstrate change from surgical alignment  LUE with large wound- will take patient to OR tomorrow for I&D and possible closure  NPO after midnight  POD#6 S/P " Open treatment of left humeral shaft fracture with insertion of intramedullary implant   Wt bearing status - WBAT LUE  Wound care/Drains - Dressings to be changed every other day by nursing. Or PRN for saturation starting POD#2  Future Procedures - tomorrow  Sutures/Staples out- 14-21 days post operatively. Removal will completed by ortho mid levels only.  PT/OT-initiated  Antibiotics: augmentin 875-125 po BID  DVT Prophylaxis- TEDS/SCDs/Foot pumps/xarelto  [place]-not needed per ortho  Case Coordination for Discharge Planning - Disposition per therapy recs.

## 2024-01-03 NOTE — DISCHARGE PLANNING
0901  Agency/Facility Name: [facility]  Outcome: ADEN left voicemail for admissions.    0902  Agency/Facility Name: [facility]  Spoke To: Esmer  Outcome: Per Esmer need to take a look at referral one more time and will call ADEN back.  RN CM notified.    0930  Agency/Facility Name: [facility]  Outcome: ADEN received voicemail from Sowmya today @ 0911 am. Regarding acceptance of Pt and arranging transport.      0958  Agency/Facility Name: [facility]  Spoke To: Esmer  Outcome: Per Esmer able to take Pt today 01/03/24 @ 1500 pm today.  RN CM notified.    1105  Agency/Facility Name: [facility]  Outcome: ADEN leeft voice mail for admissions.  RN CM notified.    1214  Agency/Facility Name: [facility]  Spoke To: Esmer  Outcome: ADEN informed Esmer that Per Ortho they want him to stay one more day for procedure. Possible admit for Friday. Per Esmer well wait to hear back from ADEN.  JORDYN CONCEPCION notified.

## 2024-01-03 NOTE — PROGRESS NOTES
4 Eyes Skin Assessment Completed by JORDYN Delaney and Selene RN.    Head WDL  Ears WDL  Nose WDL  Mouth WDL  Neck WDL  Breast/Chest Redness and Bruising, wounds with drg to L upper chest. Drg CDI.   Shoulder Blades Redness and Blanching; incision site to L shoulder - drg CDI  Spine Intact and Blanching  (R) Arm/Elbow/Hand Redness, Blanching, Bruising, Scab, and Discoloration, wound to RUE - drg CDI. R fa PIV  (L) Arm/Elbow/Hand Redness, Blanching, Bruising throughout LUE, wounds with drg  Abdomen Redness to lower abdomen, excoriation and redness to pannus with interdry in place, redness/excoriation to R hip, old appendectomy site - KEVAN  Groin Redness and Blanching  Scrotum/Coccyx/Buttocks Redness and Blanching  (R) Leg Scab and Bruising  (L) Leg Scab and Bruising  (R) Heel/Foot/Toe Redness, Blanching, and Scab  (L) Heel/Foot/Toe Redness, Blanching, and Scab        Devices In Places Pulse Ox and Nasal Cannula and CPAP      Interventions In Place Gray Ear Foams, NC W/Ear Foams, InterDry, TAP System, Pillows, Q2 Turns, Low Air Loss Mattress, Barrier Cream, Dri-Albert Pads, and Heels Loaded W/Pillows, Barrier Paste and wipes.    Possible Skin Injury Yes    Pictures Uploaded Into Epic Yes  Wound Consult Placed Yes  RN Wound Prevention Protocol Ordered Yes
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
## 2024-01-03 NOTE — CARE PLAN
Problem: Pain - Standard  Goal: Alleviation of pain or a reduction in pain to the patient’s comfort goal  Outcome: Progressing     Problem: Urinary - Renal Perfusion  Goal: Ability to achieve and maintain adequate renal perfusion and functioning will improve  Outcome: Progressing     Problem: Fall Risk  Goal: Patient will remain free from falls  Outcome: Progressing     Problem: Psychosocial  Goal: Patient's level of anxiety will decrease  Outcome: Progressing   The patient is Stable - Low risk of patient condition declining or worsening    Shift Goals  Clinical Goals: pain mgmt; q2 turns; maintain skin integrity  Patient Goals: pain control; rest  Family Goals: n/a    Progress made toward(s) clinical / shift goals:  yes     Skin integrity and wound healing not met.

## 2024-01-03 NOTE — DISCHARGE SUMMARY
Case Management Discharge Planning    Admission Date: 12/26/2023  GMLOS: 9.9  ALOS: 8    6-Clicks ADL Score: 12  6-Clicks Mobility Score: 8  PT and/or OT Eval ordered: Yes  Post-acute Referrals Ordered: Yes  Post-acute Choice Obtained: Yes  Has referral(s) been sent to post-acute provider:  Yes      Anticipated Discharge Dispo: Discharge Disposition: D/T to SNF with Medicare cert in anticipation of skilled care (03)    Discharge Contact Phone Number: [phone]    DME Needed: No    Action(s) Taken: Updated Provider/Nurse on Discharge Plan    Pt was discussed in IDT rounds today with Dr Paul.   Pt is scheduled for Ortho Surgery/procedure tomorrow.     Plan is discharge to  [facility] SNF when medically cleared.      Escalations Completed: None    Medically Clear: No    Next Steps:   CM to continue to assist Pt with discharge as needed    Barriers to Discharge:   Medical clearance    Is the patient up for discharge tomorrow: No

## 2024-01-03 NOTE — DISCHARGE PLANNING
Other ortho ongoing medical management as well as anticipated therapy need. Anticipate post acute services to transition to community home with intermittent assistance from family.  Please place a PM&R referral to assist with discharge planning. Medicare provider.

## 2024-01-04 ENCOUNTER — ANESTHESIA (OUTPATIENT)
Dept: SURGERY | Facility: MEDICAL CENTER | Age: 69
DRG: 853 | End: 2024-01-04
Payer: MEDICARE

## 2024-01-04 ENCOUNTER — ANESTHESIA EVENT (OUTPATIENT)
Dept: SURGERY | Facility: MEDICAL CENTER | Age: 69
DRG: 853 | End: 2024-01-04
Payer: MEDICARE

## 2024-01-04 PROCEDURE — 700102 HCHG RX REV CODE 250 W/ 637 OVERRIDE(OP): Performed by: INTERNAL MEDICINE

## 2024-01-04 PROCEDURE — 99232 SBSQ HOSP IP/OBS MODERATE 35: CPT | Performed by: STUDENT IN AN ORGANIZED HEALTH CARE EDUCATION/TRAINING PROGRAM

## 2024-01-04 PROCEDURE — A9270 NON-COVERED ITEM OR SERVICE: HCPCS | Performed by: INTERNAL MEDICINE

## 2024-01-04 PROCEDURE — A9270 NON-COVERED ITEM OR SERVICE: HCPCS | Performed by: STUDENT IN AN ORGANIZED HEALTH CARE EDUCATION/TRAINING PROGRAM

## 2024-01-04 PROCEDURE — 700111 HCHG RX REV CODE 636 W/ 250 OVERRIDE (IP): Performed by: STUDENT IN AN ORGANIZED HEALTH CARE EDUCATION/TRAINING PROGRAM

## 2024-01-04 PROCEDURE — 13160 SEC CLSR SURG WND/DEHSN XTN: CPT | Mod: 78,LT | Performed by: ORTHOPAEDIC SURGERY

## 2024-01-04 PROCEDURE — 700111 HCHG RX REV CODE 636 W/ 250 OVERRIDE (IP): Mod: JZ | Performed by: ORTHOPAEDIC SURGERY

## 2024-01-04 PROCEDURE — 160048 HCHG OR STATISTICAL LEVEL 1-5: Performed by: ORTHOPAEDIC SURGERY

## 2024-01-04 PROCEDURE — 700102 HCHG RX REV CODE 250 W/ 637 OVERRIDE(OP): Performed by: STUDENT IN AN ORGANIZED HEALTH CARE EDUCATION/TRAINING PROGRAM

## 2024-01-04 PROCEDURE — 160036 HCHG PACU - EA ADDL 30 MINS PHASE I: Performed by: ORTHOPAEDIC SURGERY

## 2024-01-04 PROCEDURE — 700105 HCHG RX REV CODE 258: Performed by: STUDENT IN AN ORGANIZED HEALTH CARE EDUCATION/TRAINING PROGRAM

## 2024-01-04 PROCEDURE — 770001 HCHG ROOM/CARE - MED/SURG/GYN PRIV*

## 2024-01-04 PROCEDURE — 160002 HCHG RECOVERY MINUTES (STAT): Performed by: ORTHOPAEDIC SURGERY

## 2024-01-04 PROCEDURE — 0KB80ZZ EXCISION OF LEFT UPPER ARM MUSCLE, OPEN APPROACH: ICD-10-PCS | Performed by: ORTHOPAEDIC SURGERY

## 2024-01-04 PROCEDURE — 8968 PR NO CHARGE - PROCEDURE: Mod: 80ROC,78,LT | Performed by: STUDENT IN AN ORGANIZED HEALTH CARE EDUCATION/TRAINING PROGRAM

## 2024-01-04 PROCEDURE — 160009 HCHG ANES TIME/MIN: Performed by: ORTHOPAEDIC SURGERY

## 2024-01-04 PROCEDURE — 160035 HCHG PACU - 1ST 60 MINS PHASE I: Performed by: ORTHOPAEDIC SURGERY

## 2024-01-04 PROCEDURE — 3E10X8Z IRRIGATION OF SKIN AND MUCOUS MEMBRANES USING IRRIGATING SUBSTANCE: ICD-10-PCS | Performed by: ORTHOPAEDIC SURGERY

## 2024-01-04 PROCEDURE — 700101 HCHG RX REV CODE 250: Performed by: STUDENT IN AN ORGANIZED HEALTH CARE EDUCATION/TRAINING PROGRAM

## 2024-01-04 PROCEDURE — 160027 HCHG SURGERY MINUTES - 1ST 30 MINS LEVEL 2: Performed by: ORTHOPAEDIC SURGERY

## 2024-01-04 PROCEDURE — 160038 HCHG SURGERY MINUTES - EA ADDL 1 MIN LEVEL 2: Performed by: ORTHOPAEDIC SURGERY

## 2024-01-04 RX ORDER — SODIUM CHLORIDE, SODIUM LACTATE, POTASSIUM CHLORIDE, CALCIUM CHLORIDE 600; 310; 30; 20 MG/100ML; MG/100ML; MG/100ML; MG/100ML
INJECTION, SOLUTION INTRAVENOUS CONTINUOUS
Status: DISCONTINUED | OUTPATIENT
Start: 2024-01-04 | End: 2024-01-04 | Stop reason: HOSPADM

## 2024-01-04 RX ORDER — HYDROMORPHONE HYDROCHLORIDE 1 MG/ML
0.4 INJECTION, SOLUTION INTRAMUSCULAR; INTRAVENOUS; SUBCUTANEOUS
Status: DISCONTINUED | OUTPATIENT
Start: 2024-01-04 | End: 2024-01-04 | Stop reason: HOSPADM

## 2024-01-04 RX ORDER — CEFAZOLIN SODIUM 1 G/3ML
INJECTION, POWDER, FOR SOLUTION INTRAMUSCULAR; INTRAVENOUS PRN
Status: DISCONTINUED | OUTPATIENT
Start: 2024-01-04 | End: 2024-01-04 | Stop reason: SURG

## 2024-01-04 RX ORDER — ALBUTEROL SULFATE 90 UG/1
AEROSOL, METERED RESPIRATORY (INHALATION) PRN
Status: DISCONTINUED | OUTPATIENT
Start: 2024-01-04 | End: 2024-01-04 | Stop reason: SURG

## 2024-01-04 RX ORDER — ONDANSETRON 2 MG/ML
INJECTION INTRAMUSCULAR; INTRAVENOUS PRN
Status: DISCONTINUED | OUTPATIENT
Start: 2024-01-04 | End: 2024-01-04 | Stop reason: SURG

## 2024-01-04 RX ORDER — SODIUM CHLORIDE, SODIUM LACTATE, POTASSIUM CHLORIDE, CALCIUM CHLORIDE 600; 310; 30; 20 MG/100ML; MG/100ML; MG/100ML; MG/100ML
INJECTION, SOLUTION INTRAVENOUS
Status: DISCONTINUED | OUTPATIENT
Start: 2024-01-04 | End: 2024-01-04 | Stop reason: SURG

## 2024-01-04 RX ORDER — OXYCODONE HCL 5 MG/5 ML
10 SOLUTION, ORAL ORAL
Status: COMPLETED | OUTPATIENT
Start: 2024-01-04 | End: 2024-01-04

## 2024-01-04 RX ORDER — DIPHENHYDRAMINE HYDROCHLORIDE 50 MG/ML
12.5 INJECTION INTRAMUSCULAR; INTRAVENOUS
Status: DISCONTINUED | OUTPATIENT
Start: 2024-01-04 | End: 2024-01-04 | Stop reason: HOSPADM

## 2024-01-04 RX ORDER — HYDROMORPHONE HYDROCHLORIDE 1 MG/ML
0.1 INJECTION, SOLUTION INTRAMUSCULAR; INTRAVENOUS; SUBCUTANEOUS
Status: DISCONTINUED | OUTPATIENT
Start: 2024-01-04 | End: 2024-01-04 | Stop reason: HOSPADM

## 2024-01-04 RX ORDER — OXYCODONE HCL 5 MG/5 ML
5 SOLUTION, ORAL ORAL
Status: COMPLETED | OUTPATIENT
Start: 2024-01-04 | End: 2024-01-04

## 2024-01-04 RX ORDER — HYDRALAZINE HYDROCHLORIDE 20 MG/ML
5 INJECTION INTRAMUSCULAR; INTRAVENOUS
Status: DISCONTINUED | OUTPATIENT
Start: 2024-01-04 | End: 2024-01-04 | Stop reason: HOSPADM

## 2024-01-04 RX ORDER — ONDANSETRON 2 MG/ML
4 INJECTION INTRAMUSCULAR; INTRAVENOUS
Status: DISCONTINUED | OUTPATIENT
Start: 2024-01-04 | End: 2024-01-04 | Stop reason: HOSPADM

## 2024-01-04 RX ORDER — ROCURONIUM BROMIDE 10 MG/ML
INJECTION, SOLUTION INTRAVENOUS PRN
Status: DISCONTINUED | OUTPATIENT
Start: 2024-01-04 | End: 2024-01-04 | Stop reason: SURG

## 2024-01-04 RX ORDER — DEXAMETHASONE SODIUM PHOSPHATE 4 MG/ML
INJECTION, SOLUTION INTRA-ARTICULAR; INTRALESIONAL; INTRAMUSCULAR; INTRAVENOUS; SOFT TISSUE PRN
Status: DISCONTINUED | OUTPATIENT
Start: 2024-01-04 | End: 2024-01-04 | Stop reason: SURG

## 2024-01-04 RX ORDER — HYDROMORPHONE HYDROCHLORIDE 1 MG/ML
0.2 INJECTION, SOLUTION INTRAMUSCULAR; INTRAVENOUS; SUBCUTANEOUS
Status: DISCONTINUED | OUTPATIENT
Start: 2024-01-04 | End: 2024-01-04 | Stop reason: HOSPADM

## 2024-01-04 RX ORDER — LABETALOL HYDROCHLORIDE 5 MG/ML
5 INJECTION, SOLUTION INTRAVENOUS
Status: DISCONTINUED | OUTPATIENT
Start: 2024-01-04 | End: 2024-01-04 | Stop reason: HOSPADM

## 2024-01-04 RX ORDER — EPHEDRINE SULFATE 50 MG/ML
5 INJECTION, SOLUTION INTRAVENOUS
Status: DISCONTINUED | OUTPATIENT
Start: 2024-01-04 | End: 2024-01-04 | Stop reason: HOSPADM

## 2024-01-04 RX ORDER — VANCOMYCIN HYDROCHLORIDE 1 G/20ML
INJECTION, POWDER, LYOPHILIZED, FOR SOLUTION INTRAVENOUS
Status: COMPLETED | OUTPATIENT
Start: 2024-01-04 | End: 2024-01-04

## 2024-01-04 RX ORDER — LIDOCAINE HYDROCHLORIDE 20 MG/ML
INJECTION, SOLUTION EPIDURAL; INFILTRATION; INTRACAUDAL; PERINEURAL PRN
Status: DISCONTINUED | OUTPATIENT
Start: 2024-01-04 | End: 2024-01-04 | Stop reason: SURG

## 2024-01-04 RX ORDER — IPRATROPIUM BROMIDE AND ALBUTEROL SULFATE 2.5; .5 MG/3ML; MG/3ML
3 SOLUTION RESPIRATORY (INHALATION)
Status: DISCONTINUED | OUTPATIENT
Start: 2024-01-04 | End: 2024-01-04 | Stop reason: HOSPADM

## 2024-01-04 RX ADMIN — OXYCODONE HYDROCHLORIDE 10 MG: 5 SOLUTION ORAL at 10:00

## 2024-01-04 RX ADMIN — ROSUVASTATIN CALCIUM 10 MG: 20 TABLET, FILM COATED ORAL at 16:53

## 2024-01-04 RX ADMIN — MOMETASONE FUROATE AND FORMOTEROL FUMARATE DIHYDRATE 1 PUFF: 200; 5 AEROSOL RESPIRATORY (INHALATION) at 05:18

## 2024-01-04 RX ADMIN — ONDANSETRON 4 MG: 2 INJECTION INTRAMUSCULAR; INTRAVENOUS at 09:30

## 2024-01-04 RX ADMIN — ALBUTEROL SULFATE 4 PUFF: 90 AEROSOL, METERED RESPIRATORY (INHALATION) at 09:34

## 2024-01-04 RX ADMIN — AMOXICILLIN AND CLAVULANATE POTASSIUM 1 TABLET: 875; 125 TABLET, FILM COATED ORAL at 05:18

## 2024-01-04 RX ADMIN — OXYCODONE HYDROCHLORIDE 10 MG: 10 TABLET ORAL at 14:18

## 2024-01-04 RX ADMIN — CLONAZEPAM 1 MG: 1 TABLET ORAL at 05:18

## 2024-01-04 RX ADMIN — OXYCODONE HYDROCHLORIDE 10 MG: 10 TABLET ORAL at 23:45

## 2024-01-04 RX ADMIN — DEXAMETHASONE SODIUM PHOSPHATE 4 MG: 4 INJECTION INTRA-ARTICULAR; INTRALESIONAL; INTRAMUSCULAR; INTRAVENOUS; SOFT TISSUE at 09:11

## 2024-01-04 RX ADMIN — HYDROMORPHONE HYDROCHLORIDE 0.2 MG: 1 INJECTION, SOLUTION INTRAMUSCULAR; INTRAVENOUS; SUBCUTANEOUS at 10:45

## 2024-01-04 RX ADMIN — LIDOCAINE HYDROCHLORIDE 100 MG: 20 INJECTION, SOLUTION EPIDURAL; INFILTRATION; INTRACAUDAL at 09:08

## 2024-01-04 RX ADMIN — ROCURONIUM BROMIDE 60 MG: 50 INJECTION, SOLUTION INTRAVENOUS at 09:08

## 2024-01-04 RX ADMIN — NYSTATIN: 100000 POWDER TOPICAL at 05:18

## 2024-01-04 RX ADMIN — TIOTROPIUM BROMIDE INHALATION SPRAY 2.5 MCG: 3.12 SPRAY, METERED RESPIRATORY (INHALATION) at 05:18

## 2024-01-04 RX ADMIN — FUROSEMIDE 40 MG: 20 TABLET ORAL at 05:17

## 2024-01-04 RX ADMIN — METOPROLOL SUCCINATE 100 MG: 100 TABLET, EXTENDED RELEASE ORAL at 05:18

## 2024-01-04 RX ADMIN — GUAIFENESIN 600 MG: 600 TABLET, EXTENDED RELEASE ORAL at 16:52

## 2024-01-04 RX ADMIN — AMOXICILLIN AND CLAVULANATE POTASSIUM 1 TABLET: 875; 125 TABLET, FILM COATED ORAL at 16:53

## 2024-01-04 RX ADMIN — SUGAMMADEX 400 MG: 100 INJECTION, SOLUTION INTRAVENOUS at 09:30

## 2024-01-04 RX ADMIN — SODIUM HYPOCHLORITE 500 ML: 1.25 SOLUTION TOPICAL at 05:19

## 2024-01-04 RX ADMIN — QUETIAPINE FUMARATE 100 MG: 100 TABLET ORAL at 05:17

## 2024-01-04 RX ADMIN — CLONAZEPAM 1 MG: 1 TABLET ORAL at 16:53

## 2024-01-04 RX ADMIN — PROPOFOL 250 MG: 10 INJECTION, EMULSION INTRAVENOUS at 09:08

## 2024-01-04 RX ADMIN — DOCUSATE SODIUM 50 MG AND SENNOSIDES 8.6 MG 2 TABLET: 8.6; 5 TABLET, FILM COATED ORAL at 05:17

## 2024-01-04 RX ADMIN — HYDROMORPHONE HYDROCHLORIDE 0.2 MG: 1 INJECTION, SOLUTION INTRAMUSCULAR; INTRAVENOUS; SUBCUTANEOUS at 10:55

## 2024-01-04 RX ADMIN — GUAIFENESIN 600 MG: 600 TABLET, EXTENDED RELEASE ORAL at 05:17

## 2024-01-04 RX ADMIN — FENTANYL CITRATE 100 MCG: 50 INJECTION, SOLUTION INTRAMUSCULAR; INTRAVENOUS at 09:08

## 2024-01-04 RX ADMIN — SODIUM CHLORIDE, POTASSIUM CHLORIDE, SODIUM LACTATE AND CALCIUM CHLORIDE: 600; 310; 30; 20 INJECTION, SOLUTION INTRAVENOUS at 09:00

## 2024-01-04 RX ADMIN — SODIUM HYPOCHLORITE 10 ML: 1.25 SOLUTION TOPICAL at 18:00

## 2024-01-04 RX ADMIN — THEOPHYLLINE 300 MG: 300 TABLET, EXTENDED RELEASE ORAL at 16:53

## 2024-01-04 RX ADMIN — OXYCODONE HYDROCHLORIDE 10 MG: 10 TABLET ORAL at 03:43

## 2024-01-04 RX ADMIN — QUETIAPINE FUMARATE 100 MG: 100 TABLET ORAL at 16:53

## 2024-01-04 RX ADMIN — THEOPHYLLINE 300 MG: 300 TABLET, EXTENDED RELEASE ORAL at 05:17

## 2024-01-04 RX ADMIN — FENTANYL CITRATE 50 MCG: 50 INJECTION, SOLUTION INTRAMUSCULAR; INTRAVENOUS at 10:01

## 2024-01-04 RX ADMIN — FLUOXETINE HYDROCHLORIDE 20 MG: 20 CAPSULE ORAL at 05:18

## 2024-01-04 RX ADMIN — OXYCODONE HYDROCHLORIDE 10 MG: 10 TABLET ORAL at 19:25

## 2024-01-04 RX ADMIN — FENTANYL CITRATE 50 MCG: 50 INJECTION, SOLUTION INTRAMUSCULAR; INTRAVENOUS at 10:11

## 2024-01-04 RX ADMIN — HYDROMORPHONE HYDROCHLORIDE 1 MG: 1 INJECTION, SOLUTION INTRAMUSCULAR; INTRAVENOUS; SUBCUTANEOUS at 05:18

## 2024-01-04 RX ADMIN — HYDROMORPHONE HYDROCHLORIDE 0.2 MG: 1 INJECTION, SOLUTION INTRAMUSCULAR; INTRAVENOUS; SUBCUTANEOUS at 10:38

## 2024-01-04 RX ADMIN — CEFAZOLIN 3 G: 1 INJECTION, POWDER, FOR SOLUTION INTRAMUSCULAR; INTRAVENOUS at 09:11

## 2024-01-04 ASSESSMENT — PAIN DESCRIPTION - PAIN TYPE
TYPE: SURGICAL PAIN
TYPE: ACUTE PAIN
TYPE: SURGICAL PAIN
TYPE: ACUTE PAIN
TYPE: SURGICAL PAIN
TYPE: SURGICAL PAIN
TYPE: ACUTE PAIN

## 2024-01-04 ASSESSMENT — PAIN SCALES - GENERAL: PAIN_LEVEL: 2

## 2024-01-04 NOTE — CARE PLAN
The patient is Stable - Low risk of patient condition declining or worsening    Shift Goals  Clinical Goals: Wound care, skin integrity, pain control, and rest  Patient Goals: Pain control and rest  Family Goals: na    Progress made toward(s) clinical / shift goals:  Wound care completed at beginning of shift. Skin integrity maintained with Q2 turns, barrier paste, TAPS, and mepilex. Pain and nausea controlled per MAR. Pt slept intermittently throughout shift.     Problem: Pain - Standard  Goal: Alleviation of pain or a reduction in pain to the patient’s comfort goal  Outcome: Progressing     Problem: Wound/ / Incision Healing  Goal: Patient's wound/surgical incision will decrease in size and heals properly  Outcome: Progressing

## 2024-01-04 NOTE — OR NURSING
0942: Pt arrived via bed with anesthesia and RN. VSS. Dressing intact with minimal, old drainage. LUE swelling present. Orders reviewed and initiated.   1137: Report called to JORDYN Fisher. All questions answered. VSS. Dressing intact with minimal, old drainage. No patient distress. Pain medicated per MAR. Alert and oriented x4. Pt transported to room in stable condition on 2L NC with no personal belongings. Family updated.

## 2024-01-04 NOTE — ANESTHESIA PREPROCEDURE EVALUATION
Case: [id number] Date/Time: 01/04/24 0845    Procedure: IRRIGATION AND DEBRIDEMENT, WOUND - ARM AND CLOSURE (Left)    Location: [facility] OR 16 / SURGERY [facility]    Surgeons: Corey Cruz M.D.          69 yo M w/ BMI 49, SANTOSH, COPD on 2L home O2, Afib, Hep C, presenting w/ humeral fx s/p ORIF 12/28    NT-proBNP 1603, on furosemide    Relevant Problems   ANESTHESIA   (positive) SANTOSH on CPAP      PULMONARY   (positive) COPD (chronic obstructive pulmonary disease) (HCC)   (positive) Pneumonia      CARDIAC   (positive) Atrial fibrillation with RVR (HCC)         (positive) Hepatitis C infection       Physical Exam    Airway   Mallampati: III  TM distance: >3 FB  Neck ROM: full       Cardiovascular - normal exam  Rhythm: irregular  Rate: normal  (-) murmur     Dental   Comments: edentulous         Pulmonary   (+) rales     Abdominal    Neurological - normal exam                   Anesthesia Plan    ASA 3- EMERGENT   ASA physical status 3 criteria: COPD - poorly controlled and morbid obesity - BMI greater than or equal to 40ASA physical status emergent criteria: compromised vital organ, limb or tissue    Plan - general       Airway plan will be ETT          Induction: intravenous    Postoperative Plan: Postoperative administration of opioids is intended.    Pertinent diagnostic labs and testing reviewed    Informed Consent:    Anesthetic plan and risks discussed with patient.    Use of blood products discussed with: patient whom consented to blood products.

## 2024-01-04 NOTE — ANESTHESIA PROCEDURE NOTES
Airway    Date/Time: 1/4/2024 9:10 AM    Performed by: Omega Benson M.D.  Authorized by: Omega Benson M.D.    Location:  OR  Urgency:  Elective  Indications for Airway Management:  Anesthesia      Spontaneous Ventilation: absent    Sedation Level:  Deep  Preoxygenated: Yes    Patient Position:  Sniffing  Mask Difficulty Assessment:  2 - vent by mask + OA or adjuvant +/- NMBA  Final Airway Type:  Endotracheal airway  Final Endotracheal Airway:  ETT  Cuffed: Yes    Technique Used for Successful ETT Placement:  Video laryngoscopy    Insertion Site:  Oral  Blade Type:  Glide  Laryngoscope Blade/Videolaryngoscope Blade Size:  3  ETT Size (mm):  7.0  Measured from:  Teeth  ETT to Teeth (cm):  22  Placement Verified by: auscultation and capnometry    Cormack-Lehane Classification:  Grade I - full view of glottis  Number of Attempts at Approach:  1

## 2024-01-04 NOTE — PROGRESS NOTES
4 Eyes Skin Assessment Completed by JORDYN Ponce and JORDYN Escobar.    Head WDL  Ears WDL  Nose WDL  Mouth WDL  Neck WDL  Breast/Chest WDL  Shoulder Blades Redness and Blanching  Spine Redness and Blanching  (R) Arm/Elbow/Hand Redness, Blanching, Bruising, and Swelling and x2 wounds DIP  (L) Arm/Elbow/Hand Redness, Blanching, Bruising, and Swelling and x4 wounds DIP  Abdomen/ Pannus Redness and Blanching and excoriation  Groin Redness, Blanching, and Excoriation  Scrotum/Coccyx/Buttocks Redness, Blanching, and Excoriation  (R) Leg Swelling and discoloration  (L) Leg Swelling and discoloration   (R) Heel/Foot/Toe Discoloration and Swelling and flaky  (L) Heel/Foot/Toe Discoloration and Swelling and flaky          Devices In Places Blood Pressure Cuff, Pulse Ox, SCD's, and Nasal Cannula      Interventions In Place Gray Ear Foams, Heel Mepilex, TAP System, Pillows, Q2 Turns, Low Air Loss Mattress, Barrier Cream, and Dri-Albert Pads    Possible Skin Injury Yes    Pictures Uploaded Into Epic Yes  Wound Consult Placed Yes  RN Wound Prevention Protocol Ordered Yes

## 2024-01-04 NOTE — ANESTHESIA TIME REPORT
Anesthesia Start and Stop Event Times       Date Time Event    1/4/2024 0852 Ready for Procedure     0900 Anesthesia Start     0947 Anesthesia Stop          Responsible Staff  01/04/24      Name Role Begin End    Omega Benson M.D. Anesth 0900 0947          Overtime Reason:  no overtime (within assigned shift)    Comments:

## 2024-01-04 NOTE — PROGRESS NOTES
Bedside report received, assessment completed    A&O x  4, pt calls appropriately  Mobility: Up with max assist  Fall Risk Assessment: High, bed alarm on, door notifications in use  Pain Assessment / Reassessment completed, medication provided per MAR  Diet: Regular, NPO at midnight   LDA:   IV Access: 20 R FA, CDI/ flushed/ SL    GI/: + void, + flatus, 1/1 BM  DVT Prophylaxis: Xarelto, SCD's refused    Reviewed plan of care with patient, bed in lowest position and locked, pt resting comfortably now, call light within reach, all needs met at this time. Interventions will be executed per plan of care

## 2024-01-04 NOTE — PROGRESS NOTES
Received report from previous shift RN  Assessment complete.  A&O x 4. Patient calls appropriately.  Patient ambulates with max assist. Bed alarm on.   Patient has 7/10 pain. Pain managed with prescribed medications.  Denies N&V. Tolerating diet.  Skin per flowsheets.  + void, - flatus, - BM.  Patient denies SOB.  SCD's refused.  Patient pleasant and cooperative with plan of care.  Review plan with of care with patient. Call light and personal belongings with in reach. Hourly rounding in place. All needs met at this time.

## 2024-01-04 NOTE — THERAPY
Occupational Therapy Contact Note    Patient Name: Henry Kumari  Age:  68 y.o., Sex:  male  Medical Record #: [id number]  Today's Date: 1/4/2024 01/04/24 0750   Interdisciplinary Plan of Care Collaboration   Collaboration Comments Heading to OR for LUE I&D and possible closure. Will re approach as able/appropriate post op.

## 2024-01-04 NOTE — ANESTHESIA POSTPROCEDURE EVALUATION
Patient: Henry Kumari    Procedure Summary       Date: 01/04/24 Room / Location: [place] [address] / SURGERY [facility]    Anesthesia Start: 0900 Anesthesia Stop: 0947    Procedure: IRRIGATION AND DEBRIDEMENT, WOUND - ARM AND CLOSURE (Left: Arm Upper) Diagnosis: (operative site infection)    Surgeons: Corey Cruz M.D. Responsible Provider: Omega Benson M.D.    Anesthesia Type: general ASA Status: 3 - Emergent            Final Anesthesia Type: general  Last vitals  BP   Blood Pressure : (!) 140/81    Temp   36.2 °C (97.1 °F)    Pulse   (!) 103   Resp   18    SpO2   95 %      Anesthesia Post Evaluation    Patient location during evaluation: PACU  Patient participation: complete - patient participated  Level of consciousness: awake  Pain score: 2    Airway patency: patent  Anesthetic complications: no  Cardiovascular status: hemodynamically stable  Respiratory status: acceptable and face mask  Hydration status: acceptable    PONV: none          No notable events documented.     Nurse Pain Score: 7 (NPRS)

## 2024-01-04 NOTE — DISCHARGE PLANNING
1512  Agency/Facility Name: [facility]  Spoke To: Esmer  Outcome: DPA informed Esmer that ID has been consulted for Pt and Pt has come back form surgery ATT. DPA asked if later transport time is acceptable for [facility]. Per Esmer for CM to set transport up at later time and touch base in the AM if things change.  RN CM notified.

## 2024-01-04 NOTE — PROGRESS NOTES
Hospital Medicine Daily Progress Note    Date of Service  1/3/2024    Chief Complaint  Henry Kumari is a 68 y.o. male admitted 12/26/2023 with fall, humerus fracture.    Hospital Course  Henry Kumari is a 68 y.o. male with a h/o COPD (2L at baseline), CHF, afib, prior DVTs/PE who presented 12/26/2023 as a transfer from [facility].  The patient presented there after a fall, humerus fracture.     He has chronic panniculitis that has been utilizing alcohol for.  The patient had a fall due to his generalized weakness causing a humerus fracture there was noted at the transferring facility and he was transferred here for higher level of care.  The patient is on anticoagulation for history of DVTs as well as pulmonary emboli.  He is on home oxygen and continues to abuse tobacco products. He states he does not have any dysuria.  The patient also suffered from multiple skin tears to the right upper extremity and he did receive tetanus prophylaxis as well as Rocephin.  Patient was found to have a leukocytosis with a white blood cell count of 18,200 and slight elevation in his creatinine at 1.74.  Blood cultures were ordered and are currently pending at [facility].      In ER he was found to be febrile to 38.2, tachycardic to the 120s (in A-fib) normal blood pressure, normal oxygen saturation on room air.  Labs remarkable for WBC 18, CO2 19, BUN 34, creatinine 1.24, lactic acid 2.4 improved to 2 point.  Urinalysis hazy 20-50WBC, chest x-ray negative, CRP 22, viral panel negative.    Left humeral shaft fracture - s/p ORIF 12/28    He denies dysuria urgency frequency    Interval Problem Update  Patient was seen and examined at bedside  BMI 49    Patient sounds congested, x-ray showed mild pulmonary edema, I started IV Lasix twice daily  Continue Augmentin for RLL pneumonia    Hb stable-resume Xarelto    Left humeral shaft fracture - s/p ORIF 12/28, some oozing  from the incision site, I put Xarelto on hold, I discussed with Ortho team, no concerns  WBAT LUE  Sutures/Staples out- 14-21 days post operatively    Abdominal panniculitis/cellulitis -had 4 days Ancef, now on Augmentin for possible pneumonia    SANTOSH -CPAP  COPD -2 L, at baseline    PT OT recommended SNF    Above per previous hospitalist.    01/02/24  Patient continues on IV furosemide 40 mg IV twice daily.  Continues to be debilitated.  On 2 LPM oxygen via nasal cannula, which is his baseline.  He does have some wheezing today.  Complaining of chest pain, nausea, and constipation.  He is alert and orient x 3.  Awaiting acceptance to skilled nursing facility.    01/03/24  Continues on 2 LPM oxygen via nasal cannula.  Continues on furosemide 40 mg IV twice daily.  Transitioning to 40 mg daily.  N.p.o. after midnight I &D and possible closure of large wound.    I have discussed this patient's plan of care and discharge plan at IDT rounds today with Case Management, Nursing, Nursing leadership, and other members of the IDT team.    Consultants/Specialty      Code Status  Full Code    Disposition  The patient is not medically cleared for discharge to home or a post-acute facility.  Anticipate discharge to: skilled nursing facility    I have placed the appropriate orders for post-discharge needs.    Review of Systems  Review of Systems   Constitutional:  Negative for chills and fever.   Respiratory:  Negative for cough, shortness of breath and wheezing.    Cardiovascular:  Negative for chest pain and palpitations.   Gastrointestinal:  Positive for nausea. Negative for abdominal pain and vomiting.   Genitourinary:  Negative for dysuria and hematuria.   Musculoskeletal:  Positive for back pain. Negative for joint pain and myalgias.   Neurological:  Positive for weakness. Negative for dizziness and headaches.            Physical Exam  Temp:  [36.7 °C (98.1 °F)-37.3 °C (99.1 °F)] 36.9 °C (98.4 °F)  Pulse:  [] 98  Resp:   [17-20] 17  BP: (122-156)/(65-77) 156/76  SpO2:  [92 %-98 %] 95 %    Physical Exam  Constitutional:       General: He is not in acute distress.     Appearance: He is obese. He is ill-appearing.   HENT:      Head: Normocephalic and atraumatic.      Mouth/Throat:      Mouth: Mucous membranes are dry.   Eyes:      General:         Right eye: No discharge.         Left eye: No discharge.      Conjunctiva/sclera: Conjunctivae normal.      Pupils: Pupils are equal, round, and reactive to light.   Cardiovascular:      Rate and Rhythm: Normal rate and regular rhythm.      Pulses: Normal pulses.      Heart sounds: Normal heart sounds. No murmur heard.  Pulmonary:      Effort: Pulmonary effort is normal.      Breath sounds: No wheezing.   Abdominal:      Palpations: Abdomen is soft.      Tenderness: There is abdominal tenderness. There is no guarding.   Musculoskeletal:         General: Tenderness present.      Cervical back: Neck supple. No tenderness.      Comments: Left arm sling in place   Skin:     General: Skin is warm.      Findings: Lesion and rash present.      Comments: Mild erythema in the lower part of abdomen.   Neurological:      Mental Status: He is alert and oriented to person, place, and time. Mental status is at baseline.   Psychiatric:         Mood and Affect: Mood normal.         Fluids    Intake/Output Summary (Last 24 hours) at 1/3/2024 1953  Last data filed at 1/3/2024 1747  Gross per 24 hour   Intake 600 ml   Output 1395 ml   Net -795 ml         Laboratory  Recent Labs     01/01/24  0404   WBC 7.1   RBC 3.63*   HEMOGLOBIN 10.3*   HEMATOCRIT 31.3*   MCV 86.2   MCH 28.4   MCHC 32.9   RDW 43.8   PLATELETCT 173   MPV 11.1       Recent Labs     01/01/24  0404   SODIUM 135   POTASSIUM 3.9   CHLORIDE 103   CO2 23   GLUCOSE 91   BUN 10   CREATININE 0.39*   CALCIUM 8.1*                     Imaging  DX-HUMERUS 2+ LEFT   Final Result      Improved alignment status post open reduction internal fixation of a  complex left humerus fracture.      DX-CHEST-PORTABLE (1 VIEW)   Final Result      1.  Right basilar opacity which could be atelectasis or pneumonia      2.  Enlarged cardiac silhouette      DX-PORTABLE FLUOROSCOPY < 1 HOUR   Final Result      Portable fluoroscopy utilized for 1 minute 30 seconds.      INTERPRETING LOCATION: [address], [place], [address]      DX-HUMERUS 2+ LEFT   Final Result      Digitized intraoperative radiograph is submitted for review. This examination is not for diagnostic purpose but for guidance during a surgical procedure. Please see the patient's chart for full procedural details.         INTERPRETING LOCATION: [address], [place], [address]      DX-CHEST-PORTABLE (1 VIEW)   Final Result         Cardiomegaly.      No pulmonary infiltrates or consolidations are noted.         OUTSIDE IMAGES-CT HEAD   Final Result      OUTSIDE IMAGES-DX UPPER EXTREMITY, LEFT   Final Result      OUTSIDE IMAGES-DX CHEST   Final Result      OUTSIDE IMAGES-DX UPPER EXTREMITY, LEFT   Final Result           Assessment/Plan  * Sepsis (HCC)- (present on admission)  Assessment & Plan  This is Sepsis Present on admission  SIRS criteria identified on my evaluation include: Fever, with temperature greater than 100.9 deg F, Tachycardia, with heart rate greater than 90 BPM, and Leukocytosis, with WBC greater than 12,000  Clinical indicators of end organ dysfunction include Lactic Acid greater than 2  Source is skin/panniculitis   Sepsis protocol initiated  Crystalloid Fluid Administration: Resuscitation volume of 1000 ordered. Reason that resuscitation volume of less than 30ml/kg was ordered concern for causing harm given CHF  IV antibiotics as appropriate for source of sepsis  Reassessment: I have reassessed the patient's hemodynamic status    Open wound of multiple sites of left upper extremity without complication  Assessment & Plan  Multiple open wounds on both arms, the most concerned one is on left arm  Wound care  consulted  Wound culture pending  I ordered a Dilaudid IV as needed for dressing change      Plan for surgery tomorrow    Hypophosphatemia  Assessment & Plan  Replaced    Lactic acidosis  Assessment & Plan  Due to sepsis    Lactic acid 3.1>1.4  Continue IV fluid    Left humeral fracture  Assessment & Plan  Left humeral shaft fracture  -evaluated by surgery,     12/28 s/p ORIF today  PT OT and supportive care    Cellulitis  Assessment & Plan  Lower abdominal diffused erythema, tenderness  History of panniculitis    12/31 had 4 days Ancef, now on Augmentin for possible pneumonia    01/02/24  Continue Augmentin    Opiate dependence (HCC)- (present on admission)  Assessment & Plan  Takes buprenorphine at home for chronic pain  In acute pain so giving prn narcotics    01/02/24  Continue home buprenorphine    Chronic respiratory failure with hypoxia ([facility])- (present on admission)  Assessment & Plan  Due to COPD and obese  Baseline 2 L    1/1 patient sounds congested, chest x-ray with mild pulmonary edema, I started IV Lasix    01/02/24  Stable 2 LPM.  Continue furosemide 40 mg IV twice daily    01/03/24  Stable 2 LPM.  Change furosemide to 40 mg by mouth daily    Class 3 severe obesity due to excess calories with serious comorbidity and body mass index (BMI) of 45.0 to 49.9 in adult ([facility])- (present on admission)  Assessment & Plan  BMI 49  Weight loss education    SANTOSH on CPAP- (present on admission)  Assessment & Plan  Continue CPAP    COPD (chronic obstructive pulmonary disease) ([facility])- (present on admission)  Assessment & Plan  Not in exacerbation  Continue inhalers and albuterol prn    01/02/24  Some wheezing noted today.  2 LPM stable baseline oxygen needs.  Patient is medically cleared to discharge today  Continue home inhalers including Spiriva and Dulera and albuterol as needed  Continuous pulse oximetry monitoring    History of hepatitis C- (present on admission)  Assessment & Plan  As per history.    Atrial  fibrillation with RVR (HCC)- (present on admission)  Assessment & Plan  History of A-fib on metoprolol and Xarelto, rate controlled    12/28 developed A-fib with RVR   Patient had surgery for left femoral fracture  Continue metoprolol  Optimize electrolytes potassium over 4 and mag over 2  I ordered IV mag 2 g    Continue Xarelto    History of DVT (deep vein thrombosis)- (present on admission)  Assessment & Plan  home Xarelto on hold for possible left arm fracture surgery in the coming days         VTE prophylaxis: Xarelto      I have performed a physical exam and reviewed and updated ROS and Plan today (1/3/2024). In review of yesterday's note (1/2/2024), there are no changes except as documented above.

## 2024-01-04 NOTE — OP REPORT
DATE: 1/4/2024    PREOPERATIVE DIAGNOSIS: 3 cm left arm wound    POSTOPERATIVE DIAGNOSIS: Same    PROCEDURE PERFORMED:  Irrigation and debridement and secondary closure of 3 cm left arm wound                                                    SURGEON: Corey Cruz M.D.     ASSISTANT: Hector Pavon    ANESTHESIA: General    ESTIMATED BLOOD LOSS: 0 mL    INDICATIONS: The patient is a 68 y.o. male with a left arm wound after humeral nailing.  I discussed the risks and benefits of the procedure, including the risks of infection, wound healing complication, neurovascular injury, need for repeat irrigation and debridement and the medical risks of anesthesia including DVT, PE, MI, stroke, and death.  Benefits include eradication of infection and closure of wound.  Alternatives to surgery were also discussed, including non-operative management.  The patient signed the informed consent and the operative extremity was marked.      PROCEDURE:  The patient underwent anesthesia, and was positioned supine on the operating room table and all bony prominences were well padded.  Preoperative antibiotics were held until cultures could be obtained. Sequential compression devices were employed. The correct operative site was prepped and draped into a sterile field. A procedural pause was conducted to verify correct patient, correct extremity, presence of the surgeons initials on the operative extremity.    The wound dehiscence was debrided in excisional fashion with knife and rongeur of skin, subcutaneous tissue, and underlying muscle down to healthy tissue.  The  wound was irrigated with 3 L of saline solution.  Vancomycin powder was placed in the wound.  The wound was closed with  2-0 Nylon as deep tissues were too poor to hold suture.  Sterile dressings were applied.     The patient tolerated the procedure well. There were no apparent complications. All sponge, needle, and instrument counts were correct on two separate  occasions. They were awakened, extubated, and transferred to the recovery room in satisfactory condition.     The use of Hector Pavon as a surgical assistant was necessary for assistance with exposure, retraction, and closure.    Post-Operative Plan:    1.  The patient should be full weightbearing on their operative extremity.    2.  IV antibiotics - will be given postoperatively and adjusted by the Infectious Disease service as dictated by culture results.    ____________________________________   Corey Cruz M.D.   DD: 1/4/2024  9:31 AM

## 2024-01-05 VITALS
SYSTOLIC BLOOD PRESSURE: 100 MMHG | RESPIRATION RATE: 18 BRPM | HEIGHT: 74 IN | DIASTOLIC BLOOD PRESSURE: 70 MMHG | OXYGEN SATURATION: 95 % | HEART RATE: 88 BPM | WEIGHT: 315 LBS | BODY MASS INDEX: 40.43 KG/M2 | TEMPERATURE: 97.5 F

## 2024-01-05 PROBLEM — A41.9 SEPSIS (HCC): Status: RESOLVED | Noted: 2018-03-30 | Resolved: 2024-01-05

## 2024-01-05 PROBLEM — I48.91 ATRIAL FIBRILLATION WITH RVR (HCC): Status: RESOLVED | Noted: 2018-03-30 | Resolved: 2024-01-05

## 2024-01-05 PROBLEM — E83.39 HYPOPHOSPHATEMIA: Status: RESOLVED | Noted: 2023-12-28 | Resolved: 2024-01-05

## 2024-01-05 PROBLEM — L03.90 CELLULITIS: Status: RESOLVED | Noted: 2023-12-27 | Resolved: 2024-01-05

## 2024-01-05 PROBLEM — E87.20 LACTIC ACIDOSIS: Status: RESOLVED | Noted: 2023-12-27 | Resolved: 2024-01-05

## 2024-01-05 PROBLEM — S42.353B: Status: ACTIVE | Noted: 2023-12-27

## 2024-01-05 PROBLEM — F13.20 BENZODIAZEPINE DEPENDENCE, CONTINUOUS (HCC): Status: ACTIVE | Noted: 2024-01-05

## 2024-01-05 PROBLEM — S41.102A: Status: RESOLVED | Noted: 2024-01-01 | Resolved: 2024-01-05

## 2024-01-05 PROBLEM — J18.9 PNEUMONIA: Status: RESOLVED | Noted: 2023-12-31 | Resolved: 2024-01-05

## 2024-01-05 PROBLEM — D62 ACUTE BLOOD LOSS ANEMIA (ABLA): Status: ACTIVE | Noted: 2024-01-05

## 2024-01-05 LAB
BACTERIA WND AEROBE CULT: ABNORMAL
BACTERIA WND AEROBE CULT: ABNORMAL
GRAM STN SPEC: ABNORMAL
SIGNIFICANT IND 70042: ABNORMAL
SITE SITE: ABNORMAL
SOURCE SOURCE: ABNORMAL

## 2024-01-05 PROCEDURE — 700102 HCHG RX REV CODE 250 W/ 637 OVERRIDE(OP): Performed by: STUDENT IN AN ORGANIZED HEALTH CARE EDUCATION/TRAINING PROGRAM

## 2024-01-05 PROCEDURE — 700102 HCHG RX REV CODE 250 W/ 637 OVERRIDE(OP): Performed by: INTERNAL MEDICINE

## 2024-01-05 PROCEDURE — A9270 NON-COVERED ITEM OR SERVICE: HCPCS | Performed by: INTERNAL MEDICINE

## 2024-01-05 PROCEDURE — 97535 SELF CARE MNGMENT TRAINING: CPT

## 2024-01-05 PROCEDURE — 99239 HOSP IP/OBS DSCHRG MGMT >30: CPT | Performed by: STUDENT IN AN ORGANIZED HEALTH CARE EDUCATION/TRAINING PROGRAM

## 2024-01-05 PROCEDURE — A9270 NON-COVERED ITEM OR SERVICE: HCPCS | Performed by: STUDENT IN AN ORGANIZED HEALTH CARE EDUCATION/TRAINING PROGRAM

## 2024-01-05 RX ORDER — OXYCODONE HYDROCHLORIDE 10 MG/1
10 TABLET ORAL EVERY 6 HOURS PRN
Qty: 20 TABLET | Refills: 0 | Status: SHIPPED | OUTPATIENT
Start: 2024-01-05 | End: 2024-01-10

## 2024-01-05 RX ORDER — LINEZOLID 600 MG/1
600 TABLET, FILM COATED ORAL EVERY 12 HOURS
Status: DISCONTINUED | OUTPATIENT
Start: 2024-01-05 | End: 2024-01-05 | Stop reason: HOSPADM

## 2024-01-05 RX ORDER — CLONAZEPAM 1 MG/1
1 TABLET ORAL 2 TIMES DAILY
Qty: 6 TABLET | Refills: 0 | Status: SHIPPED | OUTPATIENT
Start: 2024-01-05 | End: 2024-01-08

## 2024-01-05 RX ORDER — OXYCODONE HYDROCHLORIDE 10 MG/1
10 TABLET ORAL EVERY 6 HOURS PRN
Qty: 20 TABLET | Refills: 0 | Status: SHIPPED | OUTPATIENT
Start: 2024-01-05 | End: 2024-01-05

## 2024-01-05 RX ORDER — LINEZOLID 600 MG/1
600 TABLET, FILM COATED ORAL EVERY 12 HOURS
Qty: 28 TABLET | Refills: 0 | Status: ACTIVE | DISCHARGE
Start: 2024-01-05 | End: 2024-01-19

## 2024-01-05 RX ORDER — FUROSEMIDE 40 MG/1
40 TABLET ORAL DAILY
Qty: 60 TABLET | Status: ON HOLD
Start: 2024-01-06 | End: 2024-01-29

## 2024-01-05 RX ORDER — LINEZOLID 600 MG/1
600 TABLET, FILM COATED ORAL EVERY 12 HOURS
Qty: 14 TABLET | Refills: 0 | Status: ACTIVE | DISCHARGE
Start: 2024-01-05 | End: 2024-01-05

## 2024-01-05 RX ADMIN — TIOTROPIUM BROMIDE INHALATION SPRAY 2.5 MCG: 3.12 SPRAY, METERED RESPIRATORY (INHALATION) at 05:19

## 2024-01-05 RX ADMIN — GUAIFENESIN 600 MG: 600 TABLET, EXTENDED RELEASE ORAL at 05:20

## 2024-01-05 RX ADMIN — AMOXICILLIN AND CLAVULANATE POTASSIUM 1 TABLET: 875; 125 TABLET, FILM COATED ORAL at 05:19

## 2024-01-05 RX ADMIN — OXYCODONE HYDROCHLORIDE 10 MG: 10 TABLET ORAL at 15:14

## 2024-01-05 RX ADMIN — THEOPHYLLINE 300 MG: 300 TABLET, EXTENDED RELEASE ORAL at 05:20

## 2024-01-05 RX ADMIN — DOCUSATE SODIUM 50 MG AND SENNOSIDES 8.6 MG 2 TABLET: 8.6; 5 TABLET, FILM COATED ORAL at 05:20

## 2024-01-05 RX ADMIN — LINEZOLID 600 MG: 600 TABLET, FILM COATED ORAL at 09:33

## 2024-01-05 RX ADMIN — RIVAROXABAN 20 MG: 20 TABLET, FILM COATED ORAL at 05:19

## 2024-01-05 RX ADMIN — FLUOXETINE HYDROCHLORIDE 20 MG: 20 CAPSULE ORAL at 05:19

## 2024-01-05 RX ADMIN — OXYCODONE HYDROCHLORIDE 10 MG: 10 TABLET ORAL at 09:33

## 2024-01-05 RX ADMIN — OXYCODONE HYDROCHLORIDE 10 MG: 10 TABLET ORAL at 05:24

## 2024-01-05 RX ADMIN — MOMETASONE FUROATE AND FORMOTEROL FUMARATE DIHYDRATE 1 PUFF: 200; 5 AEROSOL RESPIRATORY (INHALATION) at 05:19

## 2024-01-05 RX ADMIN — FUROSEMIDE 40 MG: 20 TABLET ORAL at 05:19

## 2024-01-05 RX ADMIN — METOPROLOL SUCCINATE 100 MG: 100 TABLET, EXTENDED RELEASE ORAL at 05:20

## 2024-01-05 RX ADMIN — CLONAZEPAM 1 MG: 1 TABLET ORAL at 05:20

## 2024-01-05 RX ADMIN — QUETIAPINE FUMARATE 100 MG: 100 TABLET ORAL at 05:19

## 2024-01-05 RX ADMIN — POLYETHYLENE GLYCOL 3350 1 PACKET: 17 POWDER, FOR SOLUTION ORAL at 09:43

## 2024-01-05 ASSESSMENT — COGNITIVE AND FUNCTIONAL STATUS - GENERAL
SUGGESTED CMS G CODE MODIFIER DAILY ACTIVITY: CL
HELP NEEDED FOR BATHING: A LOT
TOILETING: A LOT
PERSONAL GROOMING: A LITTLE
TOILETING: TOTAL
EATING MEALS: A LITTLE
DAILY ACTIVITIY SCORE: 12
TURNING FROM BACK TO SIDE WHILE IN FLAT BAD: UNABLE
SUGGESTED CMS G CODE MODIFIER DAILY ACTIVITY: CL
HELP NEEDED FOR BATHING: TOTAL
MOVING FROM LYING ON BACK TO SITTING ON SIDE OF FLAT BED: UNABLE
MOVING TO AND FROM BED TO CHAIR: UNABLE
MOBILITY SCORE: 8
DRESSING REGULAR LOWER BODY CLOTHING: TOTAL
SUGGESTED CMS G CODE MODIFIER MOBILITY: CM
DRESSING REGULAR LOWER BODY CLOTHING: TOTAL
DRESSING REGULAR UPPER BODY CLOTHING: A LOT
DRESSING REGULAR UPPER BODY CLOTHING: A LOT
STANDING UP FROM CHAIR USING ARMS: A LOT
EATING MEALS: A LITTLE
DAILY ACTIVITIY SCORE: 12
CLIMB 3 TO 5 STEPS WITH RAILING: TOTAL
PERSONAL GROOMING: A LITTLE
WALKING IN HOSPITAL ROOM: A LOT

## 2024-01-05 ASSESSMENT — ENCOUNTER SYMPTOMS
WHEEZING: 0
FEVER: 0
HEADACHES: 0
NAUSEA: 0
VOMITING: 0
ABDOMINAL PAIN: 0
CHILLS: 0
BACK PAIN: 1
PALPITATIONS: 0
DIZZINESS: 0
COUGH: 0
MYALGIAS: 0
SHORTNESS OF BREATH: 0
WEAKNESS: 1

## 2024-01-05 ASSESSMENT — PAIN DESCRIPTION - PAIN TYPE
TYPE: ACUTE PAIN

## 2024-01-05 NOTE — PROGRESS NOTES
"      Orthopaedic Progress Note    Interval changes:  Patient doing well post op  LUE dressings CDI  Cleared for DC to SNF by ortho pending medicine and ID team clearance    ROS - Patient denies any new issues.  Pain well controlled.    /70   Pulse 88   Temp 36.4 °C (97.5 °F) (Temporal)   Resp 18   Ht 1.88 m (6' 2\")   Wt (!) 172 kg (379 lb)   SpO2 95%     Patient seen and examined  No acute distress  Breathing non labored  RRR  LUE with large deep wound to anterior lateral proximal bicepts with gauze packing, DNVI, moves all fingers, cap refill <2 sec.         Active Hospital Problems    Diagnosis     Acute blood loss anemia (ABLA) [D62]     Cellulitis [L03.90]     Open displaced comminuted fracture of shaft of humerus [S42.353B]     Opiate dependence ([facility]) [F11.20]     Chronic respiratory failure with hypoxia ([facility]) [J96.11]     Class 3 severe obesity due to excess calories with serious comorbidity and body mass index (BMI) of 45.0 to 49.9 in adult ([facility]) [E66.01, Z68.42]     History of DVT (deep vein thrombosis) [Z86.718]     COPD (chronic obstructive pulmonary disease) ([facility]) [J44.9]     SANTOSH on CPAP [G47.33]     History of hepatitis C [Z86.19]        Assessment/Plan:  Patient doing well post op  LUE dressings CDI  Cleared for DC to SNF by ortho pending medicine and ID team clearance  POD#1 S/P Irrigation and debridement and secondary closure of 3 cm left arm wound   POD#8 S/P Open treatment of left humeral shaft fracture with insertion of intramedullary implant   Wt bearing status - WBAT LUE  Wound care/Drains - Dressings to be changed every other day by nursing. Or PRN for saturation starting POD#2  Future Procedures - none planned   Sutures/Staples out- 14-21 days post operatively. Removal will completed by ortho mid levels only.  PT/OT-initiated  Antibiotics: zyvix 600mg po Q12  DVT Prophylaxis- TEDS/SCDs/Foot pumps/xarelto  [place]-not needed per ortho  Case Coordination for Discharge Planning - " Disposition per therapy recs.

## 2024-01-05 NOTE — THERAPY
"Occupational Therapy  Daily Treatment     Patient Name: Henry Kumari  Age:  68 y.o., Sex:  male  Medical Record #: [id number]  Today's Date: 1/5/2024     Precautions  Precautions: (P) Fall Risk, Weight Bearing As Tolerated Left Upper Extremity  Comments: per Ortho note; no formal order    Assessment    Pt seen for OT tx session. Pt required max A supine>sit, SPV seated grooming, mod A for UB dressing, total A LB dressing and toilet hygiene. Pt demo'd impaired balance, functional mobility, and activity tolerance. Will continue to follow while in house.     Plan    Treatment Plan Status: (P) Continue Current Treatment Plan  Type of Treatment: Self Care / Activities of Daily Living, Adaptive Equipment, Neuro Re-Education / Balance, Therapeutic Exercises, Therapeutic Activity  Treatment Frequency: 3 Times per Week  Treatment Duration: Until Therapy Goals Met    DC Equipment Recommendations: (P) Unable to determine at this time  Discharge Recommendations: (P) Recommend post-acute placement for additional occupational therapy services prior to discharge home    Subjective    \"I have been hurting for 50 years, you think therapy is going to help it?\"     Objective       01/05/24 0929   Treatment Charges   Charges Yes   OT Self Care / ADL (Units) 1   Total Time Spent   OT Self Care / ADL (Minutes) 20   Precautions   Precautions Fall Risk;Weight Bearing As Tolerated Left Upper Extremity   Vitals   O2 (LPM) 2   O2 Delivery Device Nasal Cannula   Pain 0 - 10 Group   Therapist Pain Assessment Post Activity Pain Same as Prior to Activity;Nurse Notified  (no c/o pain during session)   Cognition    Cognition / Consciousness X   Orientation Level Not Oriented to Place   Level of Consciousness Alert   Attention Impaired   Comments generally disgruntled thoughout session   Strength Upper Body   Comments pt gaurded w/ L UE due to pain   Balance   Sitting Balance (Static) Fair -   Sitting Balance (Dynamic) Fair -   Weight Shift " "Sitting Poor   Bed Mobility    Supine to Sit Maximal Assist   Sit to Supine Moderate Assist   Scooting Minimal Assist  (laterally scooting up EOB)   Skilled Intervention Verbal Cuing;Tactile Cuing;Facilitation   Activities of Daily Living   Grooming Supervision;Seated   Upper Body Dressing Moderate Assist  (sock)   Lower Body Dressing Total Assist   Toileting Total Assist   Skilled Intervention Verbal Cuing;Tactile Cuing;Facilitation   How much help from another person does the patient currently need...   Putting on and taking off regular lower body clothing? 1   Bathing (including washing, rinsing, and drying)? 1   Toileting, which includes using a toilet, bedpan, or urinal? 2   Putting on and taking off regular upper body clothing? 2   Taking care of personal grooming such as brushing teeth? 3   Eating meals? 3   6 Clicks Daily Activity Score 12   Functional Mobility   Sit to Stand   (attempted STS at EOB, unable to reach full stand)   Mobility sat EOB   Activity Tolerance   Sitting Edge of Bed 10 min   Patient / Family Goals   Patient / Family Goal #1 \"To do for myself\"   Goal #1 Outcome Goal not met   Short Term Goals   Short Term Goal # 1 Pt will complete ADL transfers with supv   Goal Outcome # 1 Goal not met   Short Term Goal # 2 Pt will complete gown change with min A using clifford technique   Goal Outcome # 2 Goal not met   Short Term Goal # 3 Pt will complete seated oral care using L hand as stabilizer   Goal Outcome # 3 Goal not met   Short Term Goal # 4 Pt will complete toileting with min A   Goal Outcome # 4 Goal not met   Education Group   Role of Occupational Therapist Patient Response Patient;Acceptance;Explanation;Verbal Demonstration   Occupational Therapy Treatment Plan    O.T. Treatment Plan Continue Current Treatment Plan   Anticipated Discharge Equipment and Recommendations   DC Equipment Recommendations Unable to determine at this time   Discharge Recommendations Recommend post-acute placement " for additional occupational therapy services prior to discharge home   Interdisciplinary Plan of Care Collaboration   IDT Collaboration with  Nursing   Patient Position at End of Therapy Call Light within Reach;Tray Table within Reach;Phone within Reach;In Bed;Bed Alarm On   Collaboration Comments report given   Session Information   Date / Session Number  1/5, 2 (1/3, 1/11)

## 2024-01-05 NOTE — PROGRESS NOTES
4 Eyes Skin Assessment Completed by JORDYN Fisher and JORDYN Ponce.     Head WDL  Ears WDL  Nose WDL  Mouth WDL  Neck WDL  Breast/Chest WDL  Shoulder Blades Redness and Blanching  Spine Redness and Blanching  (R) Arm/Elbow/Hand Redness, Blanching, Bruising, and Swelling and x2 wounds DIP  (L) Arm/Elbow/Hand Redness, Blanching, Bruising, and Swelling and x4 wounds DIP  Abdomen/ Pannus Redness and Blanching and excoriation  Groin Redness, Blanching, and Excoriation  Scrotum/Coccyx/Buttocks Redness, Blanching, and Excoriation  (R) Leg Swelling and discoloration  (L) Leg Swelling and discoloration   (R) Heel/Foot/Toe Discoloration and Swelling and flaky  (L) Heel/Foot/Toe Discoloration and Swelling and flaky              Devices In Places Blood Pressure Cuff, Pulse Ox, SCD's, and Nasal Cannula        Interventions In Place Gray Ear Foams, Heel Mepilex, TAP System, Pillows, Q2 Turns, Low Air Loss Mattress, Barrier Cream, and Dri-Albert Pads     Possible Skin Injury Yes     Pictures Uploaded Into Epic Yes  Wound Consult Placed Yes  RN Wound Prevention Protocol Ordered Yes

## 2024-01-05 NOTE — CARE PLAN
The patient is Stable - Low risk of patient condition declining or worsening    Shift Goals  Clinical Goals: pain management; skin integrity preservation; discharge to SNF  Patient Goals: pain control  Family Goals: n/a    Progress made toward(s) clinical / shift goals:  Pain has been medicated per MAR parameters. Patient has consistently called appropriately for assistance as needed. Discharge orders have been received for patient to go to SNF for continued care.     Patient is not progressing towards the following goals: N/A    Problem: Pain - Standard  Goal: Alleviation of pain or a reduction in pain to the patient’s comfort goal  Outcome: Met     Problem: Knowledge Deficit - Standard  Goal: Patient and family/care givers will demonstrate understanding of plan of care, disease process/condition, diagnostic tests and medications  Outcome: Met     Problem: Hemodynamics  Goal: Patient's hemodynamics, fluid balance and neurologic status will be stable or improve  Outcome: Met     Problem: Fluid Volume  Goal: Fluid volume balance will be maintained  Outcome: Met     Problem: Urinary - Renal Perfusion  Goal: Ability to achieve and maintain adequate renal perfusion and functioning will improve  Outcome: Met     Problem: Respiratory  Goal: Patient will achieve/maintain optimum respiratory ventilation and gas exchange  Outcome: Met     Problem: Mechanical Ventilation  Goal: Safe management of artificial airway and ventilation  Outcome: Met  Goal: Successful weaning off mechanical ventilator, spontaneously maintains adequate gas exchange  Outcome: Met  Goal: Patient will be able to express needs and understand communication  Outcome: Met     Problem: Physical Regulation  Goal: Diagnostic test results will improve  Outcome: Met  Goal: Signs and symptoms of infection will decrease  Outcome: Met     Problem: Skin Integrity  Goal: Skin integrity is maintained or improved  Outcome: Met     Problem: Fall Risk  Goal: Patient will  remain free from falls  Outcome: Met     Problem: Psychosocial  Goal: Patient's level of anxiety will decrease  Outcome: Met  Goal: Patient's ability to verbalize feelings about condition will improve  Outcome: Met  Goal: Patient's ability to re-evaluate and adapt role responsibilities will improve  Outcome: Met  Goal: Patient and family will demonstrate ability to cope with life altering diagnosis and/or procedure  Outcome: Met  Goal: Spiritual and cultural needs incorporated into hospitalization  Outcome: Met     Problem: Communication  Goal: The ability to communicate needs accurately and effectively will improve  Outcome: Met     Problem: Risk for Aspiration  Goal: Patient's risk for aspiration will be absent or decrease  Outcome: Met     Problem: Mobility  Goal: Patient's capacity to carry out activities will improve  Outcome: Met     Problem: Self Care  Goal: Patient will have the ability to perform ADLs independently or with assistance (bathe, groom, dress, toilet and feed)  Outcome: Met     Problem: Infection - Standard  Goal: Patient will remain free from infection  Outcome: Met     Problem: Wound/ / Incision Healing  Goal: Patient's wound/surgical incision will decrease in size and heals properly  Outcome: Met

## 2024-01-05 NOTE — PROGRESS NOTES
Bedside report received, assessment completed     A&O x  4, pt calls appropriately  Mobility: Up with max assist  Fall Risk Assessment: High, bed alarm on, door notifications in use  Pain Assessment / Reassessment completed, medication provided per MAR  Diet: Regular, NPO at midnight   LDA:   IV Access: 20 R FA, CDI/ flushed/ SL     GI/: + void, - flatus, 1/1 BM  DVT Prophylaxis: Xarelto, SCD's refused     Reviewed plan of care with patient, bed in lowest position and locked, pt resting comfortably now, call light within reach, all needs met at this time. Interventions will be executed per plan of care

## 2024-01-05 NOTE — PROGRESS NOTES
4 Eyes Skin Assessment Completed by JORDYN Ponce and JORDYN Garduno.     Head WDL  Ears WDL  Nose WDL  Mouth WDL  Neck WDL  Breast/Chest WDL  Shoulder Blades Redness and Blanching  Spine Redness and Blanching  (R) Arm/Elbow/Hand Redness, Blanching, Bruising, and Swelling and x2 wounds DIP  (L) Arm/Elbow/Hand Redness, Blanching, Bruising, and Swelling and x5 wounds DIP  Abdomen/ Pannus Redness and Blanching and excoriation  Groin Redness, Blanching, and Excoriation  Scrotum/Coccyx/Buttocks Redness, Blanching, and Excoriation  (R) Leg Swelling and discoloration  (L) Leg Swelling and discoloration   (R) Heel/Foot/Toe Discoloration and Swelling and flaky  (L) Heel/Foot/Toe Discoloration and Swelling and flaky              Devices In Places Blood Pressure Cuff, Pulse Ox, SCD's, and Nasal Cannula        Interventions In Place Gray Ear Foams, Heel Mepilex, TAP System, Pillows, Q2 Turns, Low Air Loss Mattress, Barrier Cream, and Dri-Albert Pads     Possible Skin Injury Yes     Pictures Uploaded Into Epic Yes  Wound Consult Placed Yes  RN Wound Prevention Protocol Ordered Yes

## 2024-01-05 NOTE — DISCHARGE SUMMARY
Discharge Summary    CHIEF COMPLAINT ON ADMISSION  Chief Complaint   Patient presents with    Extremity Fracture     Pt sent from [place] [facility] after he fell in the early hours of the morning and sustained a left proximal humerus fracture. Pt takes Eliquis for DVT and Pe's. Pt received 300mcg of fentanyl IV and 3mg Versed IV from [facility] in route. Pt arrives in left arm sling.        Reason for Admission  Acute on chronic panniculitis with open left humerus fracture    Admission Date  12/26/2023     CODE STATUS  Full Code    HPI & HOSPITAL COURSE  Henry Kumari is a 68 y.o. male who presented on 12/26/2023 as a transfer from [facility] with a left humerus fracture following ground-level fall.  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    Patient developed chronic panniculitis, for which she was self treating with alcohol.  The patient had a fall due to his generalized weakness causing a open left humerus fracture.  He was transferred to [facility] for higher level care.  Patient has been compliant with his medications including rivaroxaban due to his history of DVTs and pulmonary emboli.  He continues to use tobacco products.  He denied any dysuria.  He suffered multiple skin tears to the right upper extremity.  He received tetanus prophylaxis as well as ceftriaxone.  He was found to have leukocytosis with white count of 18,200 and slightly elevated creatinine of 1.74.  Blood cultures were ordered at the outside hospital.    In the emergency room following transfer, patient was noted to be febrile with a temperature of 38.2 Celsius, tachycardia with heart rate in the 120s, normal blood pressure as well as stable  hypoxia requiring 2 LPM oxygen via nasal count.  Urinalysis showed hazy urine with 20-50 WBCs.  Chest x-ray was negative.  CRP 22, viral panel was negative.  Patient was noted to be septic on admission.  He also developed atrial fibrillation with rapid ventricular response during his hospital.    Orthopedic surgery was consulted and the patient was taken to the operating room by Dr. Corey Cruz for a open treatment of left humerus shaft fracture with insertion of intramedullary implant on 12/28/2023.  Patient tolerated procedure well.  Patient was started on Unasyn for a right lower lobe pneumonia with improvement of his symptoms.  He was also noted to have mild pulmonary edema and was started on IV furosemide for forced diuresis, which was transition to oral furosemide prior to discharge.    Patient's incisional wound dehisced and wound cultures from 1/1/2024 from the left arm grew Staphylococcus epidermidis.  Patient was noted to have a 3 cm wound on the left lower and was taken back to the operating room on 1/4/2024 for irrigation and debridement with secondary closure of the 3 cm left arm wound with Dr. Corey Cruz.  He was started on linezolid for total 14-day course and date of 1/18/2024 per discussion with the infectious diseases specialist on-call.    Patient was evaluated by occupational and physical therapy, recommended postacute placement for continued therapies.  Patient's oxygen requirements remained stable on 2 LPM oxygen mask.    Patient was accepted to [facility].  On the day of discharge patient's weight was 173 kg (379 lbs).    Therefore, he is discharged in good and stable condition to skilled nursing facility.    The patient met 2-midnight criteria for an inpatient stay at the time of discharge.      Discharge Date  1/5/2024    FOLLOW UP ITEMS POST DISCHARGE  -As per [facility].  -Follow-up with primary care provider following discharge  from skilled nursing facility.  -Follow-up with [facility].    DISCHARGE DIAGNOSES  Principal Problem (Resolved):    Sepsis (HCC) (POA: Yes)  Active Problems:    Open displaced comminuted fracture of shaft of humerus (POA: Yes)    Acute blood loss anemia (ABLA) (POA: Yes)    Benzodiazepine dependence, continuous (HCC) (POA: No)    History of DVT (deep vein thrombosis) (POA: Yes)    History of hepatitis C (POA: Yes)    COPD (chronic obstructive pulmonary disease) (HCC) (POA: Yes)    SANTOSH on CPAP (POA: Yes)    Class 3 severe obesity due to excess calories with serious comorbidity and body mass index (BMI) of 45.0 to 49.9 in adult (HCC) (POA: Yes)    Chronic respiratory failure with hypoxia (HCC) (POA: Yes)    Opiate dependence (HCC) (POA: Yes)    Cellulitis (POA: Yes)  Resolved Problems:    Atrial fibrillation with RVR (HCC) (POA: Yes)    Lactic acidosis (POA: Yes)    Hypophosphatemia (POA: Yes)    Pneumonia (POA: Yes)      Overview: 12/31 chest x-ray personally reviewed, possible RLL pneumonia.  Patient       just finished 4 days Ancef yesterday.  Normal white count. I started       Augmentin    Open wound of multiple sites of left upper extremity without complication (POA: Yes)      FOLLOW UP  No future appointments.  [facility]  [address] [facility] [address]  [phone]        Corey Cruz M.D.  [address] [address]  [phone]    Follow up        MEDICATIONS ON DISCHARGE     Medication List        Start taking these medications        Instructions   furosemide 40 MG Tabs  Start taking on: January 6, 2024  Commonly known as: Lasix   Take 1 Tablet by mouth every day.  Dose: 40 mg     linezolid 600 MG Tabs  Commonly known as: Zyvox   Take 1 Tablet by mouth every 12 hours for 14 days.  Dose: 600 mg     oxyCODONE immediate release 10 MG immediate release tablet  Commonly known as: Roxicodone   Take 1 Tablet by mouth every 6 hours as needed for Severe Pain  for up to 5 days.  Dose: 10 mg            Continue taking these medications        Instructions   albuterol 108 (90 Base) MCG/ACT Aers inhalation aerosol   Inhale 2 Puffs by mouth every 6 hours as needed for Shortness of Breath.  Dose: 2 Puff     clonazePAM 1 MG Tabs  Commonly known as: KlonoPIN   Take 1 Tablet by mouth 2 times a day for 3 days.  Dose: 1 mg     FLUoxetine 20 MG Caps  Commonly known as: PROzac   Take 20 mg by mouth every day.  Dose: 20 mg     fluticasone-salmeterol 250-50 MCG/DOSE Aepb  Commonly known as: ADVAIR   Inhale 1 Puff 2 times a day.  Dose: 1 Puff     gemfibrozil 600 MG Tabs  Commonly known as: Lopid   Take 600 mg by mouth 2 times a day.  Dose: 600 mg     Home Care Oxygen   Inhale 2 L/min continuous. 12/2023 stated DME=[facility]  Dose: 2 L/min     metoprolol  MG Tb24  Commonly known as: Toprol XL   Take 100 mg by mouth every day.  Dose: 100 mg     QUEtiapine 100 MG Tabs  Commonly known as: SEROquel   Take 100 mg by mouth 2 times a day.  Dose: 100 mg     rosuvastatin 10 MG Tabs  Commonly known as: Crestor   Take 10 mg by mouth every evening.  Dose: 10 mg     spironolactone 25 MG Tabs  Commonly known as: Aldactone   Take 25 mg by mouth every day.  Dose: 25 mg     theophylline  MG Tb12  Commonly known as: Theodur   Take 300 mg by mouth 2 times a day.  Dose: 300 mg     tiotropium 18 MCG Caps  Commonly known as: Spiriva   Inhale 18 mcg by mouth every day.  Dose: 18 mcg     Xarelto 20 MG Tabs tablet  Generic drug: rivaroxaban   Take 20 mg by mouth every day.  Dose: 20 mg            Stop taking these medications      buprenorphine 2 MG Subl  Commonly known as: Subutex              Allergies  Allergies   Allergen Reactions    Tape Rash     Pt states tape removes his skin         DIET  Orders Placed This Encounter   Procedures    Diet Order Diet: Regular     Standing Status:   Standing     Number of Occurrences:   1     Order Specific Question:   Diet:     Answer:   Regular [1]        ACTIVITY  As tolerated and directed by skilled nursing.  Weight bearing as tolerated    LINES, DRAINS, AND WOUNDS  This is an automated list. Peripheral IVs will be removed prior to discharge.  Peripheral IV 01/01/24 20 G Anterior;Right Forearm (Active)   Site Assessment Clean;Dry;Intact 01/04/24 1145   Dressing Type Transparent 01/04/24 1145   Line Status Infusing 01/04/24 1145   Dressing Status Clean;Dry;Intact 01/04/24 1145   Dressing Intervention N/A 01/03/24 1930   Dressing Change Due 01/08/24 01/03/24 1930   Infiltration Grading ([facility]) 0 01/04/24 1145   Phlebitis Scale ([facility] Only) 0 01/04/24 1145       Wound 12/26/23 Abdomen Inferior;Lower Right Redness, weeping (Active)   Wound Image   12/26/23 1547   Site Assessment Red;Excoriated 01/04/24 1930   Periwound Assessment Dry;Red 01/04/24 1930   Margins Defined edges 01/04/24 1930   Closure Open to air 01/04/24 1930   Drainage Amount Small 01/03/24 1939   Drainage Description Serous 01/03/24 1939   Treatments Cleansed;Site care 01/04/24 1930   Wound Cleansing Foam Cleanser/Washcloth 01/04/24 1930   Periwound Protectant Interdry;Antifungal Therapy 01/04/24 1930   Dressing Status Clean;Dry;Intact 01/04/24 1930   Dressing Changed Observed 01/04/24 1930   Dressing Options Interdry 01/04/24 1930   Dressing Change/Treatment Frequency Every Shift, and As Needed 01/04/24 0545       Wound 12/26/23 Groin redness, weeping (Active)   Wound Image   12/26/23 1547   Site Assessment Pink;Red 01/04/24 1930   Periwound Assessment Clean;Dry;Intact 01/04/24 1930   Margins Defined edges 01/04/24 1930   Closure Open to air 01/04/24 1930   Drainage Amount None 01/04/24 1930   Treatments Cleansed;Site care 01/04/24 0545   Wound Cleansing Foam Cleanser/Washcloth 01/04/24 0545   Periwound Protectant Antifungal Therapy 01/04/24 0545   Dressing Status Open to Air 01/04/24 1930   Dressing Changed Observed 01/04/24 1930       Wound 12/26/23 Arm Anterior;Inferior;Lower;Ventral  Right traumatic multiple abrasions (Active)   Wound Image    12/31/23 2240   Site Assessment Pink;White;Painful 01/05/24 0500   Periwound Assessment Pink;Purple;Flaky 01/05/24 0500   Margins Defined edges;Unattached edges 01/05/24 0500   Closure Adhesive bandage 01/05/24 0500   Drainage Amount None 01/05/24 0500   Drainage Description CLOTILDE 01/04/24 1930   Treatments Cleansed;Site care 01/05/24 0500   Wound Cleansing Normal Saline Irrigation 01/05/24 0500   Periwound Protectant No-sting Skin Prep 01/01/24 0800   Dressing Status Dry;Clean;Intact 01/05/24 0500   Dressing Changed Changed 01/05/24 0500   Dressing Cleansing/Solutions Normal Saline 01/05/24 0500   Dressing Options Petrolatum Gauze (yellow);Offloading Dressing - Sacral 01/05/24 0500   Dressing Change/Treatment Frequency Daily, and As Needed 01/05/24 0500   NEXT Dressing Change/Treatment Date 01/06/24 01/05/24 0500   NEXT Weekly Photo (Inpatient Only) 01/03/24 01/02/24 2035       Wound 12/28/23 Incision Arm Left xeroform, 4x4, tegaderm (Active)   Wound Image   12/29/23 0915   Site Assessment CLOTILDE 01/04/24 1930   Periwound Assessment CLOTILDE 01/04/24 1930   Margins CLOTILDE 01/04/24 1930   Closure CLOTILDE 01/04/24 1930   Drainage Amount CLOTILDE 01/04/24 1930   Drainage Description Serosanguineous 01/03/24 1935   Treatments Cleansed;Site care 01/04/24 0545   Wound Cleansing Normal Saline Irrigation 01/04/24 0545   Periwound Protectant Not Applicable 12/30/23 2300   Dressing Status Clean;Dry;Intact 01/04/24 1930   Dressing Changed Observed 01/04/24 1930   Dressing Options Island Dressing - Silver 01/04/24 1930   Dressing Change/Treatment Frequency Daily, and As Needed 01/04/24 0545       Wound 12/31/23 Full Thickness Wound Arm Upper Left (Active)   Wound Image      01/02/24 1000   Site Assessment CLOTILDE 01/04/24 1930   Periwound Assessment CLOTILDE 01/04/24 1930   Margins CLOTILDE 01/04/24 1930   Closure CLOTILDE 01/04/24 1930   Drainage Amount CLOTILDE 01/04/24 1930   Drainage Description Sanguineous  01/04/24 0545   Treatments Cleansed;Site care 01/04/24 0545   Wound Cleansing Dakin's Solution 01/04/24 0545   Periwound Protectant Barrier Paste 01/04/24 0545   Dressing Status Clean;Dry;Intact 01/04/24 1930   Dressing Changed Observed 01/04/24 1930   Dressing Cleansing/Solutions 1/4 Strength Dakin's Solution 01/04/24 0545   Dressing Options Island Dressing - Silver 01/04/24 1930   Dressing Change/Treatment Frequency Every Shift, and As Needed 01/04/24 0545   NEXT Dressing Change/Treatment Date 01/04/24 01/04/24 0545   NEXT Weekly Photo (Inpatient Only) 01/03/24 01/02/24 2035   Wound Team Following Weekly 01/02/24 1000   Non-staged Wound Description Full thickness 01/02/24 1000   Wound Length (cm) 2.6 cm 12/31/23 1300   Wound Width (cm) 3.8 cm 12/31/23 1300   Wound Depth (cm) 7 cm 12/31/23 1300   Wound Surface Area (cm^2) 9.88 cm^2 12/31/23 1300   Wound Volume (cm^3) 69.16 cm^3 12/31/23 1300   Shape Oval 01/02/24 1000   Wound Odor None 01/02/24 1000   WOUND NURSE ONLY - Time Spent with Patient (mins) 60 01/02/24 1000       Wound 01/04/24 Arm Proximal;Outer Left gauze, tegaderm (Active)   Site Assessment Pink;White;Painful 01/05/24 0500   Periwound Assessment Clean;Dry;Intact;Purple 01/05/24 0500   Margins Unattached edges;Defined edges 01/05/24 0500   Closure Adhesive bandage 01/05/24 0500   Drainage Amount None 01/05/24 0500   Drainage Description Sanguineous 01/04/24 0942   Treatments Cleansed;Site care 01/05/24 0500   Dressing Status Clean;Dry;Intact 01/05/24 0500   Dressing Changed Changed 01/05/24 0500   Dressing Options Silicone Adhesive Foam;Petrolatum Gauze (yellow) 01/05/24 0500   Dressing Change/Treatment Frequency Daily, and As Needed 01/05/24 0500       Peripheral IV 01/01/24 20 G Anterior;Right Forearm (Active)   Site Assessment Clean;Dry;Intact 01/04/24 1145   Dressing Type Transparent 01/04/24 1145   Line Status Infusing 01/04/24 1145   Dressing Status Clean;Dry;Intact 01/04/24 1145   Dressing  Intervention N/A 01/03/24 1930   Dressing Change Due 01/08/24 01/03/24 1930   Infiltration Grading ([facility]) 0 01/04/24 1145   Phlebitis Scale ([facility] Only) 0 01/04/24 1145               MENTAL STATUS ON TRANSFER  Alert and orient x 3.  Appropriately conversational.       CONSULTATIONS  Orthopedic surgery    PROCEDURES  DATE OF OPERATION: 12/28/2023     PREOPERATIVE DIAGNOSIS: Left humeral shaft fracture     POSTOPERATIVE DIAGNOSIS: Same     PROCEDURE PERFORMED: Open treatment of left humeral shaft fracture with insertion of intramedullary implant     SURGEON: Corey Cruz M.D.      ASSISTANT: Hector Pavon     ANESTHESIOLOGIST: Wanda     ANESTHESIA: General     ESTIMATED BLOOD LOSS: 10 mL        DATE: 1/4/2024     PREOPERATIVE DIAGNOSIS: 3 cm left arm wound     POSTOPERATIVE DIAGNOSIS: Same     PROCEDURE PERFORMED:  Irrigation and debridement and secondary closure of 3 cm left arm wound                                                    SURGEON: Corey Cruz M.D.      ASSISTANT: Hector Pavon     ANESTHESIA: General     ESTIMATED BLOOD LOSS: 0 mL           LABORATORY  Lab Results   Component Value Date    SODIUM 135 01/01/2024    POTASSIUM 3.9 01/01/2024    CHLORIDE 103 01/01/2024    CO2 23 01/01/2024    GLUCOSE 91 01/01/2024    BUN 10 01/01/2024    CREATININE 0.39 (L) 01/01/2024        Lab Results   Component Value Date    WBC 7.1 01/01/2024    HEMOGLOBIN 10.3 (L) 01/01/2024    HEMATOCRIT 31.3 (L) 01/01/2024    PLATELETCT 173 01/01/2024        Total time of the discharge process 41 minutes.

## 2024-01-05 NOTE — DISCHARGE PLANNING
1004  Agency/Facility Name: [facility]  Spoke To: Esmer  Outcome: ADEN informed Esmer that per MD is cleared for SNF. AEDN asked if 1600 pm transport time works for [facility]. Per Esmer time works and for CM to set it up.  RN CM notified.

## 2024-01-05 NOTE — CARE PLAN
The patient is Stable - Low risk of patient condition declining or worsening    Shift Goals  Clinical Goals: Skin integrity, pain control, and rest  Patient Goals: Pain control and rest  Family Goals: na    Progress made toward(s) clinical / shift goals:  Skin integrity maintained with mepilex, barrier paste, and Q2 turns. Pain controlled per MAR .Pt slept intermittently throughout shift.     Problem: Pain - Standard  Goal: Alleviation of pain or a reduction in pain to the patient’s comfort goal  Outcome: Progressing     Problem: Skin Integrity  Goal: Skin integrity is maintained or improved  Outcome: Progressing

## 2024-01-05 NOTE — PROGRESS NOTES
Hospital Medicine Daily Progress Note    Date of Service  1/4/2024    Chief Complaint  Henry Kumari is a 68 y.o. male admitted 12/26/2023 with fall, humerus fracture.    Hospital Course  Henry Kumari is a 68 y.o. male with a h/o COPD (2L at baseline), CHF, afib, prior DVTs/PE who presented 12/26/2023 as a transfer from [facility].  The patient presented there after a fall, humerus fracture.     He has chronic panniculitis that has been utilizing alcohol for.  The patient had a fall due to his generalized weakness causing a humerus fracture there was noted at the transferring facility and he was transferred here for higher level of care.  The patient is on anticoagulation for history of DVTs as well as pulmonary emboli.  He is on home oxygen and continues to abuse tobacco products. He states he does not have any dysuria.  The patient also suffered from multiple skin tears to the right upper extremity and he did receive tetanus prophylaxis as well as Rocephin.  Patient was found to have a leukocytosis with a white blood cell count of 18,200 and slight elevation in his creatinine at 1.74.  Blood cultures were ordered and are currently pending at [facility].      In ER he was found to be febrile to 38.2, tachycardic to the 120s (in A-fib) normal blood pressure, normal oxygen saturation on room air.  Labs remarkable for WBC 18, CO2 19, BUN 34, creatinine 1.24, lactic acid 2.4 improved to 2 point.  Urinalysis hazy 20-50WBC, chest x-ray negative, CRP 22, viral panel negative.    Left humeral shaft fracture - s/p ORIF 12/28    He denies dysuria urgency frequency    Interval Problem Update  Patient was seen and examined at bedside  BMI 49    Patient sounds congested, x-ray showed mild pulmonary edema, I started IV Lasix twice daily  Continue Augmentin for RLL pneumonia    Hb stable-resume Xarelto    Left humeral shaft fracture - s/p ORIF 12/28, some oozing  from the incision site, I put Xarelto on hold, I discussed with Ortho team, no concerns  WBAT LUE  Sutures/Staples out- 14-21 days post operatively    Abdominal panniculitis/cellulitis -had 4 days Ancef, now on Augmentin for possible pneumonia    SANTOSH -CPAP  COPD -2 L, at baseline    PT OT recommended SNF    Above per previous hospitalist.    01/02/24  Patient continues on IV furosemide 40 mg IV twice daily.  Continues to be debilitated.  On 2 LPM oxygen via nasal cannula, which is his baseline.  He does have some wheezing today.  Complaining of chest pain, nausea, and constipation.  He is alert and orient x 3.  Awaiting acceptance to skilled nursing facility.    01/03/24  Continues on 2 LPM oxygen via nasal cannula.  Continues on furosemide 40 mg IV twice daily.  Transitioning to 40 mg daily.  N.p.o. after midnight I &D and possible closure of large wound.    01/04/24  Patient seen on the surgical floor following his irrigation and debridement of the dehisced left arm wound.  Awaiting final culture results from 1/1/2024.  Continuing on Augmentin.  Transitioned to oral furosemide.      I have discussed this patient's plan of care and discharge plan at IDT rounds today with Case Management, Nursing, Nursing leadership, and other members of the IDT team.    Consultants/Specialty  Orthopedic surgery    Code Status  Full Code    Disposition  The patient is not medically cleared for discharge to home or a post-acute facility.  Anticipate discharge to: skilled nursing facility    I have placed the appropriate orders for post-discharge needs.    Review of Systems  Review of Systems   Constitutional:  Negative for chills and fever.   Respiratory:  Negative for cough, shortness of breath and wheezing.    Cardiovascular:  Negative for chest pain and palpitations.   Gastrointestinal:  Negative for abdominal pain, nausea and vomiting.   Genitourinary:  Negative for dysuria and hematuria.   Musculoskeletal:  Positive for back  pain. Negative for joint pain and myalgias.   Neurological:  Positive for weakness. Negative for dizziness and headaches.            Physical Exam  Temp:  [36.2 °C (97.1 °F)-36.8 °C (98.2 °F)] 36.8 °C (98.2 °F)  Pulse:  [] 92  Resp:  [18] 18  BP: (116-152)/() 124/72  SpO2:  [94 %-97 %] 95 %    Physical Exam  Constitutional:       General: He is not in acute distress.     Appearance: He is obese. He is ill-appearing.   HENT:      Head: Normocephalic and atraumatic.      Mouth/Throat:      Mouth: Mucous membranes are dry.   Eyes:      General:         Right eye: No discharge.         Left eye: No discharge.      Conjunctiva/sclera: Conjunctivae normal.      Pupils: Pupils are equal, round, and reactive to light.   Cardiovascular:      Rate and Rhythm: Normal rate and regular rhythm.      Pulses: Normal pulses.      Heart sounds: Normal heart sounds. No murmur heard.  Pulmonary:      Effort: Pulmonary effort is normal.      Breath sounds: No wheezing.   Abdominal:      Palpations: Abdomen is soft.      Tenderness: There is abdominal tenderness. There is no guarding.   Musculoskeletal:         General: Tenderness present.      Cervical back: Neck supple. No tenderness.      Comments: Left dressing clean dry and intact   Skin:     General: Skin is warm.      Findings: Lesion and rash present.      Comments: Mild erythema in the lower part of abdomen.   Neurological:      Mental Status: He is alert and oriented to person, place, and time. Mental status is at baseline.   Psychiatric:         Mood and Affect: Mood normal.       Fluids      Laboratory                            Imaging  DX-HUMERUS 2+ LEFT   Final Result      Improved alignment status post open reduction internal fixation of a complex left humerus fracture.      DX-CHEST-PORTABLE (1 VIEW)   Final Result      1.  Right basilar opacity which could be atelectasis or pneumonia      2.  Enlarged cardiac silhouette      DX-PORTABLE FLUOROSCOPY < 1 HOUR    Final Result      Portable fluoroscopy utilized for 1 minute 30 seconds.      INTERPRETING LOCATION: [address], [place], [address]      DX-HUMERUS 2+ LEFT   Final Result      Digitized intraoperative radiograph is submitted for review. This examination is not for diagnostic purpose but for guidance during a surgical procedure. Please see the patient's chart for full procedural details.         INTERPRETING LOCATION: [address], [place], [address]      DX-CHEST-PORTABLE (1 VIEW)   Final Result         Cardiomegaly.      No pulmonary infiltrates or consolidations are noted.         OUTSIDE IMAGES-CT HEAD   Final Result      OUTSIDE IMAGES-DX UPPER EXTREMITY, LEFT   Final Result      OUTSIDE IMAGES-DX CHEST   Final Result      OUTSIDE IMAGES-DX UPPER EXTREMITY, LEFT   Final Result           Assessment/Plan  Open displaced comminuted fracture of shaft of humerus- (present on admission)  Assessment & Plan  Left humeral shaft fracture  -evaluated by surgery,     12/28 s/p ORIF today  PT OT and supportive care    Cellulitis- (present on admission)  Assessment & Plan  Lower abdominal diffused erythema, tenderness  History of panniculitis    12/31 had 4 days Ancef, now on Augmentin for possible pneumonia    01/02/24  Continue Augmentin    1/4  Continue Augmentin. Improving.  Awaiting culture results of left arm wound    Opiate dependence (HCC)- (present on admission)  Assessment & Plan  Takes buprenorphine at home for chronic pain  In acute pain so giving prn narcotics    01/02/24  Continue home buprenorphine    Chronic respiratory failure with hypoxia (HCC)- (present on admission)  Assessment & Plan  Due to COPD and obese  Baseline 2 L    1/1 patient sounds congested, chest x-ray with mild pulmonary edema, I started IV Lasix    01/02/24  Stable 2 LPM.  Continue furosemide 40 mg IV twice daily    01/03/24  Stable 2 LPM.  Change furosemide to 40 mg by mouth daily    Class 3 severe obesity due to excess calories with serious  comorbidity and body mass index (BMI) of 45.0 to 49.9 in adult ([facility])- (present on admission)  Assessment & Plan  BMI 49  Weight loss education    SANTOSH on CPAP- (present on admission)  Assessment & Plan  Continue CPAP    COPD (chronic obstructive pulmonary disease) ([facility])- (present on admission)  Assessment & Plan  Not in exacerbation  Continue inhalers and albuterol prn    01/02/24  Some wheezing noted today.  2 LPM stable baseline oxygen needs.  Patient is medically cleared to discharge today  Continue home inhalers including Spiriva and Dulera and albuterol as needed  Continuous pulse oximetry monitoring    1/4  Stable 2 LPM, no wheezing  Continue inhalers    History of hepatitis C- (present on admission)  Assessment & Plan  As per history.    History of DVT (deep vein thrombosis)- (present on admission)  Assessment & Plan  home Xarelto on hold for possible left arm fracture surgery in the coming days         VTE prophylaxis: Xarelto      I have performed a physical exam and reviewed and updated ROS and Plan today (1/4/2024). In review of yesterday's note (1/3/2024), there are no changes except as documented above.

## 2024-01-05 NOTE — DISCHARGE PLANNING
DC Transport Scheduled    Received request at: 1/5/2024 at 1021    Transport Company Scheduled:  [employer]  Spoke with Denia at [facility] to schedule transport.    Scheduled Date: 1/5/2024  Scheduled Time: 1600    Destination: [facility] at [address]    Notified care team of scheduled transport via [employer].     If there are any changes needed to the DC transportation scheduled, please contact [facility] at ext. [phone] between the hours of 2157-4283 Mon-Fri. If outside those hours, contact the ED Case Manager at ext. [phone].

## 2024-01-05 NOTE — PROGRESS NOTES
Patient has been bathed. Belongings have been packed.     Attempt one to call report to [facility] resulted in a message left to return the call.

## 2024-01-05 NOTE — DISCHARGE PLANNING
Case Management Discharge Planning    Admission Date: 12/26/2023  GMLOS: 9.9  ALOS: 10    6-Clicks ADL Score: 12  6-Clicks Mobility Score: 8  PT and/or OT Eval ordered: Yes  Post-acute Referrals Ordered: Yes  Post-acute Choice Obtained: Yes  Has referral(s) been sent to post-acute provider:  Yes      Anticipated Discharge Dispo: Discharge Disposition: D/T to SNF with Medicare cert in anticipation of skilled care (03)  Discharge Contact Phone Number: [phone]    DME Needed: No    Action(s) Taken: Updated Provider/Nurse on Discharge Plan  Pt was discussed  in IDT rounds today with Dr Paul.   Pt has been accepted at [facility] today at 16:00 via [facility].   [facility] is all set  as per Steffi Padilla.  Will prepare [employer]/Transfer packet    Escalations Completed: None    Medically Clear: Yes    Next Steps: CM to continue to assist Pt with discharge as needed    Barriers to Discharge: None    Is the patient up for discharge tomorrow: No

## 2024-01-06 NOTE — DISCHARGE INSTRUCTIONS
Diet    Resume your normal diet as tolerated.  A diet low in cholesterol, fat, and sodium is recommended for good health.     Chronic Obstructive Pulmonary Disease (COPD) is a long-term, progressive lung disease that makes it harder to breathe. It includes chronic bronchitis, emphysema, and refractory (non-reversible) asthma. With COPD, the airways in your lungs become inflamed and thicken, and the tissue where oxygen is exchanged is destroyed. The flow of air in and out of your lungs decreases. When that happens, less oxygen gets into your body tissues, and it becomes harder to get rid of the waste gas carbon dioxide. As the disease gets worse, shortness of breath makes it harder to remain active. There is no cure for COPD, but it is preventable and treatable.    COPD Patient Discharge Instructions    Diet  Follow a low fat, low cholesterol, low salt diet unless instructed otherwise by your Doctor. Read the labels on the back of food products and track your intake of fat, cholesterol and salt.  No smoking  Discontinuing smoking will have the biggest impact on preventing progression of disease.  To participate in [employer] Quit [employer] Program, call [phone] or visit [employer]/QuitTobacco  Oxygen  If your doctor has order that you wear oxygen at home, it is important to wear it as ordered.  Do not smoke, vape, or use e-cigarettes with oxygen on.  Store in an appropriate location: upright in its cotton or laid flat down, away from open flames and stoves.   Do not use oil-based creams and moisturizers (ie: petroleum products, oil-based lip moisturizers) or aerosol sprays (ie: hair sprays or deodorants) when using your oxygen equipment.  Be careful with tubing placement to prevent yourself and others from tripping.  Medications  Refer to your personalized Action Plan to manage your symptoms.  Warning signs of an exacerbation  Breathing fast and shallow, worsening shortness of breath (like you just finished  exercising)  Chest tightness  Increases in sputum production  Changes in sputum color  Lower oxygen levels than baseline  When to call your doctor  If the warning signs of an exacerbation do not improve  Refer to your personalized Action Plan   Pulmonary Rehab  Your doctor has ordered you a referral to Pulmonary Rehab. Call [phone] to schedule an appointment  Attend your follow-up appointment with your PCP and/or Pulmonologist  Remote Monitoring: At the direction of the remote monitoring on-call provider, you may increase your oxygen by 2 liters above your baseline.     See the educational handout provided by your COPD Navigator for more information. This also explains more about COPD, symptoms of an exacerbation, and some of the tests that you have undergone.

## 2024-01-06 NOTE — PROGRESS NOTES
PIV has been removed. Discharge education, information, and controlled substances informed use consent were reviewed with patient via AVS. Patient signed two copies of AVS; one for [facility]; one for [facility]. Patient received a third copy to keep. Patient discharged to [facility] via [facility].

## 2024-01-22 ENCOUNTER — APPOINTMENT (OUTPATIENT)
Dept: RADIOLOGY | Facility: MEDICAL CENTER | Age: 69
DRG: 492 | End: 2024-01-22
Attending: EMERGENCY MEDICINE
Payer: MEDICARE

## 2024-01-22 ENCOUNTER — HOSPITAL ENCOUNTER (INPATIENT)
Facility: MEDICAL CENTER | Age: 69
LOS: 7 days | DRG: 492 | End: 2024-01-29
Attending: EMERGENCY MEDICINE | Admitting: INTERNAL MEDICINE
Payer: MEDICARE

## 2024-01-22 ENCOUNTER — APPOINTMENT (OUTPATIENT)
Dept: RADIOLOGY | Facility: MEDICAL CENTER | Age: 69
DRG: 492 | End: 2024-01-22
Attending: INTERNAL MEDICINE
Payer: MEDICARE

## 2024-01-22 DIAGNOSIS — S42.351D OPEN DISPLACED COMMINUTED FRACTURE OF SHAFT OF RIGHT HUMERUS WITH ROUTINE HEALING, SUBSEQUENT ENCOUNTER: ICD-10-CM

## 2024-01-22 DIAGNOSIS — T84.7XXD HARDWARE COMPLICATING WOUND INFECTION, SUBSEQUENT ENCOUNTER: ICD-10-CM

## 2024-01-22 DIAGNOSIS — A41.9 SEPSIS, DUE TO UNSPECIFIED ORGANISM, UNSPECIFIED WHETHER ACUTE ORGAN DYSFUNCTION PRESENT (HCC): ICD-10-CM

## 2024-01-22 DIAGNOSIS — G89.18 POST-OP PAIN: ICD-10-CM

## 2024-01-22 PROBLEM — D64.9 ANEMIA: Status: ACTIVE | Noted: 2024-01-22

## 2024-01-22 LAB
ALBUMIN SERPL BCP-MCNC: 3.3 G/DL (ref 3.2–4.9)
ALBUMIN/GLOB SERPL: 0.8 G/DL
ALP SERPL-CCNC: 349 U/L (ref 30–99)
ALT SERPL-CCNC: 7 U/L (ref 2–50)
ANION GAP SERPL CALC-SCNC: 14 MMOL/L (ref 7–16)
APPEARANCE UR: CLEAR
AST SERPL-CCNC: 12 U/L (ref 12–45)
BASOPHILS # BLD AUTO: 0.8 % (ref 0–1.8)
BASOPHILS # BLD: 0.05 K/UL (ref 0–0.12)
BILIRUB SERPL-MCNC: 1.8 MG/DL (ref 0.1–1.5)
BILIRUB UR QL STRIP.AUTO: NEGATIVE
BUN SERPL-MCNC: 27 MG/DL (ref 8–22)
CALCIUM ALBUM COR SERPL-MCNC: 9.4 MG/DL (ref 8.5–10.5)
CALCIUM SERPL-MCNC: 8.8 MG/DL (ref 8.5–10.5)
CHLORIDE SERPL-SCNC: 98 MMOL/L (ref 96–112)
CO2 SERPL-SCNC: 24 MMOL/L (ref 20–33)
COLOR UR: YELLOW
CREAT SERPL-MCNC: 0.77 MG/DL (ref 0.5–1.4)
CRP SERPL HS-MCNC: 0.99 MG/DL (ref 0–0.75)
EOSINOPHIL # BLD AUTO: 0.06 K/UL (ref 0–0.51)
EOSINOPHIL NFR BLD: 0.9 % (ref 0–6.9)
ERYTHROCYTE [DISTWIDTH] IN BLOOD BY AUTOMATED COUNT: 41.1 FL (ref 35.9–50)
GFR SERPLBLD CREATININE-BSD FMLA CKD-EPI: 97 ML/MIN/1.73 M 2
GLOBULIN SER CALC-MCNC: 4.1 G/DL (ref 1.9–3.5)
GLUCOSE SERPL-MCNC: 104 MG/DL (ref 65–99)
GLUCOSE UR STRIP.AUTO-MCNC: NEGATIVE MG/DL
HCT VFR BLD AUTO: 32.5 % (ref 42–52)
HGB BLD-MCNC: 10.4 G/DL (ref 14–18)
IMM GRANULOCYTES # BLD AUTO: 0.03 K/UL (ref 0–0.11)
IMM GRANULOCYTES NFR BLD AUTO: 0.5 % (ref 0–0.9)
INR PPP: 1.36 (ref 0.87–1.13)
KETONES UR STRIP.AUTO-MCNC: NEGATIVE MG/DL
LACTATE SERPL-SCNC: 3.6 MMOL/L (ref 0.5–2)
LACTATE SERPL-SCNC: 4.5 MMOL/L (ref 0.5–2)
LACTATE SERPL-SCNC: 4.8 MMOL/L (ref 0.5–2)
LACTATE SERPL-SCNC: 5.3 MMOL/L (ref 0.5–2)
LEUKOCYTE ESTERASE UR QL STRIP.AUTO: NEGATIVE
LYMPHOCYTES # BLD AUTO: 0.77 K/UL (ref 1–4.8)
LYMPHOCYTES NFR BLD: 11.9 % (ref 22–41)
MCH RBC QN AUTO: 26.8 PG (ref 27–33)
MCHC RBC AUTO-ENTMCNC: 32 G/DL (ref 32.3–36.5)
MCV RBC AUTO: 83.8 FL (ref 81.4–97.8)
MICRO URNS: NORMAL
MONOCYTES # BLD AUTO: 0.56 K/UL (ref 0–0.85)
MONOCYTES NFR BLD AUTO: 8.7 % (ref 0–13.4)
NEUTROPHILS # BLD AUTO: 4.98 K/UL (ref 1.82–7.42)
NEUTROPHILS NFR BLD: 77.2 % (ref 44–72)
NITRITE UR QL STRIP.AUTO: NEGATIVE
NRBC # BLD AUTO: 0 K/UL
NRBC BLD-RTO: 0 /100 WBC (ref 0–0.2)
PH UR STRIP.AUTO: 6 [PH] (ref 5–8)
PLATELET # BLD AUTO: 225 K/UL (ref 164–446)
PMV BLD AUTO: 10.5 FL (ref 9–12.9)
POTASSIUM SERPL-SCNC: 3.7 MMOL/L (ref 3.6–5.5)
PROT SERPL-MCNC: 7.4 G/DL (ref 6–8.2)
PROT UR QL STRIP: NEGATIVE MG/DL
PROTHROMBIN TIME: 16.9 SEC (ref 12–14.6)
RBC # BLD AUTO: 3.88 M/UL (ref 4.7–6.1)
RBC UR QL AUTO: NEGATIVE
SCCMEC + MECA PNL NOSE NAA+PROBE: NEGATIVE
SODIUM SERPL-SCNC: 136 MMOL/L (ref 135–145)
SP GR UR STRIP.AUTO: 1.01
UROBILINOGEN UR STRIP.AUTO-MCNC: 0.2 MG/DL
WBC # BLD AUTO: 6.5 K/UL (ref 4.8–10.8)

## 2024-01-22 PROCEDURE — 700111 HCHG RX REV CODE 636 W/ 250 OVERRIDE (IP)

## 2024-01-22 PROCEDURE — 99222 1ST HOSP IP/OBS MODERATE 55: CPT | Mod: 57 | Performed by: ORTHOPAEDIC SURGERY

## 2024-01-22 PROCEDURE — 700105 HCHG RX REV CODE 258

## 2024-01-22 PROCEDURE — 73060 X-RAY EXAM OF HUMERUS: CPT | Mod: LT

## 2024-01-22 PROCEDURE — 87641 MR-STAPH DNA AMP PROBE: CPT

## 2024-01-22 PROCEDURE — 83605 ASSAY OF LACTIC ACID: CPT

## 2024-01-22 PROCEDURE — 81003 URINALYSIS AUTO W/O SCOPE: CPT

## 2024-01-22 PROCEDURE — 87040 BLOOD CULTURE FOR BACTERIA: CPT | Mod: 91

## 2024-01-22 PROCEDURE — 80053 COMPREHEN METABOLIC PANEL: CPT

## 2024-01-22 PROCEDURE — 85025 COMPLETE CBC W/AUTO DIFF WBC: CPT

## 2024-01-22 PROCEDURE — 700101 HCHG RX REV CODE 250: Performed by: INTERNAL MEDICINE

## 2024-01-22 PROCEDURE — 700102 HCHG RX REV CODE 250 W/ 637 OVERRIDE(OP): Performed by: INTERNAL MEDICINE

## 2024-01-22 PROCEDURE — A9270 NON-COVERED ITEM OR SERVICE: HCPCS | Performed by: INTERNAL MEDICINE

## 2024-01-22 PROCEDURE — 99223 1ST HOSP IP/OBS HIGH 75: CPT | Mod: AI | Performed by: INTERNAL MEDICINE

## 2024-01-22 PROCEDURE — 700111 HCHG RX REV CODE 636 W/ 250 OVERRIDE (IP): Performed by: EMERGENCY MEDICINE

## 2024-01-22 PROCEDURE — 770020 HCHG ROOM/CARE - TELE (206)

## 2024-01-22 PROCEDURE — 96365 THER/PROPH/DIAG IV INF INIT: CPT

## 2024-01-22 PROCEDURE — 86140 C-REACTIVE PROTEIN: CPT

## 2024-01-22 PROCEDURE — 700105 HCHG RX REV CODE 258: Performed by: EMERGENCY MEDICINE

## 2024-01-22 PROCEDURE — 700117 HCHG RX CONTRAST REV CODE 255: Performed by: INTERNAL MEDICINE

## 2024-01-22 PROCEDURE — 73201 CT UPPER EXTREMITY W/DYE: CPT | Mod: LT

## 2024-01-22 PROCEDURE — 700111 HCHG RX REV CODE 636 W/ 250 OVERRIDE (IP): Performed by: INTERNAL MEDICINE

## 2024-01-22 PROCEDURE — 36415 COLL VENOUS BLD VENIPUNCTURE: CPT

## 2024-01-22 PROCEDURE — 85610 PROTHROMBIN TIME: CPT

## 2024-01-22 PROCEDURE — 96366 THER/PROPH/DIAG IV INF ADDON: CPT

## 2024-01-22 PROCEDURE — 96375 TX/PRO/DX INJ NEW DRUG ADDON: CPT

## 2024-01-22 PROCEDURE — 99285 EMERGENCY DEPT VISIT HI MDM: CPT

## 2024-01-22 PROCEDURE — 700105 HCHG RX REV CODE 258: Performed by: INTERNAL MEDICINE

## 2024-01-22 PROCEDURE — 71045 X-RAY EXAM CHEST 1 VIEW: CPT

## 2024-01-22 RX ORDER — CLONAZEPAM 1 MG/1
1 TABLET ORAL 2 TIMES DAILY
Status: DISCONTINUED | OUTPATIENT
Start: 2024-01-22 | End: 2024-01-29 | Stop reason: HOSPADM

## 2024-01-22 RX ORDER — HYDROMORPHONE HYDROCHLORIDE 1 MG/ML
0.5 INJECTION, SOLUTION INTRAMUSCULAR; INTRAVENOUS; SUBCUTANEOUS
Status: DISCONTINUED | OUTPATIENT
Start: 2024-01-22 | End: 2024-01-22

## 2024-01-22 RX ORDER — BISACODYL 10 MG
10 SUPPOSITORY, RECTAL RECTAL
Status: DISCONTINUED | OUTPATIENT
Start: 2024-01-22 | End: 2024-01-29 | Stop reason: HOSPADM

## 2024-01-22 RX ORDER — OXYCODONE HYDROCHLORIDE 10 MG/1
10 TABLET ORAL
Status: DISCONTINUED | OUTPATIENT
Start: 2024-01-22 | End: 2024-01-22

## 2024-01-22 RX ORDER — HYDROMORPHONE HYDROCHLORIDE 1 MG/ML
0.5 INJECTION, SOLUTION INTRAMUSCULAR; INTRAVENOUS; SUBCUTANEOUS
Status: DISCONTINUED | OUTPATIENT
Start: 2024-01-22 | End: 2024-01-29 | Stop reason: HOSPADM

## 2024-01-22 RX ORDER — GEMFIBROZIL 600 MG/1
600 TABLET, FILM COATED ORAL 2 TIMES DAILY
Status: DISCONTINUED | OUTPATIENT
Start: 2024-01-22 | End: 2024-01-29 | Stop reason: HOSPADM

## 2024-01-22 RX ORDER — CLONAZEPAM 1 MG/1
1 TABLET ORAL 2 TIMES DAILY
COMMUNITY

## 2024-01-22 RX ORDER — ONDANSETRON 2 MG/ML
4 INJECTION INTRAMUSCULAR; INTRAVENOUS EVERY 4 HOURS PRN
Status: DISCONTINUED | OUTPATIENT
Start: 2024-01-22 | End: 2024-01-29 | Stop reason: HOSPADM

## 2024-01-22 RX ORDER — SODIUM CHLORIDE, SODIUM LACTATE, POTASSIUM CHLORIDE, AND CALCIUM CHLORIDE .6; .31; .03; .02 G/100ML; G/100ML; G/100ML; G/100ML
500 INJECTION, SOLUTION INTRAVENOUS
Status: DISCONTINUED | OUTPATIENT
Start: 2024-01-22 | End: 2024-01-29 | Stop reason: HOSPADM

## 2024-01-22 RX ORDER — QUETIAPINE FUMARATE 100 MG/1
100 TABLET, FILM COATED ORAL 2 TIMES DAILY
Status: DISCONTINUED | OUTPATIENT
Start: 2024-01-22 | End: 2024-01-29 | Stop reason: HOSPADM

## 2024-01-22 RX ORDER — ALBUTEROL SULFATE 90 UG/1
2 INHALANT RESPIRATORY (INHALATION) EVERY 6 HOURS PRN
Status: DISCONTINUED | OUTPATIENT
Start: 2024-01-22 | End: 2024-01-29 | Stop reason: HOSPADM

## 2024-01-22 RX ORDER — SODIUM CHLORIDE, SODIUM LACTATE, POTASSIUM CHLORIDE, AND CALCIUM CHLORIDE .6; .31; .03; .02 G/100ML; G/100ML; G/100ML; G/100ML
1000 INJECTION, SOLUTION INTRAVENOUS ONCE
Status: COMPLETED | OUTPATIENT
Start: 2024-01-22 | End: 2024-01-23

## 2024-01-22 RX ORDER — ATORVASTATIN CALCIUM 20 MG/1
20 TABLET, FILM COATED ORAL NIGHTLY
Status: DISCONTINUED | OUTPATIENT
Start: 2024-01-22 | End: 2024-01-29 | Stop reason: HOSPADM

## 2024-01-22 RX ORDER — POLYETHYLENE GLYCOL 3350 17 G/17G
1 POWDER, FOR SOLUTION ORAL
Status: DISCONTINUED | OUTPATIENT
Start: 2024-01-22 | End: 2024-01-29 | Stop reason: HOSPADM

## 2024-01-22 RX ORDER — OXYCODONE HYDROCHLORIDE 5 MG/1
5 TABLET ORAL
Status: DISCONTINUED | OUTPATIENT
Start: 2024-01-22 | End: 2024-01-22

## 2024-01-22 RX ORDER — TIOTROPIUM BROMIDE 18 UG/1
1 CAPSULE ORAL; RESPIRATORY (INHALATION) DAILY
Status: DISCONTINUED | OUTPATIENT
Start: 2024-01-22 | End: 2024-01-22

## 2024-01-22 RX ORDER — OXYCODONE HYDROCHLORIDE 10 MG/1
10 TABLET ORAL
Status: DISCONTINUED | OUTPATIENT
Start: 2024-01-22 | End: 2024-01-28

## 2024-01-22 RX ORDER — LINEZOLID 600 MG/1
600 TABLET, FILM COATED ORAL 2 TIMES DAILY
Status: ON HOLD | COMMUNITY
End: 2024-01-29

## 2024-01-22 RX ORDER — SODIUM CHLORIDE, SODIUM LACTATE, POTASSIUM CHLORIDE, AND CALCIUM CHLORIDE .6; .31; .03; .02 G/100ML; G/100ML; G/100ML; G/100ML
1000 INJECTION, SOLUTION INTRAVENOUS ONCE
Status: COMPLETED | OUTPATIENT
Start: 2024-01-22 | End: 2024-01-22

## 2024-01-22 RX ORDER — SODIUM CHLORIDE, SODIUM LACTATE, POTASSIUM CHLORIDE, CALCIUM CHLORIDE 600; 310; 30; 20 MG/100ML; MG/100ML; MG/100ML; MG/100ML
INJECTION, SOLUTION INTRAVENOUS CONTINUOUS
Status: DISCONTINUED | OUTPATIENT
Start: 2024-01-22 | End: 2024-01-25

## 2024-01-22 RX ORDER — ACETAMINOPHEN 500 MG
1000 TABLET ORAL EVERY 6 HOURS
Status: DISPENSED | OUTPATIENT
Start: 2024-01-22 | End: 2024-01-27

## 2024-01-22 RX ORDER — ONDANSETRON 4 MG/1
4 TABLET, ORALLY DISINTEGRATING ORAL EVERY 6 HOURS PRN
COMMUNITY

## 2024-01-22 RX ORDER — OXYCODONE HYDROCHLORIDE 10 MG/1
10 TABLET ORAL
Status: ON HOLD | COMMUNITY
End: 2024-01-29

## 2024-01-22 RX ORDER — ROSUVASTATIN CALCIUM 20 MG/1
10 TABLET, COATED ORAL EVERY EVENING
Status: DISCONTINUED | OUTPATIENT
Start: 2024-01-22 | End: 2024-01-22

## 2024-01-22 RX ORDER — ONDANSETRON 4 MG/1
4 TABLET, ORALLY DISINTEGRATING ORAL EVERY 4 HOURS PRN
Status: DISCONTINUED | OUTPATIENT
Start: 2024-01-22 | End: 2024-01-29 | Stop reason: HOSPADM

## 2024-01-22 RX ORDER — METOPROLOL SUCCINATE 100 MG/1
100 TABLET, EXTENDED RELEASE ORAL DAILY
Status: DISCONTINUED | OUTPATIENT
Start: 2024-01-23 | End: 2024-01-29 | Stop reason: HOSPADM

## 2024-01-22 RX ORDER — ATORVASTATIN CALCIUM 20 MG/1
20 TABLET, FILM COATED ORAL NIGHTLY
COMMUNITY

## 2024-01-22 RX ORDER — AMOXICILLIN 250 MG
2 CAPSULE ORAL 2 TIMES DAILY
Status: DISCONTINUED | OUTPATIENT
Start: 2024-01-22 | End: 2024-01-29 | Stop reason: HOSPADM

## 2024-01-22 RX ORDER — SODIUM CHLORIDE 9 MG/ML
INJECTION, SOLUTION INTRAVENOUS CONTINUOUS
Status: DISCONTINUED | OUTPATIENT
Start: 2024-01-22 | End: 2024-01-22

## 2024-01-22 RX ORDER — ACETAMINOPHEN 500 MG
1000 TABLET ORAL EVERY 6 HOURS PRN
Status: DISCONTINUED | OUTPATIENT
Start: 2024-01-27 | End: 2024-01-29 | Stop reason: HOSPADM

## 2024-01-22 RX ADMIN — GEMFIBROZIL 600 MG: 600 TABLET ORAL at 17:42

## 2024-01-22 RX ADMIN — CLONAZEPAM 1 MG: 1 TABLET ORAL at 17:39

## 2024-01-22 RX ADMIN — OXYCODONE HYDROCHLORIDE 10 MG: 10 TABLET ORAL at 19:38

## 2024-01-22 RX ADMIN — SODIUM CHLORIDE: 9 INJECTION, SOLUTION INTRAVENOUS at 12:59

## 2024-01-22 RX ADMIN — CEFTRIAXONE SODIUM 2000 MG: 10 INJECTION, POWDER, FOR SOLUTION INTRAVENOUS at 12:51

## 2024-01-22 RX ADMIN — QUETIAPINE FUMARATE 100 MG: 100 TABLET ORAL at 17:40

## 2024-01-22 RX ADMIN — ACETAMINOPHEN 1000 MG: 500 TABLET ORAL at 14:05

## 2024-01-22 RX ADMIN — SODIUM CHLORIDE, POTASSIUM CHLORIDE, SODIUM LACTATE AND CALCIUM CHLORIDE 1000 ML: 600; 310; 30; 20 INJECTION, SOLUTION INTRAVENOUS at 11:33

## 2024-01-22 RX ADMIN — SODIUM CHLORIDE, POTASSIUM CHLORIDE, SODIUM LACTATE AND CALCIUM CHLORIDE 1000 ML: 600; 310; 30; 20 INJECTION, SOLUTION INTRAVENOUS at 14:16

## 2024-01-22 RX ADMIN — ACETAMINOPHEN 1000 MG: 500 TABLET ORAL at 23:15

## 2024-01-22 RX ADMIN — ATORVASTATIN CALCIUM 20 MG: 20 TABLET, FILM COATED ORAL at 20:28

## 2024-01-22 RX ADMIN — OXYCODONE HYDROCHLORIDE 10 MG: 10 TABLET ORAL at 20:28

## 2024-01-22 RX ADMIN — IOHEXOL 100 ML: 350 INJECTION, SOLUTION INTRAVENOUS at 16:32

## 2024-01-22 RX ADMIN — SODIUM CHLORIDE, POTASSIUM CHLORIDE, SODIUM LACTATE AND CALCIUM CHLORIDE 1000 ML: 600; 310; 30; 20 INJECTION, SOLUTION INTRAVENOUS at 23:12

## 2024-01-22 RX ADMIN — THIAMINE HYDROCHLORIDE 500 MG: 100 INJECTION, SOLUTION INTRAMUSCULAR; INTRAVENOUS at 23:44

## 2024-01-22 RX ADMIN — OXYCODONE HYDROCHLORIDE 10 MG: 10 TABLET ORAL at 23:15

## 2024-01-22 RX ADMIN — VANCOMYCIN HYDROCHLORIDE 3 G: 5 INJECTION, POWDER, LYOPHILIZED, FOR SOLUTION INTRAVENOUS at 12:54

## 2024-01-22 RX ADMIN — OXYCODONE HYDROCHLORIDE 10 MG: 10 TABLET ORAL at 14:05

## 2024-01-22 RX ADMIN — MOMETASONE FUROATE AND FORMOTEROL FUMARATE DIHYDRATE 2 PUFF: 200; 5 AEROSOL RESPIRATORY (INHALATION) at 22:39

## 2024-01-22 ASSESSMENT — PATIENT HEALTH QUESTIONNAIRE - PHQ9
6. FEELING BAD ABOUT YOURSELF - OR THAT YOU ARE A FAILURE OR HAVE LET YOURSELF OR YOUR FAMILY DOWN: MORE THAN HALF THE DAYS
SUM OF ALL RESPONSES TO PHQ9 QUESTIONS 1 AND 2: 4
5. POOR APPETITE OR OVEREATING: SEVERAL DAYS
8. MOVING OR SPEAKING SO SLOWLY THAT OTHER PEOPLE COULD HAVE NOTICED. OR THE OPPOSITE, BEING SO FIGETY OR RESTLESS THAT YOU HAVE BEEN MOVING AROUND A LOT MORE THAN USUAL: NOT AT ALL
4. FEELING TIRED OR HAVING LITTLE ENERGY: NEARLY EVERY DAY
9. THOUGHTS THAT YOU WOULD BE BETTER OFF DEAD, OR OF HURTING YOURSELF: NOT AT ALL
3. TROUBLE FALLING OR STAYING ASLEEP OR SLEEPING TOO MUCH: NEARLY EVERY DAY
2. FEELING DOWN, DEPRESSED, IRRITABLE, OR HOPELESS: MORE THAN HALF THE DAYS
7. TROUBLE CONCENTRATING ON THINGS, SUCH AS READING THE NEWSPAPER OR WATCHING TELEVISION: NOT AT ALL
SUM OF ALL RESPONSES TO PHQ QUESTIONS 1-9: 13
1. LITTLE INTEREST OR PLEASURE IN DOING THINGS: MORE THAN HALF THE DAYS

## 2024-01-22 ASSESSMENT — ENCOUNTER SYMPTOMS
FEVER: 1
FALLS: 1
ABDOMINAL PAIN: 0
COUGH: 0
CHILLS: 0
MYALGIAS: 1
VOMITING: 0
NAUSEA: 0
SHORTNESS OF BREATH: 0

## 2024-01-22 ASSESSMENT — LIFESTYLE VARIABLES
CONSUMPTION TOTAL: NEGATIVE
ALCOHOL_USE: NO
TOTAL SCORE: 0
HOW MANY TIMES IN THE PAST YEAR HAVE YOU HAD 5 OR MORE DRINKS IN A DAY: 0
TOTAL SCORE: 0
AVERAGE NUMBER OF DAYS PER WEEK YOU HAVE A DRINK CONTAINING ALCOHOL: 0
ON A TYPICAL DAY WHEN YOU DRINK ALCOHOL HOW MANY DRINKS DO YOU HAVE: 0
HAVE PEOPLE ANNOYED YOU BY CRITICIZING YOUR DRINKING: NO
EVER HAD A DRINK FIRST THING IN THE MORNING TO STEADY YOUR NERVES TO GET RID OF A HANGOVER: NO
HAVE YOU EVER FELT YOU SHOULD CUT DOWN ON YOUR DRINKING: NO
EVER FELT BAD OR GUILTY ABOUT YOUR DRINKING: NO
TOTAL SCORE: 0

## 2024-01-22 ASSESSMENT — COGNITIVE AND FUNCTIONAL STATUS - GENERAL
TURNING FROM BACK TO SIDE WHILE IN FLAT BAD: A LITTLE
STANDING UP FROM CHAIR USING ARMS: A LOT
TOILETING: A LOT
MOVING TO AND FROM BED TO CHAIR: A LITTLE
DRESSING REGULAR UPPER BODY CLOTHING: A LOT
SUGGESTED CMS G CODE MODIFIER DAILY ACTIVITY: CK
HELP NEEDED FOR BATHING: A LOT
DRESSING REGULAR LOWER BODY CLOTHING: A LOT
WALKING IN HOSPITAL ROOM: TOTAL
MOBILITY SCORE: 13
SUGGESTED CMS G CODE MODIFIER MOBILITY: CL
CLIMB 3 TO 5 STEPS WITH RAILING: TOTAL
MOVING FROM LYING ON BACK TO SITTING ON SIDE OF FLAT BED: A LITTLE
DAILY ACTIVITIY SCORE: 16

## 2024-01-22 ASSESSMENT — FIBROSIS 4 INDEX
FIB4 SCORE: 4.27
FIB4 SCORE: 1.37

## 2024-01-22 ASSESSMENT — PAIN DESCRIPTION - PAIN TYPE
TYPE: ACUTE PAIN
TYPE: ACUTE PAIN

## 2024-01-22 NOTE — PROGRESS NOTES
Clinical update    LA rising to 5.4. I personally spoke with Dr Adler of critical care. Discussed the case in detail. BP is ok. Mentating fine. Will give another 1 L LR bolus (total normal sepsis bolus would be around 2.4 L based off IDW). Hesitant to do more given COPD and CHF diagnoses.     If clinical status changes, can move to the IMCU/ICU. But patient is currently appropriate for telemetry at this time.

## 2024-01-22 NOTE — H&P
Hospital Medicine History & Physical Note    Date of Service  1/22/2024    Primary Care Physician  Pcp Unknown    Consultants  orthopedics    Specialist Names: Radha    Code Status  Full Code    Chief Complaint  Chief Complaint   Patient presents with    Post-Op Complications     Pt reports increased pain to L arm s/p surgical repair of humerus on 12/26 and debridement on 1/5. Redness noted to L upper arm, sutures and staples in place.        History of Presenting Illness  Henry Kumari is a 68 y.o. male who presented 1/22/2024 with class III obesity with a BMI of 49, prior DVT and PE on Xarelto, recent admission to hospital from December 24 through January 7 for surgical repair of a humeral fracture and then subsequent infection and debridement on January 5.  His cultures at that time grew Staphylococcus epidermidis and he was discharged on Zyvox to complete a 21-day course.  He was transferred to a skilled nursing facility and apparently they were giving him Zyvox as appropriate.  He stated over the last couple days he has had increasing pain and swelling of the left arm and the prior surgical spot with now spontaneous leakage of fluid that he is not sure if it is foul-smelling or not.  He endorses subjective fevers and chills and nausea but no vomiting.  He says the pain in his left arm is extremely intense and he can barely move it.  He is right-hand dominant at baseline.  He also is high risk for falls as he has apparently a large orthopedic surgery of his right knee many decades ago and it gives out on him.    In the emergency room he was given an LR bolus as well as empiric IV vancomycin and orthopedic surgery's been consulted and the plan is for likely intraoperative debridement and incision and drainage.    I personally spoke with Dr. Grant of the ER physician group in detail about the patient's case.    I discussed the plan of care with patient.    Review of Systems  Review of Systems    Constitutional:  Positive for fever and malaise/fatigue. Negative for chills.   Respiratory:  Negative for cough and shortness of breath.    Gastrointestinal:  Negative for abdominal pain, nausea and vomiting.   Musculoskeletal:  Positive for falls, joint pain and myalgias.       Past Medical History   has a past medical history of Arrhythmia, Arthritis, Blood clotting disorder (HCC) (1976, ?2000), Breath shortness, Congestive heart failure (HCC), Emphysema of lung (HCC), Glaucoma, Heart burn, Hepatitis C (?2000), High cholesterol, Hypertension, Indigestion, and Sleep apnea.    Surgical History   has a past surgical history that includes nilson by laparoscopy (4/2/2018); ercp in or (N/A, 4/3/2018); ercp in or (N/A, 5/2/2018); other abdominal surgery (2018); other abdominal surgery (20018); other (1976); ercp (7/18/2018); ercp w/rem fb and/or stent chg (7/18/2018); humerus nailing intramedullary (Left, 12/28/2023); and irrigation & debridement general (Left, 1/4/2024).     Family History  family history is not on file.   Family history reviewed with patient. There is no family history that is pertinent to the chief complaint.     Social History   reports that he has quit smoking. His smoking use included cigarettes. He has a 23.5 pack-year smoking history. He has never used smokeless tobacco. He reports that he does not currently use alcohol. He reports that he does not use drugs.    Allergies  Allergies   Allergen Reactions    Tape Rash     Pt states tape removes his skin         Medications  Prior to Admission Medications   Prescriptions Last Dose Informant Patient Reported? Taking?   FLUoxetine (PROZAC) 20 MG Cap   Yes No   Sig: Take 20 mg by mouth every day.   Home Care Oxygen   Yes No   Sig: Inhale 2 L/min continuous. 12/2023 stated DME=[facility]   QUEtiapine (SEROQUEL) 100 MG Tab  Patient Yes No   Sig: Take 100 mg by mouth 2 times a day.   albuterol 108 (90 Base) MCG/ACT Aero Soln inhalation aerosol  Patient Yes  No   Sig: Inhale 2 Puffs by mouth every 6 hours as needed for Shortness of Breath.   fluticasone-salmeterol (ADVAIR) 250-50 MCG/DOSE AEROSOL POWDER, BREATH ACTIVATED  Patient Yes No   Sig: Inhale 1 Puff 2 times a day.   furosemide (LASIX) 40 MG Tab   No No   Sig: Take 1 Tablet by mouth every day.   gemfibrozil (LOPID) 600 MG Tab  Patient Yes No   Sig: Take 600 mg by mouth 2 times a day.   metoprolol SR (TOPROL XL) 100 MG TABLET SR 24 HR   Yes No   Sig: Take 100 mg by mouth every day.   rivaroxaban (XARELTO) 20 MG Tab tablet  Patient Yes No   Sig: Take 20 mg by mouth every day.   rosuvastatin (CRESTOR) 10 MG Tab   Yes No   Sig: Take 10 mg by mouth every evening.   spironolactone (ALDACTONE) 25 MG Tab  Patient Yes No   Sig: Take 25 mg by mouth every day.   theophylline SR (THEODUR) 300 MG TABLET SR 12 HR  Patient Yes No   Sig: Take 300 mg by mouth 2 times a day.   tiotropium (SPIRIVA) 18 MCG Cap  Patient Yes No   Sig: Inhale 18 mcg by mouth every day.      Facility-Administered Medications: None       Physical Exam  Temp:  [36.4 °C (97.6 °F)] 36.4 °C (97.6 °F)  Pulse:  [121] 121  Resp:  [24] 24  BP: (119)/(76) 119/76  SpO2:  [97 %] 97 %  Blood Pressure : 119/76   Temperature: 36.4 °C (97.6 °F)   Pulse: (!) 121   Respiration: (!) 24   Pulse Oximetry: 97 %       Physical Exam  Vitals and nursing note reviewed.   Constitutional:       General: He is not in acute distress.     Appearance: He is well-developed.      Comments: Body mass index is 48.79 kg/m².  Laying in bed, speaking in normal coherent sentences   HENT:      Head: Normocephalic and atraumatic.      Mouth/Throat:      Pharynx: No oropharyngeal exudate.   Eyes:      General: No scleral icterus.     Pupils: Pupils are equal, round, and reactive to light.   Neck:      Thyroid: No thyromegaly.   Cardiovascular:      Rate and Rhythm: Normal rate and regular rhythm.      Heart sounds: Normal heart sounds. No murmur heard.  Pulmonary:      Effort: Pulmonary effort  "is normal. No respiratory distress.      Breath sounds: Normal breath sounds. No wheezing.   Abdominal:      General: Bowel sounds are normal. There is no distension.      Palpations: Abdomen is soft.      Tenderness: There is no abdominal tenderness.   Musculoskeletal:         General: Swelling and tenderness present. Normal range of motion.      Cervical back: Normal range of motion and neck supple.      Comments: LUE swelling, large fluctuant area mid humeral area in the prior surgical site, intact sutures, serous fluid secreted  Moderate to severe TTP LUE  Good distal perfusion of the LUE   Skin:     General: Skin is warm and dry.      Findings: No rash.   Neurological:      Mental Status: He is alert and oriented to person, place, and time.      Cranial Nerves: No cranial nerve deficit.         Laboratory:  Recent Labs     01/22/24  1043   WBC 6.5   RBC 3.88*   HEMOGLOBIN 10.4*   HEMATOCRIT 32.5*   MCV 83.8   MCH 26.8*   MCHC 32.0*   RDW 41.1   PLATELETCT 225   MPV 10.5     Recent Labs     01/22/24  1043   SODIUM 136   POTASSIUM 3.7   CHLORIDE 98   CO2 24   GLUCOSE 104*   BUN 27*   CREATININE 0.77   CALCIUM 8.8     Recent Labs     01/22/24  1043   ALTSGPT 7   ASTSGOT 12   ALKPHOSPHAT 349*   TBILIRUBIN 1.8*   GLUCOSE 104*         No results for input(s): \"NTPROBNP\" in the last 72 hours.      No results for input(s): \"TROPONINT\" in the last 72 hours.    Imaging:  DX-CHEST-PORTABLE (1 VIEW)   Final Result      1.  Faint residual RIGHT basilar atelectasis, less likely pneumonia   2.  Persistently enlarged cardiac silhouette   3.  Atherosclerosis      DX-HUMERUS 2+ LEFT   Final Result      1. Healing comminuted fracture of the metadiaphysis of the left humerus with stable alignment of the intramedullary ruba and screws.   2. Increased opacity lateral to the distal aspect of the fracture in the left arm soft tissues, concerning for soft tissue abscess or hematoma. CT of the left humerus with IV contrast is " recommended.      CT-EXTREMITY, UPPER WITH LEFT    (Results Pending)       I personally reviewed the chest x-ray which shows an enlarged cardiac diameter but no clear evidence of pleural effusions or pulmonary edema.    I personally reviewed the CBC, lactic acid, CMP.    Assessment/Plan:  Justification for Admission Status  I anticipate this patient will require at least two midnights for appropriate medical management, necessitating inpatient admission because sepsis in the setting of recurrent surgical site infection the left humerus    Patient will need a Telemetry bed on MEDICAL service .  The need is secondary to above.    * Sepsis (HCC)- (present on admission)  Assessment & Plan  This is Sepsis Present on admission  SIRS criteria identified on my evaluation include: Tachycardia, with heart rate greater than 90 BPM and Tachypnea, with respirations greater than 20 per minute  Clinical indicators of end organ dysfunction include Lactic Acid greater than 2  Source is left upper extremity surgical site, likely an abscess  Sepsis protocol initiated  Crystalloid Fluid Administration: Fluid resuscitation ordered per standard protocol - 30 mL/kg per current or ideal body weight  IV antibiotics as appropriate for source of sepsis  Reassessment: I have reassessed the patient's hemodynamic status    Empiric IV vancomycin and IV CTX  Judicious fluids  Ortho has been consulted, CT LUE with IV contrast ordered stat, possible OR I*D later today  Good distal perfusion  See below  Multi modal pain therapy including IV opiates PRN    Anemia- (present on admission)  Assessment & Plan  Near his baseline  No e/o overt blood loss  Follow    Mood disorder (HCC)- (present on admission)  Assessment & Plan  seroquel    Anxiolytic dependence (HCC)- (present on admission)  Assessment & Plan  No current BZ's at this time    Chronic respiratory failure with hypoxia (HCC)- (present on admission)  Assessment & Plan  At his baseline    Class 3  severe obesity due to excess calories with serious comorbidity and body mass index (BMI) of 45.0 to 49.9 in adult ([facility])- (present on admission)  Assessment & Plan  Outpatient counseling    SANTOSH on CPAP- (present on admission)  Assessment & Plan  Ordered CPAP with RT    COPD (chronic obstructive pulmonary disease) ([facility])- (present on admission)  Assessment & Plan  No current exacerbation  At his baseline hypoxia  Continue outpatient BD's    History of DVT (deep vein thrombosis)- (present on admission)  Assessment & Plan  Hold outpatient xarelto for possible ortho procedure        VTE prophylaxis: SCDs/TEDs

## 2024-01-22 NOTE — ASSESSMENT & PLAN NOTE
Etiology is left humeral infection from prior surgical site.  Vancomycin and ceftriaxone  Monitor cultures  Status post IV fluids  Wound care  1/23/2024 patient had revision of the left upper surgical site for concerns of infection

## 2024-01-22 NOTE — ED NOTES
Med rec is complete per MAR from [facility].    Allergies reviewed.    Has patient had any outside antibiotics in the last 30 days? Y    Any Anticoagulants (rivaroxaban, apixaban, edoxaban, dabigatran, warfarin, enoxaparin) taken in the last 14 days? Y  Anticoagulant name: Xarelto, Last dose: 01/21/24@17:00 pm.        Pharmacy/Pharmacies Pt utilizes : [facility] [phone]

## 2024-01-22 NOTE — CONSULTS
1/22/2024    HPI: Henry Kumari is a 68 y.o. male who presents with multiple falls and suspicion for postop infection of his left open humerus fracture treated with intramedullary nail about 6 weeks ago.  States that he has been having multiple falls because his legs just cannot support him.  He states that his left arm has been hurting but not any more than usual.    Past Medical History:   Diagnosis Date    Arrhythmia     Arthritis     hips, shoulders    Blood clotting disorder (HCC) 1976, ?2000    pulmonary emboli    Breath shortness     oxygen at 2-3L/n.c.    Congestive heart failure (HCC)     Emphysema of lung (HCC)     Glaucoma     bilateral    Heart burn     back,rt knee, shoulders, hips, rt hand    Hepatitis C ?2000    High cholesterol     Hypertension     Indigestion     Sleep apnea     Bipap       Past Surgical History:   Procedure Laterality Date    IRRIGATION & DEBRIDEMENT GENERAL Left 1/4/2024    Procedure: IRRIGATION AND DEBRIDEMENT, WOUND - ARM AND CLOSURE;  Surgeon: Corey Cruz M.D.;  Location: [facility];  Service: Orthopedics    HUMERUS NAILING INTRAMEDULLARY Left 12/28/2023    Procedure: INSERTION, INTRAMEDULLARY WIL, HUMERUS;  Surgeon: Corey Cruz M.D.;  Location: [facility];  Service: Orthopedics    ERCP  7/18/2018    Procedure: ERCP;  Surgeon: Ariel Aguirre M.D.;  Location: [facility];  Service: EUS    ERCP W/REM FB AND/OR STENT CHG  7/18/2018    Procedure: ERCP W/REM FB AND/OR STENT CHG - STENT EXCHANGE;  Surgeon: Ariel Aguirre M.D.;  Location: [facility];  Service: EUS    ERCP IN OR N/A 5/2/2018    Procedure: ERCP IN OR;  Surgeon: Ariel Aguirre M.D.;  Location: [facility];  Service: Gastroenterology    ERCP IN OR N/A 4/3/2018    Procedure: ERCP IN OR- W/POSS STENT;  Surgeon: Ariel Aguirre M.D.;  Location: [facility] SAME DAY [facility];  Service: Gastroenterology    CORNELIA BY LAPAROSCOPY  4/2/2018    Procedure: CORNELIA BY  LAPAROSCOPY;  Surgeon: Meche Olson M.D.;  Location: [facility];  Service: General    OTHER ABDOMINAL SURGERY  2018    appendectomy    OTHER  1976    skin grafting to rt leg     OTHER ABDOMINAL SURGERY  20018    cholecystectomy       Medications  No current facility-administered medications on file prior to encounter.     Current Outpatient Medications on File Prior to Encounter   Medication Sig Dispense Refill    atorvastatin (LIPITOR) 20 MG Tab Take 20 mg by mouth every evening.      clonazePAM (KLONOPIN) 1 MG Tab Take 1 mg by mouth 2 times a day.      linezolid (ZYVOX) 600 MG Tab Take 600 mg by mouth 2 times a day.      oxyCODONE immediate release (ROXICODONE) 10 MG immediate release tablet Take 10 mg by mouth 5 Times a Day. Moderate to severe Pain      ondansetron (ZOFRAN ODT) 4 MG TABLET DISPERSIBLE Take 4 mg by mouth every 6 hours as needed for Nausea/Vomiting.      furosemide (LASIX) 40 MG Tab Take 1 Tablet by mouth every day. 60 Tablet     Home Care Oxygen Inhale 2 L/min continuous. 12/2023 stated DME=[facility]      rosuvastatin (CRESTOR) 10 MG Tab Take 10 mg by mouth every evening.      fluoxetine (PROZAC) 40 MG capsule Take 40 mg by mouth every day.      metoprolol SR (TOPROL XL) 100 MG TABLET SR 24 HR Take 100 mg by mouth every day.      gemfibrozil (LOPID) 600 MG Tab Take 600 mg by mouth 2 times a day.      spironolactone (ALDACTONE) 25 MG Tab Take 25 mg by mouth every day.      fluticasone-salmeterol (ADVAIR) 250-50 MCG/DOSE AEROSOL POWDER, BREATH ACTIVATED Inhale 2 Puffs 2 times a day.      albuterol 108 (90 Base) MCG/ACT Aero Soln inhalation aerosol Inhale 2 Puffs by mouth every 6 hours as needed for Shortness of Breath.      QUEtiapine Fumarate 150 MG Tab Take 150 mg by mouth 2 times a day.      tiotropium (SPIRIVA) 18 MCG Cap Inhale 18 mcg by mouth every day.      theophylline SR (THEODUR) 300 MG TABLET SR 12 HR Take 300 mg by mouth 2 times a day.      rivaroxaban (XARELTO) 20 MG Tab  "tablet Take 20 mg by mouth every day.         Allergies  Tape    ROS  Negative except as indicated in the HPI    History reviewed. No pertinent family history.    Social History     Socioeconomic History    Marital status: Single   Tobacco Use    Smoking status: Former     Current packs/day: 0.50     Average packs/day: 0.5 packs/day for 47.0 years (23.5 ttl pk-yrs)     Types: Cigarettes    Smokeless tobacco: Never   Vaping Use    Vaping Use: Never used   Substance and Sexual Activity    Alcohol use: Not Currently     Comment: 2 drinks a week    Drug use: Never       Physical Exam  Vitals  /72   Pulse 93   Temp 36.4 °C (97.6 °F) (Temporal)   Resp 16   Ht 1.88 m (6' 2\")   Wt (!) 172 kg (380 lb)   SpO2 94%   General: NAD  HEENT: Normocephalic, atraumatic  Psych: Normal mood and affect  Neck: No collar in place, normal appearing motion  Lungs: No increased work of breathing  Heart: Regular rate by palpation of peripheral pulse  Abdomen: Nondistended  MSK:   On inspection of the left upper extremity his proximal incisions and his interlock incision distally are well-healed without any surrounding erythema or duration.  There is erythema around his traumatic open wound at his mid anterior upper arm with purulent drainage from some of the suture holes.  Mildly tender to palpation throughout this area.  Distal neurovascularly intact.      Radiographs:  X-ray left humerus today demonstrates a healing diaphyseal humerus fracture with intramedullary nail in good position without any evidence of loosening or failure.    DX-CHEST-PORTABLE (1 VIEW)   Final Result      1.  Faint residual RIGHT basilar atelectasis, less likely pneumonia   2.  Persistently enlarged cardiac silhouette   3.  Atherosclerosis      DX-HUMERUS 2+ LEFT   Final Result      1. Healing comminuted fracture of the metadiaphysis of the left humerus with stable alignment of the intramedullary ruba and screws.   2. Increased opacity lateral to the " distal aspect of the fracture in the left arm soft tissues, concerning for soft tissue abscess or hematoma. CT of the left humerus with IV contrast is recommended.      CT-EXTREMITY, UPPER WITH LEFT    (Results Pending)       Laboratory Values  Recent Labs     01/22/24  1043   WBC 6.5   RBC 3.88*   HEMOGLOBIN 10.4*   HEMATOCRIT 32.5*   MCV 83.8   MCH 26.8*   MCHC 32.0*   RDW 41.1   PLATELETCT 225   MPV 10.5     Recent Labs     01/22/24  1043   SODIUM 136   POTASSIUM 3.7   CHLORIDE 98   CO2 24   GLUCOSE 104*   BUN 27*             Assessment: 68-year-old male with postop infection of his left open diaphyseal humerus fracture.    Plan:     Recommend CT scan with contrast of the left upper extremity to determine extent of his soft tissue involvement  N.p.o. at midnight for possible I&D tomorrow      Elías Garcia MD  Orthopedic Trauma
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
## 2024-01-22 NOTE — ED NOTES
Bedside report received from JORDYN Rowell. Assumed care of patient and he is resting, denies any pain or needs. Bed locked and in lowest position. Call light available and within reach. Patient on 2L o2 via nc. Appropriate fall precautions in place. Patient A&Ox4, GCS 15. Patient on cardiac monitoring, automatic BP and pulse oximeter. See MAR for medications and infusions. No distress noted.

## 2024-01-22 NOTE — ED NOTES
from Lab called with critical result of lactic 4.5 at 1118. Critical lab result read back to .   Dr. Grnat notified of critical lab result at 1120.  Critical lab result read back by Dr. Grant.

## 2024-01-22 NOTE — ASSESSMENT & PLAN NOTE
Hold outpatient xarelto for possible ortho procedure  Restart once able.  Covering the interim with heparin DVT dosing

## 2024-01-22 NOTE — PROGRESS NOTES
Pharmacy Vancomycin Kinetics Note for 1/22/2024     68 y.o. male on Vancomycin day # 1     Vancomycin Indication (AUC Dosing): Skin/skin structure infection    Provider specified end date: 01/25/24    Active Antibiotics (From admission, onward)      Ordered     Ordering Provider       Mon Jan 22, 2024 12:10 PM    01/22/24 1210  cefTRIAXone (Rocephin) syringe 2,000 mg  EVERY 24 HOURS         Rodrigo Yo M.D.    01/22/24 1210  MD Alert...Vancomycin per Pharmacy  PHARMACY TO DOSE        Question:  Indication(s) for vancomycin?  Answer:  Skin and soft tissue infection    Rodrigo Yo M.D.       Mon Jan 22, 2024 11:23 AM    01/22/24 1123  vancomycin (Vancocin) 3 g in  mL IVPB  (vancomycin (VANCOCIN) IV (LD + Maintenance))  ONCE         Kenji Grant M.D.            Dosing Weight: 172 kg (379 lb 3.1 oz)      Admission History: Admitted on 1/22/2024 for Sepsis ([facility]) [A41.9]  Pertinent history: 68 year old started on vancomycin and ceftriaxone    Allergies:     Tape     Pertinent cultures to date:     Results       Procedure Component Value Units Date/Time    MRSA By PCR (Amp) [[id number]] Collected: 01/22/24 1237    Order Status: Completed Specimen: Respirate from Nares Updated: 01/22/24 1532     MRSA by PCR Negative    Urinalysis [[id number]] Collected: 01/22/24 1237    Order Status: Completed Specimen: Urine Updated: 01/22/24 1305     Color Yellow     Character Clear     Specific Gravity 1.012     Ph 6.0     Glucose Negative mg/dL      Ketones Negative mg/dL      Protein Negative mg/dL      Bilirubin Negative     Urobilinogen, Urine 0.2     Nitrite Negative     Leukocyte Esterase Negative     Occult Blood Negative     Micro Urine Req see below     Comment: Microscopic examination not performed when specimen is clear  and chemically negative for protein, blood, leukocyte esterase  and nitrite.         Narrative:      If not done within the last 24 hours    Blood Culture - Draw one from central line and  "one from peripheral site [[id number]] Collected: 24 1100    Order Status: Sent Specimen: Blood from Line Updated: 24 1152    Narrative:      Blood Cultures X2. Blood Cultures X2. Draw one blood culture  from central line and one blood culture peripherally. If no  line present, draw blood cultures times two peripherally from  different sites.    Blood Culture - Draw one from central line and one from peripheral site [[id number]] Collected: 24 1119    Order Status: Sent Specimen: Blood from Peripheral Updated: 24 1148    Narrative:      Blood Cultures X2. Draw one blood culture from central line  and one blood culture peripherally. If no line present, draw  blood cultures times two peripherally from different sites.            Labs:     Estimated Creatinine Clearance: 153.2 mL/min (by C-G formula based on SCr of 0.77 mg/dL).  Recent Labs     24  1043   WBC 6.5   NEUTSPOLYS 77.20*     Recent Labs     24  1043   BUN 27*   CREATININE 0.77   ALBUMIN 3.3     No intake or output data in the 24 hours ending 24 1533   /72   Pulse 93   Temp 36.4 °C (97.6 °F) (Temporal)   Resp 16   Ht 1.88 m (6' 2\")   Wt (!) 172 kg (380 lb)   SpO2 94%  Temp (24hrs), Av.4 °C (97.6 °F), Min:36.4 °C (97.6 °F), Max:36.4 °C (97.6 °F)      List concerns for Vancomycin clearance:     Obesity;Age;BUN/Scr ratio greater than 20:1    Pharmacokinetics:     AUC kinetics:   Ke (hr ^-1): 0.1289 hr^-1  Half life: 5.38 hr  Clearance: 5.795  Estimated TDD: 2897.5  Estimated Dose: 893  Estimated interval: 7.4      A/P:     -  Vancomycin dose: 3000 mg followed by 1500 mg IV every 12 hours    -  Next vancomycin level(s): defer    -  Predicted vancomycin AUC from initial AUC test calculator: 518 mg·hr/L    -  Comments: MRSA nares ordered, follow up culture results, possible I&D on     Shanda Virk, PharmD    "

## 2024-01-22 NOTE — ED TRIAGE NOTES
"Chief Complaint   Patient presents with    Post-Op Complications     Pt reports increased pain to L arm s/p surgical repair of humerus on 12/26 and debridement on 1/5. Redness noted to L upper arm, sutures and staples in place.      /76   Pulse (!) 121   Temp 36.4 °C (97.6 °F) (Temporal)   Resp (!) 24   Ht 1.88 m (6' 2\")   Wt (!) 172 kg (380 lb)   SpO2 97%   BMI 48.79 kg/m²     Pt brought in by FREDDY from [facility] to BL 16. Pt in a gown and on monitor. Chart flagged for ERP to see.    Staff concerned about x-ray results that were taken yesterday.  "

## 2024-01-23 ENCOUNTER — ANESTHESIA EVENT (OUTPATIENT)
Dept: SURGERY | Facility: MEDICAL CENTER | Age: 69
DRG: 492 | End: 2024-01-23
Payer: MEDICARE

## 2024-01-23 ENCOUNTER — APPOINTMENT (OUTPATIENT)
Dept: RADIOLOGY | Facility: MEDICAL CENTER | Age: 69
DRG: 492 | End: 2024-01-23
Attending: ANESTHESIOLOGY
Payer: MEDICARE

## 2024-01-23 ENCOUNTER — ANESTHESIA (OUTPATIENT)
Dept: SURGERY | Facility: MEDICAL CENTER | Age: 69
DRG: 492 | End: 2024-01-23
Payer: MEDICARE

## 2024-01-23 PROBLEM — T84.611A: Status: ACTIVE | Noted: 2024-01-23

## 2024-01-23 LAB
ANION GAP SERPL CALC-SCNC: 14 MMOL/L (ref 7–16)
BASOPHILS # BLD AUTO: 0.8 % (ref 0–1.8)
BASOPHILS # BLD: 0.03 K/UL (ref 0–0.12)
BUN SERPL-MCNC: 24 MG/DL (ref 8–22)
CALCIUM SERPL-MCNC: 8.3 MG/DL (ref 8.5–10.5)
CHLORIDE SERPL-SCNC: 99 MMOL/L (ref 96–112)
CO2 SERPL-SCNC: 23 MMOL/L (ref 20–33)
CREAT SERPL-MCNC: 0.64 MG/DL (ref 0.5–1.4)
EOSINOPHIL # BLD AUTO: 0.12 K/UL (ref 0–0.51)
EOSINOPHIL NFR BLD: 3.3 % (ref 0–6.9)
ERYTHROCYTE [DISTWIDTH] IN BLOOD BY AUTOMATED COUNT: 40.8 FL (ref 35.9–50)
FUNGUS SPEC FUNGUS STN: NORMAL
GFR SERPLBLD CREATININE-BSD FMLA CKD-EPI: 103 ML/MIN/1.73 M 2
GLUCOSE SERPL-MCNC: 91 MG/DL (ref 65–99)
GRAM STN SPEC: NORMAL
HCT VFR BLD AUTO: 28.7 % (ref 42–52)
HGB BLD-MCNC: 9.2 G/DL (ref 14–18)
IMM GRANULOCYTES # BLD AUTO: 0.01 K/UL (ref 0–0.11)
IMM GRANULOCYTES NFR BLD AUTO: 0.3 % (ref 0–0.9)
LACTATE SERPL-SCNC: 2.2 MMOL/L (ref 0.5–2)
LYMPHOCYTES # BLD AUTO: 1.04 K/UL (ref 1–4.8)
LYMPHOCYTES NFR BLD: 28.7 % (ref 22–41)
MCH RBC QN AUTO: 26.7 PG (ref 27–33)
MCHC RBC AUTO-ENTMCNC: 32.1 G/DL (ref 32.3–36.5)
MCV RBC AUTO: 83.4 FL (ref 81.4–97.8)
MONOCYTES # BLD AUTO: 0.44 K/UL (ref 0–0.85)
MONOCYTES NFR BLD AUTO: 12.2 % (ref 0–13.4)
NEUTROPHILS # BLD AUTO: 1.98 K/UL (ref 1.82–7.42)
NEUTROPHILS NFR BLD: 54.7 % (ref 44–72)
NRBC # BLD AUTO: 0 K/UL
NRBC BLD-RTO: 0 /100 WBC (ref 0–0.2)
PLATELET # BLD AUTO: 165 K/UL (ref 164–446)
PMV BLD AUTO: 10.3 FL (ref 9–12.9)
POTASSIUM SERPL-SCNC: 3.2 MMOL/L (ref 3.6–5.5)
RBC # BLD AUTO: 3.44 M/UL (ref 4.7–6.1)
SIGNIFICANT IND 70042: NORMAL
SIGNIFICANT IND 70042: NORMAL
SITE SITE: NORMAL
SITE SITE: NORMAL
SODIUM SERPL-SCNC: 136 MMOL/L (ref 135–145)
SOURCE SOURCE: NORMAL
SOURCE SOURCE: NORMAL
WBC # BLD AUTO: 3.6 K/UL (ref 4.8–10.8)

## 2024-01-23 PROCEDURE — 700111 HCHG RX REV CODE 636 W/ 250 OVERRIDE (IP): Mod: JZ | Performed by: STUDENT IN AN ORGANIZED HEALTH CARE EDUCATION/TRAINING PROGRAM

## 2024-01-23 PROCEDURE — 36415 COLL VENOUS BLD VENIPUNCTURE: CPT

## 2024-01-23 PROCEDURE — 700111 HCHG RX REV CODE 636 W/ 250 OVERRIDE (IP): Performed by: INTERNAL MEDICINE

## 2024-01-23 PROCEDURE — 700105 HCHG RX REV CODE 258: Performed by: ANESTHESIOLOGY

## 2024-01-23 PROCEDURE — C1751 CATH, INF, PER/CENT/MIDLINE: HCPCS | Performed by: STUDENT IN AN ORGANIZED HEALTH CARE EDUCATION/TRAINING PROGRAM

## 2024-01-23 PROCEDURE — 87077 CULTURE AEROBIC IDENTIFY: CPT

## 2024-01-23 PROCEDURE — 87206 SMEAR FLUORESCENT/ACID STAI: CPT

## 2024-01-23 PROCEDURE — 94664 DEMO&/EVAL PT USE INHALER: CPT

## 2024-01-23 PROCEDURE — 700111 HCHG RX REV CODE 636 W/ 250 OVERRIDE (IP): Performed by: ANESTHESIOLOGY

## 2024-01-23 PROCEDURE — 700102 HCHG RX REV CODE 250 W/ 637 OVERRIDE(OP): Performed by: HOSPITALIST

## 2024-01-23 PROCEDURE — 160027 HCHG SURGERY MINUTES - 1ST 30 MINS LEVEL 2: Performed by: STUDENT IN AN ORGANIZED HEALTH CARE EDUCATION/TRAINING PROGRAM

## 2024-01-23 PROCEDURE — 87205 SMEAR GRAM STAIN: CPT

## 2024-01-23 PROCEDURE — 700102 HCHG RX REV CODE 250 W/ 637 OVERRIDE(OP)

## 2024-01-23 PROCEDURE — 87015 SPECIMEN INFECT AGNT CONCNTJ: CPT | Mod: 91

## 2024-01-23 PROCEDURE — 87102 FUNGUS ISOLATION CULTURE: CPT

## 2024-01-23 PROCEDURE — A9270 NON-COVERED ITEM OR SERVICE: HCPCS

## 2024-01-23 PROCEDURE — A9270 NON-COVERED ITEM OR SERVICE: HCPCS | Performed by: INTERNAL MEDICINE

## 2024-01-23 PROCEDURE — A9270 NON-COVERED ITEM OR SERVICE: HCPCS | Performed by: HOSPITALIST

## 2024-01-23 PROCEDURE — 160035 HCHG PACU - 1ST 60 MINS PHASE I: Performed by: STUDENT IN AN ORGANIZED HEALTH CARE EDUCATION/TRAINING PROGRAM

## 2024-01-23 PROCEDURE — 700111 HCHG RX REV CODE 636 W/ 250 OVERRIDE (IP): Performed by: HOSPITALIST

## 2024-01-23 PROCEDURE — 99233 SBSQ HOSP IP/OBS HIGH 50: CPT | Performed by: HOSPITALIST

## 2024-01-23 PROCEDURE — 770001 HCHG ROOM/CARE - MED/SURG/GYN PRIV*

## 2024-01-23 PROCEDURE — 87070 CULTURE OTHR SPECIMN AEROBIC: CPT

## 2024-01-23 PROCEDURE — 94660 CPAP INITIATION&MGMT: CPT

## 2024-01-23 PROCEDURE — 71045 X-RAY EXAM CHEST 1 VIEW: CPT

## 2024-01-23 PROCEDURE — 87186 SC STD MICRODIL/AGAR DIL: CPT

## 2024-01-23 PROCEDURE — 700105 HCHG RX REV CODE 258: Performed by: INTERNAL MEDICINE

## 2024-01-23 PROCEDURE — 85025 COMPLETE CBC W/AUTO DIFF WBC: CPT

## 2024-01-23 PROCEDURE — 700101 HCHG RX REV CODE 250: Performed by: INTERNAL MEDICINE

## 2024-01-23 PROCEDURE — 87116 MYCOBACTERIA CULTURE: CPT

## 2024-01-23 PROCEDURE — 87075 CULTR BACTERIA EXCEPT BLOOD: CPT

## 2024-01-23 PROCEDURE — 160048 HCHG OR STATISTICAL LEVEL 1-5: Performed by: STUDENT IN AN ORGANIZED HEALTH CARE EDUCATION/TRAINING PROGRAM

## 2024-01-23 PROCEDURE — A9270 NON-COVERED ITEM OR SERVICE: HCPCS | Performed by: ANESTHESIOLOGY

## 2024-01-23 PROCEDURE — 160009 HCHG ANES TIME/MIN: Performed by: STUDENT IN AN ORGANIZED HEALTH CARE EDUCATION/TRAINING PROGRAM

## 2024-01-23 PROCEDURE — 700111 HCHG RX REV CODE 636 W/ 250 OVERRIDE (IP)

## 2024-01-23 PROCEDURE — 8968 PR NO CHARGE - PROCEDURE: Mod: ASROC,LT | Performed by: PHYSICIAN ASSISTANT

## 2024-01-23 PROCEDURE — 700102 HCHG RX REV CODE 250 W/ 637 OVERRIDE(OP): Performed by: ANESTHESIOLOGY

## 2024-01-23 PROCEDURE — 700102 HCHG RX REV CODE 250 W/ 637 OVERRIDE(OP): Performed by: INTERNAL MEDICINE

## 2024-01-23 PROCEDURE — 0PBG0ZZ EXCISION OF LEFT HUMERAL SHAFT, OPEN APPROACH: ICD-10-PCS | Performed by: STUDENT IN AN ORGANIZED HEALTH CARE EDUCATION/TRAINING PROGRAM

## 2024-01-23 PROCEDURE — 700105 HCHG RX REV CODE 258

## 2024-01-23 PROCEDURE — 160002 HCHG RECOVERY MINUTES (STAT): Performed by: STUDENT IN AN ORGANIZED HEALTH CARE EDUCATION/TRAINING PROGRAM

## 2024-01-23 PROCEDURE — 23930 I&D UPR A/E DP ABSC/HMTMA: CPT | Mod: 78,59 | Performed by: STUDENT IN AN ORGANIZED HEALTH CARE EDUCATION/TRAINING PROGRAM

## 2024-01-23 PROCEDURE — 80048 BASIC METABOLIC PNL TOTAL CA: CPT

## 2024-01-23 PROCEDURE — 94760 N-INVAS EAR/PLS OXIMETRY 1: CPT

## 2024-01-23 PROCEDURE — 160038 HCHG SURGERY MINUTES - EA ADDL 1 MIN LEVEL 2: Performed by: STUDENT IN AN ORGANIZED HEALTH CARE EDUCATION/TRAINING PROGRAM

## 2024-01-23 PROCEDURE — 83605 ASSAY OF LACTIC ACID: CPT

## 2024-01-23 PROCEDURE — 700101 HCHG RX REV CODE 250: Performed by: ANESTHESIOLOGY

## 2024-01-23 PROCEDURE — 11012 DEB SKIN BONE AT FX SITE: CPT | Mod: 78 | Performed by: STUDENT IN AN ORGANIZED HEALTH CARE EDUCATION/TRAINING PROGRAM

## 2024-01-23 RX ORDER — OXYCODONE HCL 5 MG/5 ML
10 SOLUTION, ORAL ORAL
Status: COMPLETED | OUTPATIENT
Start: 2024-01-23 | End: 2024-01-23

## 2024-01-23 RX ORDER — SUCCINYLCHOLINE CHLORIDE 20 MG/ML
INJECTION INTRAMUSCULAR; INTRAVENOUS PRN
Status: DISCONTINUED | OUTPATIENT
Start: 2024-01-23 | End: 2024-01-23 | Stop reason: SURG

## 2024-01-23 RX ORDER — HYDROMORPHONE HYDROCHLORIDE 1 MG/ML
0.4 INJECTION, SOLUTION INTRAMUSCULAR; INTRAVENOUS; SUBCUTANEOUS
Status: DISCONTINUED | OUTPATIENT
Start: 2024-01-23 | End: 2024-01-23 | Stop reason: HOSPADM

## 2024-01-23 RX ORDER — EPHEDRINE SULFATE 50 MG/ML
5 INJECTION, SOLUTION INTRAVENOUS
Status: DISCONTINUED | OUTPATIENT
Start: 2024-01-23 | End: 2024-01-23 | Stop reason: HOSPADM

## 2024-01-23 RX ORDER — LABETALOL HYDROCHLORIDE 5 MG/ML
5 INJECTION, SOLUTION INTRAVENOUS
Status: DISCONTINUED | OUTPATIENT
Start: 2024-01-23 | End: 2024-01-23 | Stop reason: HOSPADM

## 2024-01-23 RX ORDER — LIDOCAINE HYDROCHLORIDE 20 MG/ML
INJECTION, SOLUTION EPIDURAL; INFILTRATION; INTRACAUDAL; PERINEURAL PRN
Status: DISCONTINUED | OUTPATIENT
Start: 2024-01-23 | End: 2024-01-23 | Stop reason: SURG

## 2024-01-23 RX ORDER — HYDRALAZINE HYDROCHLORIDE 20 MG/ML
5 INJECTION INTRAMUSCULAR; INTRAVENOUS
Status: DISCONTINUED | OUTPATIENT
Start: 2024-01-23 | End: 2024-01-23 | Stop reason: HOSPADM

## 2024-01-23 RX ORDER — MEPERIDINE HYDROCHLORIDE 25 MG/ML
12.5 INJECTION INTRAMUSCULAR; INTRAVENOUS; SUBCUTANEOUS
Status: DISCONTINUED | OUTPATIENT
Start: 2024-01-23 | End: 2024-01-23 | Stop reason: HOSPADM

## 2024-01-23 RX ORDER — TOBRAMYCIN 1.2 G/30ML
INJECTION, POWDER, LYOPHILIZED, FOR SOLUTION INTRAVENOUS
Status: DISCONTINUED | OUTPATIENT
Start: 2024-01-23 | End: 2024-01-23 | Stop reason: HOSPADM

## 2024-01-23 RX ORDER — ROCURONIUM BROMIDE 10 MG/ML
INJECTION, SOLUTION INTRAVENOUS PRN
Status: DISCONTINUED | OUTPATIENT
Start: 2024-01-23 | End: 2024-01-23 | Stop reason: SURG

## 2024-01-23 RX ORDER — HEPARIN SODIUM 5000 [USP'U]/ML
5000 INJECTION, SOLUTION INTRAVENOUS; SUBCUTANEOUS EVERY 8 HOURS
Status: DISCONTINUED | OUTPATIENT
Start: 2024-01-23 | End: 2024-01-29 | Stop reason: HOSPADM

## 2024-01-23 RX ORDER — VANCOMYCIN HYDROCHLORIDE 1 G/20ML
INJECTION, POWDER, LYOPHILIZED, FOR SOLUTION INTRAVENOUS
Status: COMPLETED | OUTPATIENT
Start: 2024-01-23 | End: 2024-01-23

## 2024-01-23 RX ORDER — SODIUM CHLORIDE, SODIUM LACTATE, POTASSIUM CHLORIDE, CALCIUM CHLORIDE 600; 310; 30; 20 MG/100ML; MG/100ML; MG/100ML; MG/100ML
INJECTION, SOLUTION INTRAVENOUS
Status: DISCONTINUED | OUTPATIENT
Start: 2024-01-23 | End: 2024-01-23 | Stop reason: SURG

## 2024-01-23 RX ORDER — IPRATROPIUM BROMIDE AND ALBUTEROL SULFATE 2.5; .5 MG/3ML; MG/3ML
3 SOLUTION RESPIRATORY (INHALATION)
Status: DISCONTINUED | OUTPATIENT
Start: 2024-01-23 | End: 2024-01-23 | Stop reason: HOSPADM

## 2024-01-23 RX ORDER — POTASSIUM CHLORIDE 1500 MG/1
40 TABLET, EXTENDED RELEASE ORAL ONCE
Status: COMPLETED | OUTPATIENT
Start: 2024-01-23 | End: 2024-01-23

## 2024-01-23 RX ORDER — HALOPERIDOL 5 MG/ML
1 INJECTION INTRAMUSCULAR
Status: DISCONTINUED | OUTPATIENT
Start: 2024-01-23 | End: 2024-01-23 | Stop reason: HOSPADM

## 2024-01-23 RX ORDER — DIPHENHYDRAMINE HYDROCHLORIDE 50 MG/ML
12.5 INJECTION INTRAMUSCULAR; INTRAVENOUS
Status: DISCONTINUED | OUTPATIENT
Start: 2024-01-23 | End: 2024-01-23 | Stop reason: HOSPADM

## 2024-01-23 RX ORDER — ONDANSETRON 2 MG/ML
4 INJECTION INTRAMUSCULAR; INTRAVENOUS
Status: DISCONTINUED | OUTPATIENT
Start: 2024-01-23 | End: 2024-01-23 | Stop reason: HOSPADM

## 2024-01-23 RX ORDER — OXYCODONE HCL 5 MG/5 ML
5 SOLUTION, ORAL ORAL
Status: COMPLETED | OUTPATIENT
Start: 2024-01-23 | End: 2024-01-23

## 2024-01-23 RX ORDER — HYDROMORPHONE HYDROCHLORIDE 1 MG/ML
0.1 INJECTION, SOLUTION INTRAMUSCULAR; INTRAVENOUS; SUBCUTANEOUS
Status: DISCONTINUED | OUTPATIENT
Start: 2024-01-23 | End: 2024-01-23 | Stop reason: HOSPADM

## 2024-01-23 RX ORDER — MIDAZOLAM HYDROCHLORIDE 1 MG/ML
1 INJECTION INTRAMUSCULAR; INTRAVENOUS
Status: DISCONTINUED | OUTPATIENT
Start: 2024-01-23 | End: 2024-01-23 | Stop reason: HOSPADM

## 2024-01-23 RX ORDER — SODIUM CHLORIDE, SODIUM LACTATE, POTASSIUM CHLORIDE, CALCIUM CHLORIDE 600; 310; 30; 20 MG/100ML; MG/100ML; MG/100ML; MG/100ML
INJECTION, SOLUTION INTRAVENOUS CONTINUOUS
Status: DISCONTINUED | OUTPATIENT
Start: 2024-01-23 | End: 2024-01-23 | Stop reason: HOSPADM

## 2024-01-23 RX ORDER — ASCORBIC ACID 500 MG
1000 TABLET ORAL DAILY
Status: DISCONTINUED | OUTPATIENT
Start: 2024-01-23 | End: 2024-01-29 | Stop reason: HOSPADM

## 2024-01-23 RX ORDER — HYDROMORPHONE HYDROCHLORIDE 1 MG/ML
0.2 INJECTION, SOLUTION INTRAMUSCULAR; INTRAVENOUS; SUBCUTANEOUS
Status: DISCONTINUED | OUTPATIENT
Start: 2024-01-23 | End: 2024-01-23 | Stop reason: HOSPADM

## 2024-01-23 RX ORDER — NYSTATIN 100000 [USP'U]/G
POWDER TOPICAL 2 TIMES DAILY
Status: DISCONTINUED | OUTPATIENT
Start: 2024-01-23 | End: 2024-01-29 | Stop reason: HOSPADM

## 2024-01-23 RX ORDER — ZINC SULFATE 50(220)MG
220 CAPSULE ORAL DAILY
Status: DISCONTINUED | OUTPATIENT
Start: 2024-01-23 | End: 2024-01-29 | Stop reason: HOSPADM

## 2024-01-23 RX ADMIN — QUETIAPINE FUMARATE 100 MG: 100 TABLET ORAL at 05:32

## 2024-01-23 RX ADMIN — FENTANYL CITRATE 25 MCG: 50 INJECTION, SOLUTION INTRAMUSCULAR; INTRAVENOUS at 09:59

## 2024-01-23 RX ADMIN — FENTANYL CITRATE 100 MCG: 50 INJECTION, SOLUTION INTRAMUSCULAR; INTRAVENOUS at 09:05

## 2024-01-23 RX ADMIN — Medication 220 MG: at 17:42

## 2024-01-23 RX ADMIN — FLUOXETINE HYDROCHLORIDE 20 MG: 20 CAPSULE ORAL at 05:32

## 2024-01-23 RX ADMIN — OXYCODONE HYDROCHLORIDE 10 MG: 10 TABLET ORAL at 23:09

## 2024-01-23 RX ADMIN — FENTANYL CITRATE 25 MCG: 50 INJECTION, SOLUTION INTRAMUSCULAR; INTRAVENOUS at 09:53

## 2024-01-23 RX ADMIN — SODIUM CHLORIDE, POTASSIUM CHLORIDE, SODIUM LACTATE AND CALCIUM CHLORIDE: 600; 310; 30; 20 INJECTION, SOLUTION INTRAVENOUS at 08:36

## 2024-01-23 RX ADMIN — OXYCODONE HYDROCHLORIDE AND ACETAMINOPHEN 1000 MG: 500 TABLET ORAL at 17:41

## 2024-01-23 RX ADMIN — FENTANYL CITRATE 25 MCG: 50 INJECTION, SOLUTION INTRAMUSCULAR; INTRAVENOUS at 10:04

## 2024-01-23 RX ADMIN — OXYCODONE HYDROCHLORIDE 10 MG: 10 TABLET ORAL at 14:03

## 2024-01-23 RX ADMIN — SODIUM CHLORIDE, POTASSIUM CHLORIDE, SODIUM LACTATE AND CALCIUM CHLORIDE: 600; 310; 30; 20 INJECTION, SOLUTION INTRAVENOUS at 14:05

## 2024-01-23 RX ADMIN — FENTANYL CITRATE 25 MCG: 50 INJECTION, SOLUTION INTRAMUSCULAR; INTRAVENOUS at 10:08

## 2024-01-23 RX ADMIN — SODIUM CHLORIDE, POTASSIUM CHLORIDE, SODIUM LACTATE AND CALCIUM CHLORIDE: 600; 310; 30; 20 INJECTION, SOLUTION INTRAVENOUS at 00:34

## 2024-01-23 RX ADMIN — ROCURONIUM BROMIDE 10 MG: 50 INJECTION, SOLUTION INTRAVENOUS at 08:47

## 2024-01-23 RX ADMIN — ACETAMINOPHEN 1000 MG: 500 TABLET ORAL at 23:09

## 2024-01-23 RX ADMIN — GEMFIBROZIL 600 MG: 600 TABLET ORAL at 17:42

## 2024-01-23 RX ADMIN — QUETIAPINE FUMARATE 100 MG: 100 TABLET ORAL at 17:41

## 2024-01-23 RX ADMIN — VANCOMYCIN HYDROCHLORIDE 1500 MG: 5 INJECTION, POWDER, LYOPHILIZED, FOR SOLUTION INTRAVENOUS at 14:08

## 2024-01-23 RX ADMIN — CLONAZEPAM 1 MG: 1 TABLET ORAL at 05:32

## 2024-01-23 RX ADMIN — ATORVASTATIN CALCIUM 20 MG: 20 TABLET, FILM COATED ORAL at 20:22

## 2024-01-23 RX ADMIN — PROPOFOL 200 MG: 10 INJECTION, EMULSION INTRAVENOUS at 08:47

## 2024-01-23 RX ADMIN — CEFTRIAXONE SODIUM 2000 MG: 10 INJECTION, POWDER, FOR SOLUTION INTRAVENOUS at 14:05

## 2024-01-23 RX ADMIN — OXYCODONE HYDROCHLORIDE 10 MG: 5 SOLUTION ORAL at 09:53

## 2024-01-23 RX ADMIN — METOPROLOL SUCCINATE 100 MG: 100 TABLET, EXTENDED RELEASE ORAL at 05:32

## 2024-01-23 RX ADMIN — ACETAMINOPHEN 1000 MG: 500 TABLET ORAL at 05:32

## 2024-01-23 RX ADMIN — HYDROMORPHONE HYDROCHLORIDE 0.4 MG: 1 INJECTION, SOLUTION INTRAMUSCULAR; INTRAVENOUS; SUBCUTANEOUS at 10:17

## 2024-01-23 RX ADMIN — POTASSIUM CHLORIDE 40 MEQ: 20 TABLET, EXTENDED RELEASE ORAL at 14:25

## 2024-01-23 RX ADMIN — VANCOMYCIN HYDROCHLORIDE 1500 MG: 5 INJECTION, POWDER, LYOPHILIZED, FOR SOLUTION INTRAVENOUS at 04:30

## 2024-01-23 RX ADMIN — OXYCODONE HYDROCHLORIDE 10 MG: 10 TABLET ORAL at 20:22

## 2024-01-23 RX ADMIN — HEPARIN SODIUM 5000 UNITS: 5000 INJECTION, SOLUTION INTRAVENOUS; SUBCUTANEOUS at 23:03

## 2024-01-23 RX ADMIN — HYDROMORPHONE HYDROCHLORIDE 0.4 MG: 1 INJECTION, SOLUTION INTRAMUSCULAR; INTRAVENOUS; SUBCUTANEOUS at 10:12

## 2024-01-23 RX ADMIN — GEMFIBROZIL 600 MG: 600 TABLET ORAL at 05:32

## 2024-01-23 RX ADMIN — LIDOCAINE HYDROCHLORIDE 100 MG: 20 INJECTION, SOLUTION EPIDURAL; INFILTRATION; INTRACAUDAL at 08:47

## 2024-01-23 RX ADMIN — NYSTATIN: 100000 POWDER TOPICAL at 17:42

## 2024-01-23 RX ADMIN — SODIUM CHLORIDE, POTASSIUM CHLORIDE, SODIUM LACTATE AND CALCIUM CHLORIDE: 600; 310; 30; 20 INJECTION, SOLUTION INTRAVENOUS at 23:06

## 2024-01-23 RX ADMIN — SUCCINYLCHOLINE CHLORIDE 200 MG: 20 INJECTION, SOLUTION INTRAMUSCULAR; INTRAVENOUS at 08:48

## 2024-01-23 RX ADMIN — MOMETASONE FUROATE AND FORMOTEROL FUMARATE DIHYDRATE 2 PUFF: 200; 5 AEROSOL RESPIRATORY (INHALATION) at 20:22

## 2024-01-23 RX ADMIN — CLONAZEPAM 1 MG: 1 TABLET ORAL at 17:42

## 2024-01-23 RX ADMIN — NYSTATIN: 100000 POWDER TOPICAL at 05:32

## 2024-01-23 ASSESSMENT — PAIN DESCRIPTION - PAIN TYPE
TYPE: CHRONIC PAIN;ACUTE PAIN
TYPE: CHRONIC PAIN;SURGICAL PAIN
TYPE: SURGICAL PAIN

## 2024-01-23 ASSESSMENT — COGNITIVE AND FUNCTIONAL STATUS - GENERAL
MOBILITY SCORE: 12
SUGGESTED CMS G CODE MODIFIER DAILY ACTIVITY: CK
SUGGESTED CMS G CODE MODIFIER MOBILITY: CL
DRESSING REGULAR LOWER BODY CLOTHING: A LOT
DRESSING REGULAR UPPER BODY CLOTHING: A LOT
TURNING FROM BACK TO SIDE WHILE IN FLAT BAD: A LITTLE
WALKING IN HOSPITAL ROOM: A LOT
HELP NEEDED FOR BATHING: A LOT
PERSONAL GROOMING: A LOT
TOILETING: A LOT
MOVING FROM LYING ON BACK TO SITTING ON SIDE OF FLAT BED: A LOT
MOVING TO AND FROM BED TO CHAIR: A LOT
CLIMB 3 TO 5 STEPS WITH RAILING: TOTAL
STANDING UP FROM CHAIR USING ARMS: A LOT
DAILY ACTIVITIY SCORE: 14

## 2024-01-23 ASSESSMENT — ENCOUNTER SYMPTOMS
NERVOUS/ANXIOUS: 0
SHORTNESS OF BREATH: 0
NAUSEA: 0
SENSORY CHANGE: 0
FEVER: 0
ABDOMINAL PAIN: 0
HEADACHES: 0

## 2024-01-23 ASSESSMENT — PAIN SCALES - GENERAL: PAIN_LEVEL: 0

## 2024-01-23 ASSESSMENT — FIBROSIS 4 INDEX: FIB4 SCORE: 1.87

## 2024-01-23 NOTE — PROGRESS NOTES
Report received from JORDYN Ruiz.Patient arrived to T825, A & O x4, room air, Afib on the monitor.  Fall precautions in place. Call light is in reach.

## 2024-01-23 NOTE — DOCUMENTATION QUERY
[facility]                                                                       Query Response Note      PATIENT:               GRANT POPE  ACCT #:                  [id number]  MRN:                     [id number]  :                      1955  ADMIT DATE:       2024 10:25 AM  DISCH DATE:          RESPONDING  PROVIDER #:        [id number]           QUERY TEXT:    Excisional debridement has been documented in the  Op Report.    Please document the deepest level of debridement treated:      The patient's Clinical Indicators include:   Op Report: Left arm abscess debridement. There was immediate outflow of cloudy somewhat purulent appearing tissue consistent with an infected abscess/ hematoma.  This tissue was sent for culture.  This was debrided with a rongeur and curette.  This tract up the humerus medially.  There is a large pocket measuring approximately 10 x 5 cm in greatest dimension. After thorough debridement and excision of any obviously infected necrotic tissue the wound was thoroughly irrigated with sterile saline.   Risk factors: left arm abscess s/p open humeral fracture  Treatment: debridement    Thank you,  Celia Strong RN, BSN, CCDS  Clinical   Connect via Yamli  Options provided:   -- Deepest level of debridement treated - subcutaneous tissue or fascia   -- Deepest level of debridement treated - muscle   -- Deepest level of debridement treated - bone   -- Other explanation, please specify      Query created by: Celia Strong on 2024 10:33 AM    RESPONSE TEXT:    Deepest level of debridement treated - bone          Electronically signed by:  IMER MALAVE MD 2024 11:58 AM

## 2024-01-23 NOTE — ANESTHESIA PROCEDURE NOTES
Airway    Date/Time: 1/23/2024 8:49 AM    Performed by: Singh Tai M.D.  Authorized by: Singh Tai M.D.    Location:  OR  Urgency:  Elective  Difficult Airway: No    Indications for Airway Management:  Anesthesia      Spontaneous Ventilation: absent    Sedation Level:  Deep  Preoxygenated: Yes    Patient Position:  Sniffing  Mask Difficulty Assessment:  1 - vent by mask  Final Airway Type:  Endotracheal airway  Final Endotracheal Airway:  ETT  Cuffed: Yes    Technique Used for Successful ETT Placement:  Direct laryngoscopy  Devices/Methods Used in Placement:  Intubating stylet    Insertion Site:  Oral  Blade Type:  Davila  Laryngoscope Blade/Videolaryngoscope Blade Size:  3  ETT Size (mm):  8.5  Measured from:  Gums  ETT to Gums (cm):  23  Placement Verified by: auscultation and capnometry    Cormack-Lehane Classification:  Grade I - full view of glottis  Number of Attempts at Approach:  1

## 2024-01-23 NOTE — ANESTHESIA TIME REPORT
Anesthesia Start and Stop Event Times       Date Time Event    1/23/2024 0835 Ready for Procedure     0836 Anesthesia Start     0939 Anesthesia Stop          Responsible Staff  01/23/24      Name Role Begin End    Singh Tai M.D. Anesth 0836 0939          Overtime Reason:  no overtime (within assigned shift)    Comments:

## 2024-01-23 NOTE — DISCHARGE PLANNING
Care Transition Team Assessment  SW conducted a chart review due to pt being a readmit form 12/26/24. Pt last admission was discharged to [facility]. Pt was admitted from [facility]. Prior to last admission  Pt stated that his PCP is Dr. Obey Crawford in [place]. Pt stated his address on file is incorrect and the address he currently lives at is [address] [place] [address], [id number]. Pt stated that he lives next to his niece in a added room that he states is an apartment. Pt was independent with some ADLS but used HH to help him bath. HH agency with [employer] or [address]Pt could not recall. Pt stated that he uses all the DME including walker, wheelchair, and O2. Pt stated his baseline of O2 is 2L and the O2 company is [employer]. Pt stated that his sister can transport him home.      Information Source  Orientation Level: Oriented X4  Information Given By: Other (Comments) (Chart Review)  Who is responsible for making decisions for patient? : Patient    Readmission Evaluation  Is this a readmission?: Yes - unplanned readmission    Elopement Risk  Legal Hold: No  Ambulatory or Self Mobile in Wheelchair: No-Not an Elopement Risk  Elopement Risk: Not at Risk for Elopement    Interdisciplinary Discharge Planning  Lives with - Patient's Self Care Capacity: Related Adult  Patient or legal guardian wants to designate a caregiver: No  Support Systems: Family Member(s)  Housing / Facility: [address]  Durable Medical Equipment: Home Oxygen, Walker, Other - Specify  DME Provider / Phone: [employer] scooter    Discharge Preparedness  What is your plan after discharge?: Uncertain - pending medical team collaboration  What are your discharge supports?: Other (comment), Sibling (Niece)  Prior Functional Level: Uses Walker, Uses Cane, Uses Wheelchair, Needs Assist with Activities of Daily Living  Difficulity with ADLs: Bathing, Toileting, None    Functional Assesment  Prior Functional Level: Uses Walker, Uses Cane, Uses Wheelchair,  Needs Assist with Activities of Daily Living    Finances  Financial Barriers to Discharge: No  Prescription Coverage: Yes    Vision / Hearing Impairment  Right Eye Vision: Impaired  Left Eye Vision: Impaired (does not have glasses)    Advance Directive  Advance Directive?: None    Domestic Abuse  Have you ever been the victim of abuse or violence?: No  Physical Abuse or Sexual Abuse: No  Verbal Abuse or Emotional Abuse: No  Possible Abuse/Neglect Reported to:: Not Applicable    Psychological Assessment  History of Substance Abuse: None  History of Psychiatric Problems: No  Non-compliant with Treatment: No  Newly Diagnosed Illness: No    Discharge Risks or Barriers  Discharge risks or barriers?: No  Patient risk factors: Bariatric    Anticipated Discharge Information  Discharge Disposition: D/T to SNF with Medicare cert in anticipation of skilled care (03)  Discharge Address: [facility] [address]  [place] [address]  Discharge Contact Phone Number: [phone]

## 2024-01-23 NOTE — PROGRESS NOTES
Hospital Medicine Daily Progress Note    Date of Service  1/23/2024    Chief Complaint  Worsening surgical site wound healing    Hospital Course  Henry Kumari is a 68 y.o. 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  He had a recent admission 1224 through January 7 for repair of a humeral fracture and subsequent infection and debridement on January 5.  His infection grew out Staphylococcus epidermidis and he was discharged on Zyvox to complete a 21-day course.  He was transferred to a skilled nursing care and receiving his Zyvox per report.  Over the last few days he was having increasing arm pain and swelling with then a spontaneous leakage of fluid.  There were complaints of fevers, chills, and nausea.  Increased left arm pain.  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    Interval Problem Update  1/23/2024 wbc:3.6, k:3.2.  He went to surgery today for I+D of infection and revision.  Has hemovac drain present.  He is alert and oriented with clear speech.  States he is from [place] and has been sedentary over the last year and barely gets up with use of a walker usually requiring a wheelchair.  He states that seems at once a year he has been following due to his knees giving out.    I have discussed this patient's plan of care and discharge plan at IDT rounds today with Case Management, Nursing, Nursing leadership, and other members of the IDT team.    Consultants/Specialty  orthopedics    Code Status  Full Code    Disposition  The patient is not medically cleared for discharge to home or a post-acute facility.  Anticipate discharge to: skilled nursing facility    I have placed the appropriate orders for post-discharge needs.    Review of Systems  Review of Systems   Constitutional:  Positive for malaise/fatigue. Negative for fever.   HENT:  Negative for congestion.    Respiratory:  Negative for shortness of breath.    Cardiovascular:  Negative for  chest pain and leg swelling.   Gastrointestinal:  Negative for abdominal pain and nausea.   Genitourinary:  Negative for dysuria.   Musculoskeletal:  Positive for joint pain (left shoulder).   Neurological:  Negative for sensory change and headaches.   Psychiatric/Behavioral:  The patient is not nervous/anxious.         Physical Exam  Temp:  [36.1 °C (97 °F)-37.5 °C (99.5 °F)] 36.2 °C (97.2 °F)  Pulse:  [] 89  Resp:  [12-20] 18  BP: (106-166)/(64-95) 155/87  SpO2:  [90 %-100 %] 90 %    Physical Exam  Vitals reviewed.   Constitutional:       Appearance: Normal appearance. He is obese. He is ill-appearing. He is not diaphoretic.   HENT:      Head: Normocephalic and atraumatic.      Nose: Nose normal.      Mouth/Throat:      Mouth: Mucous membranes are moist.      Pharynx: No oropharyngeal exudate.   Eyes:      General: No scleral icterus.        Right eye: No discharge.         Left eye: No discharge.      Extraocular Movements: Extraocular movements intact.      Conjunctiva/sclera: Conjunctivae normal.   Cardiovascular:      Rate and Rhythm: Normal rate and regular rhythm.      Pulses:           Radial pulses are 2+ on the right side and 2+ on the left side.        Dorsalis pedis pulses are 2+ on the right side and 2+ on the left side.      Heart sounds: No murmur heard.  Pulmonary:      Effort: Pulmonary effort is normal. No respiratory distress.      Breath sounds: Normal breath sounds. No wheezing or rales.   Abdominal:      General: Bowel sounds are normal. There is no distension.      Palpations: Abdomen is soft.      Tenderness: There is no abdominal tenderness.   Musculoskeletal:         General: No swelling or tenderness.      Cervical back: Neck supple. No muscular tenderness.      Right lower leg: No edema.      Left lower leg: Edema present.   Lymphadenopathy:      Cervical: No cervical adenopathy.   Skin:     Coloration: Skin is not jaundiced or pale.      Comments: Left arm with a Hemovac present  clean dressing over the left lateral upper mid arm.  There are staples in various spots around the shoulder.   Neurological:      General: No focal deficit present.      Mental Status: He is alert and oriented to person, place, and time. Mental status is at baseline.      Cranial Nerves: No cranial nerve deficit.   Psychiatric:         Mood and Affect: Mood normal.         Behavior: Behavior normal.         Fluids    Intake/Output Summary (Last 24 hours) at 1/23/2024 1622  Last data filed at 1/23/2024 1500  Gross per 24 hour   Intake 2400.97 ml   Output 590 ml   Net 1810.97 ml       Laboratory  Recent Labs     01/22/24  1043 01/23/24  0329   WBC 6.5 3.6*   RBC 3.88* 3.44*   HEMOGLOBIN 10.4* 9.2*   HEMATOCRIT 32.5* 28.7*   MCV 83.8 83.4   MCH 26.8* 26.7*   MCHC 32.0* 32.1*   RDW 41.1 40.8   PLATELETCT 225 165   MPV 10.5 10.3     Recent Labs     01/22/24  1043 01/23/24  0329   SODIUM 136 136   POTASSIUM 3.7 3.2*   CHLORIDE 98 99   CO2 24 23   GLUCOSE 104* 91   BUN 27* 24*   CREATININE 0.77 0.64   CALCIUM 8.8 8.3*     Recent Labs     01/22/24  2206   INR 1.36*               Imaging  DX-CHEST-LIMITED (1 VIEW)   Final Result         1. The right subclavian central venous access catheter terminates over the superior vena cava. No postprocedure visible pneumothorax.   2. Postsurgical changes in the left shoulder.   3. The remainder is stable.      CT-EXTREMITY, UPPER WITH LEFT   Final Result      1.  Wide separation of some of the fracture fragments of the surgically transfixed humeral fracture. There is some evidence of fracture healing. New fracture is not excluded by this exam. Infection is not excluded by this exam.   2.  Fluid with scant gas at the surgically transfixed humeral fracture site could be sterile or infected   3.  Osteoarthritis   4.  18 mm LEFT upper lobe hamartoma      DX-CHEST-PORTABLE (1 VIEW)   Final Result      1.  Faint residual RIGHT basilar atelectasis, less likely pneumonia   2.  Persistently  enlarged cardiac silhouette   3.  Atherosclerosis      DX-HUMERUS 2+ LEFT   Final Result      1. Healing comminuted fracture of the metadiaphysis of the left humerus with stable alignment of the intramedullary ruba and screws.   2. Increased opacity lateral to the distal aspect of the fracture in the left arm soft tissues, concerning for soft tissue abscess or hematoma. CT of the left humerus with IV contrast is recommended.           Assessment/Plan  * Sepsis (HCC)- (present on admission)  Assessment & Plan  Etiology is left humeral infection from prior surgical site.  Vancomycin and ceftriaxone  Monitor cultures  Status post IV fluids  Wound care  1/23/2024 patient had revision of the left upper surgical site for concerns of infection    Infection and inflammatory reaction due to internal fixation device of left humerus, initial encounter ([facility])- (present on admission)  Assessment & Plan  Wound care  Vancomycin and ceftriaxone for now  Orthopedics consulting and following.  Took patient for I+D with purulent drainage 1/23/24 with tobramycin powder given in wound  Monitor labs and cultures  ID to consult 1/24    Anemia- (present on admission)  Assessment & Plan  1/23/2024 Hgb: 9.2<10.4  Status post acute surgery 1/23/2024 of left humerus    Mood disorder (HCC)- (present on admission)  Assessment & Plan  seroquel    Anxiolytic dependence ([facility])- (present on admission)  Assessment & Plan  No current BZ's at this time    Chronic respiratory failure with hypoxia ([facility])- (present on admission)  Assessment & Plan  At his baseline    BMI 45.0-49.9, adult ([facility])- (present on admission)  Assessment & Plan  Body mass index is 42.91 kg/m².  Encourage weight loss    Class 3 severe obesity due to excess calories with serious comorbidity and body mass index (BMI) of 45.0 to 49.9 in adult ([facility])- (present on admission)  Assessment & Plan  Outpatient counseling    SANTOSH on CPAP- (present on admission)  Assessment & Plan  Ordered CPAP with  RT  Monitor for nocturnal hypoxia    COPD (chronic obstructive pulmonary disease) ([facility])- (present on admission)  Assessment & Plan  No current exacerbation  At his baseline hypoxia  As needed bronchodilators    History of DVT (deep vein thrombosis)- (present on admission)  Assessment & Plan  Hold outpatient xarelto for possible ortho procedure  Restart once able.  Covering the interim with heparin DVT dosing         VTE prophylaxis:   SCDs/TEDs      I have performed a physical exam and reviewed and updated ROS and Plan today (1/23/2024). In review of yesterday's note (1/22/2024), there are no changes except as documented above.

## 2024-01-23 NOTE — DOCUMENTATION QUERY
[facility]                                                                       Query Response Note      PATIENT:               GRANT POPE  ACCT #:                  [id number]  MRN:                     [id number]  :                      1955  ADMIT DATE:       2024 10:25 AM  DISCH DATE:          RESPONDING  PROVIDER #:        [id number]           QUERY TEXT:    Lactic acid 5.3 is noted in the  Lab Results.    Can a diagnosis be specified to support this finding?    The patient's clinical indicators include:  H&P:  Sepsis: clinical indicators of end organ dysfunction include lactic acid greater than 2   Lactic acid 4.5, 5.3, 3.6, 4.8  Risk Factors: Sepsis  Treatment: serial lactic acid level, IVF    Thank you,  Celia Strong RN, BSN, CCDS  Clinical   Connect via Snaptu Messenger  Options provided:   -- Lactic acidosis   -- Findings of no clinical significance   -- Other explanation, (please specify the other explanation)   -- Unable to determine      Query created by: Celia Strong on 2024 10:27 AM    RESPONSE TEXT:    Lactic acidosis          Electronically signed by:  FLY BRIGHT MD 2024 11:56 AM

## 2024-01-23 NOTE — ANESTHESIA PREPROCEDURE EVALUATION
Case: [id number] Date/Time: 01/23/24 0916    Procedure: INCISION AND DRAINAGE, SHOULDER (Left: Shoulder)    Anesthesia type: General    Pre-op diagnosis: post operative infection    Location: [place] [address] / SURGERY [facility]    Surgeons: Arnol Masterson M.D.            Relevant Problems   ANESTHESIA   (positive) SANTOSH on CPAP      PULMONARY   (positive) COPD (chronic obstructive pulmonary disease) (HCC)         (positive) Hepatitis C infection      Other   (positive) Anxiolytic dependence (HCC)   (positive) Benzodiazepine dependence, continuous (HCC)   (positive) Chronic respiratory failure with hypoxia (HCC)   (positive) Class 3 severe obesity due to excess calories with serious comorbidity and body mass index (BMI) of 45.0 to 49.9 in adult (HCC)   (positive) Sepsis (HCC)       Physical Exam    Airway   Mallampati: II  TM distance: >3 FB  Neck ROM: full       Cardiovascular - normal exam  Rhythm: regular  Rate: normal  (-) murmur     Dental - normal exam           Pulmonary - normal exam  Breath sounds clear to auscultation     Abdominal    Neurological - normal exam                   Anesthesia Plan    ASA 3   ASA physical status 3 criteria: COPD - poorly controlled and alcohol and/or substance dependence or abuse    Plan - general       Airway plan will be ETT          Induction: intravenous    Postoperative Plan: Postoperative administration of opioids is intended.    Pertinent diagnostic labs and testing reviewed    Informed Consent:    Anesthetic plan and risks discussed with patient.    Use of blood products discussed with: patient whom consented to blood products.

## 2024-01-23 NOTE — PROGRESS NOTES
4 Eyes Skin Assessment Completed by JORDYN Meza and ERICA Gabriel    Head WDL  Ears WDL  Nose WDL  Mouth WDL  Neck WDL  Breast/Chest WDL  Shoulder Blades WDL  Spine WDL  (R) Arm/Elbow/Hand Redness, Bruising, Abrasion, Scab, Discoloration, and Scar  (L) Arm/Elbow/Hand Redness, Bruising, Abrasion, Scab, Swelling, Discoloration, Scar, Shiny, and Edema, surgial sites with staples and sutures, rash in armpit  Abdomen Redness, moisture associated skin damage  Groin Redness and Excoriation  Scrotum/Coccyx/Buttocks Redness, Blanching, and Moisture Fissure  (R) Leg Redness, Scar, and Scab, discoloration  (L) Leg Redness, Scar, and Scab, discoloration  (R) Heel/Foot/Toe Redness, Blanching, and Ulcer(s)  (L) Heel/Foot/Toe Redness          Devices In Places Tele Box, Blood Pressure Cuff, Pulse Ox, and Nasal Cannula      Interventions In Place Gray Ear Foams, NC W/Ear Foams, Pillows, Q2 Turns, Barrier Cream, Heels Loaded W/Pillows, and Pressure Redistribution Mattress    Possible Skin Injury Yes    Pictures Uploaded Into Epic Yes  Wound Consult Placed Yes  RN Wound Prevention Protocol Ordered Yes4 Eyes Skin Assessment Completed

## 2024-01-23 NOTE — ANESTHESIA POSTPROCEDURE EVALUATION
Patient: Henry Kumari    Procedure Summary       Date: 01/23/24 Room / Location: [place] [address] / SURGERY [facility]    Anesthesia Start: 0836 Anesthesia Stop: 0939    Procedure: INCISION AND DRAINAGE, SHOULDER (Left: Shoulder) Diagnosis: (post operative infection)    Surgeons: Arnol Masterson M.D. Responsible Provider: Singh Tai M.D.    Anesthesia Type: general ASA Status: 3            Final Anesthesia Type: general  Last vitals  BP   Blood Pressure : (!) 156/74    Temp   36.2 °C (97.2 °F)    Pulse   80   Resp   16    SpO2   98 %      Anesthesia Post Evaluation    Patient location during evaluation: PACU  Patient participation: complete - patient participated  Level of consciousness: awake and alert  Pain score: 0    Airway patency: patent  Anesthetic complications: no  Cardiovascular status: hemodynamically stable  Respiratory status: acceptable  Hydration status: euvolemic    PONV: none          No notable events documented.     Nurse Pain Score: 4 (NPRS)

## 2024-01-23 NOTE — ANESTHESIA PROCEDURE NOTES
Central Venous Line    Performed by: Singh Tai M.D.  Authorized by: Singh Tai M.D.    Start Time:  1/23/2024 8:58 AM  End Time:  1/23/2024 9:08 AM  Patient Location:  OR  Indication: central venous access        provider hand hygiene performed prior to central venous catheter insertion, all 5 sterile barriers used (gloves, gown, cap, mask, large sterile drape) during central venous catheter insertion and skin prep agent completely dried prior to procedure    Patient Position:  Trendelenburg  Laterality:  Right  Site:  Subclavian  Prep:  Chlorhexidine  Catheter Size:  7 Fr  Catheter Length (cm):  20  Number of Lumens:  Triple lumen  target vein identified, needle advanced into vein and blood aspirated and guidewire advanced into vein    Seldinger Technique?: Yes    Ultrasound-Guided: surface landmarks    Intravenous Verification: venous blood return and chest x-ray pending    all ports aspirated, all ports flushed easily, guidewire was removed intact, biopatch was applied, line was sutured in place and dressing was applied    Events: patient tolerated procedure well with no complications

## 2024-01-23 NOTE — PROGRESS NOTES
Patient returns to T822, hemovac in place, A&O x4, patient c/o 8/10 left arm and shoulder pain, VSS

## 2024-01-23 NOTE — DIETARY
NUTRITION SERVICES: BMI - Pt with BMI >40 (=Body mass index is 42.91 kg/m².), Class III obesity. Weight loss counseling not appropriate in acute care setting.   RECOMMEND - If appropriate at DC please refer to outpatient nutrition services for weight management.

## 2024-01-23 NOTE — PROGRESS NOTES
68M open left humerus   Sp IMN by Dr Cruz  Recurrent infection open fx site  Plan for I&D today      Arnol Masterson MD  Orthopedic Trauma Surgery

## 2024-01-23 NOTE — CARE PLAN
The patient is Stable - Low risk of patient condition declining or worsening    Shift Goals  Clinical Goals: Skin integrity, trend lactic  Patient Goals: Sleep    Progress made toward(s) clinical / shift goals:    Problem: Pain - Standard  Goal: Alleviation of pain or a reduction in pain to the patient’s comfort goal  Outcome: Progressing     Problem: Knowledge Deficit - Standard  Goal: Patient and family/care givers will demonstrate understanding of plan of care, disease process/condition, diagnostic tests and medications  Outcome: Progressing     Problem: Hemodynamics  Goal: Patient's hemodynamics, fluid balance and neurologic status will be stable or improve  Outcome: Progressing     Problem: Psychosocial  Goal: Patient's ability to identify and develop effective coping behaviors will improve  Outcome: Progressing     Problem: Fall Risk  Goal: Patient will remain free from falls  Outcome: Progressing     Problem: Skin Integrity  Goal: Skin integrity is maintained or improved  Outcome: Progressing       Patient is not progressing towards the following goals:

## 2024-01-23 NOTE — PROGRESS NOTES
Patient recovered well post op. A&Ox4. VSS, 2L nc. Surgical sites CDI. Surgical pain managed. Chest x ray completed at this time to confirm line placement. Patient able to drink fluids without Nausea and vomiting. PT belongings in his room. Spouse updated and discussed plan of care. Report called to Ansley COBB.

## 2024-01-23 NOTE — OP REPORT
DATE OF OPERATION: 1/23/2024     PREOPERATIVE DIAGNOSIS: Left arm abscess status post open humeral fracture    POSTOPERATIVE DIAGNOSIS: Same    PROCEDURE PERFORMED: Left arm abscess debridement and I&D and wound VAC placement    SURGEON: Arnol Masterson M.D.     ASSISTANT: ROMA Joiner    ANESTHESIA: General    SPECIMEN: None    ESTIMATED BLOOD LOSS: 10 mL    IMPLANTS: Medium Hemovac drain      INDICATIONS: The patient is a 68 y.o. male who presented with worsening infection left arm at the open fracture site.  He underwent previous fixation with intramedullary by my partner.  Subsequently developed worsening pain swelling erythema in the anterior arm consistent with abscess.  I discussed the risks and benefits of the procedure which include but are not limited to risks of infection, wound healing complication, neurovascular injury, pain, malunion, non-union, malrotation, and the medical risks of anesthesia including MI, stroke, and death.  Alternatives to surgery were also discussed, including non-operative management, which I did not recommend.  The patient was in agreement with the plan to proceed, and the informed consent was signed and documented.  I met with the patient pre-operatively and marked the operative extremity with their agreement.  We proceeded to the operating room.      DESCRIPTION OF PROCEDURE:  Patient was seen in the preoperative holding area on the day of surgery. The operative site was marked with my initials.  he was taken to the operating room and placed supine on the operative table.  Anesthesia was induced.  The operative extremity was prepped and draped in the normal sterile fashion.  Operative pause was conducted and the correct patient, site, side, procedure, and surgeon's initials on extremity were identified.  Previous open fracture site was reincised.  Sutures were removed.  There is immediate outflow of cloudy somewhat purulent appearing tissue consistent with an infected  abscess/hematoma.  This tissue was sent for culture.  This was debrided with a rongeur and curette.  This tract up the humerus medially.  There is a large pocket measuring approximately 10 x 5 cm in greatest dimension.  After thorough debridement and excision of any obviously infected necrotic tissue the wound was thoroughly irrigated with sterile saline.  We then placed vancomycin tobramycin powder into the wound.  Medium Hemovac drain was placed in the wound.  Patient previously grew staph epi susceptible to vancomycin.  The wound was then closed in layered fashion with 0 Vicryl 2-0 Vicryl 3-0 nylon suture.  Sterile dressings were applied.  Patient was awoken taken to PACU stable condition.    POSTOPERATIVE PLAN: Patient will require ID consultation IV antibiotics for infection in the setting of hardware.  Patient will potentially require an additional debridement depending on how the wound looks over the next several days.  Drain out when less than 30 cc per shift.  After discharge recommend follow-up with Dr. Cruz in 2 weeks for wound check.      ____________________________________   Arnol Masterson M.D.   DD: 1/23/2024  9:22 AM

## 2024-01-23 NOTE — PROGRESS NOTES
Critical Lactic Acid resulted 4.8.  ROMA Carlson notified. New orders received for LR bolus, increased continuous LR rate, and IV Thiamine.

## 2024-01-24 LAB
RHODAMINE-AURAMINE STN SPEC: NORMAL
SIGNIFICANT IND 70042: NORMAL
SITE SITE: NORMAL
SOURCE SOURCE: NORMAL

## 2024-01-24 PROCEDURE — 700111 HCHG RX REV CODE 636 W/ 250 OVERRIDE (IP)

## 2024-01-24 PROCEDURE — 94660 CPAP INITIATION&MGMT: CPT

## 2024-01-24 PROCEDURE — 700105 HCHG RX REV CODE 258: Performed by: INTERNAL MEDICINE

## 2024-01-24 PROCEDURE — A9270 NON-COVERED ITEM OR SERVICE: HCPCS | Performed by: INTERNAL MEDICINE

## 2024-01-24 PROCEDURE — A9270 NON-COVERED ITEM OR SERVICE: HCPCS | Performed by: HOSPITALIST

## 2024-01-24 PROCEDURE — 700102 HCHG RX REV CODE 250 W/ 637 OVERRIDE(OP)

## 2024-01-24 PROCEDURE — 700111 HCHG RX REV CODE 636 W/ 250 OVERRIDE (IP): Mod: JZ | Performed by: HOSPITALIST

## 2024-01-24 PROCEDURE — A9270 NON-COVERED ITEM OR SERVICE: HCPCS

## 2024-01-24 PROCEDURE — 99222 1ST HOSP IP/OBS MODERATE 55: CPT | Mod: GC | Performed by: INTERNAL MEDICINE

## 2024-01-24 PROCEDURE — 700102 HCHG RX REV CODE 250 W/ 637 OVERRIDE(OP): Performed by: INTERNAL MEDICINE

## 2024-01-24 PROCEDURE — 770006 HCHG ROOM/CARE - MED/SURG/GYN SEMI*

## 2024-01-24 PROCEDURE — 97602 WOUND(S) CARE NON-SELECTIVE: CPT

## 2024-01-24 PROCEDURE — 700111 HCHG RX REV CODE 636 W/ 250 OVERRIDE (IP): Performed by: INTERNAL MEDICINE

## 2024-01-24 PROCEDURE — 99232 SBSQ HOSP IP/OBS MODERATE 35: CPT | Performed by: INTERNAL MEDICINE

## 2024-01-24 PROCEDURE — 97162 PT EVAL MOD COMPLEX 30 MIN: CPT

## 2024-01-24 PROCEDURE — 700102 HCHG RX REV CODE 250 W/ 637 OVERRIDE(OP): Performed by: HOSPITALIST

## 2024-01-24 PROCEDURE — 700105 HCHG RX REV CODE 258

## 2024-01-24 RX ORDER — DOXYCYCLINE 100 MG/1
100 TABLET ORAL EVERY 12 HOURS
Status: DISCONTINUED | OUTPATIENT
Start: 2024-01-24 | End: 2024-01-25

## 2024-01-24 RX ADMIN — HYDROMORPHONE HYDROCHLORIDE 0.5 MG: 1 INJECTION, SOLUTION INTRAMUSCULAR; INTRAVENOUS; SUBCUTANEOUS at 17:28

## 2024-01-24 RX ADMIN — THIAMINE HYDROCHLORIDE 500 MG: 100 INJECTION, SOLUTION INTRAMUSCULAR; INTRAVENOUS at 06:11

## 2024-01-24 RX ADMIN — NYSTATIN: 100000 POWDER TOPICAL at 06:03

## 2024-01-24 RX ADMIN — MOMETASONE FUROATE AND FORMOTEROL FUMARATE DIHYDRATE 2 PUFF: 200; 5 AEROSOL RESPIRATORY (INHALATION) at 19:57

## 2024-01-24 RX ADMIN — NYSTATIN: 100000 POWDER TOPICAL at 17:29

## 2024-01-24 RX ADMIN — OXYCODONE HYDROCHLORIDE 10 MG: 10 TABLET ORAL at 19:56

## 2024-01-24 RX ADMIN — QUETIAPINE FUMARATE 100 MG: 100 TABLET ORAL at 17:29

## 2024-01-24 RX ADMIN — Medication 220 MG: at 06:04

## 2024-01-24 RX ADMIN — OXYCODONE HYDROCHLORIDE AND ACETAMINOPHEN 1000 MG: 500 TABLET ORAL at 06:03

## 2024-01-24 RX ADMIN — OXYCODONE HYDROCHLORIDE 10 MG: 10 TABLET ORAL at 22:33

## 2024-01-24 RX ADMIN — OXYCODONE HYDROCHLORIDE 10 MG: 10 TABLET ORAL at 06:07

## 2024-01-24 RX ADMIN — MOMETASONE FUROATE AND FORMOTEROL FUMARATE DIHYDRATE 2 PUFF: 200; 5 AEROSOL RESPIRATORY (INHALATION) at 08:40

## 2024-01-24 RX ADMIN — ACETAMINOPHEN 1000 MG: 500 TABLET ORAL at 22:33

## 2024-01-24 RX ADMIN — CLONAZEPAM 1 MG: 1 TABLET ORAL at 17:29

## 2024-01-24 RX ADMIN — HEPARIN SODIUM 5000 UNITS: 5000 INJECTION, SOLUTION INTRAVENOUS; SUBCUTANEOUS at 22:32

## 2024-01-24 RX ADMIN — SODIUM CHLORIDE, POTASSIUM CHLORIDE, SODIUM LACTATE AND CALCIUM CHLORIDE: 600; 310; 30; 20 INJECTION, SOLUTION INTRAVENOUS at 22:37

## 2024-01-24 RX ADMIN — VANCOMYCIN HYDROCHLORIDE 1500 MG: 5 INJECTION, POWDER, LYOPHILIZED, FOR SOLUTION INTRAVENOUS at 03:09

## 2024-01-24 RX ADMIN — ONDANSETRON 4 MG: 4 TABLET, ORALLY DISINTEGRATING ORAL at 03:57

## 2024-01-24 RX ADMIN — GEMFIBROZIL 600 MG: 600 TABLET ORAL at 06:02

## 2024-01-24 RX ADMIN — GEMFIBROZIL 600 MG: 600 TABLET ORAL at 17:29

## 2024-01-24 RX ADMIN — SODIUM CHLORIDE, POTASSIUM CHLORIDE, SODIUM LACTATE AND CALCIUM CHLORIDE: 600; 310; 30; 20 INJECTION, SOLUTION INTRAVENOUS at 06:01

## 2024-01-24 RX ADMIN — OXYCODONE HYDROCHLORIDE 10 MG: 10 TABLET ORAL at 15:06

## 2024-01-24 RX ADMIN — DOXYCYCLINE 100 MG: 100 TABLET, FILM COATED ORAL at 17:29

## 2024-01-24 RX ADMIN — OXYCODONE HYDROCHLORIDE 10 MG: 10 TABLET ORAL at 11:03

## 2024-01-24 RX ADMIN — ACETAMINOPHEN 1000 MG: 500 TABLET ORAL at 11:03

## 2024-01-24 RX ADMIN — FLUOXETINE HYDROCHLORIDE 20 MG: 20 CAPSULE ORAL at 06:02

## 2024-01-24 RX ADMIN — ATORVASTATIN CALCIUM 20 MG: 20 TABLET, FILM COATED ORAL at 19:56

## 2024-01-24 RX ADMIN — TIOTROPIUM BROMIDE INHALATION SPRAY 5 MCG: 3.12 SPRAY, METERED RESPIRATORY (INHALATION) at 08:40

## 2024-01-24 RX ADMIN — HYDROMORPHONE HYDROCHLORIDE 0.5 MG: 1 INJECTION, SOLUTION INTRAMUSCULAR; INTRAVENOUS; SUBCUTANEOUS at 03:56

## 2024-01-24 RX ADMIN — METOPROLOL SUCCINATE 100 MG: 100 TABLET, EXTENDED RELEASE ORAL at 06:03

## 2024-01-24 RX ADMIN — HEPARIN SODIUM 5000 UNITS: 5000 INJECTION, SOLUTION INTRAVENOUS; SUBCUTANEOUS at 15:07

## 2024-01-24 RX ADMIN — ACETAMINOPHEN 1000 MG: 500 TABLET ORAL at 06:02

## 2024-01-24 RX ADMIN — CLONAZEPAM 1 MG: 1 TABLET ORAL at 06:02

## 2024-01-24 RX ADMIN — QUETIAPINE FUMARATE 100 MG: 100 TABLET ORAL at 06:02

## 2024-01-24 RX ADMIN — HEPARIN SODIUM 5000 UNITS: 5000 INJECTION, SOLUTION INTRAVENOUS; SUBCUTANEOUS at 06:02

## 2024-01-24 ASSESSMENT — ENCOUNTER SYMPTOMS
FEVER: 0
CHILLS: 0
SENSORY CHANGE: 0
MYALGIAS: 0
ABDOMINAL PAIN: 0
DOUBLE VISION: 0
SHORTNESS OF BREATH: 0
NERVOUS/ANXIOUS: 0
HEARTBURN: 0
NAUSEA: 0
HEADACHES: 0
COUGH: 0
DEPRESSION: 0
WEAKNESS: 1
BLURRED VISION: 0

## 2024-01-24 ASSESSMENT — COGNITIVE AND FUNCTIONAL STATUS - GENERAL
MOBILITY SCORE: 18
MOVING FROM LYING ON BACK TO SITTING ON SIDE OF FLAT BED: A LITTLE
MOVING TO AND FROM BED TO CHAIR: A LITTLE
SUGGESTED CMS G CODE MODIFIER MOBILITY: CK
TURNING FROM BACK TO SIDE WHILE IN FLAT BAD: A LITTLE
CLIMB 3 TO 5 STEPS WITH RAILING: A LITTLE
WALKING IN HOSPITAL ROOM: A LITTLE
STANDING UP FROM CHAIR USING ARMS: A LITTLE

## 2024-01-24 ASSESSMENT — GAIT ASSESSMENTS
ASSISTIVE DEVICE: FRONT WHEEL WALKER
DISTANCE (FEET): 10
GAIT LEVEL OF ASSIST: CONTACT GUARD ASSIST
DEVIATION: BRADYKINETIC

## 2024-01-24 NOTE — RESPIRATORY CARE
"COPD EDUCATION by COPD CLINICAL EDUCATOR  1/23/2024 at 4:39 PM by Lela Zuñiga, RRT     Patient interviewed by  education team. Patient refused COPD program at this time. He recently saw our team on a prior admission. He has our information. He continues to smoke.  An Action Plan was completed in the EMR to reflect current Respiratory Medication use.                 COPD Assessment  COPD Clinical Specialists ONLY  COPD Education Initiated: Yes--Short Intervention  Is this a COPD exacerbation patient?: No  DME Company: [employer]  DME Equipment Type: Oxygen 2lpm  Physician Name: JUHI Crawford in [place]  Referrals Initiated: Yes  Smoking Cessation: Declined  $ Demo/Eval of SVN's, MDI's and Aerosols: Yes  Interdisciplinary Rounds: Attendance at Rounds (30 Min)    PFT Results  No results found for: \"PFT\"    Meds to Beds        MY COPD ACTION PLAN     It is recommended that patients and physicians /healthcare providers complete this action plan together. This plan should be discussed at each physician visit and updated as needed.    The green, yellow and red zones show groups of symptoms of COPD. This list of symptoms is not comprehensive, and you may experience other symptoms. In the \"Actions\" column, your healthcare provider has recommended actions for you to take based on your symptoms.    Patient Name: Henry Kumari   YOB: 1955   Last Updated on:     Green Zone:  I am doing well today Actions     Usual activitiy and exercise level   Take daily medications     Usual amounts of cough and phlegm/mucus   Use oxygen as prescribed     Sleep well at night   Continue regular exercise/diet plan     Appetite is good   At all times avoid cigarette smoke, inhaled irritants     Daily Medications (these medications are taken every day):                Yellow Zone:  I am having a bad day or a COPD flare Actions     More breathless than usual   Continue daily medications     I have less energy for my daily " "activities   Use quick relief inhaler as ordered     Increased or thicker phlegm/mucus   Use oxygen as prescribed     Using quick relief inhaler/nebulizer more often   Get plenty of rest     Swelling of ankles more than usual   Use pursed lip breathing     More coughing than usual   At all times avoid cigarette smoke, inhaled irritants     I feel like I have a \"chest cold\"     Poor sleep and my symptoms woke me up     My appetite is not good     My medicine is not helping      Call provider immediately if symptoms don’t improve     Continue daily medications, add rescue medications:               Medications to be used during a flare up, (as Discussed with Provider):              Red Zone:  I need urgent medical care Actions     Severe shortness of breath even at rest   Call 911 or seek medical care immediately     Not able to do any activity because of breathing      Fever or shaking chills      Feeling confused or very drowsy       Chest pains      Coughing up blood                  "

## 2024-01-24 NOTE — CARE PLAN
The patient is Watcher - Medium risk of patient condition declining or worsening    Shift Goals  Clinical Goals: Pain control, tolerate procedure, VSS, lab values improving  Patient Goals: pain control  Family Goals: CLOTILDE- family not present    Progress made toward(s) clinical / shift goals:    Problem: Knowledge Deficit - Standard  Goal: Patient and family/care givers will demonstrate understanding of plan of care, disease process/condition, diagnostic tests and medications  Outcome: Not Progressing     Problem: Pain - Standard  Goal: Alleviation of pain or a reduction in pain to the patient’s comfort goal  Outcome: Progressing     Problem: Hemodynamics  Goal: Patient's hemodynamics, fluid balance and neurologic status will be stable or improve  Outcome: Progressing     Problem: Fluid Volume  Goal: Fluid volume balance will be maintained  Outcome: Progressing     Problem: Urinary - Renal Perfusion  Goal: Ability to achieve and maintain adequate renal perfusion and functioning will improve  Outcome: Progressing       Patient is not progressing towards the following goals:      Problem: Knowledge Deficit - Standard  Goal: Patient and family/care givers will demonstrate understanding of plan of care, disease process/condition, diagnostic tests and medications  Outcome: Not Progressing

## 2024-01-24 NOTE — PROGRESS NOTES
Patient left tele 825 via hospital bed with transport on 2 L O2 via NC, A & O x4. IV medications, PO medications, nystatin powder, inhaler, cell phone, cell , clothes, and patient's electric blanket sent with patient.

## 2024-01-24 NOTE — PROGRESS NOTES
"    Orthopedic PA Progress Note    Interval changes: shoulder pain improving  limited shoulder movement      Patient doing well this morning. Sitting at edge of bed, with PT at bedside. Alert and oriented x 4.     Shoulder dressings are CDI, hemovac drain intact, 180 cc sero-sanguinous drainage over last 24 hours.     WBAT in left shoulder     POD 1 for Left arm abscess debridement and I&D completed by Dr. Arnol Masterson on 1/23/24   OR cultures pending at this time, pt. Remains on IV vancomycin and PO doxycycline    ROS - Patient denies any new issues. Denies any numbness or tingling. Pain well controlled.    /70   Pulse 79   Temp 35.9 °C (96.6 °F) (Temporal)   Resp 20   Ht 1.88 m (6' 2\")   Wt (!) 152 kg (334 lb 3.5 oz)   SpO2 98%     Patient seen and examined  No acute distress  Breathing non labored  RRR  Left upper extremity/shoulder:  - dressing to left anterior shoulder CDI with hemovac drain  - neurovascular status intact      Recent Labs     01/22/24  1043 01/23/24  0329   WBC 6.5 3.6*   RBC 3.88* 3.44*   HEMOGLOBIN 10.4* 9.2*   HEMATOCRIT 32.5* 28.7*   MCV 83.8 83.4   MCH 26.8* 26.7*   MCHC 32.0* 32.1*   RDW 41.1 40.8   PLATELETCT 225 165   MPV 10.5 10.3       Active Hospital Problems    Diagnosis     Infection and inflammatory reaction due to internal fixation device of left humerus, initial encounter ([facility]) [T84.611A]     Sepsis ([facility]) [A41.9]     Anemia [D64.9]     Mood disorder ([facility]) [F39]     Chronic respiratory failure with hypoxia ([facility]) [J96.11]     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]     BMI 45.0-49.9, adult ([facility]) [Z68.42]     Class 3 severe obesity due to excess calories with serious comorbidity and body mass index (BMI) of 45.0 to 49.9 in adult ([facility]) [E66.01, Z68.42]     SANTOSH on CPAP [G47.33]     History of DVT (deep vein thrombosis) [Z86.718]     COPD (chronic obstructive pulmonary disease) ([facility]) [J44.9]        Assessment/Plan:  - hemovac drain to remain in place at this time; will " reassess daily output and appearance    POD#1 S/p Left arm abscess debridement and I&D completed by Dr. Arnol Masterson on 1/23/24     Wt bearing status - WBAT  Wound care/Drains - Dressings to be changed every other day by nursing. Or PRN for saturation starting POD#2  Future Procedures - None planned   DVT chemo prophylaxis in place: Heparin subq q 8   Sutures/Staples out- 14-21 days post operatively. Removal will completed by ortho JULIETTE's unless transferred. Sutures to remain in place; staples to be removed 1/25  PT/OT-initiated  Antibiotics: Remains on IV vancomycin and PO doxycycline  DVT Prophylaxis- TEDS/SCDs/Foot pumps  [place]-not needed per ortho  Case Coordination for Discharge Planning - Disposition per therapy recs.

## 2024-01-24 NOTE — PROGRESS NOTES
Bedside report received from JORDYN Panchal. Patient is alert and oriented x  4, 2 L O2 via NC in use, Afib on the monitor. Hemovac in place left upper arm. Fall precautions are in place. Call light is in reach.

## 2024-01-24 NOTE — PROGRESS NOTES
Patient arrived to unit at 0338 on hospital bed via patient transport. Patient is AOx4 with no signs of labored breathing or distress. Patient's LUE dressing CDI w/ hemovac in place. Patient oriented to unit as well as updated on plan of care. Two RN skin check performed. Patient states all of his questions/concerns have been addressed and answered appropriately. Patient states all of his needs are being met at this time. Safety precautions in place including patient call light within reach, personal possessions nearby, bed in low position and locked, fall identifiers in place, patient rounding in practice, bed strip alarm on and working properly, and non-skid socks in place.

## 2024-01-24 NOTE — CARE PLAN
The patient is Stable - Low risk of patient condition declining or worsening    Shift Goals  Clinical Goals: pain control, IV access, promote sleep, monitor drainage from surgical site  Patient Goals: Sleep  Family Goals: CLOTILDE- family not present    Progress made toward(s) clinical / shift goals:    Problem: Pain - Standard  Goal: Alleviation of pain or a reduction in pain to the patient’s comfort goal  Outcome: Progressing     Problem: Knowledge Deficit - Standard  Goal: Patient and family/care givers will demonstrate understanding of plan of care, disease process/condition, diagnostic tests and medications  Outcome: Progressing     Problem: Hemodynamics  Goal: Patient's hemodynamics, fluid balance and neurologic status will be stable or improve  Outcome: Progressing     Problem: Fluid Volume  Goal: Fluid volume balance will be maintained  Outcome: Progressing     Problem: Urinary - Renal Perfusion  Goal: Ability to achieve and maintain adequate renal perfusion and functioning will improve  Outcome: Progressing     Problem: Respiratory  Goal: Patient will achieve/maintain optimum respiratory ventilation and gas exchange  Outcome: Progressing     Problem: Depression  Goal: Patient and family/caregiver will verbalize accurate information about at least two of the possible causes of depression, three-four of the signs and symptoms of depression  Outcome: Progressing     Problem: Fall Risk  Goal: Patient will remain free from falls  Outcome: Progressing     Problem: Skin Integrity  Goal: Skin integrity is maintained or improved  Outcome: Progressing       Patient is not progressing towards the following goals:

## 2024-01-24 NOTE — PROGRESS NOTES
Hospital Medicine Daily Progress Note    Date of Service  1/24/2024    Chief Complaint  Worsening surgical site wound healing    Hospital Course  Henry Kumari is a 68 y.o. 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  He had a recent admission 1224 through January 7 for repair of a humeral fracture and subsequent infection and debridement on January 5.  His infection grew out Staphylococcus epidermidis and he was discharged on Zyvox to complete a 21-day course.  He was transferred to a skilled nursing care and receiving his Zyvox per report.  Over the last few days he was having increasing arm pain and swelling with then a spontaneous leakage of fluid.  There were complaints of fevers, chills, and nausea.  Increased left arm pain.  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    Interval Problem Update  1/23/2024 wbc:3.6, k:3.2.  He went to surgery today for I+D of infection and revision.  Has hemovac drain present.  He is alert and oriented with clear speech.  States he is from [place] and has been sedentary over the last year and barely gets up with use of a walker usually requiring a wheelchair.  He states that seems at once a year he has been following due to his knees giving out.    1/24 sob improving, on O2, afebrile, generalized weakness, lives alone    I have discussed this patient's plan of care and discharge plan at IDT rounds today with Case Management, Nursing, Nursing leadership, and other members of the IDT team.    Consultants/Specialty  orthopedics    Code Status  Full Code    Disposition  The patient is not medically cleared for discharge to home or a post-acute facility.      I have placed the appropriate orders for post-discharge needs.    Review of Systems  Review of Systems   Constitutional:  Negative for chills, fever and malaise/fatigue.   HENT:  Negative for congestion.    Eyes:  Negative for blurred vision and double vision.    Respiratory:  Negative for cough and shortness of breath.    Cardiovascular:  Negative for chest pain and leg swelling.   Gastrointestinal:  Negative for abdominal pain, heartburn and nausea.   Genitourinary:  Negative for dysuria.   Musculoskeletal:  Positive for joint pain (left shoulder). Negative for myalgias.   Neurological:  Positive for weakness. Negative for sensory change and headaches.   Psychiatric/Behavioral:  Negative for depression. The patient is not nervous/anxious.         Physical Exam  Temp:  [35.9 °C (96.6 °F)-36.8 °C (98.2 °F)] 35.9 °C (96.6 °F)  Pulse:  [] 79  Resp:  [16-20] 20  BP: (114-161)/(69-95) 115/70  SpO2:  [90 %-100 %] 98 %    Physical Exam  Vitals reviewed.   Constitutional:       General: He is not in acute distress.     Appearance: Normal appearance. He is obese. He is ill-appearing. He is not toxic-appearing or diaphoretic.   HENT:      Head: Normocephalic and atraumatic.      Nose: Nose normal.      Mouth/Throat:      Mouth: Mucous membranes are moist.      Pharynx: No oropharyngeal exudate.   Eyes:      General:         Right eye: No discharge.         Left eye: No discharge.      Extraocular Movements: Extraocular movements intact.      Conjunctiva/sclera: Conjunctivae normal.      Pupils: Pupils are equal, round, and reactive to light.   Cardiovascular:      Rate and Rhythm: Normal rate and regular rhythm.      Pulses:           Radial pulses are 2+ on the right side and 2+ on the left side.        Dorsalis pedis pulses are 2+ on the right side and 2+ on the left side.      Heart sounds: No murmur heard.  Pulmonary:      Effort: Pulmonary effort is normal. No respiratory distress.      Breath sounds: Normal breath sounds. No stridor. No wheezing.   Abdominal:      General: Bowel sounds are normal. There is no distension.      Palpations: Abdomen is soft.      Tenderness: There is no abdominal tenderness.   Musculoskeletal:         General: No swelling or tenderness.       Cervical back: Neck supple. No muscular tenderness.      Right lower leg: No edema.      Left lower leg: Edema present.   Lymphadenopathy:      Cervical: No cervical adenopathy.   Skin:     Coloration: Skin is not jaundiced or pale.      Comments: Left arm with a Hemovac present clean dressing over the left lateral upper mid arm.  There are staples in various spots around the shoulder.   Neurological:      General: No focal deficit present.      Mental Status: He is alert and oriented to person, place, and time. Mental status is at baseline.      Cranial Nerves: No cranial nerve deficit.      Sensory: No sensory deficit.      Motor: Weakness present.      Coordination: Coordination normal.   Psychiatric:         Mood and Affect: Mood normal.         Behavior: Behavior normal.         Fluids    Intake/Output Summary (Last 24 hours) at 1/24/2024 1044  Last data filed at 1/24/2024 0742  Gross per 24 hour   Intake 400 ml   Output 1230 ml   Net -830 ml       Laboratory  Recent Labs     01/22/24  1043 01/23/24  0329   WBC 6.5 3.6*   RBC 3.88* 3.44*   HEMOGLOBIN 10.4* 9.2*   HEMATOCRIT 32.5* 28.7*   MCV 83.8 83.4   MCH 26.8* 26.7*   MCHC 32.0* 32.1*   RDW 41.1 40.8   PLATELETCT 225 165   MPV 10.5 10.3     Recent Labs     01/22/24  1043 01/23/24  0329   SODIUM 136 136   POTASSIUM 3.7 3.2*   CHLORIDE 98 99   CO2 24 23   GLUCOSE 104* 91   BUN 27* 24*   CREATININE 0.77 0.64   CALCIUM 8.8 8.3*     Recent Labs     01/22/24  2206   INR 1.36*               Imaging  DX-CHEST-LIMITED (1 VIEW)   Final Result         1. The right subclavian central venous access catheter terminates over the superior vena cava. No postprocedure visible pneumothorax.   2. Postsurgical changes in the left shoulder.   3. The remainder is stable.      CT-EXTREMITY, UPPER WITH LEFT   Final Result      1.  Wide separation of some of the fracture fragments of the surgically transfixed humeral fracture. There is some evidence of fracture healing. New fracture  is not excluded by this exam. Infection is not excluded by this exam.   2.  Fluid with scant gas at the surgically transfixed humeral fracture site could be sterile or infected   3.  Osteoarthritis   4.  18 mm LEFT upper lobe hamartoma      DX-CHEST-PORTABLE (1 VIEW)   Final Result      1.  Faint residual RIGHT basilar atelectasis, less likely pneumonia   2.  Persistently enlarged cardiac silhouette   3.  Atherosclerosis      DX-HUMERUS 2+ LEFT   Final Result      1. Healing comminuted fracture of the metadiaphysis of the left humerus with stable alignment of the intramedullary ruba and screws.   2. Increased opacity lateral to the distal aspect of the fracture in the left arm soft tissues, concerning for soft tissue abscess or hematoma. CT of the left humerus with IV contrast is recommended.           Assessment/Plan  * Sepsis (HCC)- (present on admission)  Assessment & Plan  Etiology is left humeral infection from prior surgical site.  Vancomycin and ceftriaxone  Monitor cultures  Status post IV fluids  Wound care  1/23/2024 patient had revision of the left upper surgical site for concerns of infection    Infection and inflammatory reaction due to internal fixation device of left humerus, initial encounter ([facility])- (present on admission)  Assessment & Plan  Wound care  Vancomycin and ceftriaxone for now  Orthopedics consulting and following.  Took patient for I+D with purulent drainage 1/23/24 with tobramycin powder given in wound  Monitor labs and cultures  ID to consult 1/24 1/24 decrease ivf, pt eval  - pt lives in his own apt, but has family support  - pt eval, may need snf    Anemia- (present on admission)  Assessment & Plan  1/23/2024 Hgb: 9.2<10.4  Status post acute surgery 1/23/2024 of left humerus    Mood disorder (HCC)- (present on admission)  Assessment & Plan  seroquel    Anxiolytic dependence (HCC)- (present on admission)  Assessment & Plan  No current BZ's at this time    Chronic respiratory failure  with hypoxia ([facility])- (present on admission)  Assessment & Plan  At his baseline    BMI 45.0-49.9, adult ([facility])- (present on admission)  Assessment & Plan  Body mass index is 42.91 kg/m².  Encourage weight loss    Class 3 severe obesity due to excess calories with serious comorbidity and body mass index (BMI) of 45.0 to 49.9 in adult ([facility])- (present on admission)  Assessment & Plan  Outpatient counseling    SANTOSH on CPAP- (present on admission)  Assessment & Plan  Ordered CPAP with RT  Monitor for nocturnal hypoxia    COPD (chronic obstructive pulmonary disease) ([facility])- (present on admission)  Assessment & Plan  No current exacerbation  At his baseline hypoxia  As needed bronchodilators    History of DVT (deep vein thrombosis)- (present on admission)  Assessment & Plan  Hold outpatient xarelto for possible ortho procedure  Restart once able.  Covering the interim with heparin DVT dosing         VTE prophylaxis:   SCDs/TEDs      I have performed a physical exam and reviewed and updated ROS and Plan today (1/24/2024). In review of yesterday's note (1/23/2024), there are no changes except as documented above.

## 2024-01-24 NOTE — ASSESSMENT & PLAN NOTE
Wound care  Vancomycin and ceftriaxone for now  Orthopedics consulting and following.  Took patient for I+D with purulent drainage 1/23/24 with tobramycin powder given in wound  Monitor labs and cultures  ID to consult 1/24 1/24 decrease ivf, pt eval  - pt lives in his own apt, but has family support  - pt eval, may need snf    1/25 pt rec for snf, CM to assist  - cont iv abx, f/u ID for duration of tx  Dc ivf    1/26 now on cefepime per Id, f/u sens    1/27 per ID, cefepime 3/7, 6 weeks rec  - picc line ordered  ? Hardware removal, ortho to f/u  -Continue Cefepime 2g IV q8h for at least 6 weeks; start date 01/25/24 with tentative end date 03/07/24  -Recommend removal of hardware  -If unable to remove hardware, then after completion of course of IV antibiotics, recommend starting on ciprofloxacin 750mg PO bid for at least 4 weeks, then ciprofloxacin 500mg PO bid thereafter indefinitely for chronic suppression    Not want to return to [facility] nursing [facility],  to assist with new placement    1/28 PICC line, plan for 6 weeks of antibiotics  Patient wants to go to another skilled facility,  to assist
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
## 2024-01-24 NOTE — PROGRESS NOTES
4 Eyes Skin Assessment Completed by JORDYN Weber and JORDYN Goldberg.    Head WDL  Ears WDL  Nose WDL  Mouth WDL  Neck WDL  Breast/Chest WDL  Shoulder Blades WDL  Spine WDL  (R) Arm/Elbow/Hand Redness, Bruising, and Scab  (L) Arm/Elbow/Hand- upper arm/shoulder surgical dressing CDI w/ hemovac in place  Abdomen Redness underneath folds  Groin Redness  Scrotum/Coccyx/Buttocks Redness, Blanching, Excoriation, and Moisture Fissure  (R) Leg Redness, Blanching, and Scab- 2nd toe  (L) Leg Redness, Blanching, and Scab- shin  (R) Heel/Foot/Toe Blanching, calloused  (L) Heel/Foot/Toe Blanching, calloused          Devices In Places Nasal Cannula, PIV, hemovac      Interventions In Place Gray Ear Foams, Heels Loaded W/Pillows, and Pressure Redistribution Mattress, barrier cream    Possible Skin Injury No    Pictures Uploaded Into Epic N/A  Wound Consult Placed N/A  RN Wound Prevention Protocol Ordered Yes

## 2024-01-24 NOTE — PROGRESS NOTES
"    Orthopedic PA Progress Note    Interval changes: shoulder pain improving  limited shoulder movement      Patient doing well this morning. Sitting at edge of bed, with PT at bedside. Alert and oriented x 4.     Shoulder dressings are CDI, hemovac drain intact, 180 cc sero-sanguinous drainage over last 24 hours.     WBAT in left shoulder     POD 1 for Left arm abscess debridement and I&D completed by Dr. Arnol Masterson on 1/23/24   OR cultures pending at this time, pt. Remains on IV vancomycin and PO doxycycline    ROS - Patient denies any new issues. Denies any numbness or tingling. Pain well controlled.    /70   Pulse 79   Temp 35.9 °C (96.6 °F) (Temporal)   Resp 20   Ht 1.88 m (6' 2\")   Wt (!) 152 kg (334 lb 3.5 oz)   SpO2 98%     Patient seen and examined  No acute distress  Breathing non labored  RRR  Left upper extremity/shoulder:  - dressing to left anterior shoulder CDI with hemovac drain  - neurovascular status intact      Recent Labs     01/22/24  1043 01/23/24  0329   WBC 6.5 3.6*   RBC 3.88* 3.44*   HEMOGLOBIN 10.4* 9.2*   HEMATOCRIT 32.5* 28.7*   MCV 83.8 83.4   MCH 26.8* 26.7*   MCHC 32.0* 32.1*   RDW 41.1 40.8   PLATELETCT 225 165   MPV 10.5 10.3         Active Hospital Problems    Diagnosis     Infection and inflammatory reaction due to internal fixation device of left humerus, initial encounter ([facility]) [T84.611A]     Sepsis ([facility]) [A41.9]     Anemia [D64.9]     Mood disorder ([facility]) [F39]     Chronic respiratory failure with hypoxia ([facility]) [J96.11]     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 Arnol Masterson on 1/23/24     Wt bearing status - WBAT  Wound care/Drains - Dressings to be changed every other day by nursing. Or PRN for saturation starting POD#2  Future Procedures - None planned   DVT chemo prophylaxis in place: Heparin subq q 8   Sutures/Staples out- 14-21 days post operatively. Removal will completed by ortho JULIETTE's unless transferred. Sutures to remain in place; staples to be removed 1/25  PT/OT-initiated  Antibiotics: Remains on IV vancomycin and PO doxycycline  DVT Prophylaxis- TEDS/SCDs/Foot pumps  [place]-not needed per ortho  Case Coordination for Discharge Planning - Disposition per therapy recs.

## 2024-01-24 NOTE — THERAPY
"Physical Therapy   Initial Evaluation     Patient Name: Henry Kumari  Age:  68 y.o., Sex:  male  Medical Record #: [id number]  Today's Date: 1/24/2024     Precautions  Precautions: Fall Risk;Weight Bearing As Tolerated Left Lower Extremity  Comments: per past Ortho notes, no formal orders    Assessment  Patient is 68 y.o. male admitted with L UE abscess, presenting to physical therapy s/p L UE I&D with hemovac in place; WBAT. Pt resides in [place], [place] in an [place]\" [facility]. Niece is able to assist as needed when home, but works full time.  Pt demonstrates mild impairments in strength, activity tolerance and balance which impact functional ambulation and transfers. Today, pt able to complete bed mobility at SPV level, and stand with CGA from EOB, ambulated 10 ft in room with bariatric FWW and CGA. Pt reports his ambulation distance is limited by feeling his B LE will \"give way\" which he finds occurs mostly when cold outside.     Pt reports his L UE is usually his \"go to\" arm for strength and functional use (aside from eating) but is very limited due to prior surgical intervention and current infection, recommend OT consult to further assess functional use of L UE for ADL/IADLs.     Pt will benefit from acute PT interventions to address impairments noted below. Recommending post acute placement at this time.     Plan    Physical Therapy Initial Treatment Plan   Treatment Plan : Bed Mobility, Equipment, Gait Training, Neuro Re-Education / Balance, Self Care / Home Evaluation, Stair Training, Therapeutic Activities, Therapeutic Exercise  Treatment Frequency: 3 Times per Week  Duration: Until Therapy Goals Met       Discharge Recommendations: Recommend post-acute placement for additional physical therapy services prior to discharge home (would prefer placement closer to home in [place], [place]).     Objective     01/24/24 1028   Precautions   Precautions Fall Risk;Weight Bearing As Tolerated Left Lower " "Extremity   Comments per past Ortho notes, no formal orders   Vitals   O2 (LPM) 2   O2 Delivery Device Nasal Cannula   Vitals Comments baseline O2 per pt, \"which I sometimes use and sometimes not\"   Pain 0 - 10 Group   Therapist Pain Assessment During Activity;Nurse Notified  (L UE, not rated. TTP)   Prior Living Situation   Prior Services Skilled Home Health Services   Housing / Facility 1 Story House   Steps Into Home 1  (8 inch step into home. Has ramp access leading to home)   Steps In Home 0   Bathroom Set up Bathtub / Shower Combination;Shower Chair   Equipment Owned 4-Wheel Walker;Front-Wheel Walker;Tub / Shower Seat;Scooter;Ramp;Oxygen   Lives with - Patient's Self Care Capacity Related Adult   Comments From ISAURO [place]. Lives on Neice's property in \"in law quarters\"   Prior Level of Functional Mobility   Bed Mobility Independent   Transfer Status Independent   Ambulation Independent   Ambulation Distance household   Assistive Devices Used 4-Wheel Walker   Stairs Independent   Comments has a mobility scooter for community access, does not ambulate much as baseline due to COPD per report   Cognition    Level of Consciousness Alert   Comments pleasant and cooperative with PT   Active ROM Upper Body   Comments L UE limited by pain   Active ROM Lower Body    Comments limited by body habitus and generalized weakness   Strength Lower Body   Lower Body Strength  X   Gross Strength Generalized Weakness, Equal Bilaterally   Balance Assessment   Sitting Balance (Static) Fair +   Sitting Balance (Dynamic) Fair +   Standing Balance (Static) Fair   Standing Balance (Dynamic) Fair   Weight Shift Sitting Fair   Weight Shift Standing Fair   Comments with FWW   Bed Mobility    Supine to Sit Supervised  (HOB elevated at 20-30 degress)   Sit to Supine Supervised  (HOB flat)   Scooting Supervised   Comments intermittent use of bed rail as needed   Gait Analysis   Gait Level Of Assist Contact Guard Assist   Assistive Device Front " Wheel Walker   Distance (Feet) 10   # of Times Distance was Traveled 1   Deviation Bradykinetic  (dec step length)   # of Stairs Climbed 0   Weight Bearing Status WBAT L UE   Comments overall steady, slight difficulty managing bariatric FWW in small environment   Functional Mobility   Sit to Stand Moderate Assist  (standing from low toilet to CGA standing from EOB (uses momentum to assist stand))   Bed, Chair, Wheelchair Transfer Contact Guard Assist   Transfer Method Stand Step   How much difficulty does the patient currently have...   Turning over in bed (including adjusting bedclothes, sheets and blankets)? 3   Sitting down on and standing up from a chair with arms (e.g., wheelchair, bedside commode, etc.) 3   Moving from lying on back to sitting on the side of the bed? 3   How much help from another person does the patient currently need...   Moving to and from a bed to a chair (including a wheelchair)? 3   Need to walk in a hospital room? 3   Climbing 3-5 steps with a railing? 3   6 clicks Mobility Score 18   Activity Tolerance   Sitting Edge of Bed 10 min   Standing 6-7 min   Short Term Goals    Short Term Goal # 1 pt will complete supine <> sit without bed features and SPV in 6 visits   Short Term Goal # 2 pt will complete sit <> stand and functional transfers with LRAD and SPV to improve mobility independence in 6 vistis   Short Term Goal # 3 pt will ambulate > 50 ft with LRAD and SPV to access home environment in 6 visits   Short Term Goal # 4 Pt will negotiate 1 step with LRAD and SPV to enter/exit home in 6 visits   Education Group   Education Provided Role of Physical Therapist   Role of Physical Therapist Patient Response Patient;Acceptance;Explanation;Verbal Demonstration   Physical Therapy Initial Treatment Plan    Treatment Plan  Bed Mobility;Equipment;Gait Training;Neuro Re-Education / Balance;Self Care / Home Evaluation;Stair Training;Therapeutic Activities;Therapeutic Exercise   Treatment  Frequency 3 Times per Week   Duration Until Therapy Goals Met   Problem List    Problems Pain;Impaired Ambulation;Functional Strength Deficit;Impaired Balance;Decreased Activity Tolerance;Impaired Bed Mobility;Impaired Transfers   Anticipated Discharge Equipment and Recommendations   Discharge Recommendations Recommend post-acute placement for additional physical therapy services prior to discharge home

## 2024-01-25 LAB
ANION GAP SERPL CALC-SCNC: 7 MMOL/L (ref 7–16)
BACTERIA TISS AEROBE CULT: ABNORMAL
BACTERIA TISS AEROBE CULT: ABNORMAL
BUN SERPL-MCNC: 18 MG/DL (ref 8–22)
CALCIUM SERPL-MCNC: 8.4 MG/DL (ref 8.5–10.5)
CHLORIDE SERPL-SCNC: 103 MMOL/L (ref 96–112)
CO2 SERPL-SCNC: 27 MMOL/L (ref 20–33)
CREAT SERPL-MCNC: 0.54 MG/DL (ref 0.5–1.4)
GFR SERPLBLD CREATININE-BSD FMLA CKD-EPI: 108 ML/MIN/1.73 M 2
GLUCOSE SERPL-MCNC: 101 MG/DL (ref 65–99)
GRAM STN SPEC: ABNORMAL
POTASSIUM SERPL-SCNC: 3.6 MMOL/L (ref 3.6–5.5)
SIGNIFICANT IND 70042: ABNORMAL
SITE SITE: ABNORMAL
SODIUM SERPL-SCNC: 137 MMOL/L (ref 135–145)
SOURCE SOURCE: ABNORMAL

## 2024-01-25 PROCEDURE — 700105 HCHG RX REV CODE 258

## 2024-01-25 PROCEDURE — A9270 NON-COVERED ITEM OR SERVICE: HCPCS | Performed by: INTERNAL MEDICINE

## 2024-01-25 PROCEDURE — 94660 CPAP INITIATION&MGMT: CPT

## 2024-01-25 PROCEDURE — 99232 SBSQ HOSP IP/OBS MODERATE 35: CPT | Performed by: INTERNAL MEDICINE

## 2024-01-25 PROCEDURE — 99233 SBSQ HOSP IP/OBS HIGH 50: CPT | Mod: GC | Performed by: INTERNAL MEDICINE

## 2024-01-25 PROCEDURE — 700111 HCHG RX REV CODE 636 W/ 250 OVERRIDE (IP): Performed by: HOSPITALIST

## 2024-01-25 PROCEDURE — A9270 NON-COVERED ITEM OR SERVICE: HCPCS | Performed by: HOSPITALIST

## 2024-01-25 PROCEDURE — 700111 HCHG RX REV CODE 636 W/ 250 OVERRIDE (IP)

## 2024-01-25 PROCEDURE — 80048 BASIC METABOLIC PNL TOTAL CA: CPT

## 2024-01-25 PROCEDURE — 700105 HCHG RX REV CODE 258: Performed by: INTERNAL MEDICINE

## 2024-01-25 PROCEDURE — 770006 HCHG ROOM/CARE - MED/SURG/GYN SEMI*

## 2024-01-25 PROCEDURE — 700102 HCHG RX REV CODE 250 W/ 637 OVERRIDE(OP): Performed by: INTERNAL MEDICINE

## 2024-01-25 PROCEDURE — 36415 COLL VENOUS BLD VENIPUNCTURE: CPT

## 2024-01-25 PROCEDURE — 700102 HCHG RX REV CODE 250 W/ 637 OVERRIDE(OP): Performed by: HOSPITALIST

## 2024-01-25 PROCEDURE — 700111 HCHG RX REV CODE 636 W/ 250 OVERRIDE (IP): Mod: JZ | Performed by: INTERNAL MEDICINE

## 2024-01-25 RX ADMIN — ATORVASTATIN CALCIUM 20 MG: 20 TABLET, FILM COATED ORAL at 20:07

## 2024-01-25 RX ADMIN — OXYCODONE HYDROCHLORIDE 10 MG: 10 TABLET ORAL at 07:38

## 2024-01-25 RX ADMIN — OXYCODONE HYDROCHLORIDE AND ACETAMINOPHEN 1000 MG: 500 TABLET ORAL at 05:07

## 2024-01-25 RX ADMIN — QUETIAPINE FUMARATE 100 MG: 100 TABLET ORAL at 05:06

## 2024-01-25 RX ADMIN — TIOTROPIUM BROMIDE INHALATION SPRAY 5 MCG: 3.12 SPRAY, METERED RESPIRATORY (INHALATION) at 07:43

## 2024-01-25 RX ADMIN — HEPARIN SODIUM 5000 UNITS: 5000 INJECTION, SOLUTION INTRAVENOUS; SUBCUTANEOUS at 14:06

## 2024-01-25 RX ADMIN — DOXYCYCLINE 100 MG: 100 TABLET, FILM COATED ORAL at 05:06

## 2024-01-25 RX ADMIN — NYSTATIN: 100000 POWDER TOPICAL at 17:05

## 2024-01-25 RX ADMIN — QUETIAPINE FUMARATE 100 MG: 100 TABLET ORAL at 17:05

## 2024-01-25 RX ADMIN — GEMFIBROZIL 600 MG: 600 TABLET ORAL at 05:07

## 2024-01-25 RX ADMIN — HYDROMORPHONE HYDROCHLORIDE 0.5 MG: 1 INJECTION, SOLUTION INTRAMUSCULAR; INTRAVENOUS; SUBCUTANEOUS at 15:23

## 2024-01-25 RX ADMIN — OXYCODONE HYDROCHLORIDE 10 MG: 10 TABLET ORAL at 22:49

## 2024-01-25 RX ADMIN — Medication 220 MG: at 05:07

## 2024-01-25 RX ADMIN — OXYCODONE HYDROCHLORIDE 10 MG: 10 TABLET ORAL at 11:03

## 2024-01-25 RX ADMIN — GEMFIBROZIL 600 MG: 600 TABLET ORAL at 17:05

## 2024-01-25 RX ADMIN — FLUOXETINE HYDROCHLORIDE 20 MG: 20 CAPSULE ORAL at 05:06

## 2024-01-25 RX ADMIN — MOMETASONE FUROATE AND FORMOTEROL FUMARATE DIHYDRATE 2 PUFF: 200; 5 AEROSOL RESPIRATORY (INHALATION) at 20:07

## 2024-01-25 RX ADMIN — OXYCODONE HYDROCHLORIDE 10 MG: 10 TABLET ORAL at 14:06

## 2024-01-25 RX ADMIN — CEFEPIME 2 G: 2 INJECTION, POWDER, FOR SOLUTION INTRAVENOUS at 14:17

## 2024-01-25 RX ADMIN — CLONAZEPAM 1 MG: 1 TABLET ORAL at 17:05

## 2024-01-25 RX ADMIN — NYSTATIN: 100000 POWDER TOPICAL at 05:08

## 2024-01-25 RX ADMIN — MOMETASONE FUROATE AND FORMOTEROL FUMARATE DIHYDRATE 2 PUFF: 200; 5 AEROSOL RESPIRATORY (INHALATION) at 07:43

## 2024-01-25 RX ADMIN — METOPROLOL SUCCINATE 100 MG: 100 TABLET, EXTENDED RELEASE ORAL at 05:06

## 2024-01-25 RX ADMIN — HEPARIN SODIUM 5000 UNITS: 5000 INJECTION, SOLUTION INTRAVENOUS; SUBCUTANEOUS at 05:07

## 2024-01-25 RX ADMIN — HEPARIN SODIUM 5000 UNITS: 5000 INJECTION, SOLUTION INTRAVENOUS; SUBCUTANEOUS at 20:07

## 2024-01-25 RX ADMIN — THIAMINE HYDROCHLORIDE 500 MG: 100 INJECTION, SOLUTION INTRAMUSCULAR; INTRAVENOUS at 05:03

## 2024-01-25 RX ADMIN — CLONAZEPAM 1 MG: 1 TABLET ORAL at 05:07

## 2024-01-25 RX ADMIN — HYDROMORPHONE HYDROCHLORIDE 0.5 MG: 1 INJECTION, SOLUTION INTRAMUSCULAR; INTRAVENOUS; SUBCUTANEOUS at 03:45

## 2024-01-25 RX ADMIN — OXYCODONE HYDROCHLORIDE 10 MG: 10 TABLET ORAL at 20:06

## 2024-01-25 ASSESSMENT — ENCOUNTER SYMPTOMS
SENSORY CHANGE: 0
DEPRESSION: 0
WEAKNESS: 1
MYALGIAS: 0
NERVOUS/ANXIOUS: 0
ABDOMINAL PAIN: 0
BACK PAIN: 0
HEADACHES: 0
FEVER: 0
SHORTNESS OF BREATH: 0
NAUSEA: 0

## 2024-01-25 NOTE — PROGRESS NOTES
1010: Pt's medication cefepime (Maxipime) 2 g in  mL IVPB was given per MAR. IV pump wifi not working. Notified charge RN. Restarted the system.

## 2024-01-25 NOTE — PROGRESS NOTES
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     Recently admitted from 12/26/23 to 01/05/24 after GLF with L humerus fracture s/p ORIF with intramedullary nailing 12/28/23. Wound culture 01/01/24 grew pansensitive Staphylococcus epidermidis. Went back to OR 01/04/24 due to wound dehiscence for I&D with secondary closure and started on 14-day course of Linezolid, end date 01/18/24. Patient was discharged to SNF.     L humerus x-ray 01/22 showing healing comminuted fracture of metadiaphysis of L humerus with stable alignment of intramedullary ruba and screws, and increase opacity lateral to distal aspect of fracture in the L arm soft tissue concerning for abscess or hematoma. CT L upper extremity w/cont 01/22 showing wide separation of some fracture fragment of surgically transfixed humeral fracture and fluid with scant gas at fracture site. CXRs 01/22 and 01/23 neg. Patient is s/p I&D of left humeral abscess with wound vac placement 01/23/24. Blood cultures 01/22/24 NGTD. OR cultures 01/23/24 NGTD. UA 01/22/24 neg. MRSA nares neg.     Patient states that their left arm feels much better than when they came in. States that for several days before coming to the ED they were having fever, chills, and nausea. They state that they also noticed drainage from their surgical site; not able to specify if purulent.    Interim History:   01/25: Patient states that their L arm feels much better. Operative tissue culture 01/23 growing Pseudomonas aeruginosa, resistant to Gentamicin. Started on Cefepime.    Current  "Antimicrobials:  Cefepime 01/25-    Previous Antimicrobials:  Doxycycline 01/24-01/25  Ceftriaxone 01/22-01/23  Vancomycin 01/22-01/24  Tobramycin powder 01/23-01/23  Linezolid 01/04-01/21    Allergies/Intolerances:  Allergies   Allergen Reactions    Tape Rash     Pt states tape removes his skin         Most Recent Vital Signs:  /66   Pulse 92   Temp 35.8 °C (96.5 °F) (Temporal)   Resp 18   Ht 1.88 m (6' 2\")   Wt (!) 152 kg (334 lb 3.5 oz)   SpO2 96%   BMI 42.91 kg/m²   Temp  Min: 35.7 °C (96.2 °F)  Max: 36.4 °C (97.6 °F)    Physical Exam:  General: in no acute distress, +obese  HEENT: sclera anicteric, MMM, no oral lesions  Neck: no LAD  Chest: CTAB with no r/r/w, normal work of breathing  Cardiac: RRR, normal S1 S2, no m/r/g   Abdomen: +bs, soft, NTND  Extremities: no edema, WWP, +b/l LE venous stasis changes   Skin: warm and dry, +dressing in place over LUE with wound vac   Neuro: alert    Pertinent Lab Results:  Recent Labs     01/22/24  1043 01/23/24  0329   WBC 6.5 3.6*      Recent Labs     01/22/24  1043 01/23/24  0329   HEMOGLOBIN 10.4* 9.2*   HEMATOCRIT 32.5* 28.7*   MCV 83.8 83.4   MCH 26.8* 26.7*   PLATELETCT 225 165         Recent Labs     01/22/24  1043 01/23/24  0329 01/25/24  0118   SODIUM 136 136 137   POTASSIUM 3.7 3.2* 3.6   CHLORIDE 98 99 103   CO2 24 23 27   CREATININE 0.77 0.64 0.54        Invalid input(s): \"ALT\", \"ALKPHOS\", \"BILITOT\", \"TOTALBILIRUB\", \"BILIRUBINTOT\", \"BILIRUBINDIR\", \"BILIRUBININD\", \"ALKALINEPHOS\"     Microbiology:  Blood Culture   Date Value Ref Range Status   04/27/2018 No growth after 5 days of incubation.  Final        Studies:      IMPRESSSION:   1. Sepsis, resolved                       2. Left upper extremity abscess  3. Left humerus fracture s/p ORIF and IMN 12/28/23, I&D 01/04/24 and 01/23/24  4. Lactic acidosis, resolved  5. Normocytic anemia  6. 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 Patient is s/p I&D of left humeral abscess with wound vac placement 01/23/24. OR cultures 01/23 growing Pseudomonas aeruginosa, requiring escalation from doxycycline to antipseudomonal such as cefepime. Will need treatment with antipseudomonal IV antibiotics for at least 6 weeks, and will need removal of hardware.     -OR cultures 01/23; growing Pseudomonas aeruginosa, resistant to Gentamicin  -Follow blood cultures 01/22; NGTD  -Discontinue Doxycycline  -Start on Cefepime 2g IV q8h for at least 6 weeks  -Recommend removal of hardware  -Patient will need PICC line placement     Plan of care discussed with Ely Carrasquillo D.O.. Will continue to follow.     Evan Dunn M.D.  [facility] IM PGY-3     Discussed with my attending, Dr. Luanne Holliday.

## 2024-01-25 NOTE — CARE PLAN
The patient is Stable - Low risk of patient condition declining or worsening    Shift Goals  Clinical Goals: Monitor hemovac output, pt will be able to reach pain comfort goal of being able to sleep comfortably, maintain skin integrity, pt will remain free from falls, EOB for meals, IV fluids  Patient Goals: sleep comfortably  Family Goals: CLOTILDE    Progress made toward(s) clinical / shift goals: 65 mL output from hemovac, pt remained free from falls, pt sat up in chair, pt able to turn self independently, pt sat on EOB for meals, pt was able to sleep comfortably in bed, pt medicated per MAR for pain relief, pt received continuous IV fluids      Problem: Pain - Standard  Goal: Alleviation of pain or a reduction in pain to the patient’s comfort goal  Outcome: Progressing     Problem: Knowledge Deficit - Standard  Goal: Patient and family/care givers will demonstrate understanding of plan of care, disease process/condition, diagnostic tests and medications  Outcome: Progressing     Problem: Fluid Volume  Goal: Fluid volume balance will be maintained  Outcome: Progressing     Problem: Fall Risk  Goal: Patient will remain free from falls  Outcome: Progressing     Problem: Skin Integrity  Goal: Skin integrity is maintained or improved  Outcome: Progressing     Problem: Mobility  Goal: Patient's capacity to carry out activities will improve  Outcome: Progressing       Patient is not progressing towards the following goals:

## 2024-01-25 NOTE — CARE PLAN
The patient is Stable - Low risk of patient condition declining or worsening    Shift Goals  Clinical Goals: monitor hemovac output, pain management.  Patient Goals: rest, sleep, pain control.  Family Goals: kenroy    Progress made toward(s) clinical / shift goals:  patient verbalizes understanding of plan of care. Patient repositioned himself in bed. Uses call light appropriately. Follows fall risk protocol. Pain receiving scheduled analgesics. Patient reports pain reduction to comfort goal 3/10.     Problem: Pain - Standard  Goal: Alleviation of pain or a reduction in pain to the patient’s comfort goal  Outcome: Progressing       Patient is not progressing towards the following goals:

## 2024-01-25 NOTE — CARE PLAN
Problem: Pain - Standard  Goal: Alleviation of pain or a reduction in pain to the patient’s comfort goal  Outcome: Progressing     Problem: Knowledge Deficit - Standard  Goal: Patient and family/care givers will demonstrate understanding of plan of care, disease process/condition, diagnostic tests and medications  Outcome: Progressing     Problem: Respiratory  Goal: Patient will achieve/maintain optimum respiratory ventilation and gas exchange  Outcome: Progressing     Problem: Fall Risk  Goal: Patient will remain free from falls  Outcome: Progressing   The patient is Stable - Low risk of patient condition declining or worsening    Shift Goals  Clinical Goals: Pain control, monitor hemovac output  Patient Goals: rest, pain control  Family Goals: CLOTILDE- family not present    Progress made toward(s) clinical / shift goals:  Pain well controlled with scheduled and PRN medications. Hemovac output serosang. Patient able to ambulate in the room with PT.    Patient is not progressing towards the following goals:

## 2024-01-25 NOTE — WOUND TEAM
[facility]  Inpatient Services  Wound and Skin Care Brief Evaluation    Admission Date: 1/22/2024     Last order of IP CONSULT TO WOUND CARE was found on 1/22/2024 from Hospital Encounter on 1/22/2024     HPI, PMH, SH: Reviewed    Chief Complaint   Patient presents with    Post-Op Complications     Pt reports increased pain to L arm s/p surgical repair of humerus on 12/26 and debridement on 1/5. Redness noted to L upper arm, sutures and staples in place.      Diagnosis: Sepsis ([facility]) [A41.9]  Infection and inflammatory reaction due to internal fixation device of left humerus, initial encounter ([facility]) [T84.611A]    Unit where seen by Wound Team: S525/01     Wound consult placed regarding LUE, sacrum, & R toes. Chart and images reviewed. This clinician in to assess patient. Patient pleasant and agreeable.  LUE is a surgical site, please defer to Ortho for management of this incision.  Sacrum with slight moisture fissure present, recommend keeping area dry as possible.  R toes with small abrasion, can be left KEVAN.  BL heels blanching and intact, bruising noted to L lateral heel.    No pressure injuries or advanced wound care needs identified. Wound consult completed. Wound team signing off, re-consult if patient has further advanced wound care needs.                 PREVENTATIVE INTERVENTIONS:    Q shift Antony - performed per nursing policy  Q shift pressure point assessments - performed per nursing policy    Surface/Positioning  Standard/trauma mattress - Currently in Place

## 2024-01-25 NOTE — PROGRESS NOTES
Received report from day shift RN. Assumed pt care. On initial rounds and assessment pt in bed, AAOX4, reports pain 8/10 to L arm and shoulder. Scheduled analgesic administered. Patient states pain increases during activity.  Educated on activity and self care. Bed is locked and in low position. Call bell within reach. Will continue to monitor.

## 2024-01-25 NOTE — PROGRESS NOTES
Assumed care of pt at 0700. Received report from nightshift RN. Pt is A&O x4 and on 2L NC. Pt presents with even and non-labored breathing and denies SOB. Pt reports pain 7/10 on his L shoulder and was medicated per MAR for relief. Bed in lowest and locked position. Bed alarm is on. Call light within reach and pt declines any further needs at this time.

## 2024-01-26 LAB
ANION GAP SERPL CALC-SCNC: 8 MMOL/L (ref 7–16)
BASOPHILS # BLD AUTO: 0.9 % (ref 0–1.8)
BASOPHILS # BLD: 0.04 K/UL (ref 0–0.12)
BUN SERPL-MCNC: 14 MG/DL (ref 8–22)
CALCIUM SERPL-MCNC: 8.5 MG/DL (ref 8.5–10.5)
CHLORIDE SERPL-SCNC: 104 MMOL/L (ref 96–112)
CO2 SERPL-SCNC: 25 MMOL/L (ref 20–33)
CREAT SERPL-MCNC: 0.48 MG/DL (ref 0.5–1.4)
EOSINOPHIL # BLD AUTO: 0.15 K/UL (ref 0–0.51)
EOSINOPHIL NFR BLD: 3.3 % (ref 0–6.9)
ERYTHROCYTE [DISTWIDTH] IN BLOOD BY AUTOMATED COUNT: 40.6 FL (ref 35.9–50)
GFR SERPLBLD CREATININE-BSD FMLA CKD-EPI: 112 ML/MIN/1.73 M 2
GLUCOSE SERPL-MCNC: 86 MG/DL (ref 65–99)
HCT VFR BLD AUTO: 25.4 % (ref 42–52)
HGB BLD-MCNC: 8.3 G/DL (ref 14–18)
IMM GRANULOCYTES # BLD AUTO: 0.09 K/UL (ref 0–0.11)
IMM GRANULOCYTES NFR BLD AUTO: 2 % (ref 0–0.9)
LYMPHOCYTES # BLD AUTO: 1.14 K/UL (ref 1–4.8)
LYMPHOCYTES NFR BLD: 24.9 % (ref 22–41)
MCH RBC QN AUTO: 26.7 PG (ref 27–33)
MCHC RBC AUTO-ENTMCNC: 32.7 G/DL (ref 32.3–36.5)
MCV RBC AUTO: 81.7 FL (ref 81.4–97.8)
MONOCYTES # BLD AUTO: 0.85 K/UL (ref 0–0.85)
MONOCYTES NFR BLD AUTO: 18.6 % (ref 0–13.4)
NEUTROPHILS # BLD AUTO: 2.3 K/UL (ref 1.82–7.42)
NEUTROPHILS NFR BLD: 50.3 % (ref 44–72)
NRBC # BLD AUTO: 0.02 K/UL
NRBC BLD-RTO: 0.4 /100 WBC (ref 0–0.2)
PLATELET # BLD AUTO: 123 K/UL (ref 164–446)
PMV BLD AUTO: 10.1 FL (ref 9–12.9)
POTASSIUM SERPL-SCNC: 4.1 MMOL/L (ref 3.6–5.5)
RBC # BLD AUTO: 3.11 M/UL (ref 4.7–6.1)
SODIUM SERPL-SCNC: 137 MMOL/L (ref 135–145)
WBC # BLD AUTO: 4.6 K/UL (ref 4.8–10.8)

## 2024-01-26 PROCEDURE — 85025 COMPLETE CBC W/AUTO DIFF WBC: CPT

## 2024-01-26 PROCEDURE — 700102 HCHG RX REV CODE 250 W/ 637 OVERRIDE(OP): Performed by: INTERNAL MEDICINE

## 2024-01-26 PROCEDURE — 700111 HCHG RX REV CODE 636 W/ 250 OVERRIDE (IP)

## 2024-01-26 PROCEDURE — 700111 HCHG RX REV CODE 636 W/ 250 OVERRIDE (IP): Performed by: HOSPITALIST

## 2024-01-26 PROCEDURE — 700102 HCHG RX REV CODE 250 W/ 637 OVERRIDE(OP): Performed by: HOSPITALIST

## 2024-01-26 PROCEDURE — 99233 SBSQ HOSP IP/OBS HIGH 50: CPT | Mod: GC | Performed by: INTERNAL MEDICINE

## 2024-01-26 PROCEDURE — 770006 HCHG ROOM/CARE - MED/SURG/GYN SEMI*

## 2024-01-26 PROCEDURE — A9270 NON-COVERED ITEM OR SERVICE: HCPCS | Performed by: HOSPITALIST

## 2024-01-26 PROCEDURE — A9270 NON-COVERED ITEM OR SERVICE: HCPCS | Performed by: INTERNAL MEDICINE

## 2024-01-26 PROCEDURE — 80048 BASIC METABOLIC PNL TOTAL CA: CPT

## 2024-01-26 PROCEDURE — 700111 HCHG RX REV CODE 636 W/ 250 OVERRIDE (IP): Mod: JZ | Performed by: INTERNAL MEDICINE

## 2024-01-26 PROCEDURE — 700105 HCHG RX REV CODE 258: Performed by: INTERNAL MEDICINE

## 2024-01-26 PROCEDURE — 94660 CPAP INITIATION&MGMT: CPT

## 2024-01-26 PROCEDURE — 36415 COLL VENOUS BLD VENIPUNCTURE: CPT

## 2024-01-26 PROCEDURE — 99232 SBSQ HOSP IP/OBS MODERATE 35: CPT | Performed by: INTERNAL MEDICINE

## 2024-01-26 RX ADMIN — CEFEPIME 2 G: 2 INJECTION, POWDER, FOR SOLUTION INTRAVENOUS at 09:06

## 2024-01-26 RX ADMIN — TIOTROPIUM BROMIDE INHALATION SPRAY 5 MCG: 3.12 SPRAY, METERED RESPIRATORY (INHALATION) at 08:01

## 2024-01-26 RX ADMIN — MOMETASONE FUROATE AND FORMOTEROL FUMARATE DIHYDRATE 2 PUFF: 200; 5 AEROSOL RESPIRATORY (INHALATION) at 08:02

## 2024-01-26 RX ADMIN — NYSTATIN: 100000 POWDER TOPICAL at 17:16

## 2024-01-26 RX ADMIN — HEPARIN SODIUM 5000 UNITS: 5000 INJECTION, SOLUTION INTRAVENOUS; SUBCUTANEOUS at 05:31

## 2024-01-26 RX ADMIN — HYDROMORPHONE HYDROCHLORIDE 0.5 MG: 1 INJECTION, SOLUTION INTRAMUSCULAR; INTRAVENOUS; SUBCUTANEOUS at 11:42

## 2024-01-26 RX ADMIN — HEPARIN SODIUM 5000 UNITS: 5000 INJECTION, SOLUTION INTRAVENOUS; SUBCUTANEOUS at 14:09

## 2024-01-26 RX ADMIN — CLONAZEPAM 1 MG: 1 TABLET ORAL at 17:16

## 2024-01-26 RX ADMIN — OXYCODONE HYDROCHLORIDE 10 MG: 10 TABLET ORAL at 20:18

## 2024-01-26 RX ADMIN — HYDROMORPHONE HYDROCHLORIDE 0.5 MG: 1 INJECTION, SOLUTION INTRAMUSCULAR; INTRAVENOUS; SUBCUTANEOUS at 15:38

## 2024-01-26 RX ADMIN — GEMFIBROZIL 600 MG: 600 TABLET ORAL at 05:33

## 2024-01-26 RX ADMIN — HEPARIN SODIUM 5000 UNITS: 5000 INJECTION, SOLUTION INTRAVENOUS; SUBCUTANEOUS at 21:40

## 2024-01-26 RX ADMIN — OXYCODONE HYDROCHLORIDE 10 MG: 10 TABLET ORAL at 23:36

## 2024-01-26 RX ADMIN — QUETIAPINE FUMARATE 100 MG: 100 TABLET ORAL at 17:16

## 2024-01-26 RX ADMIN — FLUOXETINE HYDROCHLORIDE 20 MG: 20 CAPSULE ORAL at 05:31

## 2024-01-26 RX ADMIN — CLONAZEPAM 1 MG: 1 TABLET ORAL at 05:31

## 2024-01-26 RX ADMIN — OXYCODONE HYDROCHLORIDE 10 MG: 10 TABLET ORAL at 05:40

## 2024-01-26 RX ADMIN — METOPROLOL SUCCINATE 100 MG: 100 TABLET, EXTENDED RELEASE ORAL at 05:32

## 2024-01-26 RX ADMIN — OXYCODONE HYDROCHLORIDE AND ACETAMINOPHEN 1000 MG: 500 TABLET ORAL at 05:32

## 2024-01-26 RX ADMIN — NYSTATIN: 100000 POWDER TOPICAL at 05:41

## 2024-01-26 RX ADMIN — CEFEPIME 2 G: 2 INJECTION, POWDER, FOR SOLUTION INTRAVENOUS at 17:16

## 2024-01-26 RX ADMIN — MOMETASONE FUROATE AND FORMOTEROL FUMARATE DIHYDRATE 2 PUFF: 200; 5 AEROSOL RESPIRATORY (INHALATION) at 20:19

## 2024-01-26 RX ADMIN — OXYCODONE HYDROCHLORIDE 10 MG: 10 TABLET ORAL at 14:09

## 2024-01-26 RX ADMIN — GEMFIBROZIL 600 MG: 600 TABLET ORAL at 17:16

## 2024-01-26 RX ADMIN — Medication 220 MG: at 05:32

## 2024-01-26 RX ADMIN — OXYCODONE HYDROCHLORIDE 10 MG: 10 TABLET ORAL at 10:14

## 2024-01-26 RX ADMIN — CEFEPIME 2 G: 2 INJECTION, POWDER, FOR SOLUTION INTRAVENOUS at 01:13

## 2024-01-26 RX ADMIN — QUETIAPINE FUMARATE 100 MG: 100 TABLET ORAL at 05:32

## 2024-01-26 RX ADMIN — ATORVASTATIN CALCIUM 20 MG: 20 TABLET, FILM COATED ORAL at 20:18

## 2024-01-26 ASSESSMENT — ENCOUNTER SYMPTOMS
DEPRESSION: 0
SENSORY CHANGE: 0
HEADACHES: 0
COUGH: 0
DIZZINESS: 0
ABDOMINAL PAIN: 0
WEAKNESS: 1
HEARTBURN: 0
MYALGIAS: 0
SHORTNESS OF BREATH: 0
NAUSEA: 0
NERVOUS/ANXIOUS: 0
FEVER: 0
BACK PAIN: 0

## 2024-01-26 NOTE — PROGRESS NOTES
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     Recently admitted from 12/26/23 to 01/05/24 after GLF with L humerus fracture s/p ORIF with intramedullary nailing 12/28/23. Wound culture 01/01/24 grew pansensitive Staphylococcus epidermidis. Went back to OR 01/04/24 due to wound dehiscence for I&D with secondary closure and started on 14-day course of Linezolid, end date 01/18/24. Patient was discharged to SNF.     L humerus x-ray 01/22 showing healing comminuted fracture of metadiaphysis of L humerus with stable alignment of intramedullary ruba and screws, and increase opacity lateral to distal aspect of fracture in the L arm soft tissue concerning for abscess or hematoma. CT L upper extremity w/cont 01/22 showing wide separation of some fracture fragment of surgically transfixed humeral fracture and fluid with scant gas at fracture site. CXRs 01/22 and 01/23 neg. Patient is s/p I&D of left humeral abscess with wound vac placement 01/23/24. Blood cultures 01/22/24 NGTD. OR cultures 01/23/24 NGTD. UA 01/22/24 neg. MRSA nares neg.     Patient states that their left arm feels much better than when they came in. States that for several days before coming to the ED they were having fever, chills, and nausea. They state that they also noticed drainage from their surgical site; not able to specify if purulent.     Interim History:   01/25: Patient states that their L arm feels much better. Operative tissue culture 01/23 growing Pseudomonas aeruginosa, resistant to Gentamicin. Started on Cefepime.  01/26:  "NAEO. Afebrile. Patient states that their L arm continues to feel better. Denies nausea, vomiting, fever, chills.     Current Antimicrobials:  Cefepime 01/25-     Previous Antimicrobials:  Doxycycline 01/24-01/25  Ceftriaxone 01/22-01/23  Vancomycin 01/22-01/24  Tobramycin powder 01/23-01/23  Linezolid 01/04-01/21    Allergies/Intolerances:  Allergies   Allergen Reactions    Tape Rash     Pt states tape removes his skin         Most Recent Vital Signs:  /71   Pulse 77   Temp 36.5 °C (97.7 °F) (Temporal)   Resp 18   Ht 1.88 m (6' 2\")   Wt (!) 152 kg (334 lb 3.5 oz)   SpO2 99%   BMI 42.91 kg/m²   Temp  Min: 35.9 °C (96.7 °F)  Max: 36.5 °C (97.7 °F)    Physical Exam:  General: in no acute distress, +obese  HEENT: sclera anicteric, MMM, no oral lesions  Neck: no LAD  Chest: CTAB with no r/r/w, normal work of breathing  Cardiac: RRR, normal S1 S2, no m/r/g   Abdomen: +bs, soft, NTND  Extremities: no edema, WWP, +b/l LE venous stasis changes   Skin: warm and dry, +dressing in place over LUE with wound vac   Neuro: alert    Pertinent Lab Results:  Recent Labs     01/26/24  0704   WBC 4.6*      Recent Labs     01/26/24  0704   HEMOGLOBIN 8.3*   HEMATOCRIT 25.4*   MCV 81.7   MCH 26.7*   PLATELETCT 123*         Recent Labs     01/25/24  0118 01/26/24  0704   SODIUM 137 137   POTASSIUM 3.6 4.1   CHLORIDE 103 104   CO2 27 25   CREATININE 0.54 0.48*        Invalid input(s): \"ALT\", \"ALKPHOS\", \"BILITOT\", \"TOTALBILIRUB\", \"BILIRUBINTOT\", \"BILIRUBINDIR\", \"BILIRUBININD\", \"ALKALINEPHOS\"     Microbiology:  Blood Culture   Date Value Ref Range Status   04/27/2018 No growth after 5 days of incubation.  Final        Studies:      IMPRESSSION:   1. Sepsis, resolved                       2. Left upper extremity abscess  3. Left humerus fracture s/p ORIF and IMN 12/28/23, I&D 01/04/24 and 01/23/24  4. Lactic acidosis, resolved  5. Normocytic anemia  6. 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 Patient is s/p I&D of left humeral abscess with wound vac placement 01/23/24. OR cultures 01/23 growing Pseudomonas aeruginosa, resistant to Gentamicin. Will need treatment with antipseudomonal IV antibiotics for at least 6 weeks, and recommend removal of hardware. If hardware not removed, will need at least 4 weeks of ciprofloxacin 750mg PO bid after completion of IV antibiotics with subsequent dose decrease to 500mg PO bid thereafter indefinitely for chronic suppression.     -OR cultures 01/23; growing Pseudomonas aeruginosa, resistant to Gentamicin  -Follow blood cultures 01/22; NGTD  -Continue Cefepime 2g IV q8h for at least 6 weeks; start date 01/25/24 with tentative end date 03/07/24  -Recommend removal of hardware  -If unable to remove hardware, then after completion of course of IV antibiotics, recommend starting on ciprofloxacin 750mg PO bid for at least 4 weeks, then ciprofloxacin 500mg PO bid thereafter indefinitely for chronic suppression  -Patient will need PICC line placement prior to discharge     Plan of care discussed with Ely Carrasquillo D.O.. Will continue to follow.     Evan Dunn M.D.  [facility] IM PGY-3     Discussed with my attending, Dr. Luanne Holliday.

## 2024-01-26 NOTE — PROGRESS NOTES
"    Orthopedic PA Progress Note    Interval changes:   Patient doing well, pain improving.   Shoulder dressings are CDI, hemovac drain intact, 45 cc sero-sanguinous drainage over last 24 hours.   WBAT in left shoulder   Changed to cefepime per ID  Cx positive for pseudomonas  Anticipate drain removal tomorrow    ROS - Patient denies any new issues. Denies any numbness or tingling. Pain well controlled.    /86   Pulse 74   Temp 35.9 °C (96.7 °F) (Temporal)   Resp 18   Ht 1.88 m (6' 2\")   Wt (!) 152 kg (334 lb 3.5 oz)   SpO2 100%     Patient seen and examined  No acute distress  Breathing non labored  RRR  Left upper extremity/shoulder:  - dressing to left anterior shoulder CDI with hemovac drain  - Elbow and shoulder active ROM improving slowly      Recent Labs     01/23/24  0329   WBC 3.6*   RBC 3.44*   HEMOGLOBIN 9.2*   HEMATOCRIT 28.7*   MCV 83.4   MCH 26.7*   MCHC 32.1*   RDW 40.8   PLATELETCT 165   MPV 10.3         Active Hospital Problems    Diagnosis     Infection and inflammatory reaction due to internal fixation device of left humerus, initial encounter ([facility]) [T84.611A]     Sepsis ([facility]) [A41.9]     Anemia [D64.9]     Mood disorder ([facility]) [F39]     Chronic respiratory failure with hypoxia ([facility]) [J96.11]     Anxiolytic dependence ([facility]) [F13.20]     BMI 45.0-49.9, adult ([facility]) [Z68.42]     Class 3 severe obesity due to excess calories with serious comorbidity and body mass index (BMI) of 45.0 to 49.9 in adult ([facility]) [E66.01, Z68.42]     SANTOSH on CPAP [G47.33]     History of DVT (deep vein thrombosis) [Z86.718]     COPD (chronic obstructive pulmonary disease) ([facility]) [J44.9]        Assessment/Plan:  - hemovac drain to remain in place at this time; will reassess daily output and appearance    POD#2 S/p Left arm abscess debridement and I&D completed by Dr. Arnol Masterson on 1/23/24     Wt bearing status - WBAT  Wound care/Drains - Dressings to be changed every other day by nursing. Or PRN for saturation " starting POD#2  Future Procedures - None planned   DVT chemo prophylaxis in place: Heparin subq q 8   Sutures/Staples out- 14-21 days post operatively. Removal will completed by ortho JULIETTE's unless transferred. Sutures to remain in place; staples to be removed 1/25  PT/OT-initiated  Antibiotics: Remains on IV vancomycin and PO doxycycline  DVT Prophylaxis- TEDS/SCDs/Foot pumps  [place]-not needed per ortho  Case Coordination for Discharge Planning - Disposition per therapy recs.

## 2024-01-26 NOTE — CARE PLAN
The patient is Stable - Low risk of patient condition declining or worsening    Shift Goals  Clinical Goals: Monitor hemovac output, pt will be able to reach pain comfort goal of being able to sleep comfortably, maintain skin integrity, pt will remain free from falls, EOB for meals, IV fluids  Patient Goals: pain control, rest  Family Goals: CLOTILDE    Progress made toward(s) clinical / shift goals: Scant output from hemovac, pt remained free from falls, pt able to turn self independently, pt sat on EOB for meals, pt was able to sleep comfortably in bed, pt medicated per MAR for pain relief, waffle overlay in place, q2 turns       Problem: Pain - Standard  Goal: Alleviation of pain or a reduction in pain to the patient’s comfort goal  Outcome: Progressing     Problem: Knowledge Deficit - Standard  Goal: Patient and family/care givers will demonstrate understanding of plan of care, disease process/condition, diagnostic tests and medications  Outcome: Progressing     Problem: Fall Risk  Goal: Patient will remain free from falls  Outcome: Progressing     Problem: Skin Integrity  Goal: Skin integrity is maintained or improved  Outcome: Progressing     Problem: Mobility  Goal: Patient's capacity to carry out activities will improve  Outcome: Progressing       Patient is not progressing towards the following goals:

## 2024-01-26 NOTE — PROGRESS NOTES
IV leaking and infiltrated; pt irritated upon being informed new PIV access must be established. Pt educated on importance of adequate PIV access; pt consented. Pt irritated that he cannot find phone. Staff attempted to find phone. Pt hard stick per pt report; US certified RN consulted.  ICU RN established PIV w/ US. IV ABX retimed.

## 2024-01-26 NOTE — CARE PLAN
The patient is Stable - Low risk of patient condition declining or worsening    Shift Goals  Clinical Goals: Pt will report pain Lvl<2/10  Patient Goals: Pt will report pain Lvl<2/10  Family Goals: na    Progress made toward(s) clinical / shift goals:      Patient is not progressing towards the following goals:

## 2024-01-26 NOTE — PROGRESS NOTES
Assumed care of pt at 0700. Received report from nightshift RN. Pt is A&O x4 and on 2L NC. Pt presents with even and non-labored breathing and denies SOB. Pt reports pain 7/10 on his L shoulder and was medicated at 0540 for pain relief. Next scheduled dose of Oxy 10mg is at 1100. Bed in lowest and locked position. Bed alarm is on. Call light within reach and pt declines any further needs at this time.

## 2024-01-26 NOTE — PROGRESS NOTES
Hospital Medicine Daily Progress Note    Date of Service  1/25/2024    Chief Complaint  Worsening surgical site wound healing    Hospital Course  Henry Kumari is a 68 y.o. 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  He had a recent admission 1224 through January 7 for repair of a humeral fracture and subsequent infection and debridement on January 5.  His infection grew out Staphylococcus epidermidis and he was discharged on Zyvox to complete a 21-day course.  He was transferred to a skilled nursing care and receiving his Zyvox per report.  Over the last few days he was having increasing arm pain and swelling with then a spontaneous leakage of fluid.  There were complaints of fevers, chills, and nausea.  Increased left arm pain.  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    Interval Problem Update  1/23/2024 wbc:3.6, k:3.2.  He went to surgery today for I+D of infection and revision.  Has hemovac drain present.  He is alert and oriented with clear speech.  States he is from [place] and has been sedentary over the last year and barely gets up with use of a walker usually requiring a wheelchair.  He states that seems at once a year he has been following due to his knees giving out.    1/24 sob improving, on O2, afebrile, generalized weakness, lives alone    1/25  feels better, tolerating oral intake, generalized weakness    I have discussed this patient's plan of care and discharge plan at IDT rounds today with Case Management, Nursing, Nursing leadership, and other members of the IDT team.    Consultants/Specialty  orthopedics    Code Status  Full Code    Disposition  The patient is not medically cleared for discharge to home or a post-acute facility.      I have placed the appropriate orders for post-discharge needs.    Review of Systems  Review of Systems   Constitutional:  Negative for fever and malaise/fatigue.   Respiratory:  Negative for  shortness of breath.    Cardiovascular:  Negative for chest pain and leg swelling.   Gastrointestinal:  Negative for abdominal pain and nausea.   Genitourinary:  Negative for dysuria.   Musculoskeletal:  Positive for joint pain (left shoulder). Negative for back pain and myalgias.   Neurological:  Positive for weakness. Negative for sensory change and headaches.   Psychiatric/Behavioral:  Negative for depression. The patient is not nervous/anxious.         Physical Exam  Temp:  [35.8 °C (96.5 °F)-36.4 °C (97.6 °F)] 36 °C (96.8 °F)  Pulse:  [73-92] 73  Resp:  [18] 18  BP: (110-172)/() 128/75  SpO2:  [92 %-100 %] 100 %    Physical Exam  Vitals reviewed.   Constitutional:       General: He is not in acute distress.     Appearance: Normal appearance. He is obese. He is ill-appearing.      Comments: Morbid obesity   HENT:      Head: Normocephalic and atraumatic.      Nose: Nose normal.      Mouth/Throat:      Mouth: Mucous membranes are moist.      Pharynx: No oropharyngeal exudate.   Eyes:      Extraocular Movements: Extraocular movements intact.      Pupils: Pupils are equal, round, and reactive to light.   Cardiovascular:      Rate and Rhythm: Normal rate and regular rhythm.      Pulses:           Radial pulses are 2+ on the right side and 2+ on the left side.        Dorsalis pedis pulses are 2+ on the right side and 2+ on the left side.      Heart sounds: No murmur heard.  Pulmonary:      Effort: Pulmonary effort is normal. No respiratory distress.      Breath sounds: Normal breath sounds. No stridor. No wheezing.   Abdominal:      General: Bowel sounds are normal. There is no distension.      Palpations: Abdomen is soft.      Tenderness: There is no abdominal tenderness. There is no guarding.   Musculoskeletal:         General: No swelling or tenderness.      Cervical back: Neck supple. No edema or rigidity. No muscular tenderness.      Right lower leg: Edema present.      Left lower leg: Edema present.    Lymphadenopathy:      Cervical: No cervical adenopathy.   Skin:     Coloration: Skin is not pale.      Findings: No erythema or rash.      Comments: Left arm with a Hemovac present clean dressing over the left lateral upper mid arm.  There are staples in various spots around the shoulder.   Neurological:      General: No focal deficit present.      Mental Status: He is alert and oriented to person, place, and time. Mental status is at baseline.      Cranial Nerves: No cranial nerve deficit.      Sensory: No sensory deficit.      Motor: Weakness present.   Psychiatric:         Mood and Affect: Mood normal.         Behavior: Behavior normal.         Fluids    Intake/Output Summary (Last 24 hours) at 1/25/2024 2115  Last data filed at 1/25/2024 1535  Gross per 24 hour   Intake 440 ml   Output 165 ml   Net 275 ml       Laboratory  Recent Labs     01/23/24  0329   WBC 3.6*   RBC 3.44*   HEMOGLOBIN 9.2*   HEMATOCRIT 28.7*   MCV 83.4   MCH 26.7*   MCHC 32.1*   RDW 40.8   PLATELETCT 165   MPV 10.3     Recent Labs     01/23/24  0329 01/25/24  0118   SODIUM 136 137   POTASSIUM 3.2* 3.6   CHLORIDE 99 103   CO2 23 27   GLUCOSE 91 101*   BUN 24* 18   CREATININE 0.64 0.54   CALCIUM 8.3* 8.4*     Recent Labs     01/22/24  2206   INR 1.36*               Imaging  DX-CHEST-LIMITED (1 VIEW)   Final Result         1. The right subclavian central venous access catheter terminates over the superior vena cava. No postprocedure visible pneumothorax.   2. Postsurgical changes in the left shoulder.   3. The remainder is stable.      CT-EXTREMITY, UPPER WITH LEFT   Final Result      1.  Wide separation of some of the fracture fragments of the surgically transfixed humeral fracture. There is some evidence of fracture healing. New fracture is not excluded by this exam. Infection is not excluded by this exam.   2.  Fluid with scant gas at the surgically transfixed humeral fracture site could be sterile or infected   3.  Osteoarthritis   4.  18  mm LEFT upper lobe hamartoma      DX-CHEST-PORTABLE (1 VIEW)   Final Result      1.  Faint residual RIGHT basilar atelectasis, less likely pneumonia   2.  Persistently enlarged cardiac silhouette   3.  Atherosclerosis      DX-HUMERUS 2+ LEFT   Final Result      1. Healing comminuted fracture of the metadiaphysis of the left humerus with stable alignment of the intramedullary ruba and screws.   2. Increased opacity lateral to the distal aspect of the fracture in the left arm soft tissues, concerning for soft tissue abscess or hematoma. CT of the left humerus with IV contrast is recommended.           Assessment/Plan  * Sepsis ([facility])- (present on admission)  Assessment & Plan  Etiology is left humeral infection from prior surgical site.  Vancomycin and ceftriaxone  Monitor cultures  Status post IV fluids  Wound care  1/23/2024 patient had revision of the left upper surgical site for concerns of infection    Infection and inflammatory reaction due to internal fixation device of left humerus, initial encounter ([facility])- (present on admission)  Assessment & Plan  Wound care  Vancomycin and ceftriaxone for now  Orthopedics consulting and following.  Took patient for I+D with purulent drainage 1/23/24 with tobramycin powder given in wound  Monitor labs and cultures  ID to consult 1/24 1/24 decrease ivf, pt eval  - pt lives in his own apt, but has family support  - pt eval, may need snf    1/25 pt rec for snf, CM to assist  - cont iv abx, f/u ID for duration of tx  Dc ivf    Anemia- (present on admission)  Assessment & Plan  1/23/2024 Hgb: 9.2<10.4  Status post acute surgery 1/23/2024 of left humerus    Mood disorder (HCC)- (present on admission)  Assessment & Plan  seroquel    Anxiolytic dependence ([facility]CC)- (present on admission)  Assessment & Plan  No current BZ's at this time    Chronic respiratory failure with hypoxia ([facility])- (present on admission)  Assessment & Plan  At his baseline    BMI 45.0-49.9, adult ([facility])- (present  on admission)  Assessment & Plan  Body mass index is 42.91 kg/m².  Encourage weight loss    Class 3 severe obesity due to excess calories with serious comorbidity and body mass index (BMI) of 45.0 to 49.9 in adult ([facility])- (present on admission)  Assessment & Plan  Outpatient counseling    SANTOSH on CPAP- (present on admission)  Assessment & Plan  Ordered CPAP with RT  Monitor for nocturnal hypoxia    COPD (chronic obstructive pulmonary disease) ([facility])- (present on admission)  Assessment & Plan  No current exacerbation  At his baseline hypoxia  As needed bronchodilators    History of DVT (deep vein thrombosis)- (present on admission)  Assessment & Plan  Hold outpatient xarelto for possible ortho procedure  Restart once able.  Covering the interim with heparin DVT dosing         VTE prophylaxis:   SCDs/TEDs      I have performed a physical exam and reviewed and updated ROS and Plan today (1/25/2024). In review of yesterday's note (1/24/2024), there are no changes except as documented above.

## 2024-01-26 NOTE — PROGRESS NOTES
Pt A&Ox4. VSS. Pt needs identified and addressed. Fall precautions implemented; call light w/ reach.

## 2024-01-27 ENCOUNTER — APPOINTMENT (OUTPATIENT)
Dept: RADIOLOGY | Facility: MEDICAL CENTER | Age: 69
DRG: 492 | End: 2024-01-27
Attending: INTERNAL MEDICINE
Payer: MEDICARE

## 2024-01-27 LAB
BACTERIA BLD CULT: NORMAL
BACTERIA BLD CULT: NORMAL
BACTERIA SPEC ANAEROBE CULT: NORMAL
SIGNIFICANT IND 70042: NORMAL
SITE SITE: NORMAL
SOURCE SOURCE: NORMAL

## 2024-01-27 PROCEDURE — A9270 NON-COVERED ITEM OR SERVICE: HCPCS | Performed by: INTERNAL MEDICINE

## 2024-01-27 PROCEDURE — 700102 HCHG RX REV CODE 250 W/ 637 OVERRIDE(OP): Performed by: HOSPITALIST

## 2024-01-27 PROCEDURE — 36573 INSJ PICC RS&I 5 YR+: CPT

## 2024-01-27 PROCEDURE — 770006 HCHG ROOM/CARE - MED/SURG/GYN SEMI*

## 2024-01-27 PROCEDURE — 700111 HCHG RX REV CODE 636 W/ 250 OVERRIDE (IP)

## 2024-01-27 PROCEDURE — 99233 SBSQ HOSP IP/OBS HIGH 50: CPT | Performed by: INTERNAL MEDICINE

## 2024-01-27 PROCEDURE — A9270 NON-COVERED ITEM OR SERVICE: HCPCS

## 2024-01-27 PROCEDURE — 700102 HCHG RX REV CODE 250 W/ 637 OVERRIDE(OP): Performed by: INTERNAL MEDICINE

## 2024-01-27 PROCEDURE — 94660 CPAP INITIATION&MGMT: CPT

## 2024-01-27 PROCEDURE — 99232 SBSQ HOSP IP/OBS MODERATE 35: CPT | Performed by: INTERNAL MEDICINE

## 2024-01-27 PROCEDURE — A9270 NON-COVERED ITEM OR SERVICE: HCPCS | Performed by: HOSPITALIST

## 2024-01-27 PROCEDURE — 02HV33Z INSERTION OF INFUSION DEVICE INTO SUPERIOR VENA CAVA, PERCUTANEOUS APPROACH: ICD-10-PCS | Performed by: INTERNAL MEDICINE

## 2024-01-27 PROCEDURE — 700102 HCHG RX REV CODE 250 W/ 637 OVERRIDE(OP)

## 2024-01-27 PROCEDURE — 700111 HCHG RX REV CODE 636 W/ 250 OVERRIDE (IP): Mod: JZ | Performed by: INTERNAL MEDICINE

## 2024-01-27 PROCEDURE — 700105 HCHG RX REV CODE 258: Performed by: INTERNAL MEDICINE

## 2024-01-27 PROCEDURE — 700111 HCHG RX REV CODE 636 W/ 250 OVERRIDE (IP): Performed by: HOSPITALIST

## 2024-01-27 RX ADMIN — METOPROLOL SUCCINATE 100 MG: 100 TABLET, EXTENDED RELEASE ORAL at 05:21

## 2024-01-27 RX ADMIN — CEFEPIME 2 G: 2 INJECTION, POWDER, FOR SOLUTION INTRAVENOUS at 17:54

## 2024-01-27 RX ADMIN — Medication 220 MG: at 05:21

## 2024-01-27 RX ADMIN — CLONAZEPAM 1 MG: 1 TABLET ORAL at 17:54

## 2024-01-27 RX ADMIN — CEFEPIME 2 G: 2 INJECTION, POWDER, FOR SOLUTION INTRAVENOUS at 09:54

## 2024-01-27 RX ADMIN — MOMETASONE FUROATE AND FORMOTEROL FUMARATE DIHYDRATE 2 PUFF: 200; 5 AEROSOL RESPIRATORY (INHALATION) at 17:55

## 2024-01-27 RX ADMIN — CLONAZEPAM 1 MG: 1 TABLET ORAL at 05:21

## 2024-01-27 RX ADMIN — CEFEPIME 2 G: 2 INJECTION, POWDER, FOR SOLUTION INTRAVENOUS at 00:27

## 2024-01-27 RX ADMIN — OXYCODONE HYDROCHLORIDE 10 MG: 10 TABLET ORAL at 20:06

## 2024-01-27 RX ADMIN — QUETIAPINE FUMARATE 100 MG: 100 TABLET ORAL at 05:21

## 2024-01-27 RX ADMIN — OXYCODONE HYDROCHLORIDE 10 MG: 10 TABLET ORAL at 11:35

## 2024-01-27 RX ADMIN — QUETIAPINE FUMARATE 100 MG: 100 TABLET ORAL at 17:54

## 2024-01-27 RX ADMIN — OXYCODONE HYDROCHLORIDE AND ACETAMINOPHEN 1000 MG: 500 TABLET ORAL at 05:21

## 2024-01-27 RX ADMIN — HEPARIN SODIUM 5000 UNITS: 5000 INJECTION, SOLUTION INTRAVENOUS; SUBCUTANEOUS at 14:02

## 2024-01-27 RX ADMIN — HEPARIN SODIUM 5000 UNITS: 5000 INJECTION, SOLUTION INTRAVENOUS; SUBCUTANEOUS at 05:21

## 2024-01-27 RX ADMIN — OXYCODONE HYDROCHLORIDE 10 MG: 10 TABLET ORAL at 22:48

## 2024-01-27 RX ADMIN — NYSTATIN: 100000 POWDER TOPICAL at 08:14

## 2024-01-27 RX ADMIN — ATORVASTATIN CALCIUM 20 MG: 20 TABLET, FILM COATED ORAL at 20:06

## 2024-01-27 RX ADMIN — GEMFIBROZIL 600 MG: 600 TABLET ORAL at 05:20

## 2024-01-27 RX ADMIN — OXYCODONE HYDROCHLORIDE 10 MG: 10 TABLET ORAL at 14:02

## 2024-01-27 RX ADMIN — GEMFIBROZIL 600 MG: 600 TABLET ORAL at 18:35

## 2024-01-27 RX ADMIN — NYSTATIN: 100000 POWDER TOPICAL at 17:55

## 2024-01-27 RX ADMIN — HYDROMORPHONE HYDROCHLORIDE 0.5 MG: 1 INJECTION, SOLUTION INTRAMUSCULAR; INTRAVENOUS; SUBCUTANEOUS at 16:14

## 2024-01-27 RX ADMIN — OXYCODONE HYDROCHLORIDE 10 MG: 10 TABLET ORAL at 08:13

## 2024-01-27 RX ADMIN — HYDROMORPHONE HYDROCHLORIDE 0.5 MG: 1 INJECTION, SOLUTION INTRAMUSCULAR; INTRAVENOUS; SUBCUTANEOUS at 01:48

## 2024-01-27 RX ADMIN — HEPARIN SODIUM 5000 UNITS: 5000 INJECTION, SOLUTION INTRAVENOUS; SUBCUTANEOUS at 20:07

## 2024-01-27 RX ADMIN — FLUOXETINE HYDROCHLORIDE 20 MG: 20 CAPSULE ORAL at 05:21

## 2024-01-27 ASSESSMENT — ENCOUNTER SYMPTOMS
NERVOUS/ANXIOUS: 1
COUGH: 0
MEMORY LOSS: 0
DOUBLE VISION: 0
DIAPHORESIS: 0
DEPRESSION: 0
INSOMNIA: 0
ABDOMINAL PAIN: 0
HEADACHES: 0
BLURRED VISION: 0
WEAKNESS: 1
MYALGIAS: 1
SHORTNESS OF BREATH: 0
FEVER: 0
SENSORY CHANGE: 0
NAUSEA: 0
DIZZINESS: 0
BACK PAIN: 0

## 2024-01-27 NOTE — PROCEDURES
Vascular Access Team     Date of Insertion: 01/27/2024  Arm Circumference: 47  Internal length: 44  External Length: 0  Vein Occupancy %: 20   Reason for PICC: abx  Labs: WBC 4.6, , BUN 14, Cr 0.48, , INR n/a     Consents confirmed, vessel patency confirmed with ultrasound. Risks and benefits of procedure explained to patient and education regarding central line associated bloodstream infections provided. Questions answered.      PICC placed in RUE per licensed provider order with ultrasound guidance.  4 Fr, 1 lumen PICC placed in Brachial vein after 1 attempt(s). 2 mL of 1% lidocaine injected intradermally at the insertion site. A 21 gauge microintroducer needle was visualized entering the vein and modified Seldinger technique was used to obtain access to the vein. 44 cm catheter inserted and brisk blood return was observed from each lumen upon aspiration. Line secured at the 0 cm marker. TCS stylet removed and observed to be fully intact. Each lumen flushed using pulsatile method without resistance with 10 mL 0.9% normal saline. PICC line secured with Biopatch and Tegaderm.     Unable to use 3CG for confirmation. CHest Xray ordered for line placement.  Patient tolerated procedure well, without complications.  Patient condition relayed to primary RN or ordering physician via this post procedure note in the EMR.      Ultrasound images uploaded to PACS and viewable in the EMR - yes  Ultrasound imaged printed and placed in paper chart - no     [employer] PICC ref # [id number], Lot # [id number], Expiration Date 01/31/2025

## 2024-01-27 NOTE — PROGRESS NOTES
Received report from nightshift RN. Pt is A&O x4 and on 2L NC. Pt presents with even and non-labored breathing and denies SOB. Pt reports pain 7/10 on his L shoulder and was medicated per MAR for pain relief. Bed in lowest and locked position. Bed alarm is on. Call light within reach and pt declines any further needs at this time.

## 2024-01-27 NOTE — PROGRESS NOTES
Consulted Charge RN pertaining to PICC team. No PICC team services available at this time. US sound RN consulted in light of pt being a hard stick.

## 2024-01-27 NOTE — PROGRESS NOTES
Infectious Disease Progress Note    Author: Luanne Holliday M.D. 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 + surgical site infection of L humerus shaft fracture site with intramedullary implant.   AF somnolent PICC placed No new positive cultures  Labs Reviewed, Medications Reviewed, and Wound Reviewed.    Review of Systems:  Review of Systems   Unable to perform ROS: Other   Constitutional:  Negative for fever.       Hemodynamics:  Temp (24hrs), Av.5 °C (97.7 °F), Min:36.1 °C (96.9 °F), Max:36.9 °C (98.4 °F)  Temperature: 36.1 °C (96.9 °F)  Pulse  Av  Min: 64  Max: 129   Blood Pressure : 133/79       Physical Exam:  Physical Exam  Vitals and nursing note reviewed.   Constitutional:       General: He is not in acute distress.     Appearance: He is not ill-appearing, toxic-appearing or diaphoretic.   Eyes:      General:         Right eye: No discharge.         Left eye: No discharge.   Cardiovascular:      Rate and Rhythm: Normal rate.   Pulmonary:      Effort: Pulmonary effort is normal. No respiratory distress.      Breath sounds: No stridor.   Abdominal:      General: There is no distension.   Musculoskeletal:      Comments: HV removed  RUE PICC   Skin:     Coloration: Skin is not jaundiced.      Findings: Bruising present.   Neurological:      Comments: somnolent         Meds:    Current Facility-Administered Medications:     mometasone-formoterol    [START ON 2024] tiotropium    cefepime    nystatin    ascorbic acid    zinc sulfate    heparin    albuterol    FLUoxetine    gemfibrozil    metoprolol SR    QUEtiapine    senna-docusate **AND** polyethylene glycol/lytes **AND** magnesium hydroxide **AND** bisacodyl    LR    ondansetron     ondansetron    atorvastatin    clonazePAM    oxyCODONE immediate release    Pharmacy Consult Request    [] acetaminophen **FOLLOWED BY** acetaminophen    [DISCONTINUED] oxyCODONE immediate-release **OR** [DISCONTINUED] oxyCODONE immediate-release **OR** HYDROmorphone    Labs:  Recent Labs     24  0704   WBC 4.6*   RBC 3.11*   HEMOGLOBIN 8.3*   HEMATOCRIT 25.4*   MCV 81.7   MCH 26.7*   RDW 40.6   PLATELETCT 123*   MPV 10.1   NEUTSPOLYS 50.30   LYMPHOCYTES 24.90   MONOCYTES 18.60*   EOSINOPHILS 3.30   BASOPHILS 0.90     Recent Labs     24  0118 24  0704   SODIUM 137 137   POTASSIUM 3.6 4.1   CHLORIDE 103 104   CO2 27 25   GLUCOSE 101* 86   BUN 18 14     Recent Labs     24  0118 24  0704   CREATININE 0.54 0.48*       Imaging:  IR-PICC LINE PLACEMENT W/ GUIDANCE > AGE 5    Result Date: 2024  HISTORY/REASON FOR EXAM:   PICC placement. TECHNIQUE/EXAM DESCRIPTION AND NUMBER OF VIEWS:   PICC line insertion with ultrasound guidance.  The procedure was performed using maximal sterile barrier technique including sterile gown, mask, cap, and donning of sterile gloves following appropriate hand hygiene and/or sterile scrub. Patient skin site was prepped with 2% Chlorhexidine solution. FINDINGS:  PICC line insertion with Ultrasound Guidance was performed by qualified nursing staff without the assistance of a Radiologist. PICC positioning appropriateness confirmed by 3CG technology; chest xray only needed in the instance 3CG unable to confirm placement.              Ultrasound-guided PICC placement performed by qualified nursing staff as above.     DX-CHEST-FOR LINE PLACEMENT Perform procedure in: Patient's Room    Result Date: 2024 8:15 AM HISTORY/REASON FOR EXAM:  Confirm line placement TECHNIQUE/EXAM DESCRIPTION AND NUMBER OF VIEWS: Single view of the chest. COMPARISON: None FINDINGS: Limited single view of the chest performed primarily to evaluate PICC position. A  right peripherally inserted catheter is seen.  The tip projects appropriately over the expected area of the superior vena cava. No pulmonary infiltrates or consolidations are noted. No pleural abnormalities are noted. Enlarged cardiopericardial silhouette.     1. A right peripherally inserted catheter with tip projects appropriately over the expected area of the superior vena cava.    DX-CHEST-LIMITED (1 VIEW)    Result Date: 1/23/2024 1/23/2024 9:59 AM HISTORY/REASON FOR EXAM:  Line placement TECHNIQUE/EXAM DESCRIPTION AND NUMBER OF VIEWS: Single portable view of the chest. COMPARISON: Chest radiography, 1/22/2024. FINDINGS: Interval placement of a right subclavian central venous access catheter, terminating over the superior vena cava. No pleural effusion or visible pneumothorax. Stable right basilar opacity. Stable cardiomediastinal silhouette size. There are postsurgical changes in the proximal left humerus and left shoulder soft tissues. Decreased bone mineralization with multilevel cervical and thoracic spondylosis. Stable degenerative changes in the shoulders.     1. The right subclavian central venous access catheter terminates over the superior vena cava. No postprocedure visible pneumothorax. 2. Postsurgical changes in the left shoulder. 3. The remainder is stable.    CT-EXTREMITY, UPPER WITH LEFT    Result Date: 1/22/2024 1/22/2024 3:29 PM HISTORY/REASON FOR EXAM: LEFT upper extremity pain, humerus fixation 4 weeks previously. TECHNIQUE/EXAM DESCRIPTION AND NUMBER OF VIEWS: CT scan of the LEFT upper extremity. Axial spiral CT images were obtained of the LEFT upper extremity from the shoulder to the elbow. Images were reviewed at soft tissue and bone windows with administration of 100 mL of Omnipaque 350 without complication. Up to date radiation dose reduction adjustments have been utilized to meet ALARA standards for radiation dose reduction. COMPARISON: Radiographs from 1/3/2023 FINDINGS:  Mineralization is decreased. There is an intramedullary ruba in the LEFT humerus with 3 proximal cortical interlocking screws and 2 distal cortical interlocking screws. The distal extent of the hardware is not included in the field-of-view due to patient positioning and habitus. There is a comminuted fracture of the midshaft of the humerus which is as apparently a spiral fracture. There is some peripheral callus formation. There is wide separation of many of the fracture fragments. There is discontinuity of some of the areas of callus formation in the mid shaft. There is fluid density within the fracture site measuring approximately 3.6 x 3.2 x 3.7 cm. There is a small focus of gas in this area. There is adjacent edema. No other fracture is seen. There is narrowing of the glenohumeral and acromioclavicular joint space. There are prosthetic joint bodies in a superior glenohumeral joint recess or possibly arising from the acromioclavicular joint. There is an 18 mm anterior LEFT upper lobe pulmonary nodule with popcorn-like peripheral calcification.     1.  Wide separation of some of the fracture fragments of the surgically transfixed humeral fracture. There is some evidence of fracture healing. New fracture is not excluded by this exam. Infection is not excluded by this exam. 2.  Fluid with scant gas at the surgically transfixed humeral fracture site could be sterile or infected 3.  Osteoarthritis 4.  18 mm LEFT upper lobe hamartoma    DX-HUMERUS 2+ LEFT    Result Date: 1/22/2024 1/22/2024 10:46 AM HISTORY/REASON FOR EXAM:  Left humerus fracture, status post surgical repair on 12/26/2023 and debridement on 1/5/2024. Left arm redness and swelling. TECHNIQUE/EXAM DESCRIPTION AND NUMBER OF VIEWS:  2 views of the LEFT humerus. COMPARISON: Left humerus radiography, 1/3/2024. FINDINGS: Bones: Stable comminuted fracture of the mid diaphysis of the left humerus with ruba and screw internal fixation. No interval change in  alignment of the fracture fragment. There is periosteal reaction at both ends of the fracture, indicating healing. Joints: The imaged portions of the elbow and shoulder joints are intact, with the exception of degenerative changes in the glenohumeral and acromioclavicular joints. Soft tissues: There is marked soft tissue swelling involving the lateral aspect of the left arm with increased opacity localized to the site of prior surgical intervention. Soft tissue abscess is not excluded. CT of the left humerus with IV contrast is recommended.     1. Healing comminuted fracture of the metadiaphysis of the left humerus with stable alignment of the intramedullary ruba and screws. 2. Increased opacity lateral to the distal aspect of the fracture in the left arm soft tissues, concerning for soft tissue abscess or hematoma. CT of the left humerus with IV contrast is recommended.    DX-CHEST-PORTABLE (1 VIEW)    Result Date: 1/22/2024 1/22/2024 10:46 AM HISTORY/REASON FOR EXAM:  Sepsis; sepsis TECHNIQUE/EXAM DESCRIPTION AND NUMBER OF VIEWS: Single portable view of the chest. COMPARISON: 12/31/2023 FINDINGS: HEART: Stable size. There is atherosclerotic calcification in the aortic arch. LUNGS: Decreased RIGHT basilar opacity. PLEURA: No effusion or pneumothorax.     1.  Faint residual RIGHT basilar atelectasis, less likely pneumonia 2.  Persistently enlarged cardiac silhouette 3.  Atherosclerosis    DX-HUMERUS 2+ LEFT    Result Date: 1/3/2024  1/3/2024 7:32 AM HISTORY/REASON FOR EXAM:  Pain/Deformity Following Trauma. TECHNIQUE/EXAM DESCRIPTION AND NUMBER OF VIEWS:  2 views of the LEFT humerus. COMPARISON: 12/26/2023 FINDINGS: Patient is status post open reduction internal fixation of a left humerus fracture. Alignment is improved with minimal persistent lateral angulation of the proximal humeral shaft. A displaced or flank fracture fragment is again noted projecting medially.  No new fracture or malalignment is identified.      Improved alignment status post open reduction internal fixation of a complex left humerus fracture.    DX-CHEST-PORTABLE (1 VIEW)    Result Date: 12/31/2023 12/31/2023 8:55 AM HISTORY/REASON FOR EXAM:  Shortness of Breath. TECHNIQUE/EXAM DESCRIPTION AND NUMBER OF VIEWS: Single portable view of the chest. COMPARISON: 1 view chest 12/26/2023 FINDINGS: LUNGS: There is an opacity at the right lung base which could be atelectasis or pneumonia HEART and MEDIASTINUM: enlarged. Pleura: There are no pleural effusion or pneumothoraces. Osseous structures: No significant bony abnormality.     1.  Right basilar opacity which could be atelectasis or pneumonia 2.  Enlarged cardiac silhouette    DX-PORTABLE FLUOROSCOPY < 1 HOUR    Result Date: 12/28/2023 12/28/2023 1:00 PM HISTORY/REASON FOR EXAM:  Main OR, left humerus nailing. TECHNIQUE/EXAM DESCRIPTION AND NUMBER OF VIEWS: Portable fluoroscopy for less than one hour in OR. FINDINGS: The portable fluoroscopy unit was obligated to the procedure for less than one hour. Actual fluoro time was 1 minute 30 seconds. Fluoroscopy dose(DAP): 6.348 Gy*cm^2     Portable fluoroscopy utilized for 1 minute 30 seconds. INTERPRETING LOCATION: [address] [facility], [address]    DX-HUMERUS 2+ LEFT    Result Date: 12/28/2023 12/28/2023 1:00 PM HISTORY/REASON FOR EXAM:  Main OR, left humerus nailing. TECHNIQUE/EXAM DESCRIPTION AND NUMBER OF VIEWS:  6 views of the LEFT humerus. Digitized Intraoperative Radiograph FINDINGS: Fluoroscopic time: 1 minute 30 seconds. Fluoroscopy dose(DAP): 6.348 Gy*cm^2     Digitized intraoperative radiograph is submitted for review. This examination is not for diagnostic purpose but for guidance during a surgical procedure. Please see the patient's chart for full procedural details. INTERPRETING LOCATION: [address] [facility] [place], [address]      Micro:  Results       Procedure Component Value Units Date/Time    CULTURE TISSUE W/ GRM STAIN [[id number]]  (Abnormal)   (Susceptibility) Collected: 01/23/24 0900    Order Status: Completed Specimen: Tissue Updated: 01/27/24 1030     Significant Indicator POS     Source TISS     Site Incision/drainage left shoulder     Culture Result -     Gram Stain Result Few WBCs.  No organisms seen.       Culture Result Pseudomonas aeruginosa  Moderate growth      Narrative:      Surgery Specimen    Susceptibility       Pseudomonas aeruginosa (1)       Antibiotic Interpretation Microscan   Method Status    Ciprofloxacin Sensitive <=0.25 mcg/mL KARTHIKEYAN Final    Cefepime Sensitive 4 mcg/mL KARTHIKEYAN Final    Gentamicin Resistant 4 mcg/mL KARTHIKEYAN Final    Pip/Tazobactam Sensitive <=8 mcg/mL KARTHIKEYAN Final    Levofloxacin Sensitive 1 mcg/mL KARTHIKEYAN Final    Meropenem Sensitive 2 mcg/mL KARTHIKEYAN Final                       Anaerobic Culture [[id number]] Collected: 01/23/24 0900    Order Status: Completed Specimen: Tissue Updated: 01/27/24 1030     Significant Indicator NEG     Source TISS     Site Incision/drainage left shoulder     Culture Result No Anaerobes isolated.    Narrative:      Surgery Specimen    Fungal Culture [[id number]] Collected: 01/23/24 0900    Order Status: Completed Specimen: Tissue Updated: 01/27/24 1030     Significant Indicator NEG     Source TISS     Site Incision/drainage left shoulder     Culture Result Culture in progress.     Fungal Smear Results No fungal elements seen.    Narrative:      Surgery Specimen    AFB Culture [[id number]] Collected: 01/23/24 0900    Order Status: Completed Specimen: Tissue Updated: 01/27/24 1030     Significant Indicator NEG     Source TISS     Site Incision/drainage left shoulder     Culture Result Culture in progress.     AFB Smear Results No acid fast bacilli seen.    Narrative:      Surgery Specimen    GRAM STAIN [[id number]] Collected: 01/23/24 0900    Order Status: Completed Specimen: Tissue Updated: 01/24/24 1630     Significant Indicator .     Source TISS     Site Incision/drainage left shoulder     Gram Stain Result  Few WBCs.  No organisms seen.      Narrative:      Surgery Specimen    Fungal Smear [[id number]] Collected: 01/23/24 0900    Order Status: Completed Specimen: Tissue Updated: 01/24/24 1630     Significant Indicator NEG     Source TISS     Site Incision/drainage left shoulder     Fungal Smear Results No fungal elements seen.    Narrative:      Surgery Specimen    Acid Fast Stain [[id number]] Collected: 01/23/24 0900    Order Status: Completed Specimen: Tissue Updated: 01/24/24 1630     Significant Indicator NEG     Source TISS     Site Incision/drainage left shoulder     AFB Smear Results No acid fast bacilli seen.    Narrative:      Surgery Specimen    Blood Culture - Draw one from central line and one from peripheral site [[id number]] Collected: 01/22/24 1119    Order Status: Completed Specimen: Blood from Peripheral Updated: 01/23/24 0736     Significant Indicator NEG     Source BLD     Site PERIPHERAL     Culture Result No Growth  Note: Blood cultures are incubated for 5 days and  are monitored continuously.Positive blood cultures  are called to the RN and reported as soon as  they are identified.      Narrative:      Blood Cultures X2. Draw one blood culture from central line  and one blood culture peripherally. If no line present, draw  blood cultures times two peripherally from different sites.  Right Hand    Blood Culture - Draw one from central line and one from peripheral site [[id number]] Collected: 01/22/24 1100    Order Status: Completed Specimen: Blood from Line Updated: 01/23/24 0736     Significant Indicator NEG     Source BLD     Site Peripheral     Culture Result No Growth  Note: Blood cultures are incubated for 5 days and  are monitored continuously.Positive blood cultures  are called to the RN and reported as soon as  they are identified.      Narrative:      Blood Cultures X2. Blood Cultures X2. Draw one blood culture  from central line and one blood culture peripherally. If no  line present, draw  blood cultures times two peripherally from  different sites.  Blood Cultures X2. Blood Cultures X2. Draw one blood culture    MRSA By PCR (Amp) [[id number]] Collected: 01/22/24 1237    Order Status: Completed Specimen: Respirate from Nares Updated: 01/22/24 1532     MRSA by PCR Negative    Urinalysis [[id number]] Collected: 01/22/24 1237    Order Status: Completed Specimen: Urine Updated: 01/22/24 1305     Color Yellow     Character Clear     Specific Gravity 1.012     Ph 6.0     Glucose Negative mg/dL      Ketones Negative mg/dL      Protein Negative mg/dL      Bilirubin Negative     Urobilinogen, Urine 0.2     Nitrite Negative     Leukocyte Esterase Negative     Occult Blood Negative     Micro Urine Req see below     Comment: Microscopic examination not performed when specimen is clear  and chemically negative for protein, blood, leukocyte esterase  and nitrite.         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]     BMI 45.0-49.9, adult ([facility]) [Z68.42]     Class 3 severe obesity due to excess calories with serious comorbidity and body mass index (BMI) of 45.0 to 49.9 in adult ([facility]) [E66.01, Z68.42]     SANTOSH on CPAP [G47.33]     History of DVT (deep vein thrombosis) [Z86.718]     COPD (chronic obstructive pulmonary disease) ([facility]) [J44.9]      Sepsis, resolved                       Left upper extremity abscess  Recent Left humerus fracture s/p ORIF and IMN 12/28/23, I&D 01/04/24 and 01/23/24  Lactic acidosis, resolved       PLAN:   Blood cultures 01/22; NGTD  s/p I&D of left humeral abscess with wound vac placement 01/23/24.   OR cultures 01/23 +  Pseudomonas aeruginosa, resistant only to Gentamicin.   Continue IV cefepime for 6 weeks  Recommend removal of hardware if at all feasible  If hardware not removed, will need at least 4 weeks of ciprofloxacin 750mg PO bid after completion of IV antibiotics with suppression thereafter indefinitely for chronic infection  Tentative end date IV cefepime 03/07/24    PICC yes  Dispo SNF    FU ID clinic 2 weeks after discharge  OK to see Tamir WILLINGHAMN  Will sign off  Please reconsult if needed

## 2024-01-27 NOTE — PROGRESS NOTES
US PIV unsuccessful per ICU Rn's. Hospitalist William consulted; Hospitalist ordered EJ; ER RN's placed left EJ. EJ patent and intact.

## 2024-01-27 NOTE — CARE PLAN
The patient is Stable - Low risk of patient condition declining or worsening    Shift Goals  Clinical Goals: Pt will be able to reach pain comfort goal of being able to sleep comfortably, maintain skin integrity, pt will remain free from falls, EOB for meals, IV fluids  Patient Goals: rest, pain control  Family Goals: CLOTILDE    Progress made toward(s) clinical / shift goals: Pt remained free from falls, pt able to turn self independently, pt sat on EOB for meals, pt was able to sleep comfortably in bed, pt medicated per MAR for pain relief, waffle overlay in place, q2 turns        Problem: Pain - Standard  Goal: Alleviation of pain or a reduction in pain to the patient’s comfort goal  Outcome: Progressing     Problem: Fall Risk  Goal: Patient will remain free from falls  Outcome: Progressing     Problem: Skin Integrity  Goal: Skin integrity is maintained or improved  Outcome: Progressing     Problem: Mobility  Goal: Patient's capacity to carry out activities will improve  Outcome: Progressing       Patient is not progressing towards the following goals:

## 2024-01-27 NOTE — PROGRESS NOTES
Hospital Medicine Daily Progress Note    Date of Service  1/26/2024    Chief Complaint  Worsening surgical site wound healing    Hospital Course  Henry Kumari is a 68 y.o. 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  He had a recent admission 1224 through January 7 for repair of a humeral fracture and subsequent infection and debridement on January 5.  His infection grew out Staphylococcus epidermidis and he was discharged on Zyvox to complete a 21-day course.  He was transferred to a skilled nursing care and receiving his Zyvox per report.  Over the last few days he was having increasing arm pain and swelling with then a spontaneous leakage of fluid.  There were complaints of fevers, chills, and nausea.  Increased left arm pain.  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    Interval Problem Update  1/23/2024 wbc:3.6, k:3.2.  He went to surgery today for I+D of infection and revision.  Has hemovac drain present.  He is alert and oriented with clear speech.  States he is from [place] and has been sedentary over the last year and barely gets up with use of a walker usually requiring a wheelchair.  He states that seems at once a year he has been following due to his knees giving out.    1/24 sob improving, on O2, afebrile, generalized weakness, lives alone    1/25  feels better, tolerating oral intake, generalized weakness    1/26 no new complaints, pt is upset [facility] may have lost his clothes, otherwise patient reports feeling better  - generalized weakness    I have discussed this patient's plan of care and discharge plan at IDT rounds today with Case Management, Nursing, Nursing leadership, and other members of the IDT team.    Consultants/Specialty  orthopedics    Code Status  Full Code    Disposition  Medically Cleared  I have placed the appropriate orders for post-discharge needs.    Review of Systems  Review of Systems   Constitutional:   Negative for fever and malaise/fatigue.   Respiratory:  Negative for cough and shortness of breath.    Cardiovascular:  Negative for chest pain and leg swelling.   Gastrointestinal:  Negative for abdominal pain, heartburn and nausea.   Genitourinary:  Negative for dysuria.   Musculoskeletal:  Positive for joint pain (left shoulder). Negative for back pain and myalgias.   Neurological:  Positive for weakness. Negative for dizziness, sensory change and headaches.   Psychiatric/Behavioral:  Negative for depression. The patient is not nervous/anxious.         Physical Exam  Temp:  [36 °C (96.8 °F)-36.5 °C (97.7 °F)] 36.4 °C (97.6 °F)  Pulse:  [73-77] 77  Resp:  [17-18] 17  BP: (125-128)/(71-89) 125/89  SpO2:  [93 %-100 %] 93 %    Physical Exam  Vitals reviewed.   Constitutional:       Appearance: Normal appearance. He is obese. He is ill-appearing. He is not diaphoretic.      Comments: Morbid obesity   HENT:      Head: Normocephalic and atraumatic.      Nose: Nose normal.      Mouth/Throat:      Mouth: Mucous membranes are moist.      Pharynx: No oropharyngeal exudate.   Eyes:      Extraocular Movements: Extraocular movements intact.      Pupils: Pupils are equal, round, and reactive to light.   Cardiovascular:      Rate and Rhythm: Normal rate and regular rhythm.      Pulses:           Radial pulses are 2+ on the right side and 2+ on the left side.        Dorsalis pedis pulses are 2+ on the right side and 2+ on the left side.      Heart sounds: No murmur heard.  Pulmonary:      Effort: Pulmonary effort is normal. No respiratory distress.      Breath sounds: Normal breath sounds. No stridor.   Abdominal:      General: Bowel sounds are normal. There is no distension.      Palpations: Abdomen is soft.      Tenderness: There is no abdominal tenderness.   Musculoskeletal:         General: No swelling or tenderness.      Cervical back: Neck supple. No edema or rigidity. No muscular tenderness.      Right lower leg: Edema  present.      Left lower leg: Edema present.   Skin:     Coloration: Skin is not pale.      Findings: No erythema.      Comments: Left arm with a Hemovac present clean dressing over the left lateral upper mid arm.  There are staples in various spots around the shoulder.   Neurological:      General: No focal deficit present.      Mental Status: He is alert and oriented to person, place, and time. Mental status is at baseline.      Cranial Nerves: No cranial nerve deficit.      Sensory: No sensory deficit.      Motor: Weakness present.      Coordination: Coordination normal.   Psychiatric:         Mood and Affect: Mood normal.         Behavior: Behavior normal.         Fluids    Intake/Output Summary (Last 24 hours) at 1/26/2024 1602  Last data filed at 1/26/2024 1100  Gross per 24 hour   Intake 200 ml   Output 1580 ml   Net -1380 ml       Laboratory  Recent Labs     01/26/24  0704   WBC 4.6*   RBC 3.11*   HEMOGLOBIN 8.3*   HEMATOCRIT 25.4*   MCV 81.7   MCH 26.7*   MCHC 32.7   RDW 40.6   PLATELETCT 123*   MPV 10.1     Recent Labs     01/25/24  0118 01/26/24  0704   SODIUM 137 137   POTASSIUM 3.6 4.1   CHLORIDE 103 104   CO2 27 25   GLUCOSE 101* 86   BUN 18 14   CREATININE 0.54 0.48*   CALCIUM 8.4* 8.5                     Imaging  DX-CHEST-LIMITED (1 VIEW)   Final Result         1. The right subclavian central venous access catheter terminates over the superior vena cava. No postprocedure visible pneumothorax.   2. Postsurgical changes in the left shoulder.   3. The remainder is stable.      CT-EXTREMITY, UPPER WITH LEFT   Final Result      1.  Wide separation of some of the fracture fragments of the surgically transfixed humeral fracture. There is some evidence of fracture healing. New fracture is not excluded by this exam. Infection is not excluded by this exam.   2.  Fluid with scant gas at the surgically transfixed humeral fracture site could be sterile or infected   3.  Osteoarthritis   4.  18 mm LEFT upper lobe  hamartoma      DX-CHEST-PORTABLE (1 VIEW)   Final Result      1.  Faint residual RIGHT basilar atelectasis, less likely pneumonia   2.  Persistently enlarged cardiac silhouette   3.  Atherosclerosis      DX-HUMERUS 2+ LEFT   Final Result      1. Healing comminuted fracture of the metadiaphysis of the left humerus with stable alignment of the intramedullary ruba and screws.   2. Increased opacity lateral to the distal aspect of the fracture in the left arm soft tissues, concerning for soft tissue abscess or hematoma. CT of the left humerus with IV contrast is recommended.           Assessment/Plan  * Sepsis (HCC)- (present on admission)  Assessment & Plan  Etiology is left humeral infection from prior surgical site.  Vancomycin and ceftriaxone  Monitor cultures  Status post IV fluids  Wound care  1/23/2024 patient had revision of the left upper surgical site for concerns of infection    Infection and inflammatory reaction due to internal fixation device of left humerus, initial encounter ([facility])- (present on admission)  Assessment & Plan  Wound care  Vancomycin and ceftriaxone for now  Orthopedics consulting and following.  Took patient for I+D with purulent drainage 1/23/24 with tobramycin powder given in wound  Monitor labs and cultures  ID to consult 1/24 1/24 decrease ivf, pt eval  - pt lives in his own apt, but has family support  - pt eval, may need snf    1/25 pt rec for snf, CM to assist  - cont iv abx, f/u ID for duration of tx  Dc ivf    1/26 now on cefepime per Id, f/u sens    Anemia- (present on admission)  Assessment & Plan  1/23/2024 Hgb: 9.2<10.4  Status post acute surgery 1/23/2024 of left humerus    Mood disorder (HCC)- (present on admission)  Assessment & Plan  seroquel    Anxiolytic dependence (HCC)- (present on admission)  Assessment & Plan  No current BZ's at this time    Chronic respiratory failure with hypoxia (HCC)- (present on admission)  Assessment & Plan  At his baseline    BMI 45.0-49.9,  adult ([facility])- (present on admission)  Assessment & Plan  Body mass index is 42.91 kg/m².  Encourage weight loss    Class 3 severe obesity due to excess calories with serious comorbidity and body mass index (BMI) of 45.0 to 49.9 in adult ([facility])- (present on admission)  Assessment & Plan  Outpatient counseling    SANTOSH on CPAP- (present on admission)  Assessment & Plan  Ordered CPAP with RT  Monitor for nocturnal hypoxia    COPD (chronic obstructive pulmonary disease) ([facility])- (present on admission)  Assessment & Plan  No current exacerbation  At his baseline hypoxia  As needed bronchodilators    History of DVT (deep vein thrombosis)- (present on admission)  Assessment & Plan  Hold outpatient xarelto for possible ortho procedure  Restart once able.  Covering the interim with heparin DVT dosing         VTE prophylaxis:   SCDs/TEDs      I have performed a physical exam and reviewed and updated ROS and Plan today (1/26/2024). In review of yesterday's note (1/25/2024), there are no changes except as documented above.

## 2024-01-27 NOTE — CARE PLAN
The patient is Watcher - Medium risk of patient condition declining or worsening    Shift Goals  Clinical Goals: Pt will report pain lvl <6/10  Patient Goals: Pt will report pain lvl <6/10  Family Goals: NA    Progress made toward(s) clinical / shift goals:      Patient is not progressing towards the following goals:

## 2024-01-28 PROCEDURE — 700111 HCHG RX REV CODE 636 W/ 250 OVERRIDE (IP): Mod: JZ | Performed by: INTERNAL MEDICINE

## 2024-01-28 PROCEDURE — 700102 HCHG RX REV CODE 250 W/ 637 OVERRIDE(OP): Performed by: HOSPITALIST

## 2024-01-28 PROCEDURE — 700111 HCHG RX REV CODE 636 W/ 250 OVERRIDE (IP): Performed by: HOSPITALIST

## 2024-01-28 PROCEDURE — A9270 NON-COVERED ITEM OR SERVICE: HCPCS | Performed by: HOSPITALIST

## 2024-01-28 PROCEDURE — 700102 HCHG RX REV CODE 250 W/ 637 OVERRIDE(OP): Performed by: INTERNAL MEDICINE

## 2024-01-28 PROCEDURE — 700105 HCHG RX REV CODE 258: Performed by: INTERNAL MEDICINE

## 2024-01-28 PROCEDURE — 94660 CPAP INITIATION&MGMT: CPT

## 2024-01-28 PROCEDURE — A9270 NON-COVERED ITEM OR SERVICE: HCPCS | Performed by: INTERNAL MEDICINE

## 2024-01-28 PROCEDURE — 99232 SBSQ HOSP IP/OBS MODERATE 35: CPT | Performed by: INTERNAL MEDICINE

## 2024-01-28 PROCEDURE — 770006 HCHG ROOM/CARE - MED/SURG/GYN SEMI*

## 2024-01-28 RX ORDER — SPIRONOLACTONE 25 MG/1
25 TABLET ORAL
Status: DISCONTINUED | OUTPATIENT
Start: 2024-01-29 | End: 2024-01-29 | Stop reason: HOSPADM

## 2024-01-28 RX ORDER — OXYCODONE HYDROCHLORIDE 10 MG/1
10 TABLET ORAL EVERY 4 HOURS PRN
Status: DISCONTINUED | OUTPATIENT
Start: 2024-01-28 | End: 2024-01-29 | Stop reason: HOSPADM

## 2024-01-28 RX ORDER — OXYCODONE HYDROCHLORIDE 5 MG/1
5 TABLET ORAL EVERY 4 HOURS PRN
Status: DISCONTINUED | OUTPATIENT
Start: 2024-01-28 | End: 2024-01-29 | Stop reason: HOSPADM

## 2024-01-28 RX ADMIN — NYSTATIN: 100000 POWDER TOPICAL at 08:50

## 2024-01-28 RX ADMIN — OXYCODONE HYDROCHLORIDE 10 MG: 10 TABLET ORAL at 11:14

## 2024-01-28 RX ADMIN — NYSTATIN: 100000 POWDER TOPICAL at 17:14

## 2024-01-28 RX ADMIN — CEFEPIME 2 G: 2 INJECTION, POWDER, FOR SOLUTION INTRAVENOUS at 17:17

## 2024-01-28 RX ADMIN — FLUOXETINE HYDROCHLORIDE 20 MG: 20 CAPSULE ORAL at 05:15

## 2024-01-28 RX ADMIN — HEPARIN SODIUM 5000 UNITS: 5000 INJECTION, SOLUTION INTRAVENOUS; SUBCUTANEOUS at 05:15

## 2024-01-28 RX ADMIN — CLONAZEPAM 1 MG: 1 TABLET ORAL at 17:13

## 2024-01-28 RX ADMIN — OXYCODONE HYDROCHLORIDE 10 MG: 10 TABLET ORAL at 05:14

## 2024-01-28 RX ADMIN — CLONAZEPAM 1 MG: 1 TABLET ORAL at 05:15

## 2024-01-28 RX ADMIN — GEMFIBROZIL 600 MG: 600 TABLET ORAL at 09:32

## 2024-01-28 RX ADMIN — QUETIAPINE FUMARATE 100 MG: 100 TABLET ORAL at 05:14

## 2024-01-28 RX ADMIN — MOMETASONE FUROATE AND FORMOTEROL FUMARATE DIHYDRATE 2 PUFF: 200; 5 AEROSOL RESPIRATORY (INHALATION) at 05:20

## 2024-01-28 RX ADMIN — OXYCODONE HYDROCHLORIDE 10 MG: 10 TABLET ORAL at 15:29

## 2024-01-28 RX ADMIN — METOPROLOL SUCCINATE 100 MG: 100 TABLET, EXTENDED RELEASE ORAL at 05:15

## 2024-01-28 RX ADMIN — OXYCODONE HYDROCHLORIDE 10 MG: 10 TABLET ORAL at 20:12

## 2024-01-28 RX ADMIN — Medication 220 MG: at 05:14

## 2024-01-28 RX ADMIN — CEFEPIME 2 G: 2 INJECTION, POWDER, FOR SOLUTION INTRAVENOUS at 01:59

## 2024-01-28 RX ADMIN — MOMETASONE FUROATE AND FORMOTEROL FUMARATE DIHYDRATE 2 PUFF: 200; 5 AEROSOL RESPIRATORY (INHALATION) at 17:13

## 2024-01-28 RX ADMIN — GEMFIBROZIL 600 MG: 600 TABLET ORAL at 18:17

## 2024-01-28 RX ADMIN — OXYCODONE HYDROCHLORIDE AND ACETAMINOPHEN 1000 MG: 500 TABLET ORAL at 05:14

## 2024-01-28 RX ADMIN — CEFEPIME 2 G: 2 INJECTION, POWDER, FOR SOLUTION INTRAVENOUS at 08:56

## 2024-01-28 RX ADMIN — ATORVASTATIN CALCIUM 20 MG: 20 TABLET, FILM COATED ORAL at 20:13

## 2024-01-28 RX ADMIN — HEPARIN SODIUM 5000 UNITS: 5000 INJECTION, SOLUTION INTRAVENOUS; SUBCUTANEOUS at 13:52

## 2024-01-28 RX ADMIN — QUETIAPINE FUMARATE 100 MG: 100 TABLET ORAL at 17:13

## 2024-01-28 ASSESSMENT — ENCOUNTER SYMPTOMS
SHORTNESS OF BREATH: 0
INSOMNIA: 0
ABDOMINAL PAIN: 0
WEAKNESS: 1
NAUSEA: 0
FEVER: 0
HEADACHES: 0
DEPRESSION: 0
COUGH: 0
DIZZINESS: 0
SENSORY CHANGE: 0
MEMORY LOSS: 0
MYALGIAS: 1
NERVOUS/ANXIOUS: 1
BACK PAIN: 0
CHILLS: 0
HEARTBURN: 0

## 2024-01-28 NOTE — PROGRESS NOTES
"    Orthopedic PA Progress Note    Interval changes:   Patient doing well.  Shoulder dressings are CDI, HV drain with minimal output and removed  WBAT in left shoulder   Dressings changed  Incision without purulence   Ok for d/c planning from ortho POV    ROS - Patient denies any new issues. Denies any numbness or tingling. Pain well controlled.    BP (!) 158/73   Pulse 73   Temp 36.2 °C (97.1 °F) (Temporal)   Resp 18   Ht 1.88 m (6' 2\")   Wt (!) 152 kg (334 lb 3.5 oz)   SpO2 99%     Patient seen and examined  No acute distress  Breathing non labored  RRR  Left upper extremity/shoulder:  - dressing to left anterior shoulder CDI   - Elbow and shoulder active ROM improving slowly      Recent Labs     01/26/24  0704   WBC 4.6*   RBC 3.11*   HEMOGLOBIN 8.3*   HEMATOCRIT 25.4*   MCV 81.7   MCH 26.7*   MCHC 32.7   RDW 40.6   PLATELETCT 123*   MPV 10.1         Active Hospital Problems    Diagnosis     Infection and inflammatory reaction due to internal fixation device of left humerus, initial encounter ([facility]) [T84.611A]     Sepsis ([facility]) [A41.9]     Anemia [D64.9]     Mood disorder ([facility]) [F39]     Chronic respiratory failure with hypoxia ([facility]) [J96.11]     Anxiolytic dependence ([facility]) [F13.20]     BMI 45.0-49.9, adult ([facility]) [Z68.42]     Class 3 severe obesity due to excess calories with serious comorbidity and body mass index (BMI) of 45.0 to 49.9 in adult ([facility]) [E66.01, Z68.42]     SANTOSH on CPAP [G47.33]     History of DVT (deep vein thrombosis) [Z86.718]     COPD (chronic obstructive pulmonary disease) ([facility]) [J44.9]        Assessment/Plan:  Patient doing well.  Shoulder dressings are CDI, HV drain with minimal output and removed  WBAT in left shoulder   Dressings changed  Incision without purulence   Ok for d/c planning from ortho POV    POD#4 S/p Left arm abscess debridement and I&D completed by Dr. Arnol Masterson on 1/23/24     Wt bearing status - WBAT  Wound care/Drains - Dressings to be changed every other day by " nursing. Or PRN for saturation starting POD#2  Future Procedures - None planned   DVT chemo prophylaxis in place: Heparin subq q 8   Sutures/Staples out- 14-21 days post operatively. Removal will completed by ortho JULIETTE's unless transferred. Sutures to remain in place; staples to be removed 1/25  PT/OT-initiated  Antibiotics: Remains on IV vancomycin and PO doxycycline  DVT Prophylaxis- TEDS/SCDs/Foot pumps  [place]-not needed per ortho  Case Coordination for Discharge Planning - Disposition per therapy recs.

## 2024-01-28 NOTE — PROGRESS NOTES
Hospital Medicine Daily Progress Note    Date of Service  1/27/2024    Chief Complaint  Worsening surgical site wound healing    Hospital Course  Henry Kumari is a 68 y.o. 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  He had a recent admission 1224 through January 7 for repair of a humeral fracture and subsequent infection and debridement on January 5.  His infection grew out Staphylococcus epidermidis and he was discharged on Zyvox to complete a 21-day course.  He was transferred to a skilled nursing care and receiving his Zyvox per report.  Over the last few days he was having increasing arm pain and swelling with then a spontaneous leakage of fluid.  There were complaints of fevers, chills, and nausea.  Increased left arm pain.  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    Interval Problem Update  1/23/2024 wbc:3.6, k:3.2.  He went to surgery today for I+D of infection and revision.  Has hemovac drain present.  He is alert and oriented with clear speech.  States he is from [place] and has been sedentary over the last year and barely gets up with use of a walker usually requiring a wheelchair.  He states that seems at once a year he has been following due to his knees giving out.    1/24 sob improving, on O2, afebrile, generalized weakness, lives alone    1/25  feels better, tolerating oral intake, generalized weakness    1/26 no new complaints, pt is upset [facility] may have lost his clothes, otherwise patient reports feeling better  - generalized weakness    1/27 still upset about [facility] still losing his clothing  Does not want to return to [facility]  Reports left upper extremity ache, status post PICC line placement    I have discussed this patient's plan of care and discharge plan at IDT rounds today with Case Management, Nursing, Nursing leadership, and other members of the IDT team.    Consultants/Specialty  orthopedics    Code Status  Full  Code    Disposition  The patient is not medically cleared for discharge to home or a post-acute facility.      I have placed the appropriate orders for post-discharge needs.    Review of Systems  Review of Systems   Constitutional:  Negative for diaphoresis and fever.   Eyes:  Negative for blurred vision and double vision.   Respiratory:  Negative for cough and shortness of breath.    Cardiovascular:  Negative for chest pain and leg swelling.   Gastrointestinal:  Negative for abdominal pain and nausea.   Genitourinary:  Negative for dysuria.   Musculoskeletal:  Positive for joint pain (left shoulder) and myalgias. Negative for back pain.   Neurological:  Positive for weakness. Negative for dizziness, sensory change and headaches.   Psychiatric/Behavioral:  Negative for depression and memory loss. The patient is nervous/anxious. The patient does not have insomnia.         Physical Exam  Temp:  [36.1 °C (96.9 °F)-36.9 °C (98.4 °F)] 36.1 °C (97 °F)  Pulse:  [] 75  Resp:  [17-24] 17  BP: (110-153)/(75-86) 110/86  SpO2:  [93 %-100 %] 100 %    Physical Exam  Vitals reviewed.   Constitutional:       General: He is not in acute distress.     Appearance: Normal appearance. He is obese. He is ill-appearing.      Comments: Morbid obesity   HENT:      Head: Normocephalic and atraumatic.      Nose: Nose normal.      Mouth/Throat:      Mouth: Mucous membranes are moist.      Pharynx: No oropharyngeal exudate.   Eyes:      Extraocular Movements: Extraocular movements intact.      Pupils: Pupils are equal, round, and reactive to light.   Cardiovascular:      Rate and Rhythm: Normal rate and regular rhythm.      Pulses:           Radial pulses are 2+ on the right side and 2+ on the left side.        Dorsalis pedis pulses are 2+ on the right side and 2+ on the left side.      Heart sounds: No murmur heard.  Pulmonary:      Effort: Pulmonary effort is normal. No respiratory distress.      Breath sounds: Normal breath sounds. No  stridor.   Abdominal:      General: Bowel sounds are normal. There is no distension.      Palpations: Abdomen is soft. There is no mass.      Tenderness: There is no abdominal tenderness.   Musculoskeletal:         General: No swelling or tenderness.      Cervical back: Neck supple. No edema or rigidity. No muscular tenderness.      Right lower leg: Edema present.      Left lower leg: Edema present.   Skin:     Coloration: Skin is not pale.      Comments: Left arm with a Hemovac present clean dressing over the left lateral upper mid arm.  There are staples in various spots around the shoulder.   Neurological:      General: No focal deficit present.      Mental Status: He is alert and oriented to person, place, and time. Mental status is at baseline.      Cranial Nerves: No cranial nerve deficit.      Sensory: No sensory deficit.      Motor: Weakness present.   Psychiatric:         Mood and Affect: Mood normal.         Behavior: Behavior normal.         Thought Content: Thought content normal.         Fluids    Intake/Output Summary (Last 24 hours) at 1/27/2024 1749  Last data filed at 1/27/2024 1000  Gross per 24 hour   Intake 480 ml   Output 1750 ml   Net -1270 ml       Laboratory  Recent Labs     01/26/24  0704   WBC 4.6*   RBC 3.11*   HEMOGLOBIN 8.3*   HEMATOCRIT 25.4*   MCV 81.7   MCH 26.7*   MCHC 32.7   RDW 40.6   PLATELETCT 123*   MPV 10.1     Recent Labs     01/25/24  0118 01/26/24  0704   SODIUM 137 137   POTASSIUM 3.6 4.1   CHLORIDE 103 104   CO2 27 25   GLUCOSE 101* 86   BUN 18 14   CREATININE 0.54 0.48*   CALCIUM 8.4* 8.5                     Imaging  DX-CHEST-FOR LINE PLACEMENT Perform procedure in: Patient's Room   Final Result            1. A right peripherally inserted catheter with tip projects appropriately over the expected area of the superior vena cava.      IR-PICC LINE PLACEMENT W/ GUIDANCE > AGE 5   Final Result                  Ultrasound-guided PICC placement performed by qualified nursing  staff as    above.          DX-CHEST-LIMITED (1 VIEW)   Final Result         1. The right subclavian central venous access catheter terminates over the superior vena cava. No postprocedure visible pneumothorax.   2. Postsurgical changes in the left shoulder.   3. The remainder is stable.      CT-EXTREMITY, UPPER WITH LEFT   Final Result      1.  Wide separation of some of the fracture fragments of the surgically transfixed humeral fracture. There is some evidence of fracture healing. New fracture is not excluded by this exam. Infection is not excluded by this exam.   2.  Fluid with scant gas at the surgically transfixed humeral fracture site could be sterile or infected   3.  Osteoarthritis   4.  18 mm LEFT upper lobe hamartoma      DX-CHEST-PORTABLE (1 VIEW)   Final Result      1.  Faint residual RIGHT basilar atelectasis, less likely pneumonia   2.  Persistently enlarged cardiac silhouette   3.  Atherosclerosis      DX-HUMERUS 2+ LEFT   Final Result      1. Healing comminuted fracture of the metadiaphysis of the left humerus with stable alignment of the intramedullary ruba and screws.   2. Increased opacity lateral to the distal aspect of the fracture in the left arm soft tissues, concerning for soft tissue abscess or hematoma. CT of the left humerus with IV contrast is recommended.           Assessment/Plan  * Sepsis (HCC)- (present on admission)  Assessment & Plan  Etiology is left humeral infection from prior surgical site.  Vancomycin and ceftriaxone  Monitor cultures  Status post IV fluids  Wound care  1/23/2024 patient had revision of the left upper surgical site for concerns of infection    Infection and inflammatory reaction due to internal fixation device of left humerus, initial encounter (HCC)- (present on admission)  Assessment & Plan  Wound care  Vancomycin and ceftriaxone for now  Orthopedics consulting and following.  Took patient for I+D with purulent drainage 1/23/24 with tobramycin powder given in  wound  Monitor labs and cultures  ID to consult 1/24 1/24 decrease ivf, pt eval  - pt lives in his own apt, but has family support  - pt eval, may need snf    1/25 pt rec for snf, CM to assist  - cont iv abx, f/u ID for duration of tx  Dc ivf    1/26 now on cefepime per Id, f/u sens    1/27 per ID, cefepime 3/7, 6 weeks rec  - picc line ordered  ? Hardware removal, ortho to f/u  -Continue Cefepime 2g IV q8h for at least 6 weeks; start date 01/25/24 with tentative end date 03/07/24  -Recommend removal of hardware  -If unable to remove hardware, then after completion of course of IV antibiotics, recommend starting on ciprofloxacin 750mg PO bid for at least 4 weeks, then ciprofloxacin 500mg PO bid thereafter indefinitely for chronic suppression    Not want to return to [facility], [facility] nursing [facility],  to assist with new placement    Anemia- (present on admission)  Assessment & Plan  1/23/2024 Hgb: 9.2<10.4  Status post acute surgery 1/23/2024 of left humerus    Mood disorder (HCC)- (present on admission)  Assessment & Plan  seroquel    Anxiolytic dependence ([facility])- (present on admission)  Assessment & Plan  No current BZ's at this time    Chronic respiratory failure with hypoxia ([facility])- (present on admission)  Assessment & Plan  At his baseline    BMI 45.0-49.9, adult ([facility])- (present on admission)  Assessment & Plan  Body mass index is 42.91 kg/m².  Encourage weight loss    Class 3 severe obesity due to excess calories with serious comorbidity and body mass index (BMI) of 45.0 to 49.9 in adult ([facility])- (present on admission)  Assessment & Plan  Outpatient counseling    SANTOSH on CPAP- (present on admission)  Assessment & Plan  Ordered CPAP with RT  Monitor for nocturnal hypoxia    COPD (chronic obstructive pulmonary disease) ([facility])- (present on admission)  Assessment & Plan  No current exacerbation  At his baseline hypoxia  As needed bronchodilators    History of DVT (deep vein thrombosis)- (present on  admission)  Assessment & Plan  Hold outpatient xarelto for possible ortho procedure  Restart once able.  Covering the interim with heparin DVT dosing         VTE prophylaxis:   SCDs/TEDs      I have performed a physical exam and reviewed and updated ROS and Plan today (1/27/2024). In review of yesterday's note (1/26/2024), there are no changes except as documented above.

## 2024-01-28 NOTE — PROGRESS NOTES
Hospital Medicine Daily Progress Note    Date of Service  1/28/2024    Chief Complaint  Worsening surgical site wound healing    Hospital Course  Henry Kumari is a 68 y.o. 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  He had a recent admission 1224 through January 7 for repair of a humeral fracture and subsequent infection and debridement on January 5.  His infection grew out Staphylococcus epidermidis and he was discharged on Zyvox to complete a 21-day course.  He was transferred to a skilled nursing care and receiving his Zyvox per report.  Over the last few days he was having increasing arm pain and swelling with then a spontaneous leakage of fluid.  There were complaints of fevers, chills, and nausea.  Increased left arm pain.  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    Interval Problem Update  1/23/2024 wbc:3.6, k:3.2.  He went to surgery today for I+D of infection and revision.  Has hemovac drain present.  He is alert and oriented with clear speech.  States he is from [place] and has been sedentary over the last year and barely gets up with use of a walker usually requiring a wheelchair.  He states that seems at once a year he has been following due to his knees giving out.    1/24 sob improving, on O2, afebrile, generalized weakness, lives alone    1/25  feels better, tolerating oral intake, generalized weakness    1/26 no new complaints, pt is upset [facility] may have lost his clothes, otherwise patient reports feeling better  - generalized weakness    1/27 still upset about [facility] still losing his clothing  Does not want to return to [facility]  Reports left upper extremity ache, status post PICC line placement    1/28 left shoulder ache, improved range of motion  pending skilled placement      I have discussed this patient's plan of care and discharge plan at IDT rounds today with Case Management, Nursing, Nursing leadership, and other  members of the IDT team.    Consultants/Specialty  orthopedics    Code Status  Full Code    Disposition  The patient is not medically cleared for discharge to home or a post-acute facility.      I have placed the appropriate orders for post-discharge needs.    Review of Systems  Review of Systems   Constitutional:  Negative for chills, fever and malaise/fatigue.   Respiratory:  Negative for cough and shortness of breath.    Cardiovascular:  Negative for chest pain and leg swelling.   Gastrointestinal:  Negative for abdominal pain, heartburn and nausea.   Genitourinary:  Negative for dysuria.   Musculoskeletal:  Positive for joint pain (left shoulder) and myalgias. Negative for back pain.   Neurological:  Positive for weakness. Negative for dizziness, sensory change and headaches.   Psychiatric/Behavioral:  Negative for depression and memory loss. The patient is nervous/anxious. The patient does not have insomnia.         Physical Exam  Temp:  [36.1 °C (97 °F)-36.6 °C (97.8 °F)] 36.5 °C (97.7 °F)  Pulse:  [67-76] 67  Resp:  [17-18] 18  BP: (110-158)/(70-86) 118/80  SpO2:  [99 %-100 %] 99 %    Physical Exam  Vitals reviewed.   Constitutional:       General: He is not in acute distress.     Appearance: Normal appearance. He is obese. He is ill-appearing. He is not diaphoretic.      Comments: Morbid obesity   HENT:      Head: Normocephalic and atraumatic.      Nose: Nose normal.      Mouth/Throat:      Mouth: Mucous membranes are moist.      Pharynx: No oropharyngeal exudate.   Eyes:      Extraocular Movements: Extraocular movements intact.      Pupils: Pupils are equal, round, and reactive to light.   Cardiovascular:      Rate and Rhythm: Normal rate and regular rhythm.      Pulses:           Radial pulses are 2+ on the right side and 2+ on the left side.        Dorsalis pedis pulses are 2+ on the right side and 2+ on the left side.      Heart sounds: No murmur heard.  Pulmonary:      Effort: Pulmonary effort is  normal.      Breath sounds: Normal breath sounds. No stridor.   Abdominal:      General: Bowel sounds are normal. There is no distension.      Palpations: Abdomen is soft. There is no mass.      Tenderness: There is no abdominal tenderness.   Musculoskeletal:         General: Swelling present. No tenderness.      Cervical back: Neck supple. No edema or rigidity. No muscular tenderness.      Right lower leg: Edema present.      Left lower leg: Edema present.   Skin:     Coloration: Skin is not pale.      Comments: Left arm with a Hemovac present clean dressing over the left lateral upper mid arm.  There are staples in various spots around the shoulder.   Neurological:      General: No focal deficit present.      Mental Status: He is alert and oriented to person, place, and time. Mental status is at baseline.      Cranial Nerves: No cranial nerve deficit.      Sensory: No sensory deficit.      Motor: Weakness present.      Coordination: Coordination normal.   Psychiatric:         Mood and Affect: Mood normal.         Behavior: Behavior normal.         Thought Content: Thought content normal.         Fluids    Intake/Output Summary (Last 24 hours) at 1/28/2024 1425  Last data filed at 1/28/2024 1300  Gross per 24 hour   Intake 880 ml   Output 925 ml   Net -45 ml       Laboratory  Recent Labs     01/26/24  0704   WBC 4.6*   RBC 3.11*   HEMOGLOBIN 8.3*   HEMATOCRIT 25.4*   MCV 81.7   MCH 26.7*   MCHC 32.7   RDW 40.6   PLATELETCT 123*   MPV 10.1     Recent Labs     01/26/24  0704   SODIUM 137   POTASSIUM 4.1   CHLORIDE 104   CO2 25   GLUCOSE 86   BUN 14   CREATININE 0.48*   CALCIUM 8.5                     Imaging  DX-CHEST-FOR LINE PLACEMENT Perform procedure in: Patient's Room   Final Result            1. A right peripherally inserted catheter with tip projects appropriately over the expected area of the superior vena cava.      IR-PICC LINE PLACEMENT W/ GUIDANCE > AGE 5   Final Result                  Ultrasound-guided  PICC placement performed by qualified nursing staff as    above.          DX-CHEST-LIMITED (1 VIEW)   Final Result         1. The right subclavian central venous access catheter terminates over the superior vena cava. No postprocedure visible pneumothorax.   2. Postsurgical changes in the left shoulder.   3. The remainder is stable.      CT-EXTREMITY, UPPER WITH LEFT   Final Result      1.  Wide separation of some of the fracture fragments of the surgically transfixed humeral fracture. There is some evidence of fracture healing. New fracture is not excluded by this exam. Infection is not excluded by this exam.   2.  Fluid with scant gas at the surgically transfixed humeral fracture site could be sterile or infected   3.  Osteoarthritis   4.  18 mm LEFT upper lobe hamartoma      DX-CHEST-PORTABLE (1 VIEW)   Final Result      1.  Faint residual RIGHT basilar atelectasis, less likely pneumonia   2.  Persistently enlarged cardiac silhouette   3.  Atherosclerosis      DX-HUMERUS 2+ LEFT   Final Result      1. Healing comminuted fracture of the metadiaphysis of the left humerus with stable alignment of the intramedullary ruba and screws.   2. Increased opacity lateral to the distal aspect of the fracture in the left arm soft tissues, concerning for soft tissue abscess or hematoma. CT of the left humerus with IV contrast is recommended.           Assessment/Plan  * Sepsis (HCC)- (present on admission)  Assessment & Plan  Etiology is left humeral infection from prior surgical site.  Vancomycin and ceftriaxone  Monitor cultures  Status post IV fluids  Wound care  1/23/2024 patient had revision of the left upper surgical site for concerns of infection    Infection and inflammatory reaction due to internal fixation device of left humerus, initial encounter (HCC)- (present on admission)  Assessment & Plan  Wound care  Vancomycin and ceftriaxone for now  Orthopedics consulting and following.  Took patient for I+D with purulent  drainage 1/23/24 with tobramycin powder given in wound  Monitor labs and cultures  ID to consult 1/24 1/24 decrease ivf, pt eval  - pt lives in his own apt, but has family support  - pt eval, may need snf    1/25 pt rec for snf, CM to assist  - cont iv abx, f/u ID for duration of tx  Dc ivf    1/26 now on cefepime per Id, f/u sens    1/27 per ID, cefepime 3/7, 6 weeks rec  - picc line ordered  ? Hardware removal, ortho to f/u  -Continue Cefepime 2g IV q8h for at least 6 weeks; start date 01/25/24 with tentative end date 03/07/24  -Recommend removal of hardware  -If unable to remove hardware, then after completion of course of IV antibiotics, recommend starting on ciprofloxacin 750mg PO bid for at least 4 weeks, then ciprofloxacin 500mg PO bid thereafter indefinitely for chronic suppression    Not want to return to [facility] nursing [facility],  to assist with new placement    1/28 PICC line, plan for 6 weeks of antibiotics  Patient wants to go to another skilled facility,  to assist    Anemia- (present on admission)  Assessment & Plan  1/23/2024 Hgb: 9.2<10.4  Status post acute surgery 1/23/2024 of left humerus    Mood disorder (HCC)- (present on admission)  Assessment & Plan  seroquel    Anxiolytic dependence (HCC)- (present on admission)  Assessment & Plan  No current BZ's at this time    Chronic respiratory failure with hypoxia ([facility])- (present on admission)  Assessment & Plan  At his baseline    BMI 45.0-49.9, adult ([facility])- (present on admission)  Assessment & Plan  Body mass index is 42.91 kg/m².  Encourage weight loss    Class 3 severe obesity due to excess calories with serious comorbidity and body mass index (BMI) of 45.0 to 49.9 in adult ([facility])- (present on admission)  Assessment & Plan  Outpatient counseling    SANTOSH on CPAP- (present on admission)  Assessment & Plan  Ordered CPAP with RT  Monitor for nocturnal hypoxia    COPD (chronic obstructive pulmonary disease) ([facility])-  (present on admission)  Assessment & Plan  No current exacerbation  At his baseline hypoxia  As needed bronchodilators    History of DVT (deep vein thrombosis)- (present on admission)  Assessment & Plan  Hold outpatient xarelto for possible ortho procedure  Restart once able.  Covering the interim with heparin DVT dosing         VTE prophylaxis:   SCDs/TEDs      I have performed a physical exam and reviewed and updated ROS and Plan today (1/28/2024). In review of yesterday's note (1/27/2024), there are no changes except as documented above.

## 2024-01-28 NOTE — PROGRESS NOTES
Assumed care of pt from HS RN. He is resting in bed with eyes closed respirations even, unlabored. Call light in reach and bed low and locked.

## 2024-01-29 VITALS
BODY MASS INDEX: 40.43 KG/M2 | TEMPERATURE: 96.1 F | HEART RATE: 73 BPM | DIASTOLIC BLOOD PRESSURE: 65 MMHG | OXYGEN SATURATION: 96 % | SYSTOLIC BLOOD PRESSURE: 125 MMHG | HEIGHT: 74 IN | RESPIRATION RATE: 18 BRPM | WEIGHT: 315 LBS

## 2024-01-29 PROCEDURE — 94660 CPAP INITIATION&MGMT: CPT

## 2024-01-29 PROCEDURE — A9270 NON-COVERED ITEM OR SERVICE: HCPCS | Performed by: INTERNAL MEDICINE

## 2024-01-29 PROCEDURE — 99239 HOSP IP/OBS DSCHRG MGMT >30: CPT | Performed by: INTERNAL MEDICINE

## 2024-01-29 PROCEDURE — 700102 HCHG RX REV CODE 250 W/ 637 OVERRIDE(OP): Performed by: HOSPITALIST

## 2024-01-29 PROCEDURE — 700102 HCHG RX REV CODE 250 W/ 637 OVERRIDE(OP): Performed by: INTERNAL MEDICINE

## 2024-01-29 PROCEDURE — A9270 NON-COVERED ITEM OR SERVICE: HCPCS | Performed by: HOSPITALIST

## 2024-01-29 PROCEDURE — 700111 HCHG RX REV CODE 636 W/ 250 OVERRIDE (IP)

## 2024-01-29 PROCEDURE — 700105 HCHG RX REV CODE 258: Performed by: INTERNAL MEDICINE

## 2024-01-29 PROCEDURE — 700111 HCHG RX REV CODE 636 W/ 250 OVERRIDE (IP): Mod: JZ | Performed by: INTERNAL MEDICINE

## 2024-01-29 PROCEDURE — 97116 GAIT TRAINING THERAPY: CPT

## 2024-01-29 PROCEDURE — 700111 HCHG RX REV CODE 636 W/ 250 OVERRIDE (IP): Mod: JZ | Performed by: HOSPITALIST

## 2024-01-29 RX ORDER — OXYCODONE HYDROCHLORIDE 5 MG/1
5 TABLET ORAL EVERY 4 HOURS PRN
Qty: 20 TABLET | Refills: 0 | Status: SHIPPED | OUTPATIENT
Start: 2024-01-29 | End: 2024-01-29

## 2024-01-29 RX ORDER — AMOXICILLIN 250 MG
2 CAPSULE ORAL 2 TIMES DAILY
Qty: 30 TABLET | Refills: 0 | Status: SHIPPED | OUTPATIENT
Start: 2024-01-29

## 2024-01-29 RX ORDER — ZINC SULFATE 50(220)MG
220 CAPSULE ORAL DAILY
Qty: 30 CAPSULE | Refills: 3 | Status: SHIPPED | OUTPATIENT
Start: 2024-01-30

## 2024-01-29 RX ORDER — ACETAMINOPHEN 500 MG
1000 TABLET ORAL EVERY 6 HOURS PRN
Qty: 30 TABLET | Refills: 0 | Status: SHIPPED | OUTPATIENT
Start: 2024-01-29

## 2024-01-29 RX ORDER — CIPROFLOXACIN 750 MG/1
750 TABLET, FILM COATED ORAL 2 TIMES DAILY
Qty: 60 TABLET | Refills: 0 | Status: ACTIVE | OUTPATIENT
Start: 2024-03-08 | End: 2024-04-07

## 2024-01-29 RX ORDER — OXYCODONE HYDROCHLORIDE 10 MG/1
10 TABLET ORAL EVERY 4 HOURS PRN
Qty: 28 TABLET | Refills: 0 | Status: SHIPPED | OUTPATIENT
Start: 2024-01-29 | End: 2024-02-03

## 2024-01-29 RX ORDER — NYSTATIN 100000 [USP'U]/G
1 POWDER TOPICAL 2 TIMES DAILY
Status: SHIPPED
Start: 2024-01-29

## 2024-01-29 RX ADMIN — SPIRONOLACTONE 25 MG: 25 TABLET ORAL at 05:41

## 2024-01-29 RX ADMIN — CLONAZEPAM 1 MG: 1 TABLET ORAL at 05:41

## 2024-01-29 RX ADMIN — NYSTATIN: 100000 POWDER TOPICAL at 05:39

## 2024-01-29 RX ADMIN — OXYCODONE HYDROCHLORIDE AND ACETAMINOPHEN 1000 MG: 500 TABLET ORAL at 05:40

## 2024-01-29 RX ADMIN — Medication 220 MG: at 05:40

## 2024-01-29 RX ADMIN — GEMFIBROZIL 600 MG: 600 TABLET ORAL at 05:41

## 2024-01-29 RX ADMIN — MOMETASONE FUROATE AND FORMOTEROL FUMARATE DIHYDRATE 2 PUFF: 200; 5 AEROSOL RESPIRATORY (INHALATION) at 05:39

## 2024-01-29 RX ADMIN — QUETIAPINE FUMARATE 100 MG: 100 TABLET ORAL at 05:41

## 2024-01-29 RX ADMIN — HYDROMORPHONE HYDROCHLORIDE 0.5 MG: 1 INJECTION, SOLUTION INTRAMUSCULAR; INTRAVENOUS; SUBCUTANEOUS at 08:57

## 2024-01-29 RX ADMIN — HYDROMORPHONE HYDROCHLORIDE 0.5 MG: 1 INJECTION, SOLUTION INTRAMUSCULAR; INTRAVENOUS; SUBCUTANEOUS at 02:29

## 2024-01-29 RX ADMIN — METOPROLOL SUCCINATE 100 MG: 100 TABLET, EXTENDED RELEASE ORAL at 05:40

## 2024-01-29 RX ADMIN — HEPARIN SODIUM 5000 UNITS: 5000 INJECTION, SOLUTION INTRAVENOUS; SUBCUTANEOUS at 05:39

## 2024-01-29 RX ADMIN — OXYCODONE HYDROCHLORIDE 10 MG: 10 TABLET ORAL at 05:53

## 2024-01-29 RX ADMIN — FLUOXETINE 40 MG: 20 CAPSULE ORAL at 05:40

## 2024-01-29 RX ADMIN — OXYCODONE HYDROCHLORIDE 10 MG: 10 TABLET ORAL at 01:18

## 2024-01-29 RX ADMIN — CEFEPIME 2 G: 2 INJECTION, POWDER, FOR SOLUTION INTRAVENOUS at 00:56

## 2024-01-29 RX ADMIN — CEFEPIME 2 G: 2 INJECTION, POWDER, FOR SOLUTION INTRAVENOUS at 09:00

## 2024-01-29 RX ADMIN — OXYCODONE HYDROCHLORIDE 10 MG: 10 TABLET ORAL at 13:02

## 2024-01-29 ASSESSMENT — COGNITIVE AND FUNCTIONAL STATUS - GENERAL
TURNING FROM BACK TO SIDE WHILE IN FLAT BAD: UNABLE
MOBILITY SCORE: 20
SUGGESTED CMS G CODE MODIFIER MOBILITY: CJ
CLIMB 3 TO 5 STEPS WITH RAILING: A LITTLE

## 2024-01-29 ASSESSMENT — GAIT ASSESSMENTS
ASSISTIVE DEVICE: FRONT WHEEL WALKER
DISTANCE (FEET): 30
DEVIATION: BRADYKINETIC
GAIT LEVEL OF ASSIST: SUPERVISED

## 2024-01-29 NOTE — PROGRESS NOTES
Assumed care of pt from HS RN. He is resting with eyes closed, respirations even and unlabored. Cpap in place. Call light in reach and bed low and locked.

## 2024-01-29 NOTE — DISCHARGE PLANNING
DC Transport Scheduled    Transport Company Scheduled:  [employer]  Spoke with Junior at [facility] to schedule transport.  [facility] Trip #: [id number]    Scheduled Date: 1/29/2024  Scheduled Time: 8542-2806    Destination: [facility] at [address]     Notified care team of scheduled transport via Voalte.     If there are any changes needed to the DC transportation scheduled, please contact [facility] at ext. [phone] between the hours of 7758-8201 Mon-Fri. If outside those hours, contact the ED Case Manager at ext. [phone].

## 2024-01-29 NOTE — DISCHARGE PLANNING
Admission Date: 1/21/2024  GMLOS: 5.1  ALOS: 7     6-Clicks ADL Score: 21  6-Clicks Mobility Score: 12  PT and/or OT Eval ordered: Yes  Post-acute Referrals Ordered: Yes  Post-acute Choice Obtained: Yes  Has referral(s) been sent to post-acute provider:  Yes        Anticipated Discharge Dispo: Discharge Disposition: D/T to SNF with Medicare cert in anticipation of skilled care (03)     DME Needed: No     Action(s) Taken: OTHER     Per MD, patient is medically cleared for SNF. LSW met with patient at bedside. Patient reports he is willing to return to [facility] as long as they have his C - PAP and personal belongnies. LSW explained, [facility] reports his C -Pap and clothes are in storage.     Per DPA, [facility] agrees to accept patient this afternoon. LSW send the Transportation Form and Facesheet to [facility].     Per [facility] Ride Line Coordinator, the transfer to [facility] is scheduled for today 1330 - 1400.     LSW completed the [facility] and the Transfer Packet, RN aware.     Escalations Completed: None     Medically Clear: Yes     Next Steps: SNF     Barriers to Discharge: None     Is the patient up for discharge tomorrow:

## 2024-01-29 NOTE — DISCHARGE PLANNING
Referral sent to [facility]    0900- Spoke To: Linda  Agency/Facility Name: [facility]  Plan or Request: Bed available, asking for a 1400 transport time.

## 2024-01-29 NOTE — DISCHARGE SUMMARY
Discharge Summary    CHIEF COMPLAINT ON ADMISSION  Chief Complaint   Patient presents with    Post-Op Complications     Pt reports increased pain to L arm s/p surgical repair of humerus on 12/26 and debridement on 1/5. Redness noted to L upper arm, sutures and staples in place.        Reason for Admission  ems    Admission Date  1/22/2024     CODE STATUS  Full Code    HPI & HOSPITAL COURSE  Henry Kumari is a 68 y.o. 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 He had a recent admission 1224 through January 7 for repair of a humeral fracture and subsequent infection and debridement on January 5. His infection grew out Staphylococcus epidermidis and he was discharged on Zyvox to complete a 21-day course. He was transferred to a skilled nursing care and receiving his Zyvox per report. Over the last few days he was having increasing arm pain and swelling with then a spontaneous leakage of fluid. There were complaints of fevers, chills, and nausea. Increased left arm pain. 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     He was seen by orthopedic surgery, Dr. Masterson.  Status post I&D of left humeral abscess with wound vac placement 01/23/24.   OR cultures 01/23 + Pseudomonas aeruginosa, resistant only to Gentamicin.   Continue IV cefepime for 6 weeks.  Per ID,  Recommend removal of hardware if at all feasible  If hardware not removed, will need at least 4 weeks of ciprofloxacin 750mg PO bid after completion of IV antibiotics with suppression thereafter indefinitely for chronic infection  Tentative end date IV cefepime 03/07/24.  Repeat blood cultures have been negative  Discussed with Dr. Masterson, recommend follow-up with Dr. Cruz.  At this point no plans for hardware removal.  Patient has noticed improved pain control as well as strength.  He has worked with therapy.    PICC line has been placed.  He will need a 6-week course of IV antibiotics  followed by 4-week course of ciprofloxacin per ID recommendations.    At this point patient symptoms have improved and will be discharged to skilled nursing facility for additional antibiotics and therapy.    Therefore, he is discharged in good and stable condition to skilled nursing facility.    The patient met 2-midnight criteria for an inpatient stay at the time of discharge.      FOLLOW UP ITEMS POST DISCHARGE  Primary care provider in 2 days  Infectious disease clinic 2 to 3 weeks.  Orthopedic surgery in 2 weeks      DISCHARGE DIAGNOSES  Principal Problem:    Sepsis (HCC) (POA: Yes)  Active Problems:    History of DVT (deep vein thrombosis) (POA: Yes)    COPD (chronic obstructive pulmonary disease) (HCC) (POA: Yes)    SANTOSH on CPAP (POA: Yes)    Class 3 severe obesity due to excess calories with serious comorbidity and body mass index (BMI) of 45.0 to 49.9 in adult (HCC) (POA: Yes)    BMI 45.0-49.9, adult (HCC) (POA: Yes)    Chronic respiratory failure with hypoxia (HCC) (POA: Yes)    Anxiolytic dependence (HCC) (POA: Yes)    Mood disorder (HCC) (POA: Yes)    Anemia (POA: Yes)    Infection and inflammatory reaction due to internal fixation device of left humerus, initial encounter ([facility]) (POA: Yes)  Resolved Problems:    * No resolved hospital problems. *      FOLLOW UP  No future appointments.  [facility] and [address] [address]  [phone]          MEDICATIONS ON DISCHARGE     Medication List        START taking these medications        Instructions   acetaminophen 500 MG Tabs  Commonly known as: Tylenol   Take 2 Tablets by mouth every 6 hours as needed for Mild Pain, Moderate Pain or Fever.  Dose: 1,000 mg     ascorbic acid 1000 MG tablet  Start taking on: January 30, 2024  Commonly known as: Vitamin C   Take 1 Tablet by mouth every day.  Dose: 1,000 mg     ciprofloxacin 750 MG Tabs  Start taking on: March 8, 2024  Commonly known as: Cipro   Take 1 Tablet by  mouth 2 times a day for 30 days.  Dose: 750 mg     NS SOLN 100 mL with cefepime 2 GM SOLR 2 g   Infuse 2 g into a venous catheter every 8 hours for 38 days.  Dose: 2 g     nystatin powder  Commonly known as: Mycostatin   Apply 1 g topically 2 times a day.  Dose: 1 Application     senna-docusate 8.6-50 MG Tabs  Commonly known as: Pericolace Or Senokot S   Take 2 Tablets by mouth 2 times a day.  Dose: 2 Tablet     zinc sulfate 220 (50 Zn) MG Caps  Start taking on: January 30, 2024  Commonly known as: Zincate   Take 1 Capsule by mouth every day.  Dose: 220 mg            CHANGE how you take these medications        Instructions   oxyCODONE immediate release 10 MG immediate release tablet  What changed:   when to take this  reasons to take this  additional instructions  Commonly known as: Roxicodone   Take 1 Tablet by mouth every four hours as needed for Moderate Pain or Severe Pain for up to 5 days.  Dose: 10 mg            CONTINUE taking these medications        Instructions   albuterol 108 (90 Base) MCG/ACT Aers inhalation aerosol   Inhale 2 Puffs by mouth every 6 hours as needed for Shortness of Breath.  Dose: 2 Puff     atorvastatin 20 MG Tabs  Commonly known as: Lipitor   Take 20 mg by mouth every evening.  Dose: 20 mg     clonazePAM 1 MG Tabs  Commonly known as: KlonoPIN   Take 1 mg by mouth 2 times a day.  Dose: 1 mg     fluoxetine 40 MG capsule  Commonly known as: PROzac   Take 40 mg by mouth every day.  Dose: 40 mg     fluticasone-salmeterol 250-50 MCG/DOSE Aepb  Commonly known as: ADVAIR   Inhale 2 Puffs 2 times a day.  Dose: 2 Puff     gemfibrozil 600 MG Tabs  Commonly known as: Lopid   Take 600 mg by mouth 2 times a day.  Dose: 600 mg     metoprolol  MG Tb24  Commonly known as: Toprol XL   Take 100 mg by mouth every day.  Dose: 100 mg     ondansetron 4 MG Tbdp  Commonly known as: Zofran ODT   Take 4 mg by mouth every 6 hours as needed for Nausea/Vomiting.  Dose: 4 mg     QUEtiapine Fumarate 150 MG  Tabs   Take 150 mg by mouth 2 times a day.  Dose: 150 mg     spironolactone 25 MG Tabs  Commonly known as: Aldactone   Take 25 mg by mouth every day.  Dose: 25 mg     theophylline  MG Tb12  Commonly known as: Theodur   Take 300 mg by mouth 2 times a day.  Dose: 300 mg     tiotropium 18 MCG Caps  Commonly known as: Spiriva   Inhale 18 mcg by mouth every day.  Dose: 18 mcg            STOP taking these medications      furosemide 40 MG Tabs  Commonly known as: Lasix     Home Care Oxygen     linezolid 600 MG Tabs  Commonly known as: Zyvox     rosuvastatin 10 MG Tabs  Commonly known as: Crestor     Xarelto 20 MG Tabs tablet  Generic drug: rivaroxaban              Allergies  Allergies   Allergen Reactions    Tape Rash     Pt states tape removes his skin         DIET  Orders Placed This Encounter   Procedures    Diet Order Diet: Cardiac     Standing Status:   Standing     Number of Occurrences:   1     Order Specific Question:   Diet:     Answer:   Cardiac [6]       ACTIVITY  As tolerated and directed by skilled nursing.  Weight bearing as tolerated    LINES, DRAINS, AND WOUNDS  This is an automated list. Peripheral IVs will be removed prior to discharge.  Peripheral IV Anterior;Left Other (Comment) (Active)   Site Assessment Clean;Dry;Intact 01/29/24 0900   Dressing Type Transparent 01/29/24 0900   Line Status Saline locked 01/29/24 0900   Dressing Status Intact;Dry;Clean 01/29/24 0900   Dressing Intervention N/A 01/28/24 2000   Dressing Change Due 02/03/24 01/29/24 0900   Date Primary Tubing Changed 01/27/24 01/27/24 2100   Date Secondary Tubing Changed 01/27/24 01/27/24 2100   NEXT Primary Tubing Change  01/27/24 01/27/24 2100   NEXT Secondary Tubing Change  01/27/24 01/27/24 2100   [place]fil[place] Grading [place] 0 01/29/24 0900   Phlebitis Scale ([facility] Only) 0 01/29/24 0900       PICC Single Lumen 01/27/24 Right Brachial (Active)   Site Assessment Clean;Dry;Intact 01/29/24 0900   Line Status Flushed;Saline  locked 01/29/24 0900   Line Secured at (cm) 0 cm 01/27/24 0811   Extremity Circumference (cm) 47 cm 01/27/24 0811   Dressing Type Securing device;Skin barrier;Transparent;Biopatch 01/29/24 0900   Dressing Status Clean;Dry;Intact 01/29/24 0900   Dressing Intervention N/A 01/29/24 0900   Dressing Change Due 02/03/24 01/29/24 0900   Date Primary Tubing Changed 01/27/24 01/28/24 1200   Date Secondary Tubing Changed 01/27/24 01/28/24 1200   Date IV Connector(s) Changed 01/27/24 01/27/24 2100   NEXT Primary Tubing Change  02/03/24 01/28/24 1200   NEXT Secondary Tubing Change  02/03/24 01/28/24 1200   Line Necessity Assessed Antibiotic Therapy Greater than 7 Days 01/29/24 0900   $ Single Lumen PICC Charge Single kit used 01/27/24 0811     Closed/Suction Drain Left;Ventral Shoulder Hemovac (Active)   Site Description Unable to view 01/27/24 0813   Dressing Type Gauze 01/26/24 0720   Dressing Status Clean;Dry;Intact 01/27/24 0813   Drainage Appearance Serosanguineous 01/27/24 0813   Tube Status Compression 01/27/24 0813   Output (mL) 5 mL 01/25/24 2006      Wound 12/26/23 Abdomen Inferior;Lower Right Redness, weeping (Active)   Wound Image   12/26/23 1547   Site Assessment Other (Comment) 01/27/24 0813   Periwound Assessment Clean;Dry;Intact 01/27/24 2246   Margins Unattached edges 01/27/24 2246   Closure Open to air 01/27/24 0813   Drainage Amount Small 01/24/24 0814   Drainage Description Sanguineous 01/23/24 0600   Treatments Cleansed 01/27/24 0813   Wound Cleansing Soap and Water 01/27/24 2246   Periwound Protectant Antifungal Therapy 01/27/24 2246   Dressing Status Clean;Dry;Intact 01/24/24 0814   Dressing Changed Changed 01/05/24 1000   Dressing Options Interdry 01/27/24 2246   Dressing Change/Treatment Frequency Every Shift, and As Needed 01/27/24 2246       Wound 12/26/23 Groin redness, weeping (Active)   Wound Image   12/26/23 1547   Site Assessment [place] 01/28/24 2000   Periwound Assessment Clean;Dry;Intact 01/28/24  2000   Margins Attached edges 01/28/24 0845   Closure Open to air 01/28/24 2000   Drainage Amount None 01/28/24 2000   Treatments Cleansed;Pharmaceutical agent 01/28/24 0845   Wound Cleansing Foam Cleanser/Washcloth 01/28/24 0845   Periwound Protectant Antifungal Therapy 01/28/24 0845   Dressing Status Open to Air 01/28/24 2000   Dressing Changed Observed 01/27/24 2246   Dressing Options Open to Air 01/28/24 2000       Wound 12/26/23 Arm Anterior;Inferior;Lower;Ventral Right traumatic multiple abrasions (Active)   Wound Image    12/31/23 2240   Site Assessment CLOTILDE 01/05/24 1000   Periwound Assessment CLOTILDE 01/05/24 1000   Margins CLOTILDE 01/05/24 1000   Closure Adhesive bandage 01/05/24 0500   Drainage Amount None 01/05/24 0500   Drainage Description CLOTILDE 01/04/24 1930   Treatments Cleansed;Site care 01/05/24 0500   Wound Cleansing Normal Saline Irrigation 01/05/24 0500   Periwound Protectant No-sting Skin Prep 01/01/24 0800   Dressing Status Clean;Dry;Intact 01/05/24 1000   Dressing Changed Observed 01/05/24 1000   Dressing Cleansing/Solutions Normal Saline 01/05/24 0500   Dressing Options Offloading Dressing - Sacral;Petrolatum Gauze (yellow) 01/05/24 1000   Dressing Change/Treatment Frequency Daily, and As Needed 01/05/24 1000   NEXT Dressing Change/Treatment Date 01/06/24 01/05/24 1000   NEXT Weekly Photo (Inpatient Only) 01/03/24 01/02/24 2035       Wound 12/28/23 Incision Arm Left xeroform, 4x4, tegaderm (Active)   Wound Image   12/29/23 0915   Site Assessment CLOTILDE 01/26/24 1905   Periwound Assessment CLOTILDE 01/26/24 1905   Margins CLOTILDE 01/26/24 1905   Closure CLOTILDE 01/26/24 1905   Drainage Amount CLOTILDE 01/26/24 1905   Drainage Description Serosanguineous 01/03/24 1935   Treatments Cleansed;Site care 01/04/24 0545   Wound Cleansing Normal Saline Irrigation 01/04/24 0545   Periwound Protectant Not Applicable 12/30/23 2300   Dressing Status Clean;Dry;Intact 01/05/24 1000   Dressing Changed Observed 01/05/24 1000   Dressing  [place] Dressing - Silver 01/05/24 1000   Dressing Change/Treatment Frequency Daily, and As Needed 01/04/24 0545       Wound 12/31/23 Full Thickness Wound Arm Upper Left (Active)   Wound Image      01/02/24 1000   Site Assessment CLOTILDE 01/05/24 1000   Periwound Assessment CLOTILDE 01/05/24 1000   Margins CLOTILDE 01/05/24 1000   Closure CLOTILDE 01/04/24 1930   Drainage Amount CLOTILDE 01/04/24 1930   Drainage Description Sanguineous 01/04/24 0545   Treatments Cleansed;Site care 01/04/24 0545   Wound Cleansing Dakin's Solution 01/04/24 0545   Periwound Protectant Barrier Paste 01/04/24 0545   Dressing Status Clean;Dry;Intact 01/05/24 1000   Dressing Changed Observed 01/05/24 1000   Dressing Cleansing/Solutions 1/4 Strength Dakin's Solution 01/04/24 0545   Dressing Options Hydrofiber Silver 01/05/24 1000   Dressing Change/Treatment Frequency Every Shift, and As Needed 01/04/24 0545   NEXT Dressing Change/Treatment Date 01/04/24 01/04/24 0545   NEXT Weekly Photo (Inpatient Only) 01/03/24 01/02/24 2035   Wound Team Following Weekly 01/02/24 1000   Non-staged Wound Description Full thickness 01/02/24 1000   Wound Length (cm) 2.6 cm 12/31/23 1300   Wound Width (cm) 3.8 cm 12/31/23 1300   Wound Depth (cm) 7 cm 12/31/23 1300   Wound Surface Area (cm^2) 9.88 cm^2 12/31/23 1300   Wound Volume (cm^3) 69.16 cm^3 12/31/23 1300   Shape Oval 01/02/24 1000   Wound Odor None 01/02/24 1000   WOUND NURSE ONLY - Time Spent with Patient (mins) 60 01/02/24 1000       Wound 01/04/24 Arm Proximal;Outer Left gauze, tegaderm (Active)   Site Assessment CLOTILDE 01/05/24 1000   Periwound Assessment CLOTILDE 01/05/24 1000   Margins CLOTILDE 01/05/24 1000   Closure Adhesive bandage 01/05/24 0500   Drainage Amount None 01/05/24 0500   Drainage Description Sanguineous 01/04/24 0942   Treatments Cleansed;Site care 01/05/24 0500   Dressing Status Clean;Dry;Intact 01/05/24 1000   Dressing Changed Observed 01/05/24 1000   Dressing Options Petrolatum Gauze (yellow);Silicone  Adhesive Foam 01/05/24 1000   Dressing Change/Treatment Frequency Daily, and As Needed 01/05/24 1000   NEXT Dressing Change/Treatment Date 01/06/24 01/05/24 1000       Wound 01/22/24 Incision Axilla;Shoulder Left (Active)   Wound Image     01/22/24 2330   Site Assessment [place] 01/28/24 0845   Periwound Assessment Pink 01/28/24 0845   Margins Defined edges 01/28/24 0845   Closure Other (Comment) 01/27/24 2246   Drainage Amount None 01/28/24 0845   Dressing Status Clean;Dry;Intact 01/27/24 2246   Dressing Changed Observed 01/27/24 2246       Wound 01/23/24 Incision Shoulder Left XEROFORM, 4X4, TAPE (Active)   Site Assessment CLOTILDE 01/28/24 2000   Periwound Assessment CLOTILDE 01/27/24 2246   Margins CLOTILDE 01/27/24 2246   Dressing Options Other (Comments) 01/23/24 0921      PICC Single Lumen 01/27/24 Right Brachial (Active)   Site Assessment Clean;Dry;Intact 01/29/24 0900   Line Status Flushed;Saline locked 01/29/24 0900   Line Secured at (cm) 0 cm 01/27/24 0811   Extremity Circumference (cm) 47 cm 01/27/24 0811   Dressing Type Securing device;Skin barrier;Transparent;Biopatch 01/29/24 0900   Dressing Status Clean;Dry;Intact 01/29/24 0900   Dressing Intervention N/A 01/29/24 0900   Dressing Change Due 02/03/24 01/29/24 0900   Date Primary Tubing Changed 01/27/24 01/28/24 1200   Date Secondary Tubing Changed 01/27/24 01/28/24 1200   Date IV Connector(s) Changed 01/27/24 01/27/24 2100   NEXT Primary Tubing Change  02/03/24 01/28/24 1200   NEXT Secondary Tubing Change  02/03/24 01/28/24 1200   Line Necessity Assessed Antibiotic Therapy Greater than 7 Days 01/29/24 0900   $ Single Lumen PICC Charge Single kit used 01/27/24 0811     Peripheral IV Anterior;Left Other (Comment) (Active)   Site Assessment Clean;Dry;Intact 01/29/24 0900   Dressing Type Transparent 01/29/24 0900   Line Status Saline locked 01/29/24 0900   Dressing Status Intact;Dry;Clean 01/29/24 0900   Dressing Intervention N/A 01/28/24 2000   Dressing Change Due  02/03/24 01/29/24 0900   Date Primary Tubing Changed 01/27/24 01/27/24 2100   Date Secondary Tubing Changed 01/27/24 01/27/24 2100   NEXT Primary Tubing Change  01/27/24 01/27/24 2100   NEXT Secondary Tubing Change  01/27/24 01/27/24 2100   Infiltration Grading ([facility]) 0 01/29/24 0900   Phlebitis Scale ([facility] Only) 0 01/29/24 0900               MENTAL STATUS ON TRANSFER             CONSULTATIONS  orthopedic surgery  Infectious disease    PROCEDURES  I&D of left shoulder    LABORATORY  Lab Results   Component Value Date    SODIUM 137 01/26/2024    POTASSIUM 4.1 01/26/2024    CHLORIDE 104 01/26/2024    CO2 25 01/26/2024    GLUCOSE 86 01/26/2024    BUN 14 01/26/2024    CREATININE 0.48 (L) 01/26/2024        Lab Results   Component Value Date    WBC 4.6 (L) 01/26/2024    HEMOGLOBIN 8.3 (L) 01/26/2024    HEMATOCRIT 25.4 (L) 01/26/2024    PLATELETCT 123 (L) 01/26/2024        Total time of the discharge process exceeds 45 minutes.

## 2024-01-29 NOTE — THERAPY
Physical Therapy   Daily Treatment     Patient Name: Henry Kumari  Age:  68 y.o., Sex:  male  Medical Record #: [id number]  Today's Date: 1/29/2024     Precautions  Precautions: Fall Risk;Weight Bearing As Tolerated Left Lower Extremity    Assessment    Rec'd pt alert, in bed, pleasant and agreeable to work w/ PT.  He is able to move in/out of bed w/o assist but does use the bed features.  He is able to stand w/ spv, which is a significant improvement compared to last session.  He ambulated 30 ft w/ a fww w/o loss of balance.  He declined attempting stairs today.    Plan    Treatment Plan Status: Continue Current Treatment Plan  Type of Treatment: Bed Mobility, Equipment, Gait Training, Neuro Re-Education / Balance, Self Care / Home Evaluation, Stair Training, Therapeutic Activities, Therapeutic Exercise  Treatment Frequency: 3 Times per Week  Treatment Duration: Until Therapy Goals Met    DC Equipment Recommendations: Unable to determine at this time  Discharge Recommendations: Recommend post-acute placement for additional physical therapy services prior to discharge home        Objective       01/29/24 0837   Balance   Sitting Balance (Static) Fair +   Sitting Balance (Dynamic) Fair +   Standing Balance (Static) Fair   Standing Balance (Dynamic) Fair   Weight Shift Sitting Good   Weight Shift Standing Good   Comments w/ fww   Bed Mobility    Supine to Sit Supervised   Sit to Supine Supervised   Comments w/ HOB elevated   Gait Analysis   Gait Level Of Assist Supervised   Assistive Device Front Wheel Walker   Distance (Feet) 30   Deviation Bradykinetic   Level of Assist with Stairs Refused   Weight Bearing Status WBAT L UE   Functional Mobility   Sit to Stand Supervised   Short Term Goals    Short Term Goal # 1 pt will complete supine <> sit without bed features and SPV in 6 visits   Goal Outcome # 1 goal not met   Short Term Goal # 2 pt will complete sit <> stand and functional transfers with LRAD and SPV to  improve mobility independence in 6 vistis   Goal Outcome # 2 Goal met   Short Term Goal # 3 pt will ambulate > 50 ft with LRAD and SPV to access home environment in 6 visits   Goal Outcome # 3 Goal not met   Short Term Goal # 4 Pt will negotiate 1 step with LRAD and SPV to enter/exit home in 6 visits   Goal Outcome # 4 Goal not met   Physical Therapy Treatment Plan   Physical Therapy Treatment Plan Continue Current Treatment Plan   Anticipated Discharge Equipment and Recommendations   DC Equipment Recommendations Unable to determine at this time   Discharge Recommendations Recommend post-acute placement for additional physical therapy services prior to discharge home

## 2024-01-29 NOTE — PROGRESS NOTES
Received report and assumed care of patient at change of shift. Patient is A&Ox4, on 2L O2 nasal cannula, and reports pain of 7/10 in his left arm and shoulder at this time, medicated per MAR. Patient assessment completed, bed in lowest position, and call light and personal belongings are within reach. Waffle overlay in place. Patient expressed no further needs at this time.

## 2024-01-29 NOTE — CARE PLAN
The patient is Stable - Low risk of patient condition declining or worsening    Shift Goals  Clinical Goals: Patient will receive IV antibiotic therapy as ordered to treat infection. Patient will maintain oxygen saturation above 92% on his baseline of 2L O2 nasal cannula.  Patient Goals: Patient will receive pain medication as needed, which will adequately reduce his pain level to his comfort goal of being able to sleep comfortably.    Progress made toward(s) clinical / shift goals:  Patient wore his CPAP at night to ensure adequate oxygenation as he slept. Patient received IV antibiotic therapy as ordered through his PICC line. Patient received pain medication as needed, which adequately reduced his pain level to his comfort goal of being able to sleep comfortably.     Patient is not progressing towards the following goals:

## 2024-02-20 LAB
FUNGUS SPEC CULT: NORMAL
FUNGUS SPEC FUNGUS STN: NORMAL
SIGNIFICANT IND 70042: NORMAL
SITE SITE: NORMAL
SOURCE SOURCE: NORMAL

## 2024-03-06 LAB
MYCOBACTERIUM SPEC CULT: NORMAL
RHODAMINE-AURAMINE STN SPEC: NORMAL
SIGNIFICANT IND 70042: NORMAL
SITE SITE: NORMAL
SOURCE SOURCE: NORMAL

## 2024-10-24 NOTE — OR NURSING
1411: pt arrived via bed with anesthesia and RN. VSS. Dressing CDI. CLOTILDE left radial pulse d/t dressing. Orders reviewed and initiated.   1450: Dr. Muñoz updated on cardiac rhythm.   1500: EKG at bedside.   1505: Dr. Muoñz at bedside.   1518: Dr. Diaz updated.   1556: Report called to JORDYN Sandoval. All questions answered. VSS. Dressing CDI. No patient distress. Alert and oriented x4. CMS intact. Pt transported to room in stable condition on transport monitor and 4L NC with ACLS RN. No personal belongings. Family updated.    24-Oct-2024 14:19

## 2024-12-17 ENCOUNTER — HOSPITAL ENCOUNTER (INPATIENT)
Facility: MEDICAL CENTER | Age: 69
LOS: 10 days | End: 2024-12-27
Attending: INTERNAL MEDICINE | Admitting: INTERNAL MEDICINE
Payer: MEDICARE

## 2024-12-17 DIAGNOSIS — F39 MOOD DISORDER (HCC): ICD-10-CM

## 2024-12-17 DIAGNOSIS — L03.90 CELLULITIS, UNSPECIFIED CELLULITIS SITE: ICD-10-CM

## 2024-12-17 DIAGNOSIS — I96 NECROSIS (HCC): ICD-10-CM

## 2024-12-17 DIAGNOSIS — D61.818 PANCYTOPENIA (HCC): ICD-10-CM

## 2024-12-17 PROCEDURE — 770001 HCHG ROOM/CARE - MED/SURG/GYN PRIV*

## 2024-12-17 PROCEDURE — 87641 MR-STAPH DNA AMP PROBE: CPT

## 2024-12-17 PROCEDURE — 36415 COLL VENOUS BLD VENIPUNCTURE: CPT

## 2024-12-17 PROCEDURE — 700102 HCHG RX REV CODE 250 W/ 637 OVERRIDE(OP): Performed by: STUDENT IN AN ORGANIZED HEALTH CARE EDUCATION/TRAINING PROGRAM

## 2024-12-17 PROCEDURE — 84100 ASSAY OF PHOSPHORUS: CPT

## 2024-12-17 PROCEDURE — 84145 PROCALCITONIN (PCT): CPT

## 2024-12-17 PROCEDURE — 80053 COMPREHEN METABOLIC PANEL: CPT

## 2024-12-17 PROCEDURE — 86140 C-REACTIVE PROTEIN: CPT

## 2024-12-17 PROCEDURE — 85025 COMPLETE CBC W/AUTO DIFF WBC: CPT

## 2024-12-17 PROCEDURE — 83880 ASSAY OF NATRIURETIC PEPTIDE: CPT

## 2024-12-17 PROCEDURE — 99223 1ST HOSP IP/OBS HIGH 75: CPT | Mod: AI | Performed by: STUDENT IN AN ORGANIZED HEALTH CARE EDUCATION/TRAINING PROGRAM

## 2024-12-17 PROCEDURE — 85652 RBC SED RATE AUTOMATED: CPT

## 2024-12-17 PROCEDURE — 83735 ASSAY OF MAGNESIUM: CPT

## 2024-12-17 PROCEDURE — A9270 NON-COVERED ITEM OR SERVICE: HCPCS | Performed by: STUDENT IN AN ORGANIZED HEALTH CARE EDUCATION/TRAINING PROGRAM

## 2024-12-17 PROCEDURE — 85610 PROTHROMBIN TIME: CPT

## 2024-12-17 RX ORDER — ONDANSETRON 2 MG/ML
4 INJECTION INTRAMUSCULAR; INTRAVENOUS EVERY 4 HOURS PRN
Status: DISCONTINUED | OUTPATIENT
Start: 2024-12-17 | End: 2024-12-27 | Stop reason: HOSPADM

## 2024-12-17 RX ORDER — MORPHINE SULFATE 4 MG/ML
4 INJECTION INTRAVENOUS EVERY 4 HOURS PRN
Status: DISCONTINUED | OUTPATIENT
Start: 2024-12-17 | End: 2024-12-27 | Stop reason: HOSPADM

## 2024-12-17 RX ORDER — GEMFIBROZIL 600 MG/1
600 TABLET, FILM COATED ORAL 2 TIMES DAILY
Status: DISCONTINUED | OUTPATIENT
Start: 2024-12-17 | End: 2024-12-27 | Stop reason: HOSPADM

## 2024-12-17 RX ORDER — ASCORBIC ACID 500 MG
1000 TABLET ORAL DAILY
Status: DISCONTINUED | OUTPATIENT
Start: 2024-12-18 | End: 2024-12-27 | Stop reason: HOSPADM

## 2024-12-17 RX ORDER — ZINC SULFATE 50(220)MG
220 CAPSULE ORAL DAILY
Status: DISCONTINUED | OUTPATIENT
Start: 2024-12-18 | End: 2024-12-27 | Stop reason: HOSPADM

## 2024-12-17 RX ORDER — HYDROCODONE BITARTRATE AND ACETAMINOPHEN 5; 325 MG/1; MG/1
1 TABLET ORAL EVERY 6 HOURS PRN
Status: DISCONTINUED | OUTPATIENT
Start: 2024-12-17 | End: 2024-12-27 | Stop reason: HOSPADM

## 2024-12-17 RX ORDER — METOPROLOL SUCCINATE 100 MG/1
100 TABLET, EXTENDED RELEASE ORAL DAILY
Status: DISCONTINUED | OUTPATIENT
Start: 2024-12-18 | End: 2024-12-18

## 2024-12-17 RX ORDER — POLYETHYLENE GLYCOL 3350 17 G/17G
1 POWDER, FOR SOLUTION ORAL
Status: DISCONTINUED | OUTPATIENT
Start: 2024-12-17 | End: 2024-12-27 | Stop reason: HOSPADM

## 2024-12-17 RX ORDER — SPIRONOLACTONE 50 MG/1
25 TABLET, FILM COATED ORAL DAILY
Status: DISCONTINUED | OUTPATIENT
Start: 2024-12-18 | End: 2024-12-27 | Stop reason: HOSPADM

## 2024-12-17 RX ORDER — ATORVASTATIN CALCIUM 20 MG/1
20 TABLET, FILM COATED ORAL NIGHTLY
Status: DISCONTINUED | OUTPATIENT
Start: 2024-12-17 | End: 2024-12-27 | Stop reason: HOSPADM

## 2024-12-17 RX ORDER — TAMSULOSIN HYDROCHLORIDE 0.4 MG/1
0.4 CAPSULE ORAL DAILY
Status: DISCONTINUED | OUTPATIENT
Start: 2024-12-18 | End: 2024-12-27 | Stop reason: HOSPADM

## 2024-12-17 RX ORDER — AMOXICILLIN 250 MG
2 CAPSULE ORAL EVERY EVENING
Status: DISCONTINUED | OUTPATIENT
Start: 2024-12-17 | End: 2024-12-27 | Stop reason: HOSPADM

## 2024-12-17 RX ORDER — ALBUTEROL SULFATE 90 UG/1
2 INHALANT RESPIRATORY (INHALATION) EVERY 6 HOURS PRN
Status: DISCONTINUED | OUTPATIENT
Start: 2024-12-17 | End: 2024-12-19

## 2024-12-17 RX ORDER — ACETAMINOPHEN 325 MG/1
650 TABLET ORAL EVERY 6 HOURS PRN
Status: DISCONTINUED | OUTPATIENT
Start: 2024-12-17 | End: 2024-12-27 | Stop reason: HOSPADM

## 2024-12-17 RX ORDER — ONDANSETRON 4 MG/1
4 TABLET, ORALLY DISINTEGRATING ORAL EVERY 4 HOURS PRN
Status: DISCONTINUED | OUTPATIENT
Start: 2024-12-17 | End: 2024-12-27 | Stop reason: HOSPADM

## 2024-12-17 RX ORDER — TAMSULOSIN HYDROCHLORIDE 0.4 MG/1
0.8 CAPSULE ORAL DAILY
COMMUNITY

## 2024-12-17 RX ORDER — THEOPHYLLINE 300 MG/1
300 TABLET, EXTENDED RELEASE ORAL 2 TIMES DAILY
Status: DISCONTINUED | OUTPATIENT
Start: 2024-12-17 | End: 2024-12-27 | Stop reason: HOSPADM

## 2024-12-17 RX ORDER — CLONAZEPAM 1 MG/1
1 TABLET ORAL 2 TIMES DAILY
Status: DISCONTINUED | OUTPATIENT
Start: 2024-12-17 | End: 2024-12-27 | Stop reason: HOSPADM

## 2024-12-17 RX ORDER — LABETALOL HYDROCHLORIDE 5 MG/ML
10 INJECTION, SOLUTION INTRAVENOUS EVERY 4 HOURS PRN
Status: DISCONTINUED | OUTPATIENT
Start: 2024-12-17 | End: 2024-12-27 | Stop reason: HOSPADM

## 2024-12-17 RX ADMIN — THEOPHYLLINE 300 MG: 300 TABLET, EXTENDED RELEASE ORAL at 23:20

## 2024-12-17 RX ADMIN — CLONAZEPAM 1 MG: 1 TABLET ORAL at 23:20

## 2024-12-17 RX ADMIN — GEMFIBROZIL 600 MG: 600 TABLET ORAL at 23:20

## 2024-12-17 RX ADMIN — QUETIAPINE FUMARATE 50 MG: 100 TABLET ORAL at 23:21

## 2024-12-17 RX ADMIN — ATORVASTATIN CALCIUM 20 MG: 20 TABLET, FILM COATED ORAL at 23:20

## 2024-12-17 SDOH — ECONOMIC STABILITY: TRANSPORTATION INSECURITY
IN THE PAST 12 MONTHS, HAS LACK OF RELIABLE TRANSPORTATION KEPT YOU FROM MEDICAL APPOINTMENTS, MEETINGS, WORK OR FROM GETTING THINGS NEEDED FOR DAILY LIVING?: NO

## 2024-12-17 SDOH — ECONOMIC STABILITY: TRANSPORTATION INSECURITY
IN THE PAST 12 MONTHS, HAS THE LACK OF TRANSPORTATION KEPT YOU FROM MEDICAL APPOINTMENTS OR FROM GETTING MEDICATIONS?: NO

## 2024-12-17 ASSESSMENT — SOCIAL DETERMINANTS OF HEALTH (SDOH)
WITHIN THE PAST 12 MONTHS, THE FOOD YOU BOUGHT JUST DIDN'T LAST AND YOU DIDN'T HAVE MONEY TO GET MORE: NEVER TRUE
IN THE PAST 12 MONTHS, HAS THE ELECTRIC, GAS, OIL, OR WATER COMPANY THREATENED TO SHUT OFF SERVICE IN YOUR HOME?: NO
WITHIN THE LAST YEAR, HAVE YOU BEEN AFRAID OF YOUR PARTNER OR EX-PARTNER?: NO
WITHIN THE LAST YEAR, HAVE YOU BEEN KICKED, HIT, SLAPPED, OR OTHERWISE PHYSICALLY HURT BY YOUR PARTNER OR EX-PARTNER?: NO
WITHIN THE LAST YEAR, HAVE TO BEEN RAPED OR FORCED TO HAVE ANY KIND OF SEXUAL ACTIVITY BY YOUR PARTNER OR EX-PARTNER?: NO
WITHIN THE PAST 12 MONTHS, YOU WORRIED THAT YOUR FOOD WOULD RUN OUT BEFORE YOU GOT THE MONEY TO BUY MORE: NEVER TRUE
WITHIN THE LAST YEAR, HAVE YOU BEEN HUMILIATED OR EMOTIONALLY ABUSED IN OTHER WAYS BY YOUR PARTNER OR EX-PARTNER?: NO

## 2024-12-17 ASSESSMENT — COGNITIVE AND FUNCTIONAL STATUS - GENERAL
WALKING IN HOSPITAL ROOM: A LITTLE
SUGGESTED CMS G CODE MODIFIER MOBILITY: CK
MOVING FROM LYING ON BACK TO SITTING ON SIDE OF FLAT BED: A LOT
DAILY ACTIVITIY SCORE: 18
MOBILITY SCORE: 17
STANDING UP FROM CHAIR USING ARMS: A LITTLE
MOVING TO AND FROM BED TO CHAIR: A LITTLE
TURNING FROM BACK TO SIDE WHILE IN FLAT BAD: A LITTLE
CLIMB 3 TO 5 STEPS WITH RAILING: A LITTLE
HELP NEEDED FOR BATHING: A LOT
SUGGESTED CMS G CODE MODIFIER DAILY ACTIVITY: CK
DRESSING REGULAR LOWER BODY CLOTHING: A LOT
TOILETING: A LOT

## 2024-12-17 ASSESSMENT — LIFESTYLE VARIABLES
TOTAL SCORE: 0
HAVE PEOPLE ANNOYED YOU BY CRITICIZING YOUR DRINKING: NO
DOES PATIENT WANT TO STOP DRINKING: NO
EVER FELT BAD OR GUILTY ABOUT YOUR DRINKING: NO
CONSUMPTION TOTAL: NEGATIVE
ALCOHOL_USE: NO
EVER HAD A DRINK FIRST THING IN THE MORNING TO STEADY YOUR NERVES TO GET RID OF A HANGOVER: NO
TOTAL SCORE: 0
HAVE YOU EVER FELT YOU SHOULD CUT DOWN ON YOUR DRINKING: NO
AVERAGE NUMBER OF DAYS PER WEEK YOU HAVE A DRINK CONTAINING ALCOHOL: 0
TOTAL SCORE: 0
HOW MANY TIMES IN THE PAST YEAR HAVE YOU HAD 5 OR MORE DRINKS IN A DAY: 0
ON A TYPICAL DAY WHEN YOU DRINK ALCOHOL HOW MANY DRINKS DO YOU HAVE: 0

## 2024-12-17 ASSESSMENT — PAIN DESCRIPTION - PAIN TYPE: TYPE: ACUTE PAIN

## 2024-12-17 ASSESSMENT — FIBROSIS 4 INDEX: FIB4 SCORE: 2.54

## 2024-12-17 NOTE — PROGRESS NOTES
[facility] DIRECT ADMISSION REPORT  Transferring facility: [facility]  Transferring physician: Dr. Milligan    Chief complaint: Leg pain  Pertinent history & patient course: Patient did fall 10 days ago with resultant leg bruising.  He came to the ER at that time and was sent home.  Patient began having worsening so he presented back to the ER and the wound appears necrotic.  ERP there did discuss the case with their general surgeon who stated patient needed to be transferred for possible need of plastic surgery intervention which they do not have there.  Patient was hemodynamically stable in the ER there, mentation intact, no head trauma.  Pertinent imaging & lab results: They had not done any imaging, I requested they do not do any, we will do the imaging here  Consultants called prior to transfer and pertinent input from consultants: N/A  Code Status: Full per transferring provider, I personally verified with the transferring provider patient's code status and the transferring provider has confirmed this with the patient.  Reason for Transfer: Need for surgical specialists  Further work up or recommendations requested prior to transfer: N/A    Patient accepted for transfer: Yes  Accepting [facility] Facility: [facility] - Nursing to notify the Triage Coordinator in the RTOC via Voalte or Phone ext. [phone] when patient arrives to the unit. The Triage Coordinator will assign the admitting provider.    Consultants to be called upon arrival: No consultation will need to be called directly, first will need imaging  Admission status: Inpatient.   Floor requested: Surgical  If ICU transfer, name of intensivist case discussed with and pertinent input from critical care: N/A    The admitting provider is the point of contact for questions or concerns regarding patient's care.

## 2024-12-18 ENCOUNTER — APPOINTMENT (OUTPATIENT)
Dept: RADIOLOGY | Facility: MEDICAL CENTER | Age: 69
End: 2024-12-18
Attending: STUDENT IN AN ORGANIZED HEALTH CARE EDUCATION/TRAINING PROGRAM
Payer: MEDICARE

## 2024-12-18 ENCOUNTER — HOSPITAL ENCOUNTER (OUTPATIENT)
Dept: RADIOLOGY | Facility: MEDICAL CENTER | Age: 69
End: 2024-12-18
Attending: STUDENT IN AN ORGANIZED HEALTH CARE EDUCATION/TRAINING PROGRAM

## 2024-12-18 PROBLEM — E87.6 HYPOKALEMIA: Status: ACTIVE | Noted: 2024-12-18

## 2024-12-18 LAB
ALBUMIN SERPL BCP-MCNC: 3.5 G/DL (ref 3.2–4.9)
ALBUMIN/GLOB SERPL: 0.9 G/DL
ALP SERPL-CCNC: 185 U/L (ref 30–99)
ALT SERPL-CCNC: 6 U/L (ref 2–50)
ANION GAP SERPL CALC-SCNC: 12 MMOL/L (ref 7–16)
AST SERPL-CCNC: 21 U/L (ref 12–45)
BASOPHILS # BLD AUTO: 0.7 % (ref 0–1.8)
BASOPHILS # BLD: 0.04 K/UL (ref 0–0.12)
BILIRUB SERPL-MCNC: 0.9 MG/DL (ref 0.1–1.5)
BUN SERPL-MCNC: 21 MG/DL (ref 8–22)
CALCIUM ALBUM COR SERPL-MCNC: 10 MG/DL (ref 8.5–10.5)
CALCIUM SERPL-MCNC: 9.6 MG/DL (ref 8.5–10.5)
CHLORIDE SERPL-SCNC: 100 MMOL/L (ref 96–112)
CO2 SERPL-SCNC: 31 MMOL/L (ref 20–33)
CREAT SERPL-MCNC: 1.35 MG/DL (ref 0.5–1.4)
CRP SERPL HS-MCNC: 7.13 MG/DL (ref 0–0.75)
EKG IMPRESSION: NORMAL
EOSINOPHIL # BLD AUTO: 0.31 K/UL (ref 0–0.51)
EOSINOPHIL NFR BLD: 5.2 % (ref 0–6.9)
ERYTHROCYTE [DISTWIDTH] IN BLOOD BY AUTOMATED COUNT: 55.9 FL (ref 35.9–50)
ERYTHROCYTE [SEDIMENTATION RATE] IN BLOOD BY WESTERGREN METHOD: >140 MM/HOUR (ref 0–20)
GFR SERPLBLD CREATININE-BSD FMLA CKD-EPI: 57 ML/MIN/1.73 M 2
GLOBULIN SER CALC-MCNC: 3.7 G/DL (ref 1.9–3.5)
GLUCOSE SERPL-MCNC: 101 MG/DL (ref 65–99)
HCT VFR BLD AUTO: 26.8 % (ref 42–52)
HGB BLD-MCNC: 8.4 G/DL (ref 14–18)
IMM GRANULOCYTES # BLD AUTO: 0.02 K/UL (ref 0–0.11)
IMM GRANULOCYTES NFR BLD AUTO: 0.3 % (ref 0–0.9)
INR PPP: 1.51 (ref 0.87–1.13)
LYMPHOCYTES # BLD AUTO: 0.73 K/UL (ref 1–4.8)
LYMPHOCYTES NFR BLD: 12.2 % (ref 22–41)
MAGNESIUM SERPL-MCNC: 2.2 MG/DL (ref 1.5–2.5)
MCH RBC QN AUTO: 28 PG (ref 27–33)
MCHC RBC AUTO-ENTMCNC: 31.3 G/DL (ref 32.3–36.5)
MCV RBC AUTO: 89.3 FL (ref 81.4–97.8)
MONOCYTES # BLD AUTO: 0.44 K/UL (ref 0–0.85)
MONOCYTES NFR BLD AUTO: 7.4 % (ref 0–13.4)
NEUTROPHILS # BLD AUTO: 4.42 K/UL (ref 1.82–7.42)
NEUTROPHILS NFR BLD: 74.2 % (ref 44–72)
NRBC # BLD AUTO: 0 K/UL
NRBC BLD-RTO: 0 /100 WBC (ref 0–0.2)
NT-PROBNP SERPL IA-MCNC: 1307 PG/ML (ref 0–125)
PHOSPHATE SERPL-MCNC: 3.1 MG/DL (ref 2.5–4.5)
PLATELET # BLD AUTO: 198 K/UL (ref 164–446)
PMV BLD AUTO: 8.6 FL (ref 9–12.9)
POTASSIUM SERPL-SCNC: 3.1 MMOL/L (ref 3.6–5.5)
PROCALCITONIN SERPL-MCNC: 0.08 NG/ML
PROT SERPL-MCNC: 7.2 G/DL (ref 6–8.2)
PROTHROMBIN TIME: 18.2 SEC (ref 12–14.6)
RBC # BLD AUTO: 3 M/UL (ref 4.7–6.1)
SCCMEC + MECA PNL NOSE NAA+PROBE: POSITIVE
SODIUM SERPL-SCNC: 143 MMOL/L (ref 135–145)
WBC # BLD AUTO: 6 K/UL (ref 4.8–10.8)

## 2024-12-18 PROCEDURE — 93005 ELECTROCARDIOGRAM TRACING: CPT | Mod: TC | Performed by: STUDENT IN AN ORGANIZED HEALTH CARE EDUCATION/TRAINING PROGRAM

## 2024-12-18 PROCEDURE — 700101 HCHG RX REV CODE 250: Performed by: STUDENT IN AN ORGANIZED HEALTH CARE EDUCATION/TRAINING PROGRAM

## 2024-12-18 PROCEDURE — 700102 HCHG RX REV CODE 250 W/ 637 OVERRIDE(OP): Performed by: STUDENT IN AN ORGANIZED HEALTH CARE EDUCATION/TRAINING PROGRAM

## 2024-12-18 PROCEDURE — A9270 NON-COVERED ITEM OR SERVICE: HCPCS | Performed by: INTERNAL MEDICINE

## 2024-12-18 PROCEDURE — 93922 UPR/L XTREMITY ART 2 LEVELS: CPT

## 2024-12-18 PROCEDURE — 700117 HCHG RX CONTRAST REV CODE 255: Performed by: STUDENT IN AN ORGANIZED HEALTH CARE EDUCATION/TRAINING PROGRAM

## 2024-12-18 PROCEDURE — 93922 UPR/L XTREMITY ART 2 LEVELS: CPT | Mod: 26 | Performed by: INTERNAL MEDICINE

## 2024-12-18 PROCEDURE — 700102 HCHG RX REV CODE 250 W/ 637 OVERRIDE(OP): Performed by: INTERNAL MEDICINE

## 2024-12-18 PROCEDURE — 36415 COLL VENOUS BLD VENIPUNCTURE: CPT

## 2024-12-18 PROCEDURE — 770001 HCHG ROOM/CARE - MED/SURG/GYN PRIV*

## 2024-12-18 PROCEDURE — A9270 NON-COVERED ITEM OR SERVICE: HCPCS | Performed by: STUDENT IN AN ORGANIZED HEALTH CARE EDUCATION/TRAINING PROGRAM

## 2024-12-18 PROCEDURE — 87040 BLOOD CULTURE FOR BACTERIA: CPT

## 2024-12-18 PROCEDURE — 94660 CPAP INITIATION&MGMT: CPT

## 2024-12-18 PROCEDURE — 700111 HCHG RX REV CODE 636 W/ 250 OVERRIDE (IP): Mod: JZ | Performed by: STUDENT IN AN ORGANIZED HEALTH CARE EDUCATION/TRAINING PROGRAM

## 2024-12-18 PROCEDURE — 73701 CT LOWER EXTREMITY W/DYE: CPT | Mod: RT

## 2024-12-18 PROCEDURE — 93010 ELECTROCARDIOGRAM REPORT: CPT | Performed by: INTERNAL MEDICINE

## 2024-12-18 PROCEDURE — 99232 SBSQ HOSP IP/OBS MODERATE 35: CPT | Performed by: STUDENT IN AN ORGANIZED HEALTH CARE EDUCATION/TRAINING PROGRAM

## 2024-12-18 RX ORDER — METOPROLOL SUCCINATE 50 MG/1
50 TABLET, EXTENDED RELEASE ORAL DAILY
Status: DISCONTINUED | OUTPATIENT
Start: 2024-12-18 | End: 2024-12-20

## 2024-12-18 RX ORDER — LINEZOLID 600 MG/1
600 TABLET, FILM COATED ORAL EVERY 12 HOURS
Status: DISCONTINUED | OUTPATIENT
Start: 2024-12-18 | End: 2024-12-20

## 2024-12-18 RX ORDER — FUROSEMIDE 40 MG/1
40 TABLET ORAL DAILY
COMMUNITY

## 2024-12-18 RX ORDER — IBUPROFEN 200 MG
200 TABLET ORAL EVERY 6 HOURS PRN
Status: ON HOLD | COMMUNITY
End: 2024-12-26

## 2024-12-18 RX ORDER — CALCIUM CARBONATE 300MG(750)
400 TABLET,CHEWABLE ORAL
COMMUNITY

## 2024-12-18 RX ORDER — BUTYROSPERMUM PARKII(SHEA BUTTER), SIMMONDSIA CHINENSIS (JOJOBA) SEED OIL, ALOE BARBADENSIS LEAF EXTRACT .01; 1; 3.5 G/100G; G/100G; G/100G
5000 LIQUID TOPICAL DAILY
COMMUNITY

## 2024-12-18 RX ORDER — GUAIFENESIN 200 MG/10ML
10 LIQUID ORAL EVERY 4 HOURS PRN
COMMUNITY

## 2024-12-18 RX ORDER — QUETIAPINE FUMARATE 100 MG/1
100 TABLET, FILM COATED ORAL 2 TIMES DAILY
Status: DISCONTINUED | OUTPATIENT
Start: 2024-12-18 | End: 2024-12-18

## 2024-12-18 RX ORDER — SODIUM HYPOCHLORITE 1.25 MG/ML
SOLUTION TOPICAL 2 TIMES DAILY
Status: DISCONTINUED | OUTPATIENT
Start: 2024-12-18 | End: 2024-12-21

## 2024-12-18 RX ORDER — METOPROLOL SUCCINATE 50 MG/1
50 TABLET, EXTENDED RELEASE ORAL DAILY
Status: DISCONTINUED | OUTPATIENT
Start: 2024-12-19 | End: 2024-12-18

## 2024-12-18 RX ORDER — QUETIAPINE FUMARATE 100 MG/1
100 TABLET, FILM COATED ORAL 2 TIMES DAILY
Status: DISCONTINUED | OUTPATIENT
Start: 2024-12-18 | End: 2024-12-27 | Stop reason: HOSPADM

## 2024-12-18 RX ORDER — BUPRENORPHINE 2 MG/1
2 TABLET SUBLINGUAL
COMMUNITY

## 2024-12-18 RX ADMIN — THEOPHYLLINE 300 MG: 300 TABLET, EXTENDED RELEASE ORAL at 23:25

## 2024-12-18 RX ADMIN — CLONAZEPAM 1 MG: 1 TABLET ORAL at 06:13

## 2024-12-18 RX ADMIN — IOHEXOL 100 ML: 350 INJECTION, SOLUTION INTRAVENOUS at 23:20

## 2024-12-18 RX ADMIN — SODIUM HYPOCHLORITE 10 ML: 1.25 SOLUTION TOPICAL at 17:52

## 2024-12-18 RX ADMIN — SENNOSIDES AND DOCUSATE SODIUM 2 TABLET: 50; 8.6 TABLET ORAL at 17:49

## 2024-12-18 RX ADMIN — LINEZOLID 600 MG: 600 TABLET, FILM COATED ORAL at 06:16

## 2024-12-18 RX ADMIN — LINEZOLID 600 MG: 600 TABLET, FILM COATED ORAL at 17:50

## 2024-12-18 RX ADMIN — FLUOXETINE HYDROCHLORIDE 40 MG: 20 CAPSULE ORAL at 06:11

## 2024-12-18 RX ADMIN — QUETIAPINE FUMARATE 100 MG: 100 TABLET ORAL at 17:50

## 2024-12-18 RX ADMIN — TAMSULOSIN HYDROCHLORIDE 0.4 MG: 0.4 CAPSULE ORAL at 06:10

## 2024-12-18 RX ADMIN — SPIRONOLACTONE 25 MG: 50 TABLET ORAL at 06:11

## 2024-12-18 RX ADMIN — MOMETASONE FUROATE AND FORMOTEROL FUMARATE DIHYDRATE 2 PUFF: 200; 5 AEROSOL RESPIRATORY (INHALATION) at 08:45

## 2024-12-18 RX ADMIN — MORPHINE SULFATE 4 MG: 4 INJECTION, SOLUTION INTRAMUSCULAR; INTRAVENOUS at 08:47

## 2024-12-18 RX ADMIN — HYDROCODONE BITARTRATE AND ACETAMINOPHEN 1 TABLET: 5; 325 TABLET ORAL at 06:20

## 2024-12-18 RX ADMIN — MOMETASONE FUROATE AND FORMOTEROL FUMARATE DIHYDRATE 2 PUFF: 200; 5 AEROSOL RESPIRATORY (INHALATION) at 17:50

## 2024-12-18 RX ADMIN — CLONAZEPAM 1 MG: 1 TABLET ORAL at 17:50

## 2024-12-18 RX ADMIN — Medication 220 MG: at 06:11

## 2024-12-18 RX ADMIN — GEMFIBROZIL 600 MG: 600 TABLET ORAL at 10:24

## 2024-12-18 RX ADMIN — ATORVASTATIN CALCIUM 20 MG: 20 TABLET, FILM COATED ORAL at 20:55

## 2024-12-18 RX ADMIN — HYDROCODONE BITARTRATE AND ACETAMINOPHEN 1 TABLET: 5; 325 TABLET ORAL at 20:54

## 2024-12-18 RX ADMIN — METOPROLOL SUCCINATE 50 MG: 50 TABLET, EXTENDED RELEASE ORAL at 10:24

## 2024-12-18 RX ADMIN — OXYCODONE HYDROCHLORIDE AND ACETAMINOPHEN 1000 MG: 500 TABLET ORAL at 06:11

## 2024-12-18 RX ADMIN — TIOTROPIUM BROMIDE INHALATION SPRAY 5 MCG: 3.12 SPRAY, METERED RESPIRATORY (INHALATION) at 08:45

## 2024-12-18 RX ADMIN — GEMFIBROZIL 600 MG: 600 TABLET ORAL at 23:25

## 2024-12-18 RX ADMIN — QUETIAPINE FUMARATE 100 MG: 100 TABLET ORAL at 10:24

## 2024-12-18 RX ADMIN — THEOPHYLLINE 300 MG: 300 TABLET, EXTENDED RELEASE ORAL at 10:24

## 2024-12-18 ASSESSMENT — PAIN DESCRIPTION - PAIN TYPE
TYPE: ACUTE PAIN;CHRONIC PAIN
TYPE: ACUTE PAIN

## 2024-12-18 ASSESSMENT — ENCOUNTER SYMPTOMS
FOCAL WEAKNESS: 0
NAUSEA: 1
MYALGIAS: 1
WEAKNESS: 1

## 2024-12-18 NOTE — PROGRESS NOTES
Bedside report received from night shift nurse. Assumed care at 0645.   Pt A&Ox4  NPO diet, patient did not eat breakfast tray, denies n/v.   Last meal 12/17 Night, last drink 0800.   + bowel sounds, + flatus, LBM 12/18 per pt. IV access through 22G L shoulder.  Saturating >90% on 2L NC at baseline.  RLE hematoma, previous dressing removed and site was cleaned. Replaced dressing.   Pt ambulates x1 FWW.  Pain is controlled through medication orders. Updated on plan of care. Safety education provided. Bed locked in low. Call light within reach. Rounding in place.

## 2024-12-18 NOTE — RESPIRATORY CARE
"COPD EDUCATION by COPD CLINICAL EDUCATOR  12/18/2024 at 1:17 PM by Lela Zuñiga, RRT     Patient interviewed by COPD education team. Patient refused COPD program at this time. He did agree to review his current medication use and delivery devices. Provided spacer with instruction for best delivery of medication.  His Action Plan was updated in the EMR to reflect current Respiratory Medication use.                   COPD Assessment  COPD Clinical Specialists ONLY  COPD Education Initiated: Yes--Short Intervention (met with pt honey last seen now resides long term care to assist ADL's O2 depend SANTSOH prior CPAP? reviw of meds and delivery gave spacer no PFT lives [place], [place])  DME Company: [employer]  DME Equipment Type: 2 lpm has CPAP using intermittently nebulizer  Physician Name: Has provider at residence; Jerad  Pulmonologist Name: denies any recent lives in [place]  Referrals Initiated: Yes  Smoking Cessation: No (has quit since in care facility)  Home Health Care:  (resides in SNF care in [place], [place])  Mobile Urgent Care Services: N/A  Geriatric Specialty Group: N/A  (OP) Pulmonary Function Testing:  (none available)  Interdisciplinary Rounds: Attendance at Rounds (30 Min)    PFT Results  No results found for: \"PFT\"    Meds to Beds  [facility] provides bedside medication delivery for all eligible patients at discharge and you have been automatically enrolled in the Meds to Beds Program. Would you like to opt out of this program for any reason?: No - Stay Opted In     MY COPD ACTION PLAN     It is recommended that patients and physicians /healthcare providers complete this action plan together. This plan should be discussed at each physician visit and updated as needed.    The green, yellow and red zones show groups of symptoms of COPD. This list of symptoms is not comprehensive, and you may experience other symptoms. In the \"Actions\" column, your healthcare provider has recommended actions for you to take based on your " "symptoms.    Patient Name: Henry Kumari   YOB: 1955   Last Updated on:     Green Zone:  I am doing well today Actions     Usual activitiy and exercise level   Take daily medications     Usual amounts of cough and phlegm/mucus   Use oxygen as prescribed     Sleep well at night   Continue regular exercise/diet plan     Appetite is good   At all times avoid cigarette smoke, inhaled irritants     Daily Medications (these medications are taken every day):   Fluticosone/Salmeterol (Advair)  Tiotropium Bromide Monohydrate (Spiriva) 2 Puffs  1 capsule Twice daily  Once daily     Additional Information:  Use spacer with your Advair Inhaler and rinse your mouth    Yellow Zone:  I am having a bad day or a COPD flare Actions     More breathless than usual   Continue daily medications     I have less energy for my daily activities   Use quick relief inhaler as ordered     Increased or thicker phlegm/mucus   Use oxygen as prescribed     Using quick relief inhaler/nebulizer more often   Get plenty of rest     Swelling of ankles more than usual   Use pursed lip breathing     More coughing than usual   At all times avoid cigarette smoke, inhaled irritants     I feel like I have a \"chest cold\"     Poor sleep and my symptoms woke me up     My appetite is not good     My medicine is not helping      Call provider immediately if symptoms don’t improve     Continue daily medications, add rescue medications:   Albuterol  Albuterol 2 Puffs  3mL via nebulizer          Medications to be used during a flare up, (as Discussed with Provider):           Additional Information:  Use your rescue medications as directed    Red Zone:  I need urgent medical care Actions     Severe shortness of breath even at rest   Call 911 or seek medical care immediately     Not able to do any activity because of breathing      Fever or shaking chills      Feeling confused or very drowsy       Chest pains      Coughing up blood            "

## 2024-12-18 NOTE — DISCHARGE PLANNING
Case Management Discharge Planning    Admission Date: 12/17/2024  GMLOS: 3.1  ALOS: 1    6-Clicks ADL Score: 18  6-Clicks Mobility Score: 17  PT and/or OT Eval ordered: Yes  Post-acute Referrals Ordered: No  Post-acute Choice Obtained: Yes  Has referral(s) been sent to post-acute provider:  No      Anticipated Discharge Dispo: Discharge Disposition: D/T to SNF with Medicare cert in anticipation of skilled care (03)  Discharge Address: [facility] [place] [address]  Discharge Contact Phone Number: [phone]    DME Needed: No    Action(s) Taken: Chart review completed. Discussed patient during IDT rounds.    RN CM completed dc assessment with patient at bedside. Pt came from [facility] in [place], [place]. Pt wishes to go back there on discharge.     1153: RN CM faxed choice form to [facility] Requested referral be sent [facility].     Escalations Completed: None    Medically Clear: No    Next Steps: RNCM to follow up with IDT regarding DCP needs/barriers.     Barriers to Discharge: Medical clearance and Outpatient referrals pending    Is the patient up for discharge tomorrow: No      Care Transition Team Assessment    Case Management role discussed with patient; Patient verbalized understanding of the Case Management role.     Demographics on facesheet verified.     Please see H&P for pertinent PMH and reason for admission.     Pt currently is resident of [facility] in [place], [place]. Pts PCP is Dr. Obey Crawford. Pt reports that he is able to brush his teeth, use the bathroom and eat food independently prior to admission. He said that he sometimes needs assistance with bathing and changing his clothes. He says that he is unable to walk and he uses a scooter to get around. Pt reports using 2 liters of O2 at baseline. Pt has hx of alcohol use, reporting that he stopped a couple of months ago. He states that he drank a fifth of vodka every couple of days. Pt reports hx of depression and anxiety, which he states that  he takes medication for and that the medication helps. Pt support system includes his sister and two nieces who all live in [place], [place]. Patient plans to dc back to [facility] when medically clear.       Information Source  Orientation Level: Oriented X4  Information Given By: Patient  Informant's Name: Henry Kumari  Who is responsible for making decisions for patient? : Patient    Readmission Evaluation  Is this a readmission?: No    Elopement Risk  Legal Hold: No  Ambulatory or Self Mobile in Wheelchair: Yes  Disoriented: No  Psychiatric Symptoms: None  History of Wandering: No  Elopement this Admit: No  Vocalizing Wanting to Leave: No  Displays Behaviors, Body Language Wanting to Leave: No-Not at Risk for Elopement    Interdisciplinary Discharge Planning  Primary Care Physician: Dr. Obey Crawford  Lives with - Patient's Self Care Capacity: Alone and Able to Care For Self  Patient or legal guardian wants to designate a caregiver: No  Support Systems: Family Member(s), Friends / Neighbors  Housing / Facility: Skilled Nursing Facility  Name of Care Facility: [facility]  Do You Take your Prescribed Medications Regularly: Yes    Discharge Preparedness  What is your plan after discharge?: Skilled nursing facility  What are your discharge supports?: Sibling  Prior Functional Level: Needs Assist with Activities of Daily Living, Uses Wheelchair, Wheelchair Dependent  Difficulity with ADLs: Walking, Bathing, Dressing  Difficulty with ADLs Comment: per pt SNF helps when needed with bathing and changing. Pt says he uses scooter to get around  Difficulity with IADLs: Driving, Cooking, Laundry, Managing medication, Shopping  Difficulity with IADL Comments: SNF helps him with cooking, laundry, shopping, driving needs    Functional Assesment  Prior Functional Level: Needs Assist with Activities of Daily Living, Uses Wheelchair, Wheelchair Dependent    Finances  Financial Barriers to Discharge: No  Prescription Coverage:  "Yes    Vision / Hearing Impairment  Vision Impairment : No  Hearing Impairment : No    Advance Directive  Advance Directive?: None  Advance Directive offered?: AD Booklet refused    Domestic Abuse  Possible Abuse/Neglect Reported to:: Not Applicable    Psychological Assessment  History of Substance Abuse: Alcohol  Date Last Used - Alcohol: per pt \"a couple of months ago\"  Substance Abuse Comments: per pt \"a 5th of vodka every couple of days\"  History of Psychiatric Problems: Yes (reports hx of depression, anxiety - takes medication that helps)  Non-compliant with Treatment: No  Newly Diagnosed Illness: Yes    Discharge Risks or Barriers  Discharge risks or barriers?: Post-acute placement / services  Patient risk factors: Cognitive / sensory / physical deficit, Bariatric    Anticipated Discharge Information  Discharge Disposition: D/T to SNF with Medicare cert in anticipation of skilled care (03)  Discharge Address: [address] [place] [address]  Discharge Contact Phone Number: [phone]  "

## 2024-12-18 NOTE — ASSESSMENT & PLAN NOTE
Large necrotic wound on right shin->calf, supposedly started with bumping a trailor hitch and a hematoma 10 days prior to presentation, seen at OSH at that time.  CT LE shows necrotic skin tissue with adjacent fluid collection, likely hematoma/seroma  Plastic surgery recommend debridement of necrotic skin and can assist with reconstructive surgery when wound is ready  S/p surgical debridement by ortho 12/19, wound vac on  Tolerating wound vac well    12/26/2024  Discontinue Zyvox, switch to Augmentin and doxycycline for ongoing neutropenia.  Total antibiotic course for 14 days  Requiring IV narcotics for pain management, monitor for toxicity  Repeat CBC in a.m. to monitor ongoing pancytopenia, need outpatient follow-up for repeat labs once discharge  Need placement,  assisting  Case discussed with Ortho AMINTA Ovalle.  Cleared to discharge from Ortho standpoint to SNF

## 2024-12-18 NOTE — PROGRESS NOTES
Virtual Nurse rounding complete.    Round Needs: Needs to go to bathroom.  and Notified of Patient Needs: CNA    Educated patient to wait for staff to assist him into restroom. Patient verbalized understanding.

## 2024-12-18 NOTE — PROGRESS NOTES
Pt arrived to unit   AAOx4  2L NC, baseline  VSS  Ambulates x1 FWW  Bed alarm on  RLE wound with dressing in place    Call light within reach  Safety maintained, frequent rounding in place

## 2024-12-18 NOTE — CARE PLAN
The patient is Stable - Low risk of patient condition declining or worsening    Shift Goals  Clinical Goals: safety  Patient Goals: rest    Progress made toward(s) clinical / shift goals:  education provided on poc. Safety maintained. Bed alarm in use. Call light within reach    Problem: Knowledge Deficit - Standard  Goal: Patient and family/care givers will demonstrate understanding of plan of care, disease process/condition, diagnostic tests and medications  Description: Target End Date:  1-3 days or as soon as patient condition allows    Document in Patient Education    1.  Patient and family/caregiver oriented to unit, equipment, visitation policy and means for communicating concern  2.  Complete/review Learning Assessment  3.  Assess knowledge level of disease process/condition, treatment plan, diagnostic tests and medications  4.  Explain disease process/condition, treatment plan, diagnostic tests and medications  Outcome: Progressing     Problem: Fall Risk  Goal: Patient will remain free from falls  Description: Target End Date:  Prior to discharge or change in level of care    Document interventions on the Laurel Barragan Fall Risk Assessment    1.  Assess for fall risk factors  2.  Implement fall precautions  Outcome: Progressing       Patient is not progressing towards the following goals:

## 2024-12-18 NOTE — ASSESSMENT & PLAN NOTE
Consider referral to bariatric surgery on discharge, consider trial of GLP-1 inhibitors as outpatient.

## 2024-12-18 NOTE — H&P
Hospital Medicine History & Physical Note    Date of Service  12/17/2024    Primary Care Physician  Pcp Pt States None    Consultants  NA    Code Status  Full Code    Chief Complaint  No chief complaint on file.      History of Presenting Illness  Henry Kumari is a 69 y.o. male with atrial fibrillation, COPD, CHF, history of DVT/PE, hypertension, chronic low back pain, morbid obesity, SANTOSH, viral hepatitis C, history of pancreatitis, bipolar 2 disorder, BPH, hyperlipidemia who presented 12/17/2024 with necrotic wound.    Reportedly patient suffered a wound on his right leg when he bumped on a trailer hitch causing a large hematoma.  He presented to [facility] ED at the time and was sent home.  Patient was staying at [facility] in [place] since May 2024.  Nursing staff and patient noted that his wound has been not healing, concerns for necrosis, presented to OSH.  He has been experiencing generalized fatigue and malaise, some intermittent right lower extremity pain to his wound.he has been experiencing some nausea.  He denies any fevers or chills headache or vision changes chest pain dyspnea cough abdominal pain vomiting dysuria or diarrhea.    Presented to OSH and general surgery consulted, surgeon stated they did not have the resources they are to properly treat patient and that patient may likely need plastic surgery consultation and transfer to [facility] as recommended.  Patient was subsequently accepted for transfer, arrives here in stable condition, afebrile, pulse 104 respiratory rate 18 blood pressure 137/91 pulse ox 98% on 2 L nasal cannula.    I discussed the plan of care with patient, bedside RN, charge RN, , and pharmacy.    Review of Systems  Review of Systems   Constitutional:  Positive for malaise/fatigue.   Cardiovascular:  Positive for leg swelling.   Gastrointestinal:  Positive for nausea.   Musculoskeletal:  Positive for myalgias.   Skin:         Large wound to right shin->calf    Neurological:  Positive for weakness. Negative for focal weakness.       Past Medical History   has a past medical history of Arrhythmia, Arthritis, Blood clotting disorder (HCC) (1976, ?2000), Breath shortness, Congestive heart failure (HCC), Emphysema of lung (HCC), Glaucoma, Heart burn, Hepatitis C (?2000), High cholesterol, Hypertension, Indigestion, and Sleep apnea.    Surgical History   has a past surgical history that includes nilson by laparoscopy (4/2/2018); ercp in or (N/A, 4/3/2018); ercp in or (N/A, 5/2/2018); other abdominal surgery (2018); other abdominal surgery (20018); other (1976); ercp (7/18/2018); ercp w/rem fb and/or stent chg (7/18/2018); humerus nailing intramedullary (Left, 12/28/2023); irrigation & debridement general (Left, 1/4/2024); and incision and drainage general (Left, 1/23/2024).     Family History  family history is not on file.   Family history reviewed with patient. There is no family history that is pertinent to the chief complaint.     Social History   reports that he has quit smoking. His smoking use included cigarettes. He has a 23.5 pack-year smoking history. He has never used smokeless tobacco. He reports that he does not currently use alcohol. He reports that he does not use drugs.    Allergies  Allergies   Allergen Reactions    Tape Rash     Pt states tape removes his skin         Medications  Prior to Admission Medications   Prescriptions Last Dose Informant Patient Reported? Taking?   QUEtiapine Fumarate 150 MG Tab 12/17/2024 Morning MAR from Other Facility Yes Yes   Sig: Take 150 mg by mouth 2 times a day.   acetaminophen (TYLENOL) 500 MG Tab 12/16/2024  No Yes   Sig: Take 2 Tablets by mouth every 6 hours as needed for Mild Pain, Moderate Pain or Fever.   albuterol 108 (90 Base) MCG/ACT Aero Soln inhalation aerosol 12/17/2024 Morning MAR from Other Facility Yes Yes   Sig: Inhale 2 Puffs by mouth every 6 hours as needed for Shortness of Breath.   ascorbic acid (VITAMIN  C) 1000 MG tablet 12/17/2024  No Yes   Sig: Take 1 Tablet by mouth every day.   atorvastatin (LIPITOR) 20 MG Tab 12/16/2024 Evening MAR from Other Facility Yes Yes   Sig: Take 20 mg by mouth every evening.   clonazePAM (KLONOPIN) 1 MG Tab 12/17/2024 Morning MAR from Other Facility Yes Yes   Sig: Take 1 mg by mouth 2 times a day.   fluoxetine (PROZAC) 40 MG capsule 12/17/2024 MAR from Other Facility Yes Yes   Sig: Take 40 mg by mouth every day.   fluticasone-salmeterol (ADVAIR) 250-50 MCG/DOSE AEROSOL POWDER, BREATH ACTIVATED 12/17/2024 MAR from Other Facility Yes Yes   Sig: Inhale 2 Puffs 2 times a day.   gemfibrozil (LOPID) 600 MG Tab 12/17/2024 MAR from Other Facility Yes Yes   Sig: Take 600 mg by mouth 2 times a day.   metoprolol SR (TOPROL XL) 100 MG TABLET SR 24 HR 12/17/2024 MAR from Other Facility Yes Yes   Sig: Take 100 mg by mouth every day.   nystatin (MYCOSTATIN) powder 12/17/2024  No Yes   Sig: Apply 1 g topically 2 times a day.   ondansetron (ZOFRAN ODT) 4 MG TABLET DISPERSIBLE 12/17/2024 MAR from Other Facility Yes Yes   Sig: Take 4 mg by mouth every 6 hours as needed for Nausea/Vomiting.   rivaroxaban (XARELTO) 20 MG Tab tablet 12/16/2024  Yes Yes   Sig: Take 20 mg by mouth with dinner.   senna-docusate (PERICOLACE OR SENOKOT S) 8.6-50 MG Tab 12/17/2024  No Yes   Sig: Take 2 Tablets by mouth 2 times a day.   spironolactone (ALDACTONE) 25 MG Tab 12/17/2024 MAR from Other Facility Yes Yes   Sig: Take 25 mg by mouth every day.   tamsulosin (FLOMAX) 0.4 MG capsule   Yes Yes   Sig: Take 0.4 mg by mouth every day.   theophylline SR (THEODUR) 300 MG TABLET SR 12 HR 12/17/2024 MAR from Other Facility Yes Yes   Sig: Take 300 mg by mouth 2 times a day.   tiotropium (SPIRIVA) 18 MCG Cap 12/17/2024 MAR from Other Facility Yes Yes   Sig: Inhale 18 mcg by mouth every day.   zinc sulfate (ZINCATE) 220 (50 Zn) MG Cap 12/17/2024  No Yes   Sig: Take 1 Capsule by mouth every day.      Facility-Administered Medications:  None       Physical Exam  Temp:  [36.3 °C (97.3 °F)] 36.3 °C (97.3 °F)  Pulse:  [104] 104  Resp:  [18] 18  BP: (137)/(91) 137/91  SpO2:  [98 %] 98 %  Blood Pressure : (!) 137/91   Temperature: 36.3 °C (97.3 °F)   Pulse: (!) 104   Respiration: 18   Pulse Oximetry: 98 %       Physical Exam  Vitals and nursing note reviewed.   Constitutional:       General: He is not in acute distress.     Appearance: He is obese. He is not toxic-appearing.   HENT:      Head: Normocephalic and atraumatic.      Nose: Nose normal. No rhinorrhea.      Mouth/Throat:      Mouth: Mucous membranes are moist.      Pharynx: Oropharynx is clear.   Eyes:      General: No scleral icterus.     Extraocular Movements: Extraocular movements intact.      Conjunctiva/sclera: Conjunctivae normal.   Cardiovascular:      Rate and Rhythm: Normal rate and regular rhythm.   Pulmonary:      Effort: Pulmonary effort is normal. No respiratory distress.      Breath sounds: Normal breath sounds. No wheezing, rhonchi or rales.   Abdominal:      General: There is no distension.      Palpations: Abdomen is soft.      Tenderness: There is no abdominal tenderness. There is no guarding or rebound.   Musculoskeletal:         General: Normal range of motion.      Cervical back: Normal range of motion and neck supple. No rigidity.      Right lower leg: Edema present.      Left lower leg: Edema present.   Skin:     Findings: Lesion (large necrotic wound from right shin->calf with necrotic tissue, bloody, surrounding tissue doesn't look particularly erythematous, minimally tender) present.   Neurological:      General: No focal deficit present.      Mental Status: He is alert and oriented to person, place, and time. Mental status is at baseline.      Cranial Nerves: No cranial nerve deficit.      Sensory: Sensory deficit (b/l LE neuropathy) present.   Psychiatric:         Mood and Affect: Mood normal.         Behavior: Behavior normal.         Thought Content: Thought  "content normal.         Judgment: Judgment normal.         Laboratory:          No results for input(s): \"ALTSGPT\", \"ASTSGOT\", \"ALKPHOSPHAT\", \"TBILIRUBIN\", \"DBILIRUBIN\", \"GAMMAGT\", \"AMYLASE\", \"LIPASE\", \"ALB\", \"PREALBUMIN\", \"GLUCOSE\" in the last 72 hours.      No results for input(s): \"NTPROBNP\" in the last 72 hours.      No results for input(s): \"TROPONINT\" in the last 72 hours.    Imaging:  CT-EXTREMITY, LOWER WITH RIGHT    (Results Pending)   US-EXTREMITY ARTERY LOWER UNILAT W/GLORIA (COMBO) RIGHT    (Results Pending)       no X-Ray or EKG requiring interpretation    Assessment/Plan:  Justification for Admission Status  I anticipate this patient will require at least two midnights for appropriate medical management, necessitating inpatient admission because large necrotic wound      * Necrosis (HCC)- (present on admission)  Assessment & Plan  Large necrotic wound on right shin->calf, supposedly started with bumping a trailor hitch and a hematoma 10 days prior to presentation, seen at OSH at that time.  CT ordered.  Linezolid.   Wound care  Ortho consult in AM for debridement.  GLORIA    Hypokalemia  Assessment & Plan  P.o. replacement ordered    Benzodiazepine dependence, continuous (HCC)- (present on admission)  Assessment & Plan  Continue home Klonopin    Mood disorder ([facility])- (present on admission)  Assessment & Plan  Continue Prozac, Seroquel    Class 3 severe obesity due to excess calories with serious comorbidity and body mass index (BMI) of 45.0 to 49.9 in adult ([facility])- (present on admission)  Assessment & Plan  Consider referral to bariatric surgery on discharge, consider trial of GLP-1 inhibitors as outpatient.    SANTOSH on CPAP- (present on admission)  Assessment & Plan  Nightly cpap ordered    COPD (chronic obstructive pulmonary disease) ([facility])- (present on admission)  Assessment & Plan  Not in acute exacerbation.  Continue home theophylline, Dulera, Spiriva.  DuoNebs as needed        VTE prophylaxis: " SCDs/TEDs  Total time spent on admission 77 minutes.    This included my review with ER physician, review of ED events, patient's history, outside records, recent records, face to face interview, physical examination; my review of lab results (CBC, chemistry panel), imaging review (CXR) and ECG. Also includes counseling patient on admission, treatment plan, and documentation of encounter.

## 2024-12-18 NOTE — PROGRESS NOTES
4 Eyes Skin Assessment Completed by JORDYN ashley and JORDYN slaughter.    Head WDL  Ears Blanching  Nose Redness, Blanching, and Non-Blanching    Mouth WDL  Neck WDL  Breast/Chest Redness  Shoulder Blades Blanching  Spine Blanching  (R) Arm/Elbow/Hand Redness, Abrasion, Discoloration, and Edema    (L) Arm/Elbow/Hand Redness, Blanching, and Discoloration  Abdomen Redness, Blanching, and Abrasion    Groin Redness moisture pannus  Scrotum/Coccyx/Buttocks Redness, Blanching, Non-Blanching, and Discoloration swelling. Scratch. Slow to dakota  (R) Leg Abrasion, Edema, and Incision discoloration    (L) Leg Redness, Blanching, Bruising, and Edema discoloration  (R) Heel/Foot/Toe Redness, Blanching, Boggy, and Swelling  (L) Heel/Foot/Toe Redness, Blanching, Boggy, and Swelling          Devices In Places Blood Pressure Cuff and Pulse Ox      Interventions In Place NC W/Ear Foams and Pillows    Possible Skin Injury Yes    Pictures Uploaded Into Epic Yes  Wound Consult Placed Yes  RN Wound Prevention Protocol Ordered No

## 2024-12-18 NOTE — CARE PLAN
The patient is Stable - Low risk of patient condition declining or worsening    Shift Goals  Clinical Goals: safety, wound care  Patient Goals: rest, safety  Family Goals: CLOTILDE    Progress made toward(s) clinical / shift goals:  The patient remains on 2L oxygen. The patient demonstrates proper use of the call light. The patients wound was cleaned and redressed.     Patient is not progressing towards the following goals:
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
## 2024-12-19 ENCOUNTER — ANESTHESIA EVENT (OUTPATIENT)
Dept: SURGERY | Facility: MEDICAL CENTER | Age: 69
End: 2024-12-19
Payer: MEDICARE

## 2024-12-19 ENCOUNTER — APPOINTMENT (OUTPATIENT)
Dept: RADIOLOGY | Facility: MEDICAL CENTER | Age: 69
End: 2024-12-19
Attending: STUDENT IN AN ORGANIZED HEALTH CARE EDUCATION/TRAINING PROGRAM
Payer: MEDICARE

## 2024-12-19 ENCOUNTER — ANESTHESIA (OUTPATIENT)
Dept: SURGERY | Facility: MEDICAL CENTER | Age: 69
End: 2024-12-19
Payer: MEDICARE

## 2024-12-19 LAB
ANION GAP SERPL CALC-SCNC: 13 MMOL/L (ref 7–16)
BASOPHILS # BLD AUTO: 0.7 % (ref 0–1.8)
BASOPHILS # BLD: 0.03 K/UL (ref 0–0.12)
BUN SERPL-MCNC: 16 MG/DL (ref 8–22)
CALCIUM SERPL-MCNC: 8.9 MG/DL (ref 8.5–10.5)
CHLORIDE SERPL-SCNC: 102 MMOL/L (ref 96–112)
CO2 SERPL-SCNC: 30 MMOL/L (ref 20–33)
CREAT SERPL-MCNC: 0.82 MG/DL (ref 0.5–1.4)
EOSINOPHIL # BLD AUTO: 0.21 K/UL (ref 0–0.51)
EOSINOPHIL NFR BLD: 4.7 % (ref 0–6.9)
ERYTHROCYTE [DISTWIDTH] IN BLOOD BY AUTOMATED COUNT: 57.2 FL (ref 35.9–50)
GFR SERPLBLD CREATININE-BSD FMLA CKD-EPI: 95 ML/MIN/1.73 M 2
GLUCOSE SERPL-MCNC: 93 MG/DL (ref 65–99)
HCT VFR BLD AUTO: 25.6 % (ref 42–52)
HGB BLD-MCNC: 7.8 G/DL (ref 14–18)
IMM GRANULOCYTES # BLD AUTO: 0.02 K/UL (ref 0–0.11)
IMM GRANULOCYTES NFR BLD AUTO: 0.4 % (ref 0–0.9)
LYMPHOCYTES # BLD AUTO: 0.67 K/UL (ref 1–4.8)
LYMPHOCYTES NFR BLD: 14.9 % (ref 22–41)
MCH RBC QN AUTO: 27.3 PG (ref 27–33)
MCHC RBC AUTO-ENTMCNC: 30.5 G/DL (ref 32.3–36.5)
MCV RBC AUTO: 89.5 FL (ref 81.4–97.8)
MONOCYTES # BLD AUTO: 0.43 K/UL (ref 0–0.85)
MONOCYTES NFR BLD AUTO: 9.6 % (ref 0–13.4)
NEUTROPHILS # BLD AUTO: 3.14 K/UL (ref 1.82–7.42)
NEUTROPHILS NFR BLD: 69.7 % (ref 44–72)
NRBC # BLD AUTO: 0 K/UL
NRBC BLD-RTO: 0 /100 WBC (ref 0–0.2)
PLATELET # BLD AUTO: 172 K/UL (ref 164–446)
PMV BLD AUTO: 8.5 FL (ref 9–12.9)
POTASSIUM SERPL-SCNC: 3.1 MMOL/L (ref 3.6–5.5)
RBC # BLD AUTO: 2.86 M/UL (ref 4.7–6.1)
SODIUM SERPL-SCNC: 145 MMOL/L (ref 135–145)
WBC # BLD AUTO: 4.5 K/UL (ref 4.8–10.8)

## 2024-12-19 PROCEDURE — 700102 HCHG RX REV CODE 250 W/ 637 OVERRIDE(OP): Performed by: STUDENT IN AN ORGANIZED HEALTH CARE EDUCATION/TRAINING PROGRAM

## 2024-12-19 PROCEDURE — 87076 CULTURE ANAEROBE IDENT EACH: CPT

## 2024-12-19 PROCEDURE — 160035 HCHG PACU - 1ST 60 MINS PHASE I: Performed by: ORTHOPAEDIC SURGERY

## 2024-12-19 PROCEDURE — 160009 HCHG ANES TIME/MIN: Performed by: ORTHOPAEDIC SURGERY

## 2024-12-19 PROCEDURE — 700111 HCHG RX REV CODE 636 W/ 250 OVERRIDE (IP): Performed by: STUDENT IN AN ORGANIZED HEALTH CARE EDUCATION/TRAINING PROGRAM

## 2024-12-19 PROCEDURE — 87186 SC STD MICRODIL/AGAR DIL: CPT | Mod: 91

## 2024-12-19 PROCEDURE — 700111 HCHG RX REV CODE 636 W/ 250 OVERRIDE (IP)

## 2024-12-19 PROCEDURE — 80048 BASIC METABOLIC PNL TOTAL CA: CPT

## 2024-12-19 PROCEDURE — 94640 AIRWAY INHALATION TREATMENT: CPT

## 2024-12-19 PROCEDURE — 0JBN0ZZ EXCISION OF RIGHT LOWER LEG SUBCUTANEOUS TISSUE AND FASCIA, OPEN APPROACH: ICD-10-PCS | Performed by: ORTHOPAEDIC SURGERY

## 2024-12-19 PROCEDURE — 87075 CULTR BACTERIA EXCEPT BLOOD: CPT

## 2024-12-19 PROCEDURE — 700111 HCHG RX REV CODE 636 W/ 250 OVERRIDE (IP): Mod: JZ | Performed by: STUDENT IN AN ORGANIZED HEALTH CARE EDUCATION/TRAINING PROGRAM

## 2024-12-19 PROCEDURE — 87205 SMEAR GRAM STAIN: CPT

## 2024-12-19 PROCEDURE — 160027 HCHG SURGERY MINUTES - 1ST 30 MINS LEVEL 2: Performed by: ORTHOPAEDIC SURGERY

## 2024-12-19 PROCEDURE — 160048 HCHG OR STATISTICAL LEVEL 1-5: Performed by: ORTHOPAEDIC SURGERY

## 2024-12-19 PROCEDURE — 770001 HCHG ROOM/CARE - MED/SURG/GYN PRIV*

## 2024-12-19 PROCEDURE — 87015 SPECIMEN INFECT AGNT CONCNTJ: CPT

## 2024-12-19 PROCEDURE — 700101 HCHG RX REV CODE 250: Performed by: ORTHOPAEDIC SURGERY

## 2024-12-19 PROCEDURE — A9270 NON-COVERED ITEM OR SERVICE: HCPCS | Performed by: STUDENT IN AN ORGANIZED HEALTH CARE EDUCATION/TRAINING PROGRAM

## 2024-12-19 PROCEDURE — 160002 HCHG RECOVERY MINUTES (STAT): Performed by: ORTHOPAEDIC SURGERY

## 2024-12-19 PROCEDURE — 160038 HCHG SURGERY MINUTES - EA ADDL 1 MIN LEVEL 2: Performed by: ORTHOPAEDIC SURGERY

## 2024-12-19 PROCEDURE — 87077 CULTURE AEROBIC IDENTIFY: CPT

## 2024-12-19 PROCEDURE — 700105 HCHG RX REV CODE 258: Performed by: STUDENT IN AN ORGANIZED HEALTH CARE EDUCATION/TRAINING PROGRAM

## 2024-12-19 PROCEDURE — 87070 CULTURE OTHR SPECIMN AEROBIC: CPT

## 2024-12-19 PROCEDURE — 700101 HCHG RX REV CODE 250: Performed by: STUDENT IN AN ORGANIZED HEALTH CARE EDUCATION/TRAINING PROGRAM

## 2024-12-19 PROCEDURE — 160036 HCHG PACU - EA ADDL 30 MINS PHASE I: Performed by: ORTHOPAEDIC SURGERY

## 2024-12-19 PROCEDURE — 85025 COMPLETE CBC W/AUTO DIFF WBC: CPT

## 2024-12-19 PROCEDURE — 99232 SBSQ HOSP IP/OBS MODERATE 35: CPT | Performed by: STUDENT IN AN ORGANIZED HEALTH CARE EDUCATION/TRAINING PROGRAM

## 2024-12-19 PROCEDURE — 0JCN0ZZ EXTIRPATION OF MATTER FROM RIGHT LOWER LEG SUBCUTANEOUS TISSUE AND FASCIA, OPEN APPROACH: ICD-10-PCS | Performed by: ORTHOPAEDIC SURGERY

## 2024-12-19 PROCEDURE — 36415 COLL VENOUS BLD VENIPUNCTURE: CPT

## 2024-12-19 RX ORDER — MAGNESIUM HYDROXIDE 1200 MG/15ML
LIQUID ORAL
Status: COMPLETED | OUTPATIENT
Start: 2024-12-19 | End: 2024-12-19

## 2024-12-19 RX ORDER — SODIUM CHLORIDE, SODIUM LACTATE, POTASSIUM CHLORIDE, CALCIUM CHLORIDE 600; 310; 30; 20 MG/100ML; MG/100ML; MG/100ML; MG/100ML
INJECTION, SOLUTION INTRAVENOUS CONTINUOUS
Status: DISCONTINUED | OUTPATIENT
Start: 2024-12-19 | End: 2024-12-19 | Stop reason: HOSPADM

## 2024-12-19 RX ORDER — DEXAMETHASONE SODIUM PHOSPHATE 4 MG/ML
INJECTION, SOLUTION INTRA-ARTICULAR; INTRALESIONAL; INTRAMUSCULAR; INTRAVENOUS; SOFT TISSUE PRN
Status: DISCONTINUED | OUTPATIENT
Start: 2024-12-19 | End: 2024-12-19 | Stop reason: SURG

## 2024-12-19 RX ORDER — HYDROMORPHONE HYDROCHLORIDE 1 MG/ML
0.1 INJECTION, SOLUTION INTRAMUSCULAR; INTRAVENOUS; SUBCUTANEOUS
Status: DISCONTINUED | OUTPATIENT
Start: 2024-12-19 | End: 2024-12-19 | Stop reason: HOSPADM

## 2024-12-19 RX ORDER — HYDROMORPHONE HYDROCHLORIDE 1 MG/ML
0.2 INJECTION, SOLUTION INTRAMUSCULAR; INTRAVENOUS; SUBCUTANEOUS
Status: DISCONTINUED | OUTPATIENT
Start: 2024-12-19 | End: 2024-12-19 | Stop reason: HOSPADM

## 2024-12-19 RX ORDER — MEPERIDINE HYDROCHLORIDE 25 MG/ML
12.5 INJECTION INTRAMUSCULAR; INTRAVENOUS; SUBCUTANEOUS
Status: DISCONTINUED | OUTPATIENT
Start: 2024-12-19 | End: 2024-12-19 | Stop reason: HOSPADM

## 2024-12-19 RX ORDER — ALBUTEROL SULFATE 90 UG/1
2 INHALANT RESPIRATORY (INHALATION) EVERY 4 HOURS PRN
Status: DISCONTINUED | OUTPATIENT
Start: 2024-12-19 | End: 2024-12-27 | Stop reason: HOSPADM

## 2024-12-19 RX ORDER — DIPHENHYDRAMINE HYDROCHLORIDE 50 MG/ML
12.5 INJECTION INTRAMUSCULAR; INTRAVENOUS
Status: DISCONTINUED | OUTPATIENT
Start: 2024-12-19 | End: 2024-12-19 | Stop reason: HOSPADM

## 2024-12-19 RX ORDER — IPRATROPIUM BROMIDE AND ALBUTEROL SULFATE 2.5; .5 MG/3ML; MG/3ML
3 SOLUTION RESPIRATORY (INHALATION)
Status: DISCONTINUED | OUTPATIENT
Start: 2024-12-19 | End: 2024-12-19

## 2024-12-19 RX ORDER — HALOPERIDOL 5 MG/ML
1 INJECTION INTRAMUSCULAR
Status: DISCONTINUED | OUTPATIENT
Start: 2024-12-19 | End: 2024-12-19 | Stop reason: HOSPADM

## 2024-12-19 RX ORDER — ONDANSETRON 2 MG/ML
INJECTION INTRAMUSCULAR; INTRAVENOUS PRN
Status: DISCONTINUED | OUTPATIENT
Start: 2024-12-19 | End: 2024-12-19 | Stop reason: SURG

## 2024-12-19 RX ORDER — SODIUM CHLORIDE, SODIUM LACTATE, POTASSIUM CHLORIDE, CALCIUM CHLORIDE 600; 310; 30; 20 MG/100ML; MG/100ML; MG/100ML; MG/100ML
INJECTION, SOLUTION INTRAVENOUS
Status: DISCONTINUED | OUTPATIENT
Start: 2024-12-19 | End: 2024-12-19 | Stop reason: SURG

## 2024-12-19 RX ORDER — ONDANSETRON 2 MG/ML
4 INJECTION INTRAMUSCULAR; INTRAVENOUS
Status: DISCONTINUED | OUTPATIENT
Start: 2024-12-19 | End: 2024-12-19 | Stop reason: HOSPADM

## 2024-12-19 RX ORDER — EPINEPHRINE 0.1 MG/ML
SYRINGE (ML) INJECTION
Status: DISCONTINUED | OUTPATIENT
Start: 2024-12-19 | End: 2024-12-19 | Stop reason: HOSPADM

## 2024-12-19 RX ORDER — SUCCINYLCHOLINE CHLORIDE 20 MG/ML
INJECTION INTRAMUSCULAR; INTRAVENOUS PRN
Status: DISCONTINUED | OUTPATIENT
Start: 2024-12-19 | End: 2024-12-19 | Stop reason: SURG

## 2024-12-19 RX ORDER — HYDROMORPHONE HYDROCHLORIDE 1 MG/ML
0.4 INJECTION, SOLUTION INTRAMUSCULAR; INTRAVENOUS; SUBCUTANEOUS
Status: DISCONTINUED | OUTPATIENT
Start: 2024-12-19 | End: 2024-12-19 | Stop reason: HOSPADM

## 2024-12-19 RX ORDER — IPRATROPIUM BROMIDE AND ALBUTEROL SULFATE 2.5; .5 MG/3ML; MG/3ML
3 SOLUTION RESPIRATORY (INHALATION) ONCE
Status: ACTIVE | OUTPATIENT
Start: 2024-12-19 | End: 2024-12-20

## 2024-12-19 RX ORDER — POTASSIUM CHLORIDE 1500 MG/1
40 TABLET, EXTENDED RELEASE ORAL ONCE
Status: COMPLETED | OUTPATIENT
Start: 2024-12-19 | End: 2024-12-19

## 2024-12-19 RX ORDER — FUROSEMIDE 20 MG/1
40 TABLET ORAL DAILY
Status: DISCONTINUED | OUTPATIENT
Start: 2024-12-20 | End: 2024-12-20

## 2024-12-19 RX ORDER — OXYCODONE HCL 5 MG/5 ML
5 SOLUTION, ORAL ORAL
Status: COMPLETED | OUTPATIENT
Start: 2024-12-19 | End: 2024-12-19

## 2024-12-19 RX ORDER — OXYCODONE HCL 5 MG/5 ML
10 SOLUTION, ORAL ORAL
Status: COMPLETED | OUTPATIENT
Start: 2024-12-19 | End: 2024-12-19

## 2024-12-19 RX ORDER — LIDOCAINE HYDROCHLORIDE 40 MG/ML
SOLUTION TOPICAL PRN
Status: DISCONTINUED | OUTPATIENT
Start: 2024-12-19 | End: 2024-12-19 | Stop reason: SURG

## 2024-12-19 RX ORDER — LIDOCAINE HYDROCHLORIDE 20 MG/ML
INJECTION, SOLUTION EPIDURAL; INFILTRATION; INTRACAUDAL; PERINEURAL PRN
Status: DISCONTINUED | OUTPATIENT
Start: 2024-12-19 | End: 2024-12-19 | Stop reason: SURG

## 2024-12-19 RX ORDER — CEFAZOLIN SODIUM 1 G/3ML
INJECTION, POWDER, FOR SOLUTION INTRAMUSCULAR; INTRAVENOUS PRN
Status: DISCONTINUED | OUTPATIENT
Start: 2024-12-19 | End: 2024-12-19 | Stop reason: SURG

## 2024-12-19 RX ADMIN — THEOPHYLLINE 300 MG: 300 TABLET, EXTENDED RELEASE ORAL at 11:56

## 2024-12-19 RX ADMIN — ATORVASTATIN CALCIUM 20 MG: 20 TABLET, FILM COATED ORAL at 23:57

## 2024-12-19 RX ADMIN — OXYCODONE HYDROCHLORIDE 10 MG: 5 SOLUTION ORAL at 18:23

## 2024-12-19 RX ADMIN — GEMFIBROZIL 600 MG: 600 TABLET ORAL at 11:56

## 2024-12-19 RX ADMIN — FLUOXETINE HYDROCHLORIDE 40 MG: 20 CAPSULE ORAL at 06:09

## 2024-12-19 RX ADMIN — ALBUTEROL SULFATE 2 PUFF: 90 AEROSOL, METERED RESPIRATORY (INHALATION) at 08:33

## 2024-12-19 RX ADMIN — CLONAZEPAM 1 MG: 1 TABLET ORAL at 06:10

## 2024-12-19 RX ADMIN — SUCCINYLCHOLINE CHLORIDE 160 MG: 20 INJECTION, SOLUTION INTRAMUSCULAR; INTRAVENOUS at 17:09

## 2024-12-19 RX ADMIN — PROPOFOL 200 MG: 10 INJECTION, EMULSION INTRAVENOUS at 17:09

## 2024-12-19 RX ADMIN — DEXAMETHASONE SODIUM PHOSPHATE 4 MG: 4 INJECTION INTRA-ARTICULAR; INTRALESIONAL; INTRAMUSCULAR; INTRAVENOUS; SOFT TISSUE at 17:14

## 2024-12-19 RX ADMIN — FENTANYL CITRATE 50 MCG: 50 INJECTION, SOLUTION INTRAMUSCULAR; INTRAVENOUS at 17:30

## 2024-12-19 RX ADMIN — METOPROLOL SUCCINATE 50 MG: 50 TABLET, EXTENDED RELEASE ORAL at 06:08

## 2024-12-19 RX ADMIN — FENTANYL CITRATE 50 MCG: 50 INJECTION, SOLUTION INTRAMUSCULAR; INTRAVENOUS at 17:59

## 2024-12-19 RX ADMIN — FENTANYL CITRATE 50 MCG: 50 INJECTION, SOLUTION INTRAMUSCULAR; INTRAVENOUS at 18:34

## 2024-12-19 RX ADMIN — ALBUTEROL SULFATE 2 PUFF: 90 INHALANT RESPIRATORY (INHALATION) at 13:31

## 2024-12-19 RX ADMIN — QUETIAPINE FUMARATE 100 MG: 100 TABLET ORAL at 06:10

## 2024-12-19 RX ADMIN — HYDROCODONE BITARTRATE AND ACETAMINOPHEN 1 TABLET: 5; 325 TABLET ORAL at 11:59

## 2024-12-19 RX ADMIN — LINEZOLID 600 MG: 600 TABLET, FILM COATED ORAL at 06:10

## 2024-12-19 RX ADMIN — GEMFIBROZIL 600 MG: 600 TABLET ORAL at 23:57

## 2024-12-19 RX ADMIN — HYDROMORPHONE HYDROCHLORIDE 0.2 MG: 1 INJECTION, SOLUTION INTRAMUSCULAR; INTRAVENOUS; SUBCUTANEOUS at 19:11

## 2024-12-19 RX ADMIN — TAMSULOSIN HYDROCHLORIDE 0.4 MG: 0.4 CAPSULE ORAL at 06:05

## 2024-12-19 RX ADMIN — SODIUM CHLORIDE, POTASSIUM CHLORIDE, SODIUM LACTATE AND CALCIUM CHLORIDE: 600; 310; 30; 20 INJECTION, SOLUTION INTRAVENOUS at 17:01

## 2024-12-19 RX ADMIN — MOMETASONE FUROATE AND FORMOTEROL FUMARATE DIHYDRATE 2 PUFF: 200; 5 AEROSOL RESPIRATORY (INHALATION) at 06:12

## 2024-12-19 RX ADMIN — SODIUM HYPOCHLORITE 10 ML: 1.25 SOLUTION TOPICAL at 06:18

## 2024-12-19 RX ADMIN — OXYCODONE HYDROCHLORIDE AND ACETAMINOPHEN 1000 MG: 500 TABLET ORAL at 06:06

## 2024-12-19 RX ADMIN — LIDOCAINE HYDROCHLORIDE 4 ML: 40 SOLUTION TOPICAL at 17:11

## 2024-12-19 RX ADMIN — TIOTROPIUM BROMIDE INHALATION SPRAY 5 MCG: 3.12 SPRAY, METERED RESPIRATORY (INHALATION) at 06:13

## 2024-12-19 RX ADMIN — LIDOCAINE HYDROCHLORIDE 100 MG: 20 INJECTION, SOLUTION EPIDURAL; INFILTRATION; INTRACAUDAL; PERINEURAL at 17:09

## 2024-12-19 RX ADMIN — HYDROMORPHONE HYDROCHLORIDE 0.4 MG: 1 INJECTION, SOLUTION INTRAMUSCULAR; INTRAVENOUS; SUBCUTANEOUS at 18:59

## 2024-12-19 RX ADMIN — ALBUTEROL SULFATE 2 PUFF: 90 INHALANT RESPIRATORY (INHALATION) at 22:20

## 2024-12-19 RX ADMIN — ONDANSETRON 4 MG: 2 INJECTION INTRAMUSCULAR; INTRAVENOUS at 17:59

## 2024-12-19 RX ADMIN — HYDROMORPHONE HYDROCHLORIDE 0.4 MG: 1 INJECTION, SOLUTION INTRAMUSCULAR; INTRAVENOUS; SUBCUTANEOUS at 18:49

## 2024-12-19 RX ADMIN — Medication 220 MG: at 06:08

## 2024-12-19 RX ADMIN — CEFAZOLIN 3 G: 1 INJECTION, POWDER, FOR SOLUTION INTRAMUSCULAR; INTRAVENOUS at 17:12

## 2024-12-19 RX ADMIN — SPIRONOLACTONE 25 MG: 50 TABLET ORAL at 06:07

## 2024-12-19 RX ADMIN — POTASSIUM CHLORIDE 40 MEQ: 1500 TABLET, EXTENDED RELEASE ORAL at 08:36

## 2024-12-19 RX ADMIN — THEOPHYLLINE 300 MG: 300 TABLET, EXTENDED RELEASE ORAL at 23:57

## 2024-12-19 RX ADMIN — FENTANYL CITRATE 50 MCG: 50 INJECTION, SOLUTION INTRAMUSCULAR; INTRAVENOUS at 18:23

## 2024-12-19 ASSESSMENT — PAIN DESCRIPTION - PAIN TYPE
TYPE: ACUTE PAIN

## 2024-12-19 ASSESSMENT — LIFESTYLE VARIABLES: SUBSTANCE_ABUSE: 0

## 2024-12-19 ASSESSMENT — ENCOUNTER SYMPTOMS
ABDOMINAL PAIN: 0
PALPITATIONS: 0
NAUSEA: 0
SHORTNESS OF BREATH: 0
CHILLS: 0
EYE PAIN: 0
FOCAL WEAKNESS: 0
VOMITING: 0
INSOMNIA: 0
SENSORY CHANGE: 0
BACK PAIN: 0
BLURRED VISION: 0
FEVER: 0
DIZZINESS: 0
HEADACHES: 0
MYALGIAS: 1
COUGH: 0

## 2024-12-19 NOTE — CARE PLAN
The patient is Stable - Low risk of patient condition declining or worsening    Shift Goals  Clinical Goals: assess and monitor wound site and output, provide prn meds for pain, collab w/ CT for repeat procedure  Patient Goals: pain control, rest  Family Goals: CLOTILDE    Progress made toward(s) clinical / shift goals:    Problem: Knowledge Deficit - Standard  Goal: Patient and family/care givers will demonstrate understanding of plan of care, disease process/condition, diagnostic tests and medications  Outcome: Progressing     Problem: Fall Risk  Goal: Patient will remain free from falls  Outcome: Progressing       Patient is not progressing towards the following goals:

## 2024-12-19 NOTE — PROGRESS NOTES
4 Eyes Skin Assessment Completed by JORDYN Christine and JORDYN Hodge.     Head WDL  Ears pink and Blanching  Nose Redness, Blanching, and Non-Blanching  Mouth WDL  Neck WDL  Breast/Chest Redness  Shoulder Blades redness and Blanching  Spine redness and Blanching  (R) Arm/Elbow/Hand Redness, Abrasion, Discoloration, and Edema  (L) Arm/Elbow/Hand Redness, Blanching, and Discoloration  Abdomen Redness, Blanching, and Abrasion to RLQ  Groin Redness moisture pannus, interdry cloth placed  Scrotum/Coccyx/Buttocks Redness, Blanching, Non-Blanching, and Discoloration swelling. Scratch. Slow to dakota  (R) Leg Abrasion, Edema, and Incision discoloration, hematoma                 (L) Leg Redness, Blanching, Bruising, and Edema discoloration  (R) Heel/Foot/Toe Redness, Blanching, Boggy, and Swelling  (L) Heel/Foot/Toe Redness, Blanching, Boggy, and Swelling              Devices In Places Blood Pressure Cuff and Pulse Ox        Interventions In Place NC W/Ear Foams and Pillows, low airloss, Q2 turns     Possible Skin Injury Yes     Pictures Uploaded Into Epic Yes  Wound Consult Placed Yes  RN Wound Prevention Protocol Ordered No

## 2024-12-19 NOTE — DISCHARGE PLANNING
"Case Management Discharge Planning    Admission Date: 12/17/2024  GMLOS: 3.1  ALOS: 2    6-Clicks ADL Score: 18  6-Clicks Mobility Score: 17  PT and/or OT Eval ordered: No  Post-acute Referrals Ordered: Yes  Post-acute Choice Obtained: Yes  Has referral(s) been sent to post-acute provider:  Yes    Anticipated Discharge Dispo: Discharge Disposition: D/T to SNF with Medicare cert in anticipation of skilled care (03)  Discharge Address: [address] [place], [place] [address] [place] [address]  Discharge Contact Phone Number: [phone]    DME Needed: tbd    Action(s) Taken: Chart review completed.     Pt admitted to [facility] with right lower extremity wound.     Per Dr. Rodriguez's progress note on 12/19:    \"Plastic surgery consulted, recommended some degree of debridement for necrotic skin, and they are happy to assist with reconstructive surgery if appropriate.  Wound care consulted, appreciate any local debridement and wound care they can manage.  Holding patients home xarelto for hx a fib for now.  Will continue to monitor leg wound and care needs.\"    0851: RN JAMEY called [facility]. RN confirmed that patient was a resident there, and that they anticipate him coming back on discharge. RN JAMEY was connected to  phone, no anwser and no ability to leave voice message.    1040: RN JAMEY spoke with  at [facility]. They are leaving message for admissions coordinator to call this RN CM back.     Escalations Completed: None    Medically Clear: No    Next Steps: RNCM to follow up with IDT regarding DCP needs/barriers.     Barriers to Discharge: Medical clearance    Is the patient up for discharge tomorrow: No        "

## 2024-12-19 NOTE — WOUND TEAM
Wound consult for right lower leg cancelled. Patient being taken to OR today for surgical debridement and possible NPWT placement.

## 2024-12-19 NOTE — PROGRESS NOTES
Virtual Nurse rounding complete. Patient sleepy resting in bed.       Round Needs: No needs at this time.

## 2024-12-19 NOTE — PROGRESS NOTES
Bedside report received from night shift nurse. Assumed care at 0645.   Pt A&Ox4  NPO diet, patient did not eat breakfast tray, denies n/v.   + bowel sounds, + flatus, LBM 12/18 per pt. IV access through 22G CAROLEE and 20G LFA.  Saturating >90% on 2L NC at baseline.  RLE hematoma, dressing CDI.   Skin per skin note.  Pt ambulates x1 FWW.  Pain is controlled through medication orders. Updated on plan of care. Safety education provided. Bed locked in low. Call light within reach. Rounding in place.

## 2024-12-19 NOTE — PROGRESS NOTES
"Bedside report received.  Assessment complete.  A&O x 4. Patient calls appropriately.  Patient ambulates with x1 assist w/ FWW. Bed alarm on.   Patient has 6/10 pain. Patient medicated per MAR.  Denies N&V. Tolerating cardiac diet.  Skin per flowsheets.  Patient has RLE hematoma w/ DIP - see media.   + void, + flatus, + BM.  Patient denies SOB, >90% O2 sat on 2.5L via nasal cannula .    Review plan with of care with patient. Call light and personal belongings within reach. Hourly rounding in place. All needs met at this time.    BP 94/71   Pulse 79   Temp 36.4 °C (97.5 °F) (Temporal)   Resp 18   Ht 1.82 m (5' 11.65\")   Wt (!) 161 kg (355 lb 6.1 oz)   SpO2 99%   BMI 48.66 kg/m²     "

## 2024-12-19 NOTE — PROGRESS NOTES
4 Eyes Skin Assessment Completed by JORDYN Christine and JORDYN Hodge.     Head WDL  Ears Blanching  Nose Redness, Blanching, and Non-Blanching  Mouth WDL  Neck WDL  Breast/Chest Redness  Shoulder Blades redness and Blanching  Spine redness and Blanching  (R) Arm/Elbow/Hand Redness, Abrasion, Discoloration, and Edema  (L) Arm/Elbow/Hand Redness, Blanching, and Discoloration  Abdomen Redness, Blanching, and Abrasion to RLQ  Groin Redness moisture pannus, interdry cloth placed  Scrotum/Coccyx/Buttocks Redness, Blanching, Non-Blanching, and Discoloration swelling. Scratch. Slow to dakota  (R) Leg Abrasion, Edema, and Incision discoloration, hematoma              (L) Leg Redness, Blanching, Bruising, and Edema discoloration  (R) Heel/Foot/Toe Redness, Blanching, Boggy, and Swelling  (L) Heel/Foot/Toe Redness, Blanching, Boggy, and Swelling              Devices In Places Blood Pressure Cuff and Pulse Ox        Interventions In Place NC W/Ear Foams and Pillows, low airloss, Q2 turns     Possible Skin Injury Yes     Pictures Uploaded Into Epic Yes  Wound Consult Placed Yes  RN Wound Prevention Protocol Ordered No

## 2024-12-19 NOTE — PROGRESS NOTES
IV infiltrated during contrast injection, asked RN to place a warm compress, patient tolerating treatment well.  Informed Nanci COBB via telephone.

## 2024-12-19 NOTE — PROGRESS NOTES
The patient is leaving the unit with transport for PreOp. 20G LFA PIV flushing. Patient oxygen saturation >90% on 2L.

## 2024-12-19 NOTE — PROGRESS NOTES
Patient is back to unit from CT suite via transport.      Call light and personal belongings within reach.  All needs met at this time.

## 2024-12-19 NOTE — DIETARY
NUTRITION SERVICES: BMI - Pt with BMI >40 (=Body mass index is 48.66 kg/m².), morbid obesity. Weight loss counseling not appropriate in acute care setting.     RECOMMEND - If appropriate at DC please refer to outpatient nutrition services for weight management.

## 2024-12-19 NOTE — PROGRESS NOTES
Hospital Medicine Daily Progress Note    Date of Service  12/19/2024    Chief Complaint  Henry Kumari is a 69 y.o. male admitted 12/17/2024 with leg wound.    Hospital Course    Henry Kumari is a 69 y.o. male with atrial fibrillation, COPD, CHF, history of DVT/PE, hypertension, chronic low back pain, morbid obesity, SANTOSH, viral hepatitis C, history of pancreatitis, bipolar 2 disorder, BPH, hyperlipidemia who presented 12/17/2024 with necrotic wound.     Reportedly patient suffered a wound on his right leg when he bumped on a trailer hitch causing a large hematoma.  He presented to OSH ED at the time and was sent home.  Patient was staying at [facility] in [place] since May 2024.  Nursing staff and patient noted that his wound has been not healing, concerns for necrosis, presented to OSH.  He has been experiencing generalized fatigue and malaise, some intermittent right lower extremity pain to his wound.he has been experiencing some nausea.  He denies any fevers or chills headache or vision changes chest pain dyspnea cough abdominal pain vomiting dysuria or diarrhea.     Presented to OSH and general surgery consulted, surgeon stated they did not have the resources they are to properly treat patient and that patient may likely need plastic surgery consultation and transfer to [facility] as recommended.  Patient was subsequently accepted for transfer, arrives here in stable condition, afebrile, pulse 104 respiratory rate 18 blood pressure 137/91 pulse ox 98% on 2 L nasal cannula.    Interval Problem Update  No acute events overnight.  Patient reports improved leg pain with medications.  NPO, ortho consulted, plan for surgical debridement today.  CT scan reviewed showing necrotic skin layer with adjacent fluid collection, likely hematoma/seroma.  Arterial US with no acute findings.  Continue linezolid for now.  K low, repleting.  Appreciate ortho recommendations post op.  Holding home xarelto for hx a  fib, resume when appropriate.      I have discussed this patient's plan of care and discharge plan at IDT rounds today with Case Management, Nursing, Nursing leadership, and other members of the IDT team.    Consultants/Specialty  plastic surgery    Code Status  Full Code    Disposition  Medically Cleared  I have placed the appropriate orders for post-discharge needs.    Review of Systems  Review of Systems   Constitutional:  Negative for chills and fever.   Eyes:  Negative for blurred vision and pain.   Respiratory:  Negative for cough and shortness of breath.    Cardiovascular:  Negative for chest pain, palpitations and leg swelling.   Gastrointestinal:  Negative for abdominal pain, nausea and vomiting.   Genitourinary:  Negative for dysuria and urgency.   Musculoskeletal:  Positive for myalgias. Negative for back pain.   Skin:  Negative for itching and rash.   Neurological:  Negative for dizziness, sensory change, focal weakness and headaches.   Psychiatric/Behavioral:  Negative for substance abuse. The patient does not have insomnia.         Physical Exam  Temp:  [36.2 °C (97.1 °F)-37 °C (98.6 °F)] 36.2 °C (97.1 °F)  Pulse:  [44-95] 95  Resp:  [16-18] 17  BP: ()/(58-85) 120/58  SpO2:  [93 %-99 %] 93 %    Physical Exam  Constitutional:       General: He is not in acute distress.     Appearance: He is not ill-appearing.   HENT:      Head: Normocephalic and atraumatic.      Right Ear: External ear normal.      Left Ear: External ear normal.      Mouth/Throat:      Pharynx: No oropharyngeal exudate or posterior oropharyngeal erythema.   Eyes:      Extraocular Movements: Extraocular movements intact.      Pupils: Pupils are equal, round, and reactive to light.   Cardiovascular:      Rate and Rhythm: Normal rate and regular rhythm.      Pulses: Normal pulses.      Heart sounds: Normal heart sounds.   Pulmonary:      Effort: Pulmonary effort is normal.      Breath sounds: Normal breath sounds.   Abdominal:       General: Bowel sounds are normal. There is no distension.      Palpations: Abdomen is soft.      Tenderness: There is no abdominal tenderness. There is no guarding.   Musculoskeletal:         General: Swelling present. No tenderness.      Cervical back: Normal range of motion and neck supple.      Right lower leg: Edema present.      Left lower leg: No edema.   Skin:     General: Skin is warm and dry.   Neurological:      General: No focal deficit present.      Mental Status: He is oriented to person, place, and time.      Cranial Nerves: No cranial nerve deficit.      Sensory: No sensory deficit.      Motor: No weakness.   Psychiatric:         Mood and Affect: Mood normal.         Behavior: Behavior normal.         Fluids  No intake or output data in the 24 hours ending 12/19/24 1508       Laboratory  Recent Labs     12/17/24  2352 12/19/24  1325   WBC 6.0 4.5*   RBC 3.00* 2.86*   HEMOGLOBIN 8.4* 7.8*   HEMATOCRIT 26.8* 25.6*   MCV 89.3 89.5   MCH 28.0 27.3   MCHC 31.3* 30.5*   RDW 55.9* 57.2*   PLATELETCT 198 172   MPV 8.6* 8.5*     Recent Labs     12/17/24  2352 12/19/24  0211   SODIUM 143 145   POTASSIUM 3.1* 3.1*   CHLORIDE 100 102   CO2 31 30   GLUCOSE 101* 93   BUN 21 16   CREATININE 1.35 0.82   CALCIUM 9.6 8.9     Recent Labs     12/17/24 2352   INR 1.51*               Imaging  CT-EXTREMITY, LOWER WITH RIGHT   Final Result      1.  Large ulcerated subcutaneous wound in the anterior medial aspect of the right lower leg.      2.  Large adjacent subcutaneous abscess, hematoma, or seroma adjacent to the area of soft tissue ulceration.      3.  No evidence of acute or chronic osteomyelitis.      4.  Severe tricompartmental osteoarthritis of the right knee.      US-GLORIA SINGLE LEVEL BILAT   Final Result      IR-US GUIDED PIV    (Results Pending)   IR-US GUIDED PIV    (Results Pending)        Assessment/Plan  * Necrosis (HCC)- (present on admission)  Assessment & Plan  Large necrotic wound on right shin->calf,  supposedly started with bumping a trailor hitch and a hematoma 10 days prior to presentation, seen at OSH at that time.  CT LE shows necrotic skin tissue with adjacent fluid collection, likely hematoma/seroma  Plastic surgery recommend debridement of necrotic skin and can assist with reconstructive surgery when wound is ready  Ortho consulted, plan for surgical debridement in OR today  Continue linezolid for now    Hypokalemia  Assessment & Plan  P.o. replacement ordered    Benzodiazepine dependence, continuous (HCC)- (present on admission)  Assessment & Plan  Continue home Klonopin    Mood disorder (HCC)- (present on admission)  Assessment & Plan  Continue Prozac, Seroquel    Class 3 severe obesity due to excess calories with serious comorbidity and body mass index (BMI) of 45.0 to 49.9 in adult ([facility])- (present on admission)  Assessment & Plan  Consider referral to bariatric surgery on discharge, consider trial of GLP-1 inhibitors as outpatient.    SANTOSH on CPAP- (present on admission)  Assessment & Plan  Nightly cpap ordered    COPD (chronic obstructive pulmonary disease) ([facility])- (present on admission)  Assessment & Plan  Not in acute exacerbation.  Continue home theophylline, Dulera, Spiriva.  DuoNebs as needed         VTE prophylaxis: VTE Selection    I have performed a physical exam and reviewed and updated ROS and Plan today (12/19/2024). In review of yesterday's note (12/18/2024), there are no changes except as documented above.

## 2024-12-19 NOTE — PROGRESS NOTES
Hospital Medicine Daily Progress Note    Date of Service  12/18/2024    Chief Complaint  Henry Kumari is a 69 y.o. male admitted 12/17/2024 with leg wound.    Hospital Course    Henry Kumari is a 69 y.o. male with atrial fibrillation, COPD, CHF, history of DVT/PE, hypertension, chronic low back pain, morbid obesity, SANTOSH, viral hepatitis C, history of pancreatitis, bipolar 2 disorder, BPH, hyperlipidemia who presented 12/17/2024 with necrotic wound.     Reportedly patient suffered a wound on his right leg when he bumped on a trailer hitch causing a large hematoma.  He presented to OSH ED at the time and was sent home.  Patient was staying at [facility] in [place] since May 2024.  Nursing staff and patient noted that his wound has been not healing, concerns for necrosis, presented to OSH.  He has been experiencing generalized fatigue and malaise, some intermittent right lower extremity pain to his wound.he has been experiencing some nausea.  He denies any fevers or chills headache or vision changes chest pain dyspnea cough abdominal pain vomiting dysuria or diarrhea.     Presented to OSH and general surgery consulted, surgeon stated they did not have the resources they are to properly treat patient and that patient may likely need plastic surgery consultation and transfer to [facility] as recommended.  Patient was subsequently accepted for transfer, arrives here in stable condition, afebrile, pulse 104 respiratory rate 18 blood pressure 137/91 pulse ox 98% on 2 L nasal cannula.    Interval Problem Update  No acute events overnight.  Patient reports leg pain by right leg wound. He reports hitting it against car door about 10 days ago and has been worsening since then.  WBC normal, ESR and CRP elevated.  CT leg pending, arterial US pending.  Can continue linezolid for now but wound does not appear overtly infected.  Spoke with orthopedic surgery who recommended plastic surgery consult.  Plastic surgery  consulted, recommended some degree of debridement for necrotic skin, and they are happy to assist with reconstructive surgery if appropriate.  Wound care consulted, appreciate any local debridement and wound care they can manage.  Holding patients home xarelto for hx a fib for now.  Will continue to monitor leg wound and care needs.      I have discussed this patient's plan of care and discharge plan at IDT rounds today with Case Management, Nursing, Nursing leadership, and other members of the IDT team.    Consultants/Specialty  plastic surgery    Code Status  Full Code    Disposition  Medically Cleared  I have placed the appropriate orders for post-discharge needs.    Review of Systems  ROS     Physical Exam  Temp:  [36.3 °C (97.3 °F)-36.6 °C (97.9 °F)] 36.4 °C (97.5 °F)  Pulse:  [] 79  Resp:  [17-18] 18  BP: ()/(71-99) 94/71  SpO2:  [91 %-99 %] 99 %    Physical Exam    Fluids    Intake/Output Summary (Last 24 hours) at 12/18/2024 1727  Last data filed at 12/18/2024 1058  Gross per 24 hour   Intake --   Output 325 ml   Net -325 ml        Laboratory  Recent Labs     12/17/24  2352   WBC 6.0   RBC 3.00*   HEMOGLOBIN 8.4*   HEMATOCRIT 26.8*   MCV 89.3   MCH 28.0   MCHC 31.3*   RDW 55.9*   PLATELETCT 198   MPV 8.6*     Recent Labs     12/17/24  2352   SODIUM 143   POTASSIUM 3.1*   CHLORIDE 100   CO2 31   GLUCOSE 101*   BUN 21   CREATININE 1.35   CALCIUM 9.6     Recent Labs     12/17/24  2352   INR 1.51*               Imaging  US-GLORIA SINGLE LEVEL BILAT   Final Result      CT-EXTREMITY, LOWER WITH RIGHT    (Results Pending)   IR-US GUIDED PIV    (Results Pending)        Assessment/Plan  * Necrosis (HCC)- (present on admission)  Assessment & Plan  Large necrotic wound on right shin->calf, supposedly started with bumping a trailor hitch and a hematoma 10 days prior to presentation, seen at OSH at that time.  CT ordered.  Linezolid.   Wound care consulted  Ortho recommend plastic surgery consult  Plastic  surgery recommend debridement of necrotic skin and can assist with reconstructive surgery when wound is ready  GLORIA    Hypokalemia  Assessment & Plan  P.o. replacement ordered    Benzodiazepine dependence, continuous (HCC)- (present on admission)  Assessment & Plan  Continue home Klonopin    Mood disorder ([facility])- (present on admission)  Assessment & Plan  Continue Prozac, Seroquel    Class 3 severe obesity due to excess calories with serious comorbidity and body mass index (BMI) of 45.0 to 49.9 in adult ([facility])- (present on admission)  Assessment & Plan  Consider referral to bariatric surgery on discharge, consider trial of GLP-1 inhibitors as outpatient.    SANTOSH on CPAP- (present on admission)  Assessment & Plan  Nightly cpap ordered    COPD (chronic obstructive pulmonary disease) ([facility])- (present on admission)  Assessment & Plan  Not in acute exacerbation.  Continue home theophylline, Dulera, Spiriva.  DuoNebs as needed         VTE prophylaxis: VTE Selection    I have performed a physical exam and reviewed and updated ROS and Plan today (12/18/2024). In review of yesterday's note (12/17/2024), there are no changes except as documented above.

## 2024-12-19 NOTE — ANESTHESIA PREPROCEDURE EVALUATION
Case: [id number] Date/Time: 12/19/24 1453    Procedures:       IRRIGATION AND DEBRIDEMENT, WOUND, GENERAL, LEG (Right)      APPLICATION OR REPLACEMENT, WOUND VAC    Anesthesia type: General    Location: [facility] OR  / SURGERY [facility]    Surgeons: Milton Swan M.D.          BMI 48  SANTOSH/CPAP, CHF. COPD  on 2 liters o2,   DVT/PE, AFIB  Relevant Problems   ANESTHESIA   (positive) SANTOSH on CPAP      PULMONARY   (positive) COPD (chronic obstructive pulmonary disease) (HCC)         (positive) Hepatitis C infection       Physical Exam    Airway   Mallampati: II  TM distance: >3 FB  Neck ROM: full       Cardiovascular - normal exam  Rhythm: regular  Rate: normal     Dental - normal exam           Pulmonary - normal exam     Abdominal    Neurological - normal exam                   Anesthesia Plan    ASA 3   ASA physical status 3 criteria: COPD - poorly controlled    Plan - general       Airway plan will be ETT          Induction: intravenous    Postoperative Plan: Postoperative administration of opioids is intended.    Pertinent diagnostic labs and testing reviewed    Informed Consent:    Anesthetic plan and risks discussed with patient.    Use of blood products discussed with: patient whom consented to blood products.

## 2024-12-20 ENCOUNTER — APPOINTMENT (OUTPATIENT)
Dept: RADIOLOGY | Facility: MEDICAL CENTER | Age: 69
End: 2024-12-20
Attending: STUDENT IN AN ORGANIZED HEALTH CARE EDUCATION/TRAINING PROGRAM
Payer: MEDICARE

## 2024-12-20 LAB
ANION GAP SERPL CALC-SCNC: 16 MMOL/L (ref 7–16)
BUN SERPL-MCNC: 10 MG/DL (ref 8–22)
CALCIUM SERPL-MCNC: 8.7 MG/DL (ref 8.5–10.5)
CHLORIDE SERPL-SCNC: 103 MMOL/L (ref 96–112)
CO2 SERPL-SCNC: 24 MMOL/L (ref 20–33)
CREAT SERPL-MCNC: 0.74 MG/DL (ref 0.5–1.4)
ERYTHROCYTE [DISTWIDTH] IN BLOOD BY AUTOMATED COUNT: 59.2 FL (ref 35.9–50)
GFR SERPLBLD CREATININE-BSD FMLA CKD-EPI: 98 ML/MIN/1.73 M 2
GLUCOSE SERPL-MCNC: 108 MG/DL (ref 65–99)
GRAM STN SPEC: NORMAL
HCT VFR BLD AUTO: 26.1 % (ref 42–52)
HGB BLD-MCNC: 7.8 G/DL (ref 14–18)
MCH RBC QN AUTO: 27.9 PG (ref 27–33)
MCHC RBC AUTO-ENTMCNC: 29.9 G/DL (ref 32.3–36.5)
MCV RBC AUTO: 93.2 FL (ref 81.4–97.8)
PLATELET # BLD AUTO: 170 K/UL (ref 164–446)
PMV BLD AUTO: 8.6 FL (ref 9–12.9)
POTASSIUM SERPL-SCNC: 3.7 MMOL/L (ref 3.6–5.5)
RBC # BLD AUTO: 2.8 M/UL (ref 4.7–6.1)
SIGNIFICANT IND 70042: NORMAL
SITE SITE: NORMAL
SODIUM SERPL-SCNC: 143 MMOL/L (ref 135–145)
SOURCE SOURCE: NORMAL
WBC # BLD AUTO: 5.1 K/UL (ref 4.8–10.8)

## 2024-12-20 PROCEDURE — 97166 OT EVAL MOD COMPLEX 45 MIN: CPT

## 2024-12-20 PROCEDURE — 94669 MECHANICAL CHEST WALL OSCILL: CPT

## 2024-12-20 PROCEDURE — 700111 HCHG RX REV CODE 636 W/ 250 OVERRIDE (IP): Performed by: STUDENT IN AN ORGANIZED HEALTH CARE EDUCATION/TRAINING PROGRAM

## 2024-12-20 PROCEDURE — 770001 HCHG ROOM/CARE - MED/SURG/GYN PRIV*

## 2024-12-20 PROCEDURE — 80048 BASIC METABOLIC PNL TOTAL CA: CPT

## 2024-12-20 PROCEDURE — 700102 HCHG RX REV CODE 250 W/ 637 OVERRIDE(OP): Performed by: STUDENT IN AN ORGANIZED HEALTH CARE EDUCATION/TRAINING PROGRAM

## 2024-12-20 PROCEDURE — A9270 NON-COVERED ITEM OR SERVICE: HCPCS | Performed by: STUDENT IN AN ORGANIZED HEALTH CARE EDUCATION/TRAINING PROGRAM

## 2024-12-20 PROCEDURE — 700101 HCHG RX REV CODE 250: Performed by: STUDENT IN AN ORGANIZED HEALTH CARE EDUCATION/TRAINING PROGRAM

## 2024-12-20 PROCEDURE — 94640 AIRWAY INHALATION TREATMENT: CPT

## 2024-12-20 PROCEDURE — 94660 CPAP INITIATION&MGMT: CPT

## 2024-12-20 PROCEDURE — 99232 SBSQ HOSP IP/OBS MODERATE 35: CPT | Performed by: STUDENT IN AN ORGANIZED HEALTH CARE EDUCATION/TRAINING PROGRAM

## 2024-12-20 PROCEDURE — 85027 COMPLETE CBC AUTOMATED: CPT

## 2024-12-20 PROCEDURE — 36415 COLL VENOUS BLD VENIPUNCTURE: CPT

## 2024-12-20 PROCEDURE — 97535 SELF CARE MNGMENT TRAINING: CPT

## 2024-12-20 PROCEDURE — 97163 PT EVAL HIGH COMPLEX 45 MIN: CPT

## 2024-12-20 PROCEDURE — 71045 X-RAY EXAM CHEST 1 VIEW: CPT

## 2024-12-20 RX ORDER — METOPROLOL SUCCINATE 100 MG/1
100 TABLET, EXTENDED RELEASE ORAL DAILY
Status: DISCONTINUED | OUTPATIENT
Start: 2024-12-21 | End: 2024-12-27 | Stop reason: HOSPADM

## 2024-12-20 RX ORDER — IPRATROPIUM BROMIDE AND ALBUTEROL SULFATE 2.5; .5 MG/3ML; MG/3ML
3 SOLUTION RESPIRATORY (INHALATION)
Status: DISCONTINUED | OUTPATIENT
Start: 2024-12-20 | End: 2024-12-21

## 2024-12-20 RX ORDER — FUROSEMIDE 10 MG/ML
40 INJECTION INTRAMUSCULAR; INTRAVENOUS
Status: DISCONTINUED | OUTPATIENT
Start: 2024-12-20 | End: 2024-12-21

## 2024-12-20 RX ORDER — METOPROLOL SUCCINATE 50 MG/1
50 TABLET, EXTENDED RELEASE ORAL ONCE
Status: COMPLETED | OUTPATIENT
Start: 2024-12-20 | End: 2024-12-20

## 2024-12-20 RX ORDER — CEPHALEXIN 500 MG/1
500 CAPSULE ORAL EVERY 6 HOURS
Status: DISCONTINUED | OUTPATIENT
Start: 2024-12-20 | End: 2024-12-21

## 2024-12-20 RX ORDER — ENOXAPARIN SODIUM 100 MG/ML
40 INJECTION SUBCUTANEOUS 2 TIMES DAILY
Status: DISCONTINUED | OUTPATIENT
Start: 2024-12-20 | End: 2024-12-21

## 2024-12-20 RX ORDER — GUAIFENESIN 600 MG/1
600 TABLET, EXTENDED RELEASE ORAL EVERY 12 HOURS
Status: DISCONTINUED | OUTPATIENT
Start: 2024-12-20 | End: 2024-12-27 | Stop reason: HOSPADM

## 2024-12-20 RX ORDER — IPRATROPIUM BROMIDE AND ALBUTEROL SULFATE 2.5; .5 MG/3ML; MG/3ML
3 SOLUTION RESPIRATORY (INHALATION)
Status: DISCONTINUED | OUTPATIENT
Start: 2024-12-20 | End: 2024-12-20

## 2024-12-20 RX ADMIN — CLONAZEPAM 1 MG: 1 TABLET ORAL at 05:28

## 2024-12-20 RX ADMIN — SPIRONOLACTONE 25 MG: 50 TABLET ORAL at 05:29

## 2024-12-20 RX ADMIN — IPRATROPIUM BROMIDE AND ALBUTEROL SULFATE 3 ML: .5; 2.5 SOLUTION RESPIRATORY (INHALATION) at 16:06

## 2024-12-20 RX ADMIN — Medication 220 MG: at 05:21

## 2024-12-20 RX ADMIN — TIOTROPIUM BROMIDE INHALATION SPRAY 5 MCG: 3.12 SPRAY, METERED RESPIRATORY (INHALATION) at 05:34

## 2024-12-20 RX ADMIN — QUETIAPINE FUMARATE 100 MG: 100 TABLET ORAL at 05:29

## 2024-12-20 RX ADMIN — CEPHALEXIN 500 MG: 500 CAPSULE ORAL at 17:12

## 2024-12-20 RX ADMIN — ENOXAPARIN SODIUM 40 MG: 100 INJECTION SUBCUTANEOUS at 09:27

## 2024-12-20 RX ADMIN — CEPHALEXIN 500 MG: 500 CAPSULE ORAL at 23:50

## 2024-12-20 RX ADMIN — IPRATROPIUM BROMIDE AND ALBUTEROL SULFATE 3 ML: .5; 2.5 SOLUTION RESPIRATORY (INHALATION) at 10:53

## 2024-12-20 RX ADMIN — FUROSEMIDE 40 MG: 10 INJECTION INTRAMUSCULAR; INTRAVENOUS at 13:58

## 2024-12-20 RX ADMIN — CLONAZEPAM 1 MG: 1 TABLET ORAL at 17:12

## 2024-12-20 RX ADMIN — MOMETASONE FUROATE AND FORMOTEROL FUMARATE DIHYDRATE 2 PUFF: 200; 5 AEROSOL RESPIRATORY (INHALATION) at 05:32

## 2024-12-20 RX ADMIN — GUAIFENESIN 600 MG: 600 TABLET, EXTENDED RELEASE ORAL at 11:46

## 2024-12-20 RX ADMIN — IPRATROPIUM BROMIDE AND ALBUTEROL SULFATE 3 ML: .5; 2.5 SOLUTION RESPIRATORY (INHALATION) at 21:21

## 2024-12-20 RX ADMIN — METOPROLOL SUCCINATE 50 MG: 50 TABLET, EXTENDED RELEASE ORAL at 13:39

## 2024-12-20 RX ADMIN — TAMSULOSIN HYDROCHLORIDE 0.4 MG: 0.4 CAPSULE ORAL at 05:28

## 2024-12-20 RX ADMIN — HYDROCODONE BITARTRATE AND ACETAMINOPHEN 1 TABLET: 5; 325 TABLET ORAL at 20:46

## 2024-12-20 RX ADMIN — THEOPHYLLINE 300 MG: 300 TABLET, EXTENDED RELEASE ORAL at 23:49

## 2024-12-20 RX ADMIN — CEPHALEXIN 500 MG: 500 CAPSULE ORAL at 11:47

## 2024-12-20 RX ADMIN — ATORVASTATIN CALCIUM 20 MG: 20 TABLET, FILM COATED ORAL at 20:46

## 2024-12-20 RX ADMIN — FUROSEMIDE 40 MG: 20 TABLET ORAL at 05:23

## 2024-12-20 RX ADMIN — THEOPHYLLINE 300 MG: 300 TABLET, EXTENDED RELEASE ORAL at 11:47

## 2024-12-20 RX ADMIN — OXYCODONE HYDROCHLORIDE AND ACETAMINOPHEN 1000 MG: 500 TABLET ORAL at 05:21

## 2024-12-20 RX ADMIN — GUAIFENESIN 600 MG: 600 TABLET, EXTENDED RELEASE ORAL at 17:13

## 2024-12-20 RX ADMIN — FLUOXETINE HYDROCHLORIDE 40 MG: 20 CAPSULE ORAL at 05:30

## 2024-12-20 RX ADMIN — QUETIAPINE FUMARATE 100 MG: 100 TABLET ORAL at 17:12

## 2024-12-20 RX ADMIN — ENOXAPARIN SODIUM 40 MG: 100 INJECTION SUBCUTANEOUS at 17:13

## 2024-12-20 RX ADMIN — GEMFIBROZIL 600 MG: 600 TABLET ORAL at 11:47

## 2024-12-20 RX ADMIN — LINEZOLID 600 MG: 600 TABLET, FILM COATED ORAL at 05:24

## 2024-12-20 RX ADMIN — SENNOSIDES AND DOCUSATE SODIUM 2 TABLET: 50; 8.6 TABLET ORAL at 17:13

## 2024-12-20 RX ADMIN — METOPROLOL SUCCINATE 50 MG: 50 TABLET, EXTENDED RELEASE ORAL at 05:23

## 2024-12-20 RX ADMIN — GEMFIBROZIL 600 MG: 600 TABLET ORAL at 23:49

## 2024-12-20 RX ADMIN — ALBUTEROL SULFATE 2 PUFF: 90 INHALANT RESPIRATORY (INHALATION) at 09:33

## 2024-12-20 ASSESSMENT — ENCOUNTER SYMPTOMS
EYE PAIN: 0
HEADACHES: 0
VOMITING: 0
BLURRED VISION: 0
INSOMNIA: 0
MYALGIAS: 1
PALPITATIONS: 0
DIZZINESS: 0
CHILLS: 0
COUGH: 0
BACK PAIN: 0
FEVER: 0
SENSORY CHANGE: 0
SHORTNESS OF BREATH: 0
ABDOMINAL PAIN: 0
NAUSEA: 0
FOCAL WEAKNESS: 0

## 2024-12-20 ASSESSMENT — ACTIVITIES OF DAILY LIVING (ADL): TOILETING: INDEPENDENT

## 2024-12-20 ASSESSMENT — COGNITIVE AND FUNCTIONAL STATUS - GENERAL
DAILY ACTIVITIY SCORE: 17
CLIMB 3 TO 5 STEPS WITH RAILING: A LITTLE
DRESSING REGULAR LOWER BODY CLOTHING: A LOT
TOILETING: A LITTLE
SUGGESTED CMS G CODE MODIFIER DAILY ACTIVITY: CK
PERSONAL GROOMING: A LITTLE
HELP NEEDED FOR BATHING: A LOT
WALKING IN HOSPITAL ROOM: A LITTLE
MOVING TO AND FROM BED TO CHAIR: A LITTLE
MOBILITY SCORE: 20
STANDING UP FROM CHAIR USING ARMS: A LITTLE
DRESSING REGULAR UPPER BODY CLOTHING: A LITTLE
SUGGESTED CMS G CODE MODIFIER MOBILITY: CJ

## 2024-12-20 ASSESSMENT — GAIT ASSESSMENTS
DISTANCE (FEET): 40
ASSISTIVE DEVICE: FRONT WHEEL WALKER
DEVIATION: INCREASED BASE OF SUPPORT;BRADYKINETIC
GAIT LEVEL OF ASSIST: SUPERVISED

## 2024-12-20 ASSESSMENT — PAIN DESCRIPTION - PAIN TYPE
TYPE: CHRONIC PAIN
TYPE: ACUTE PAIN
TYPE: ACUTE PAIN

## 2024-12-20 ASSESSMENT — LIFESTYLE VARIABLES: SUBSTANCE_ABUSE: 0

## 2024-12-20 NOTE — OR NURSING
Pt arrives from OR to PACU at 1808. Pt identification verified by team, pt placed on all monitors with alarms audible, report and care of pt received from Anesthesiologist and RN. Assessment complete.

## 2024-12-20 NOTE — CONSULTS
DATE OF SERVICE:  12/19/2024     ORTHOPEDIC CONSULTATION     REQUESTING PHYSICIAN:  Dr. Henry Rodriguez, hospitalist service.     REASON FOR CONSULTATION:  Right leg necrotic wound.     CHIEF COMPLAINT:  Right leg wound and pain.     HISTORY OF PRESENT ILLNESS:  The patient is 69 years old.  He was admitted to   the hospitalist service on 12/17 with a right leg wound.  He developed what   appears to be a hematoma at his right leg and developed some skin necrosis.  I   was consulted to provide treatment recommendations for that particular issue.     PAST MEDICAL HISTORY:  ALLERGIES:  No known drug allergies.     OUTPATIENT MEDICATIONS:  Quetiapine, Tylenol, albuterol, vitamin C, Lipitor,   Klonopin, Prozac, Advair, gemfibrozil, metoprolol SR, nystatin, Xarelto,   Becka-Colace, Aldactone, Flomax, Spiriva, zinc.     PAST MEDICAL DIAGNOSES:  Include arrhythmia, congestive heart failure, COPD,   hepatitis C, high cholesterol, hypertension, morbid obesity, sleep apnea,   glaucoma, blood clotting disorder.     PAST SURGICAL HISTORY:  Laparoscopic cholecystectomy, abdominal surgery, left   humerus intramedullary nailing in 2023, shoulder and arm incision and drainage   in January 2024.     SOCIAL HISTORY:  The patient does smoke half pack of cigarettes a day.  Drinks   about 2 alcoholic beverages a week.  Denies illicit drug use.     PHYSICAL EXAMINATION:  VITAL SIGNS:  Temperature 98.6, heart rate 95, respiratory rate 16, blood   pressure 124/85, pulse oximetry 94% on 2 liters nasal cannula.  GENERAL APPEARANCE:  The patient is morbidly obese. He is alert, pleasant,   cooperative, in no acute distress.  MUSCULOSKELETAL:  Right lower extremity, he has a swelling in place to the   mostly the medial leg.  He is able to dorsi and plantarflex the foot, and flex   and extend the toes. Picture of his right leg wound in Epic media tab suggest   a large necrotic area of skin and soft tissue swelling, likely consistent   with underlying  hematoma.     ASSESSMENT:  A 69-year-old male with a right large area of skin necrosis as a   result of large hematoma in the right leg.  He has multiple underlying medical   comorbidities including morbid obesity and he was admitted to the hospitalist   service.     RECOMMENDATIONS:  1.  I have discussed these findings with the patient, I do recommend formal   excisional debridement of devitalized tissue of the right leg and then   application of wound VAC.  2.  He may ultimately require split-thickness skin grafting when he has   sufficient granulation of his underlying wound.  3.  He expressed understanding and wished to proceed with surgery when   possible.  4.  He should continue to be n.p.o. and we will try to make preparations to   get him to the operating room today for surgical management.        ______________________________  MD ANGELO Boggs/LORI    DD:  12/19/2024 14:14  DT:  12/19/2024 17:17    Job#:  [id number]

## 2024-12-20 NOTE — CARE PLAN
The patient is Stable - Low risk of patient condition declining or worsening    Shift Goals  Clinical Goals: monitor sx site output and manage wound vac, provide prn meds for pain, monitor O2 demand and encourage IS use  Patient Goals: rest  Family Goals: CLOTILDE    Progress made toward(s) clinical / shift goals:    Problem: Knowledge Deficit - Standard  Goal: Patient and family/care givers will demonstrate understanding of plan of care, disease process/condition, diagnostic tests and medications  Outcome: Progressing     Problem: Fall Risk  Goal: Patient will remain free from falls  Outcome: Progressing       Patient is not progressing towards the following goals: pulmonary hygiene - course crackles noted throughout lung fields, notified RT for breathing treatment at 2213, patient refused treatment. RT reassessed at 0019, patient refused, education provided. O2 saturation >90% on 2L nasal cannula, patient denies SOB at this time.          Statement Selected

## 2024-12-20 NOTE — WOUND TEAM
Wound Consult received for Right Medial Leg Wound VAC change per protocol. Wound Consult is complete and Wound Team will start changes Saturday 12/21/24

## 2024-12-20 NOTE — ANESTHESIA PROCEDURE NOTES
Airway    Date/Time: 12/19/2024 5:11 PM    Performed by: Emery Hoffman M.D.  Authorized by: Emery Hoffman M.D.    Location:  OR  Urgency:  Elective  Indications for Airway Management:  Anesthesia      Spontaneous Ventilation: absent    Sedation Level:  Deep  Preoxygenated: Yes    Patient Position:  Sniffing  Final Airway Type:  Endotracheal airway  Final Endotracheal Airway:  ETT  Cuffed: Yes    Technique Used for Successful ETT Placement:  Direct laryngoscopy    Insertion Site:  Oral  Blade Type:  Caitlin  Laryngoscope Blade/Videolaryngoscope Blade Size:  3  ETT Size (mm):  7.5  Measured from:  Teeth  ETT to Teeth (cm):  25  Placement Verified by: auscultation and capnometry    Cormack-Lehane Classification:  Grade IIa - partial view of glottis  Number of Attempts at Approach:  1

## 2024-12-20 NOTE — PROGRESS NOTES
69yoM with right leg hematoma and devitalized soft tissue.  Planning surgical debridement, evacuation of hematoma and wound vac application.  See full dictated consultation for further details.

## 2024-12-20 NOTE — DISCHARGE PLANNING
Case Management Discharge Planning    Admission Date: 12/17/2024  GMLOS: 6.9  ALOS: 3    6-Clicks ADL Score: 18  6-Clicks Mobility Score: 17  PT and/or OT Eval ordered: Yes  Post-acute Referrals Ordered: Yes  Post-acute Choice Obtained: Yes  Has referral(s) been sent to post-acute provider:  Yes      Anticipated Discharge Dispo: Discharge Disposition: D/T to SNF with Medicare cert in anticipation of skilled care (03)  Discharge Address: [facility] [place] [address]  Discharge Contact Phone Number: [phone]    DME Needed: No    Action(s) Taken: Chart review completed. Discussed patient during IDT rounds.    Per Orthopedic Surgery MD note on 12/19:    PROCEDURE PERFORMED:  1.  Excisional debridement of right leg wound including skin and subcutaneous   tissue of wound measuring 20 x 10 cm.  2.  Evacuation of right leg subcutaneous hematoma.  3.  Application of wound VAC, right leg greater than 50 square cm.    PLAN:  1.  The patient will be readmitted postoperatively.  2.  He can weightbear as tolerated on the right lower extremity.  3.  Recommend continuing wound VAC therapy and have consulted the inpatient   wound team for routine wound VAC changes per protocol.  4.  Recommend continuing antibiotics and following up intraoperative cultures.  5.  He may require split-thickness skin grafting at some point when it was   felt sufficient layer of granulation tissue over the medial leg.      Per 12/20 IDT rounds, pt possibly will be clear to dc this weekend if pt cleared by orthopedic surgical team. It's anticipated that patient will dc with a wound vac in place.     1114: RN CM called [facility] and was unable to get ahold of admissions. RN CM was told by [facility]  that admissions coordinator was out of the office today and would not be back until Monday. JORDYN CONCEPCION was provided with an email address for admissions coordinator.     1141: RN CM sent email to admissions coordinator regarding  referral and pt's wound vac.     Escalations Completed: None    Medically Clear: No    Next Steps: Call     Barriers to Discharge: Medical clearance    Is the patient up for discharge tomorrow: No

## 2024-12-20 NOTE — THERAPY
"Occupational Therapy   Initial Evaluation     Patient Name: Henry Kumari  Age:  69 y.o., Sex:  male  Medical Record #: [id number]  Today's Date: 12/20/2024     Precautions  Precautions: Fall Risk  Comments: wound vac RLE    Assessment    Patient is a very pleasant 69 y.o. male with a BMI of 48.66 and PMHx significant for Afib, COPD on 2L O2, CHF, DVT/PE, HTN, chronic low back pain, SANTOSH, viral hepatitis C, pancreatitis, bipolar 2 disorder, BPH, HLD, arthritis and LUE humeral fracture s/p ORIF.  Presented to OSH after sustaining wound on R leg when he bumped into a trailer hitch resulting in large hematoma. Staff at [facility] in [place] noted wound to be non-healing and had concern for necrosis. Pt transferred to [facility] for HLOC; now s/p I&D with wound vac placement on 12/19. Pt greeted and seen for OT eval. Required no more than CGA for functional mobility and min  A- max A for ADLs. HR into the 150s and SpO2 desaturation to 85% with mobility; O2 titrated to 3L and able to recover in <2 min; RN notified. Currently limited by impaired cognition/safety awareness, AROM, strength, balance, activity tolerance, functional mobility and pain which are currently affecting patients ability to complete ADL/IADLs at baseline. Will continue to follow while in house.    Plan    Occupational Therapy Initial Treatment Plan   Treatment Interventions: Self Care / Activities of Daily Living, Adaptive Equipment, Neuro Re-Education / Balance, Therapeutic Exercises, Therapeutic Activity, Family / Caregiver Training  Treatment Frequency: 3 Times per Week  Duration: Until Therapy Goals Met    DC Equipment Recommendations: Unable to determine at this time  Discharge Recommendations: Other - (Return to [facility] in [place] once medically cleared)     Subjective    \"I've got a bad ticker.\"     Objective     12/20/24 1317   Prior Living Situation   Housing / Facility Skilled Nursing Facility   Steps Into Home 0   Bathroom Set " "up Walk In Shower;Grab Bars;Shower Chair   Equipment Owned Front-Wheel Walker;4-Wheel Walker;Single Point Cane;Tub / Shower Seat;Grab Bar(s) In Tub / Shower;Grab Bar(s) By Toilet;Oxygen;Hospital Bed  (mobility chair)   Lives with - Patient's Self Care Capacity Attendant / Paid Care Giver   Comments Pt has been residing at [facility] in [place] since May 2024 and stated preference to return there. States he can walk short distances with no AD, SPC or with a walker. Uses a mobility chair for longer distances. Does not go to the lunch room and reports staff brings food to him.   Prior Level of ADL Function   Self Feeding Independent   Grooming / Hygiene Independent   Bathing Independent   Dressing Independent   Toileting Independent   Comments Per pt report, can compete ADLs independently but will ask someone for help on rare occasions if not feeling well.   Prior Level of IADL Function   Medication Management Requires Assist   Laundry Dependent   Kitchen Mobility Dependent   Finances Independent   Home Management Dependent   Shopping Dependent   Prior Level Of Mobility Independent With Device in Home   History of Falls   History of Falls Yes   Date of Last Fall   (Reports 10-12 falls over the last year d/t \"slipping, legs giving out and sometimes dizziness\")   Precautions   Precautions Fall Risk   Comments wound vac RLE   Vitals   Pulse (!) 108   Pulse Oximetry 96 %   O2 (LPM) 3   O2 Delivery Device Silicone Nasal Cannula   Vitals Comments BP stable throughout. HR into the 150s with mobility. SpO2 desaturation to 85% on 2L; titrated to 3L and able to recover in <2 min. Notable increased WOB with all mobility.   Pain 0 - 10 Group   Location Leg   Therapist Pain Assessment During Activity;Post Activity Pain Same as Prior to Activity;Nurse Notified  (Increased pain with mobility; not quantified.)   Cognition    Cognition / Consciousness X   Speech/ Communication Dysarthric  (intermittently difficult to understand) "   Level of Consciousness Alert   Safety Awareness Impaired   Comments Very pleasant and participatory. Impaired safety awaress; reports does not always wear oxygen despite need.   Active ROM Upper Body   Active ROM Upper Body  X   Dominant Hand Right   Comments RUE WFL; LUE shoulder ROM limited 2/2 pain from L humerus injury sustained on christmas 2023.   Strength Upper Body   Upper Body Strength  X   Gross Strength Generalized Weakness, Equal Bilaterally.    Coordination Upper Body   Coordination WDL   Balance Assessment   Sitting Balance (Static) Fair   Sitting Balance (Dynamic) Fair   Standing Balance (Static) Fair   Standing Balance (Dynamic) Fair -   Weight Shift Sitting Fair   Weight Shift Standing Fair   Comments FWW in standing   Bed Mobility    Supine to Sit Contact Guard Assist   Sit to Supine Standby Assist   Scooting Standby Assist   Comments HOB slightly elevated with no use of rail   ADL Assessment   Eating Supervision   Grooming Standby Assist;Standing  (hand hygiene)   Upper Body Dressing Minimal Assist  (gown change)   Lower Body Dressing Maximal Assist   Toileting Standby Assist   Functional Mobility   Sit to Stand Contact Guard Assist   Bed, Chair, Wheelchair Transfer Standby Assist   Toilet Transfers Standby Assist   Transfer Method Stand Step   Mobility FWW; bed > toilet > sink > bed   Visual Perception   Comments denies vision changes   Edema / Skin Assessment   Comments RLE dressing and wound vac intact pre and post session   Activity Tolerance   Comments Limited by weakness, fatigue, pain and SOB   Patient / Family Goals   Patient / Family Goal #1 Return to SNF   Short Term Goals   Short Term Goal # 1 Pt will be able to complete LB dressing with SPV and AE PRN   Short Term Goal # 2 Pt will be able to complete UB dressing with SPV   Short Term Goal # 3 Pt will be able to complete standing g/h routine with SPV and seated rest breaks PRN   Education Group   Education Provided Role of  Occupational Therapist;Weight Bearing Precautions;Pathology of bedrest   Role of Occupational Therapist Patient Response Patient;Acceptance;Explanation;Verbal Demonstration   Weight Bearing Precautions Patient Response Patient;Acceptance;Explanation;Verbal Demonstration  (Educated on WBAT RLE)   Pathology of Bedrest Patient Response Patient;Acceptance;Explanation;Verbal Demonstration;Reinforcement Needed  (Educated on benefits and importance of frequent OOB activity and up to chair for all meals.)   Occupational Therapy Initial Treatment Plan    Treatment Interventions Self Care / Activities of Daily Living;Adaptive Equipment;Neuro Re-Education / Balance;Therapeutic Exercises;Therapeutic Activity;Family / Caregiver Training   Treatment Frequency 3 Times per Week   Duration Until Therapy Goals Met   Problem List   Problem List Decreased Active Daily Living Skills;Decreased Homemaking Skills;Decreased Upper Extremity Strength Right;Decreased Upper Extremity Strength Left;Decreased Upper Extremity AROM Left;Decreased Functional Mobility;Decreased Activity Tolerance;Safety Awareness Deficits / Cognition;Impaired Postural Control / Balance   Anticipated Discharge Equipment and Recommendations   DC Equipment Recommendations Unable to determine at this time   Discharge Recommendations Other -  (Return to [facility] in [place] once medically cleared)   Interdisciplinary Plan of Care Collaboration   IDT Collaboration with  Nursing;Physical Therapist   Patient Position at End of Therapy In Bed;Bed Alarm On;Call Light within Reach;Tray Table within Reach   Collaboration Comments OT report and recs; RN updated   Session Information   Date / Session Number  12/20, #1 (1/3, 12/26)

## 2024-12-20 NOTE — OP REPORT
DATE OF SERVICE:  12/19/2024     PREOPERATIVE DIAGNOSIS:  Right leg hematoma with devitalized soft tissue.     POSTOPERATIVE DIAGNOSIS:  Right leg hematoma with devitalized soft tissue.     PROCEDURE PERFORMED:  1.  Excisional debridement of right leg wound including skin and subcutaneous   tissue of wound measuring 20 x 10 cm.  2.  Evacuation of right leg subcutaneous hematoma.  3.  Application of wound VAC, right leg greater than 50 square cm.     SURGEON:  Milton Swan MD     ANESTHESIOLOGIST:  Emery Hoffman MD     ANESTHESIA:  General.     ESTIMATED BLOOD LOSS:  200 mL.     SPECIMENS:  Tissue from the hematoma site of the right leg was sent to lab for   aerobic and anaerobic cultures.     DRAINS:  Wound VAC at 125 mmHg, low continuous pressure was applied to the   right leg open wound.     INDICATIONS FOR PROCEDURE:  The patient is 69 years old.  He presented to   [facility] with soft tissue swelling, devitalized skin over an area of large   subcutaneous hematoma.  There was some concern for underlying infection as   well.  I was consulted to provide treatment recommendations and recommended   surgical debridement of the devitalized skin and evacuation of hematoma and   wound VAC application.  He signed informed consent preoperatively and wished   to proceed with surgery as outlined above.     DESCRIPTION OF PROCEDURE:  The patient was met in the preoperative holding   area and his surgical site was signed.  His consent was confirmed to be   accurate.  He was taken back to the operating room and general anesthesia was   induced.  Right lower extremity was prepped and draped in the usual sterile   fashion.  A formal timeout was performed to confirm patient's correct name,   correct surgical site, correct procedure and correct laterality.  I sharply   excised a small rim of skin around the large area of devitalized skin and   necrotic subcutaneous tissue with subcutaneous hematoma and then removed the   evacuated  hematoma underneath the area of devitalized skin down to   subcutaneous tissue, which was then excisionally debrided areas which appeared   devitalized and hemorrhagic in some places down to fascia of the medial leg.    I used Bovie cautery to achieve hemostasis with small traversing subcutaneous   veins.  I then thoroughly lavaged the wound with Pulsavac using normal   saline.  The residual wound measured approximately 20 x 10 cm.  I was able to   good seal at 125 mmHg wound VAC with black foam sponge and a wound VAC drape.    I then placed a well-padded compressive dressing.  He was awoken from   anesthesia, transferred on the r[place] and taken to postanesthesia care unit in   stable condition.     PLAN:  1.  The patient will be readmitted postoperatively.  2.  He can weightbear as tolerated on the right lower extremity.  3.  Recommend continuing wound VAC therapy and have consulted the inpatient   wound team for routine wound VAC changes per protocol.  4.  Recommend continuing antibiotics and following up intraoperative cultures.  5.  He may require split-thickness skin grafting at some point when it was   felt sufficient layer of granulation tissue over the medial leg.        ______________________________  MD ANGELO Boggs/GINO    DD:  12/19/2024 18:08  DT:  12/19/2024 18:54    Job#:  [id number]

## 2024-12-20 NOTE — OR SURGEON
Immediate Post OP Note    PreOp Diagnosis: Right leg hematoma with devitalized skin      PostOp Diagnosis: same      Procedure(s):  IRRIGATION AND DEBRIDEMENT, WOUND, GENERAL, LEG - Wound Class: Dirty or Infected  APPLICATION OR REPLACEMENT, WOUND VAC - Wound Class: Dirty or Infected    Surgeon(s):  Milton Swan M.D.    Anesthesiologist/Type of Anesthesia:  Anesthesiologist: Emery Hoffman M.D./General    Surgical Staff:  Circulator: Tony Godoy R.N.  Scrub Person: Martina Richey    Specimens removed if any:  ID Type Source Tests Collected by Time Destination   1 : RIGHT LEG WOUND Tissue Leg AEROBIC/ANAEROBIC CULTURE (SURGERY) Milton Swan M.D. 12/19/2024  5:40 PM        Estimated Blood Loss: 200cc    Findings: see dictation    Complications: none known    PLAN:  --readmit postop  --WBAT RLE  --continue VAC therapy, wound care consult for routine vac changes per protocol  --continue abx, fu intra-op cultures        12/19/2024 6:02 PM Milton Swan M.D.

## 2024-12-20 NOTE — PROGRESS NOTES
"Bedside report received.  Assessment complete.  A&O x 4. Patient calls appropriately.  Patient ambulates with x1 assist w/ FWW. Bed alarm on.   Patient has 7/10 pain. Declines interventions at this time.   Denies N&V. Tolerating regular diet.  Skin per flowsheets.   Patient has RLE wound vac in place per order, dressing in place, CDI.   + void, + flatus, BM 12/19 per report.  Patient denies SOB, >90% O2 sat on 2L nasal cannula.  LLE SCD's on .    Review plan with of care with patient. Call light and personal belongings within reach. Hourly rounding in place. All needs met at this time.    /81   Pulse 94   Temp 36.2 °C (97.2 °F) (Temporal)   Resp 18   Ht 1.82 m (5' 11.65\")   Wt (!) 161 kg (355 lb 6.1 oz)   SpO2 98%   BMI 48.66 kg/m²     "

## 2024-12-20 NOTE — PROGRESS NOTES
4 Eyes Skin Assessment Completed by JORDYN Green and JORDYN Payton.    Head WDL  Ears - pink and blanching  Nose - bridge of nose fragile skin, pink and scabbed  Mouth WDL  Neck WDL  Breast/Chest Redness  Shoulder Blades Redness and Blanching  Spine WDL  (R) Arm/Elbow/Hand Redness, Abrasion, Discoloration, and Edema  (L) Arm/Elbow/Hand Redness, Bruising, Discoloration, and Edema  Abdomen - abrasion to RLQ, scattered bruising  Groin - moisture and pink under pannus - interdry placed   Scrotum/Coccyx/Buttocks Redness, Blanching, and Discoloration  (R) Leg - LE hematoma -wound vac placed w/ DIP  (L) Leg Redness, Bruising, and Edema  (R) Heel/Foot/Toe Redness, Blanching, and Edema  (L) Heel/Foot/Toe Redness, Blanching, and Edema          Devices In Places Blood Pressure Cuff, Pulse Ox, SCD's, Condom Cath, and Nasal Cannula      Interventions In Place NC W/Ear Foams, InterDry, Pillows, Q2 Turns, Low Air Loss Mattress, Barrier Cream, and Heels Loaded W/Pillows    Possible Skin Injury Yes    Pictures Uploaded Into Epic Yes, previously  Wound Consult Placed Yes  RN Wound Prevention Protocol Ordered No

## 2024-12-20 NOTE — PROGRESS NOTES
Bedside report received from night shift nurse. Assumed care at 0645.   Pt A&Ox4  Tolerating a regular diet, denies n/v.   + bowel sounds, + flatus, LBM 12/18 per pt. IV access through 22G CAROLEE and 20G LFA.  Saturating >90% on 2L NC at baseline.   The patient becomes SOB and tachycardic when ambulating to the bathroom. MD notified.  RLE WV in place, dressing CDI.   Pt ambulates x1 FWW.  Pain is controlled through medication orders. Updated on plan of care. Safety education provided. Bed locked in low. Call light within reach. Rounding in place.

## 2024-12-20 NOTE — PROGRESS NOTES
[facility] Medicine Daily Progress Note    Date of Service  12/20/2024    Chief Complaint  Henry Kumari is a 69 y.o. male admitted 12/17/2024 with leg wound.    Hospital Course    Henry Kumari is a 69 y.o. male with atrial fibrillation, COPD, CHF, history of DVT/PE, hypertension, chronic low back pain, morbid obesity, SANTOSH, viral hepatitis C, history of pancreatitis, bipolar 2 disorder, BPH, hyperlipidemia who presented 12/17/2024 with necrotic wound.     Reportedly patient suffered a wound on his right leg when he bumped on a trailer hitch causing a large hematoma.  He presented to OSH ED at the time and was sent home.  Patient was staying at [facility] in [place] since May 2024.  Nursing staff and patient noted that his wound has been not healing, concerns for necrosis, presented to OSH.  He has been experiencing generalized fatigue and malaise, some intermittent right lower extremity pain to his wound.he has been experiencing some nausea.  He denies any fevers or chills headache or vision changes chest pain dyspnea cough abdominal pain vomiting dysuria or diarrhea.     Presented to OSH and general surgery consulted, surgeon stated they did not have the resources they are to properly treat patient and that patient may likely need plastic surgery consultation and transfer to [facility] as recommended.  Patient was subsequently accepted for transfer, arrives here in stable condition, afebrile, pulse 104 respiratory rate 18 blood pressure 137/91 pulse ox 98% on 2 L nasal cannula.    Interval Problem Update  No acute events overnight.  Patient feels well, denies pain by leg wound site.  S/p surgical debridement of leg wound/hematoma yesterday.  Wound vac on today, plan to change wound vac tomorrow.  Appreciate wound team and surgical team recommendations.  Anticipate he will need wound vac on discharge.  Patient with some wheezing. Duonebs q6h for now. Mucinex ordered.  On keflex for leg wound  perioperative coverage.  Holding home xarelto for hx a fib for now, can likely resume tomorrow if no plans for further surgery. Continue lovenox prophylaxis dosage.  Ultimately plan for discharge back to SNF in [place] when medically cleared.      I have discussed this patient's plan of care and discharge plan at IDT rounds today with Case Management, Nursing, Nursing leadership, and other members of the IDT team.    Consultants/Specialty  plastic surgery    Code Status  Full Code    Disposition  The patient is not medically cleared for discharge to home or a post-acute facility.  Anticipate discharge to: skilled nursing facility    I have placed the appropriate orders for post-discharge needs.    Review of Systems  Review of Systems   Constitutional:  Negative for chills and fever.   Eyes:  Negative for blurred vision and pain.   Respiratory:  Negative for cough and shortness of breath.    Cardiovascular:  Negative for chest pain, palpitations and leg swelling.   Gastrointestinal:  Negative for abdominal pain, nausea and vomiting.   Genitourinary:  Negative for dysuria and urgency.   Musculoskeletal:  Positive for myalgias. Negative for back pain.   Skin:  Negative for itching and rash.   Neurological:  Negative for dizziness, sensory change, focal weakness and headaches.   Psychiatric/Behavioral:  Negative for substance abuse. The patient does not have insomnia.         Physical Exam  Temp:  [36 °C (96.8 °F)-37.1 °C (98.8 °F)] 37.1 °C (98.8 °F)  Pulse:  [] 111  Resp:  [15-20] 18  BP: (102-153)/(45-85) 118/70  SpO2:  [93 %-99 %] 97 %    Physical Exam  Constitutional:       General: He is not in acute distress.     Appearance: He is not ill-appearing.   HENT:      Head: Normocephalic and atraumatic.      Right Ear: External ear normal.      Left Ear: External ear normal.      Mouth/Throat:      Pharynx: No oropharyngeal exudate or posterior oropharyngeal erythema.   Eyes:      Extraocular Movements: Extraocular  movements intact.      Pupils: Pupils are equal, round, and reactive to light.   Cardiovascular:      Rate and Rhythm: Normal rate and regular rhythm.      Pulses: Normal pulses.      Heart sounds: Normal heart sounds.   Pulmonary:      Effort: Pulmonary effort is normal.      Breath sounds: Normal breath sounds.   Abdominal:      General: Bowel sounds are normal. There is no distension.      Palpations: Abdomen is soft.      Tenderness: There is no abdominal tenderness. There is no guarding.   Musculoskeletal:         General: Swelling present. No tenderness.      Cervical back: Normal range of motion and neck supple.      Right lower leg: Edema present.      Left lower leg: No edema.   Skin:     General: Skin is warm and dry.   Neurological:      General: No focal deficit present.      Mental Status: He is oriented to person, place, and time.      Cranial Nerves: No cranial nerve deficit.      Sensory: No sensory deficit.      Motor: No weakness.   Psychiatric:         Mood and Affect: Mood normal.         Behavior: Behavior normal.         Fluids    Intake/Output Summary (Last 24 hours) at 12/20/2024 1258  Last data filed at 12/20/2024 1137  Gross per 24 hour   Intake 200 ml   Output 750 ml   Net -550 ml          Laboratory  Recent Labs     12/17/24  2352 12/19/24  1325 12/20/24  0028   WBC 6.0 4.5* 5.1   RBC 3.00* 2.86* 2.80*   HEMOGLOBIN 8.4* 7.8* 7.8*   HEMATOCRIT 26.8* 25.6* 26.1*   MCV 89.3 89.5 93.2   MCH 28.0 27.3 27.9   MCHC 31.3* 30.5* 29.9*   RDW 55.9* 57.2* 59.2*   PLATELETCT 198 172 170   MPV 8.6* 8.5* 8.6*     Recent Labs     12/17/24  2352 12/19/24  0211 12/20/24  0028   SODIUM 143 145 143   POTASSIUM 3.1* 3.1* 3.7   CHLORIDE 100 102 103   CO2 31 30 24   GLUCOSE 101* 93 108*   BUN 21 16 10   CREATININE 1.35 0.82 0.74   CALCIUM 9.6 8.9 8.7     Recent Labs     12/17/24 2352   INR 1.51*               Imaging  CT-EXTREMITY, LOWER WITH RIGHT   Final Result      1.  Large ulcerated subcutaneous wound in  the anterior medial aspect of the right lower leg.      2.  Large adjacent subcutaneous abscess, hematoma, or seroma adjacent to the area of soft tissue ulceration.      3.  No evidence of acute or chronic osteomyelitis.      4.  Severe tricompartmental osteoarthritis of the right knee.      US-GLORIA SINGLE LEVEL BILAT   Final Result      IR-US GUIDED PIV    (Results Pending)   IR-US GUIDED PIV    (Results Pending)        Assessment/Plan  * Necrosis (HCC)- (present on admission)  Assessment & Plan  Large necrotic wound on right shin->calf, supposedly started with bumping a trailor hitch and a hematoma 10 days prior to presentation, seen at OSH at that time.  CT LE shows necrotic skin tissue with adjacent fluid collection, likely hematoma/seroma  Plastic surgery recommend debridement of necrotic skin and can assist with reconstructive surgery when wound is ready  S/p surgical debridement by ortho 12/19, wound vac on  Wound vac change tomorrow  On keflex    Hypokalemia  Assessment & Plan  P.o. replacement ordered    Benzodiazepine dependence, continuous (HCC)- (present on admission)  Assessment & Plan  Continue home Klonopin    Mood disorder (HCC)- (present on admission)  Assessment & Plan  Continue Prozac, Seroquel    Class 3 severe obesity due to excess calories with serious comorbidity and body mass index (BMI) of 45.0 to 49.9 in adult (HCC)- (present on admission)  Assessment & Plan  Consider referral to bariatric surgery on discharge, consider trial of GLP-1 inhibitors as outpatient.    SANTOSH on CPAP- (present on admission)  Assessment & Plan  Nightly cpap ordered    COPD (chronic obstructive pulmonary disease) (HCC)- (present on admission)  Assessment & Plan  Not in acute exacerbation.  Continue home theophylline, Dulera, Spiriva.  DuoNebs q6h         VTE prophylaxis: VTE Selection    I have performed a physical exam and reviewed and updated ROS and Plan today (12/20/2024). In review of yesterday's note  (12/19/2024), there are no changes except as documented above.

## 2024-12-20 NOTE — PROGRESS NOTES
Report received from PACU nurse, Yonas RN at 1936.     Patient is back from PACU via transport at this time. Patient O2 sat at >90% on 3L via nasal cannula.  Patient has RLE wound vac in place per order.   Review plan with of care with patient. Call light and personal belongings within reach. Hourly rounding in place. All needs met at this time.

## 2024-12-20 NOTE — ANESTHESIA TIME REPORT
Anesthesia Start and Stop Event Times       Date Time Event    12/19/2024 1636 Ready for Procedure     1701 Anesthesia Start     1810 Anesthesia Stop          Responsible Staff  12/19/24      Name Role Begin End    Emery Hoffman M.D. Anesth 1701 1810          Overtime Reason:  overtime    Comments:

## 2024-12-20 NOTE — ANESTHESIA POSTPROCEDURE EVALUATION
Patient: Henry Kumari    Procedure Summary       Date: 12/19/24 Room / Location: [place] [address] / SURGERY [facility]    Anesthesia Start: 1701 Anesthesia Stop: 1810    Procedures:       IRRIGATION AND DEBRIDEMENT, WOUND, GENERAL, LEG (Right: Leg Lower)      APPLICATION OR REPLACEMENT, WOUND VAC (Right: Leg Lower) Diagnosis: (RIGHT LEG HEMATOMA WITH NECROTIC OPEN WOUND)    Surgeons: Milton Swan M.D. Responsible Provider: Emery Hoffman M.D.    Anesthesia Type: general ASA Status: 3            Final Anesthesia Type: general  Last vitals  BP   Blood Pressure : (!) 143/67    Temp   36.9 °C (98.5 °F)    Pulse   89   Resp   15    SpO2   96 %      Anesthesia Post Evaluation    Patient location during evaluation: PACU  Patient participation: complete - patient participated  Level of consciousness: awake and alert    Airway patency: patent  Anesthetic complications: no  Cardiovascular status: hemodynamically stable  Respiratory status: acceptable  Hydration status: euvolemic    PONV: none          No notable events documented.     Nurse Pain Score: 6 (NPRS)

## 2024-12-21 LAB
ANION GAP SERPL CALC-SCNC: 11 MMOL/L (ref 7–16)
BUN SERPL-MCNC: 21 MG/DL (ref 8–22)
CALCIUM SERPL-MCNC: 9 MG/DL (ref 8.5–10.5)
CHLORIDE SERPL-SCNC: 102 MMOL/L (ref 96–112)
CO2 SERPL-SCNC: 29 MMOL/L (ref 20–33)
CREAT SERPL-MCNC: 0.97 MG/DL (ref 0.5–1.4)
ERYTHROCYTE [DISTWIDTH] IN BLOOD BY AUTOMATED COUNT: 57.1 FL (ref 35.9–50)
GFR SERPLBLD CREATININE-BSD FMLA CKD-EPI: 84 ML/MIN/1.73 M 2
GLUCOSE SERPL-MCNC: 97 MG/DL (ref 65–99)
HCT VFR BLD AUTO: 24 % (ref 42–52)
HGB BLD-MCNC: 7.4 G/DL (ref 14–18)
MCH RBC QN AUTO: 27.4 PG (ref 27–33)
MCHC RBC AUTO-ENTMCNC: 30.8 G/DL (ref 32.3–36.5)
MCV RBC AUTO: 88.9 FL (ref 81.4–97.8)
PLATELET # BLD AUTO: 165 K/UL (ref 164–446)
PMV BLD AUTO: 8.9 FL (ref 9–12.9)
POTASSIUM SERPL-SCNC: 3.3 MMOL/L (ref 3.6–5.5)
RBC # BLD AUTO: 2.7 M/UL (ref 4.7–6.1)
SODIUM SERPL-SCNC: 142 MMOL/L (ref 135–145)
WBC # BLD AUTO: 4.9 K/UL (ref 4.8–10.8)

## 2024-12-21 PROCEDURE — 36415 COLL VENOUS BLD VENIPUNCTURE: CPT

## 2024-12-21 PROCEDURE — 94660 CPAP INITIATION&MGMT: CPT

## 2024-12-21 PROCEDURE — 97605 NEG PRS WND THER DME<=50SQCM: CPT

## 2024-12-21 PROCEDURE — A9270 NON-COVERED ITEM OR SERVICE: HCPCS | Performed by: STUDENT IN AN ORGANIZED HEALTH CARE EDUCATION/TRAINING PROGRAM

## 2024-12-21 PROCEDURE — 99232 SBSQ HOSP IP/OBS MODERATE 35: CPT | Performed by: STUDENT IN AN ORGANIZED HEALTH CARE EDUCATION/TRAINING PROGRAM

## 2024-12-21 PROCEDURE — 97602 WOUND(S) CARE NON-SELECTIVE: CPT

## 2024-12-21 PROCEDURE — 700101 HCHG RX REV CODE 250: Performed by: STUDENT IN AN ORGANIZED HEALTH CARE EDUCATION/TRAINING PROGRAM

## 2024-12-21 PROCEDURE — 700111 HCHG RX REV CODE 636 W/ 250 OVERRIDE (IP): Mod: JZ | Performed by: STUDENT IN AN ORGANIZED HEALTH CARE EDUCATION/TRAINING PROGRAM

## 2024-12-21 PROCEDURE — 770001 HCHG ROOM/CARE - MED/SURG/GYN PRIV*

## 2024-12-21 PROCEDURE — 94640 AIRWAY INHALATION TREATMENT: CPT

## 2024-12-21 PROCEDURE — 80048 BASIC METABOLIC PNL TOTAL CA: CPT

## 2024-12-21 PROCEDURE — 94669 MECHANICAL CHEST WALL OSCILL: CPT

## 2024-12-21 PROCEDURE — 700102 HCHG RX REV CODE 250 W/ 637 OVERRIDE(OP): Performed by: STUDENT IN AN ORGANIZED HEALTH CARE EDUCATION/TRAINING PROGRAM

## 2024-12-21 PROCEDURE — 85027 COMPLETE CBC AUTOMATED: CPT

## 2024-12-21 PROCEDURE — 700111 HCHG RX REV CODE 636 W/ 250 OVERRIDE (IP): Performed by: STUDENT IN AN ORGANIZED HEALTH CARE EDUCATION/TRAINING PROGRAM

## 2024-12-21 RX ORDER — LIDOCAINE HYDROCHLORIDE 20 MG/ML
20 INJECTION, SOLUTION INFILTRATION; PERINEURAL
Status: DISCONTINUED | OUTPATIENT
Start: 2024-12-21 | End: 2024-12-27 | Stop reason: HOSPADM

## 2024-12-21 RX ORDER — LINEZOLID 600 MG/1
600 TABLET, FILM COATED ORAL EVERY 12 HOURS
Status: COMPLETED | OUTPATIENT
Start: 2024-12-21 | End: 2024-12-26

## 2024-12-21 RX ORDER — LIDOCAINE HYDROCHLORIDE 40 MG/ML
SOLUTION TOPICAL
Status: DISCONTINUED | OUTPATIENT
Start: 2024-12-21 | End: 2024-12-27 | Stop reason: HOSPADM

## 2024-12-21 RX ORDER — FUROSEMIDE 10 MG/ML
40 INJECTION INTRAMUSCULAR; INTRAVENOUS
Status: COMPLETED | OUTPATIENT
Start: 2024-12-22 | End: 2024-12-22

## 2024-12-21 RX ORDER — FUROSEMIDE 20 MG/1
40 TABLET ORAL
Status: DISCONTINUED | OUTPATIENT
Start: 2024-12-23 | End: 2024-12-27 | Stop reason: HOSPADM

## 2024-12-21 RX ORDER — IPRATROPIUM BROMIDE AND ALBUTEROL SULFATE 2.5; .5 MG/3ML; MG/3ML
3 SOLUTION RESPIRATORY (INHALATION)
Status: DISCONTINUED | OUTPATIENT
Start: 2024-12-21 | End: 2024-12-27 | Stop reason: HOSPADM

## 2024-12-21 RX ADMIN — METOPROLOL SUCCINATE 100 MG: 100 TABLET, EXTENDED RELEASE ORAL at 08:27

## 2024-12-21 RX ADMIN — GEMFIBROZIL 600 MG: 600 TABLET ORAL at 12:16

## 2024-12-21 RX ADMIN — HYDROCODONE BITARTRATE AND ACETAMINOPHEN 1 TABLET: 5; 325 TABLET ORAL at 03:53

## 2024-12-21 RX ADMIN — FLUOXETINE HYDROCHLORIDE 40 MG: 20 CAPSULE ORAL at 05:40

## 2024-12-21 RX ADMIN — RIVAROXABAN 20 MG: 20 TABLET, FILM COATED ORAL at 17:45

## 2024-12-21 RX ADMIN — HYDROCODONE BITARTRATE AND ACETAMINOPHEN 1 TABLET: 5; 325 TABLET ORAL at 12:15

## 2024-12-21 RX ADMIN — QUETIAPINE FUMARATE 100 MG: 100 TABLET ORAL at 05:40

## 2024-12-21 RX ADMIN — CEPHALEXIN 500 MG: 500 CAPSULE ORAL at 17:44

## 2024-12-21 RX ADMIN — GEMFIBROZIL 600 MG: 600 TABLET ORAL at 23:04

## 2024-12-21 RX ADMIN — CLONAZEPAM 1 MG: 1 TABLET ORAL at 17:45

## 2024-12-21 RX ADMIN — MORPHINE SULFATE 4 MG: 4 INJECTION, SOLUTION INTRAMUSCULAR; INTRAVENOUS at 20:37

## 2024-12-21 RX ADMIN — TAMSULOSIN HYDROCHLORIDE 0.4 MG: 0.4 CAPSULE ORAL at 05:39

## 2024-12-21 RX ADMIN — POTASSIUM BICARBONATE 50 MEQ: 978 TABLET, EFFERVESCENT ORAL at 12:16

## 2024-12-21 RX ADMIN — LINEZOLID 600 MG: 600 TABLET, FILM COATED ORAL at 20:36

## 2024-12-21 RX ADMIN — ATORVASTATIN CALCIUM 20 MG: 20 TABLET, FILM COATED ORAL at 20:36

## 2024-12-21 RX ADMIN — ONDANSETRON 4 MG: 4 TABLET, ORALLY DISINTEGRATING ORAL at 09:51

## 2024-12-21 RX ADMIN — GUAIFENESIN 600 MG: 600 TABLET, EXTENDED RELEASE ORAL at 17:45

## 2024-12-21 RX ADMIN — FUROSEMIDE 40 MG: 10 INJECTION INTRAMUSCULAR; INTRAVENOUS at 08:27

## 2024-12-21 RX ADMIN — CEPHALEXIN 500 MG: 500 CAPSULE ORAL at 05:40

## 2024-12-21 RX ADMIN — IPRATROPIUM BROMIDE AND ALBUTEROL SULFATE 3 ML: .5; 2.5 SOLUTION RESPIRATORY (INHALATION) at 08:16

## 2024-12-21 RX ADMIN — THEOPHYLLINE 300 MG: 300 TABLET, EXTENDED RELEASE ORAL at 12:16

## 2024-12-21 RX ADMIN — GUAIFENESIN 600 MG: 600 TABLET, EXTENDED RELEASE ORAL at 05:39

## 2024-12-21 RX ADMIN — CEPHALEXIN 500 MG: 500 CAPSULE ORAL at 12:16

## 2024-12-21 RX ADMIN — THEOPHYLLINE 300 MG: 300 TABLET, EXTENDED RELEASE ORAL at 23:04

## 2024-12-21 RX ADMIN — IPRATROPIUM BROMIDE AND ALBUTEROL SULFATE 3 ML: .5; 2.5 SOLUTION RESPIRATORY (INHALATION) at 02:04

## 2024-12-21 RX ADMIN — CLONAZEPAM 1 MG: 1 TABLET ORAL at 05:40

## 2024-12-21 RX ADMIN — MOMETASONE FUROATE AND FORMOTEROL FUMARATE DIHYDRATE 2 PUFF: 200; 5 AEROSOL RESPIRATORY (INHALATION) at 17:45

## 2024-12-21 RX ADMIN — Medication 220 MG: at 05:40

## 2024-12-21 RX ADMIN — QUETIAPINE FUMARATE 100 MG: 100 TABLET ORAL at 17:45

## 2024-12-21 RX ADMIN — ENOXAPARIN SODIUM 40 MG: 100 INJECTION SUBCUTANEOUS at 05:40

## 2024-12-21 RX ADMIN — OXYCODONE HYDROCHLORIDE AND ACETAMINOPHEN 1000 MG: 500 TABLET ORAL at 05:40

## 2024-12-21 RX ADMIN — MORPHINE SULFATE 4 MG: 4 INJECTION, SOLUTION INTRAMUSCULAR; INTRAVENOUS at 09:45

## 2024-12-21 ASSESSMENT — PAIN DESCRIPTION - PAIN TYPE
TYPE: CHRONIC PAIN
TYPE: ACUTE PAIN;CHRONIC PAIN
TYPE: ACUTE PAIN;CHRONIC PAIN
TYPE: CHRONIC PAIN;ACUTE PAIN
TYPE: ACUTE PAIN
TYPE: ACUTE PAIN;CHRONIC PAIN

## 2024-12-21 ASSESSMENT — ENCOUNTER SYMPTOMS
INSOMNIA: 0
HEADACHES: 0
EYE PAIN: 0
BLURRED VISION: 0
FEVER: 0
FOCAL WEAKNESS: 0
ABDOMINAL PAIN: 0
CHILLS: 0
COUGH: 0
MYALGIAS: 1
DIZZINESS: 0
PALPITATIONS: 0
VOMITING: 0
SENSORY CHANGE: 0
NAUSEA: 0
BACK PAIN: 0
SHORTNESS OF BREATH: 0

## 2024-12-21 ASSESSMENT — LIFESTYLE VARIABLES: SUBSTANCE_ABUSE: 0

## 2024-12-21 NOTE — WOUND TEAM
Assisted Denia GUY (Wound RN), with wound care, non-selective debridement performed using wound cleanser/NS and gauze and NPWT dressing placed. Please see Denia GUY (Wound RN) wound note for further wound care details.

## 2024-12-21 NOTE — PROGRESS NOTES
[facility] Medicine Daily Progress Note    Date of Service  12/21/2024    Chief Complaint  Henry Kumari is a 69 y.o. male admitted 12/17/2024 with leg wound.    Hospital Course    Henry Kumari is a 69 y.o. male with atrial fibrillation, COPD, CHF, history of DVT/PE, hypertension, chronic low back pain, morbid obesity, SANTOSH, viral hepatitis C, history of pancreatitis, bipolar 2 disorder, BPH, hyperlipidemia who presented 12/17/2024 with necrotic wound.     Reportedly patient suffered a wound on his right leg when he bumped on a trailer hitch causing a large hematoma.  He presented to OSH ED at the time and was sent home.  Patient was staying at [facility] in [place] since May 2024.  Nursing staff and patient noted that his wound has been not healing, concerns for necrosis, presented to OSH.  He has been experiencing generalized fatigue and malaise, some intermittent right lower extremity pain to his wound.he has been experiencing some nausea.  He denies any fevers or chills headache or vision changes chest pain dyspnea cough abdominal pain vomiting dysuria or diarrhea.     Presented to OSH and general surgery consulted, surgeon stated they did not have the resources they are to properly treat patient and that patient may likely need plastic surgery consultation and transfer to [facility] as recommended.  Patient was subsequently accepted for transfer, arrives here in stable condition, afebrile, pulse 104 respiratory rate 18 blood pressure 137/91 pulse ox 98% on 2 L nasal cannula.    Interval Problem Update  No acute events overnight.  Patient feeling well.  Wound vac changed today by wound team.  To continue wound vac care on discharge.  Resuming home xarelto for hx a fib.  Continue IV lasix for today and tomorrow, then resume to PO lasix after tomorrow.  Patient is medically cleared.  He may return to [facility] in [place],  checking to see if they can manage wound vac.      I have discussed this  patient's plan of care and discharge plan at IDT rounds today with Case Management, Nursing, Nursing leadership, and other members of the IDT team.    Consultants/Specialty  plastic surgery    Code Status  Full Code    Disposition  The patient is medically cleared for discharge to home or a post-acute facility.  Anticipate discharge to: skilled nursing facility    I have placed the appropriate orders for post-discharge needs.    Review of Systems  Review of Systems   Constitutional:  Negative for chills and fever.   Eyes:  Negative for blurred vision and pain.   Respiratory:  Negative for cough and shortness of breath.    Cardiovascular:  Negative for chest pain, palpitations and leg swelling.   Gastrointestinal:  Negative for abdominal pain, nausea and vomiting.   Genitourinary:  Negative for dysuria and urgency.   Musculoskeletal:  Positive for myalgias. Negative for back pain.   Skin:  Negative for itching and rash.   Neurological:  Negative for dizziness, sensory change, focal weakness and headaches.   Psychiatric/Behavioral:  Negative for substance abuse. The patient does not have insomnia.         Physical Exam  Temp:  [36.1 °C (97 °F)-36.7 °C (98.1 °F)] 36.5 °C (97.7 °F)  Pulse:  [] 106  Resp:  [14-19] 19  BP: ()/(46-77) 118/63  SpO2:  [91 %-100 %] 91 %    Physical Exam  Constitutional:       General: He is not in acute distress.     Appearance: He is not ill-appearing.   HENT:      Head: Normocephalic and atraumatic.      Right Ear: External ear normal.      Left Ear: External ear normal.      Mouth/Throat:      Pharynx: No oropharyngeal exudate or posterior oropharyngeal erythema.   Eyes:      Extraocular Movements: Extraocular movements intact.      Pupils: Pupils are equal, round, and reactive to light.   Cardiovascular:      Rate and Rhythm: Normal rate and regular rhythm.      Pulses: Normal pulses.      Heart sounds: Normal heart sounds.   Pulmonary:      Effort: Pulmonary effort is normal.       Breath sounds: Normal breath sounds.   Abdominal:      General: Bowel sounds are normal. There is no distension.      Palpations: Abdomen is soft.      Tenderness: There is no abdominal tenderness. There is no guarding.   Musculoskeletal:         General: Swelling present. No tenderness.      Cervical back: Normal range of motion and neck supple.      Right lower leg: Edema present.      Left lower leg: No edema.   Skin:     General: Skin is warm and dry.   Neurological:      General: No focal deficit present.      Mental Status: He is oriented to person, place, and time.      Cranial Nerves: No cranial nerve deficit.      Sensory: No sensory deficit.      Motor: No weakness.   Psychiatric:         Mood and Affect: Mood normal.         Behavior: Behavior normal.         Fluids    Intake/Output Summary (Last 24 hours) at 12/21/2024 1525  Last data filed at 12/21/2024 0900  Gross per 24 hour   Intake --   Output 1183 ml   Net -1183 ml          Laboratory  Recent Labs     12/19/24  1325 12/20/24  0028 12/21/24  0029   WBC 4.5* 5.1 4.9   RBC 2.86* 2.80* 2.70*   HEMOGLOBIN 7.8* 7.8* 7.4*   HEMATOCRIT 25.6* 26.1* 24.0*   MCV 89.5 93.2 88.9   MCH 27.3 27.9 27.4   MCHC 30.5* 29.9* 30.8*   RDW 57.2* 59.2* 57.1*   PLATELETCT 172 170 165   MPV 8.5* 8.6* 8.9*     Recent Labs     12/19/24  0211 12/20/24  0028 12/21/24  0029   SODIUM 145 143 142   POTASSIUM 3.1* 3.7 3.3*   CHLORIDE 102 103 102   CO2 30 24 29   GLUCOSE 93 108* 97   BUN 16 10 21   CREATININE 0.82 0.74 0.97   CALCIUM 8.9 8.7 9.0                     Imaging  DX-CHEST-PORTABLE (1 VIEW)   Final Result      1.  Bibasilar and perihilar underinflation atelectasis which could obscure an additional process.   2.  Persistently enlarged cardiac silhouette      CT-EXTREMITY, LOWER WITH RIGHT   Final Result      1.  Large ulcerated subcutaneous wound in the anterior medial aspect of the right lower leg.      2.  Large adjacent subcutaneous abscess, hematoma, or seroma  adjacent to the area of soft tissue ulceration.      3.  No evidence of acute or chronic osteomyelitis.      4.  Severe tricompartmental osteoarthritis of the right knee.      US-GLORIA SINGLE LEVEL BILAT   Final Result      IR-US GUIDED PIV    (Results Pending)   IR-US GUIDED PIV    (Results Pending)        Assessment/Plan  * Necrosis (HCC)- (present on admission)  Assessment & Plan  Large necrotic wound on right shin->calf, supposedly started with bumping a trailor hitch and a hematoma 10 days prior to presentation, seen at OSH at that time.  CT LE shows necrotic skin tissue with adjacent fluid collection, likely hematoma/seroma  Plastic surgery recommend debridement of necrotic skin and can assist with reconstructive surgery when wound is ready  S/p surgical debridement by ortho 12/19, wound vac on  Wound vac change today; continue wound care; will need wound vac on discharge  On keflex    Hypokalemia  Assessment & Plan  P.o. replacement ordered    Benzodiazepine dependence, continuous (HCC)- (present on admission)  Assessment & Plan  Continue home Klonopin    Mood disorder (HCC)- (present on admission)  Assessment & Plan  Continue Prozac, Seroquel    Class 3 severe obesity due to excess calories with serious comorbidity and body mass index (BMI) of 45.0 to 49.9 in adult (HCC)- (present on admission)  Assessment & Plan  Consider referral to bariatric surgery on discharge, consider trial of GLP-1 inhibitors as outpatient.    SANTOSH on CPAP- (present on admission)  Assessment & Plan  Nightly cpap ordered    COPD (chronic obstructive pulmonary disease) (HCC)- (present on admission)  Assessment & Plan  Not in acute exacerbation.  Continue home theophylline, Dulera, Spiriva.  DuoNebs q6h prn         VTE prophylaxis: VTE Selection    I have performed a physical exam and reviewed and updated ROS and Plan today (12/21/2024). In review of yesterday's note (12/20/2024), there are no changes except as documented above.

## 2024-12-21 NOTE — PROGRESS NOTES
"Bedside report received.  Assessment complete.  A&O x 4. Patient calls appropriately.  Patient ambulates with standby assist. Bed alarm on.   Patient has 6/10 pain. Patient medicated per MAR.  Denies N&V. Tolerating regular diet.  Skin per flowsheets.   Patient has RLE wound vac per order, dressing in place, CDI.   + void, + flatus, LBM 12/20.  Patient denies SOB, >90% on 2L via nasal cannula.    Review plan with of care with patient. Call light and personal belongings within reach. Hourly rounding in place. All needs met at this time.    /76   Pulse 96   Temp 36.1 °C (97 °F) (Temporal)   Resp 16   Ht 1.82 m (5' 11.65\")   Wt (!) 161 kg (355 lb 6.1 oz)   SpO2 93%   BMI 48.66 kg/m²     "

## 2024-12-21 NOTE — PROGRESS NOTES
Bedside report received.  Assessment complete.  A&O x 4. Patient calls appropriately.  Patient ambulates with standby assist and FWW. Bed alarm on.   Patient has 7/10 pain. Pain managed with prescribed medications.  Denies N&V. Tolerating diet.  Wound vac to RLE is dressed, CDI.  + void, + flatus  Patient denies SOB.  Patient pleasant with staff and resting in bed.  Review plan with of care with patient. Call light and personal belongings within reach. Hourly rounding in place. All needs met at this time.

## 2024-12-21 NOTE — THERAPY
"Physical Therapy   Initial Evaluation     Patient Name: Henry Kumari  Age:  69 y.o., Sex:  male  Medical Record #: [id number]  Today's Date: 12/20/2024     Precautions  Precautions: Fall Risk;Weight Bearing As Tolerated Right Lower Extremity  Comments: wound vac R LE    Assessment  Henry Kumari is a 69 y.o. male with atrial fibrillation, COPD, CHF, history of DVT/PE, hypertension, chronic low back pain, morbid obesity, SANTOSH, viral hepatitis C, history of pancreatitis, bipolar 2 disorder, BPH, hyperlipidemia who presented 12/17/2024 with necrotic wound and is now s/p I&D with wound vac placement. Patient presents to PT [facility] with pain and impaired mobility. However, he is at his functional baseline and just needs assist for line management. He has assist as needed at [facility]. Recommend return there when medically cleared. Patient will not be actively followed for physical therapy services at this time, however may be seen if requested by physician for 1 more visit within 30 days to address any discharge or equipment needs.     Plan    Physical Therapy Initial Treatment Plan   Duration: Discharge Needs Only    DC Equipment Recommendations: None  Discharge Recommendations: Other - (return to [facility])       Subjective    \"I'm doing ok, I can move, it just hurts\"     Objective       12/20/24 1400   Precautions   Precautions Fall Risk;Weight Bearing As Tolerated Right Lower Extremity   Comments wound vac R LE   Vitals   Blood Pressure  128/73   O2 (LPM) 3   O2 Delivery Device Silicone Nasal Cannula   Pain 0 - 10 Group   Location Leg   Location Orientation Right   Pain Rating Scale (NPRS) 4   Therapist Pain Assessment During Activity;4   Prior Living Situation   Prior Services Intermittent Physical Support for ADL Per Service   Housing / Facility Long Term Care Facility   Steps Into Home 0   Steps In Home 0   Bathroom Set up Walk In Shower;Grab Bars;Shower Chair   Equipment Owned Front-Wheel " Walker;Single Point Cane;Scooter;Tub / Shower Seat;Grab Bar(s) In Tub / Shower;Grab Bar(s) By Toilet   Lives with - Patient's Self Care Capacity Attendant / Paid Care Giver   Comments Patient resides at [facility], he has assist as needed, but normally doesn't need any help for mobilit or ADLs   Prior Level of Functional Mobility   Bed Mobility Independent   Transfer Status Independent   Ambulation Independent   Ambulation Distance household distances   Assistive Devices Used Front-Wheel Walker   Comments uses a mobility scooter in the hallways,. FWW in the room   History of Falls   History of Falls Yes   Date of Last Fall   (falls 1x/month)   Cognition    Cognition / Consciousness X   Speech/ Communication Dysarthric   Level of Consciousness Alert   Safety Awareness Impaired;Impulsive   Comments pleasant and cooperative with poor awareness of his wound vac lines and safe use of DME   Active ROM Upper Body   Active ROM Upper Body  X   Comments L UE limited at baseline from prior humeral fx   Strength Upper Body   Upper Body Strength  X   Gross Strength Generalized Weakness, Equal Bilaterally.    Strength Lower Body   Lower Body Strength  X   Gross Strength Generalized Weakness, Equal Bilaterally   Sensation Lower Body   Lower Extremity Sensation   WDL   Other Treatments   Other Treatments Provided extensive education about safety with mobility. Educated about pathologies of bedrest   Balance Assessment   Sitting Balance (Static) Fair   Sitting Balance (Dynamic) Fair   Standing Balance (Static) Fair   Standing Balance (Dynamic) Fair   Weight Shift Sitting Fair   Weight Shift Standing Fair   Comments w/FWW   Bed Mobility    Supine to Sit Supervised   Sit to Supine Supervised   Scooting Supervised   Gait Analysis   Gait Level Of Assist Supervised   Assistive Device Front Wheel Walker   Distance (Feet) 40   # of Times Distance was Traveled 2   Deviation Increased Base Of Support;Bradykinetic   Functional  Mobility   Sit to Stand Supervised   Toilet Transfers Supervised   Mobility Bed>toilet>bed   Comments Patient reports he is at his baseline function, just has R LE pain   6 Clicks Assessment - How much HELP from from another person do you currently need... (If the patient hasn't done an activity recently, how much help from another person do you think he/she would need if he/she tried?)   Turning from your back to your side while in a flat bed without using bedrails? 4   Moving from lying on your back to sitting on the side of a flat bed without using bedrails? 4   Moving to and from a bed to a chair (including a wheelchair)? 3   Standing up from a chair using your arms (e.g., wheelchair, or bedside chair)? 3   Walking in hospital room? 3   Climbing 3-5 steps with a railing? 3   6 clicks Mobility Score 20   Activity Tolerance   Sitting Edge of Bed 10 min   Standing 2 min   Education Group   Education Provided Role of Physical Therapist;Use of Assistive Device   Role of Physical Therapist Patient Response Patient;Acceptance;Explanation;Demonstration;Verbal Demonstration;Action Demonstration   Use of Assistive Device Patient Response Family;Acceptance;Demonstration;Explanation;Verbal Demonstration;Action Demonstration   Physical Therapy Initial Treatment Plan    Duration Discharge Needs Only   Anticipated Discharge Equipment and Recommendations   DC Equipment Recommendations None   Discharge Recommendations Other -  (return to [facility])     Maria Elena Navarrete, PT, DPT, GCS

## 2024-12-21 NOTE — WOUND TEAM
[facility]  Inpatient Services  Initial Wound and Skin Care Evaluation    Admission Date: 12/17/2024     Last order of IP CONSULT TO WOUND CARE was found on 12/19/2024 from Hospital Encounter on 12/17/2024     HPI, PMH, SH: Reviewed    Past Surgical History:   Procedure Laterality Date    IRRIGATION & DEBRIDEMENT GENERAL Right 12/19/2024    Procedure: IRRIGATION AND DEBRIDEMENT, WOUND, GENERAL, LEG;  Surgeon: Milton Swan M.D.;  Location: [facility];  Service: Orthopedics    APPLICATION OR REPLACEMENT, WOUND VAC Right 12/19/2024    Procedure: APPLICATION OR REPLACEMENT, WOUND VAC;  Surgeon: Milton Swan M.D.;  Location: [facility];  Service: Orthopedics    INCISION AND DRAINAGE GENERAL Left 1/23/2024    Procedure: INCISION AND DRAINAGE, SHOULDER;  Surgeon: Arnol Masterson M.D.;  Location: [facility];  Service: Orthopedics    IRRIGATION & DEBRIDEMENT GENERAL Left 1/4/2024    Procedure: IRRIGATION AND DEBRIDEMENT, WOUND - ARM AND CLOSURE;  Surgeon: Corey Cruz M.D.;  Location: [facility];  Service: Orthopedics    HUMERUS NAILING INTRAMEDULLARY Left 12/28/2023    Procedure: INSERTION, INTRAMEDULLARY WIL, HUMERUS;  Surgeon: Corey Cruz M.D.;  Location: [facility];  Service: Orthopedics    ERCP  7/18/2018    Procedure: ERCP;  Surgeon: Ariel Aguirre M.D.;  Location: [facility];  Service: EUS    ERCP W/REM FB AND/OR STENT CHG  7/18/2018    Procedure: ERCP W/REM FB AND/OR STENT CHG - STENT EXCHANGE;  Surgeon: Ariel Aguirre M.D.;  Location: [facility];  Service: EUS    ERCP IN OR N/A 5/2/2018    Procedure: ERCP IN OR;  Surgeon: Ariel Aguirre M.D.;  Location: [facility];  Service: Gastroenterology    ERCP IN OR N/A 4/3/2018    Procedure: ERCP IN OR- W/POSS STENT;  Surgeon: Ariel Aguirre M.D.;  Location: SURGERY SAME DAY [facility];  Service: Gastroenterology    CORNELIA BY LAPAROSCOPY  4/2/2018    Procedure:  "CORNELIA BY LAPAROSCOPY;  Surgeon: Meche Olson M.D.;  Location: SURGERY [facility];  Service: General    OTHER ABDOMINAL SURGERY  2018    appendectomy    OTHER  1976    skin grafting to rt leg     OTHER ABDOMINAL SURGERY  20018    cholecystectomy     Social History     Tobacco Use    Smoking status: Former     Current packs/day: 0.50     Average packs/day: 0.5 packs/day for 47.0 years (23.5 ttl pk-yrs)     Types: Cigarettes    Smokeless tobacco: Never   Substance Use Topics    Alcohol use: Not Currently     Comment: 2 drinks a week     No chief complaint on file.    Diagnosis: Necrosis (HCC) [I96]    Unit where seen by Wound Team: T434/02     WOUND CONSULT RELATED TO:  RLE NPWT change    WOUND TEAM PLAN OF CARE - Frequency of Follow-up:   Nursing to follow dressing orders written for wound care. Contact wound team if area fails to progress, deteriorates or with any questions/concerns if something comes up before next scheduled follow up (See below as to whether wound is following and frequency of wound follow up)   NPWT change 3 times weekly - RLE    WOUND HISTORY:   Per hospitalist H&P: \"Henry Kumari is a 69 y.o. male with atrial fibrillation, COPD, CHF, history of DVT/PE, hypertension, chronic low back pain, morbid obesity, SANTOSH, viral hepatitis C, history of pancreatitis, bipolar 2 disorder, BPH, hyperlipidemia who presented 12/17/2024 with necrotic wound.     Reportedly patient suffered a wound on his right leg when he bumped on a trailer hitch causing a large hematoma.  He presented to [facility] ED at the time and was sent home.  Patient was staying at [facility] in [place] since May 2024.  Nursing staff and patient noted that his wound has been not healing, concerns for necrosis, presented to OSH.  He has been experiencing generalized fatigue and malaise, some intermittent right lower extremity pain to his wound.he has been experiencing some nausea.  He denies any fevers or chills headache or " "vision changes chest pain dyspnea cough abdominal pain vomiting dysuria or diarrhea.\"    12/19/24 I&D Leg with Dr. Swan, wound team thereafter consulted for in-house bedside VAC changes       WOUND ASSESSMENT/LDA  Negative Pressure Wound Therapy 12/19/24 Pretibial Right (Active)   Placement Date/Time: 12/19/24 1752   Inserted by: DR. SWAN  Location: Pretibial  Laterality: Right      Assessments 12/21/2024 10:30 AM   Wound Image     Vacuum Serial Number [id number]   NPWT Pump Mode / Pressure Setting Ulta;Continuous   Dressing Type Large;Black Foam (Regular)   Number of Foam Pieces Used 1   Canister Changed No   NEXT Dressing Change/Treatment Date 12/24/24       Wound 12/17/24 Full Thickness Wound Pretibial Medial Right (Active)   Date First Assessed/Time First Assessed: 12/17/24 1102   Primary Wound Type: Full Thickness Wound  Location: Pretibial  Wound Orientation: Medial  Laterality: Right      Assessments 12/21/2024 10:30 AM   Wound Image     Site Assessment Red;Bleeding;Painful   Periwound Assessment Edema;Intact   Margins Defined edges;Unattached edges   Closure Secondary intention   Drainage Amount Small   Drainage Description Sanguineous   Treatments Cleansed;Topical Lidocaine;Compression;Hemostat foam   Wound Cleansing Approved Wound Cleanser   Periwound Protectant Skin Protectant Wipes to Periwound;Paste Ring   Dressing Status Clean;Dry;Intact   Dressing Changed Changed   Dressing Cleansing/Solutions Not Applicable   Dressing Options Wound Vac   Dressing Change/Treatment Frequency Tuesday, Thursday, Saturday, and As Needed   NEXT Dressing Change/Treatment Date 12/24/24   NEXT Weekly Photo (Inpatient Only) 01/09/25   Wound Team Following 3x Weekly   Non-staged Wound Description Full thickness   Wound Length (cm) 20 cm   Wound Width (cm) 11.5 cm   Wound Depth (cm) 1.7 cm   Wound Surface Area (cm^2) 230 cm^2   Wound Volume (cm^3) 391 cm^3   Undermining (cm) 1.8 cm   Undermining of Wound, 1st Location From " 12 o'clock;To 1 o'clock   Shape Oval   Wound Odor None   Exposed Structures Adipose   WOUND NURSE ONLY - Time Spent with Patient (mins) 45        Vascular:    GLORIA:   GLORIA Results, Last 30 Days US-GLORIA SINGLE LEVEL BILAT    Result Date: 2024  Narrative  Vascular Laboratory  Conclusions  No evidence of arterial insufficiency.  GRANT POPE  Age:    69    Gender:     M  MRN:    [id number]  :    1955      BSA:  Exam Date:     2024 15:00  Room #:     Inpatient  Priority:     Routine  Ht (in):             Wt (lb):  Ordering Physician:        RAHUL MARQUEZ  Referring Physician:       [id number] Lancaster  Sonographer:               Jamshid Schmitz [id number]  Study Type:                Complete Bilateral  Technical Quality:         Adequate  Indications:     Non-pressure chronic ulcer of right calf with fat layer                   exposed, Ulcer of calf  CPT Codes:       50770  ICD Codes:       L97.212  707.12  History:         large necrotic ulceration of right medial calf; states smokes                   3-4 packs/day; no prior exam  Limitations:                 RIGHT  Waveform            Systolic BPs (mmHg)                             133           Brachial  Triphasic                                Common Femoral  Triphasic                                Popliteal  Triphasic                  151           Posterior Tibial  Triphasic                  206           Dorsalis Pedis                             87            Digit                             1.55          GLORIA                             0.65          TBI                       LEFT  Waveform        Systolic BPs (mmHg)                             129           Brachial  Triphasic                                Common Femoral  Triphasic                                Popliteal  Triphasic                  163           Posterior Tibial  Triphasic                  159           Dorsalis Pedis                                            Digit                             1.23          GLORIA                                           TBI  Findings  Doppler waveforms of the common femoral artery and popliteal artery are of  high amplitude and multiphasic.  Doppler waveforms at the ankle are brisk and multiphasic.  Slight elevation of the right dorsalis pedis GLORIA suggests onset of tibial  artery calcification.  The right posterior tibial & left ankle-brachial indices are normal.  A PPG sensor was used to obtain the great toe arterial waveforms & pressure  (TBI).  Right TBI is 0.65.  TBI is normal.  Arterial waveforms of the great toe are  normal.  There is no evidence of significant arterial disease demonstrated on the  right or the left.  Additional testing was not performed in accordance with lower extremity  arterial evaluation protocol.  Adelina LOCKWOOD To  (Electronically Signed)  Final Date:      18 December 2024                   16:43      Lab Values:    Lab Results   Component Value Date/Time    WBC 4.9 12/21/2024 12:29 AM    RBC 2.70 (L) 12/21/2024 12:29 AM    HEMOGLOBIN 7.4 (L) 12/21/2024 12:29 AM    HEMATOCRIT 24.0 (L) 12/21/2024 12:29 AM    CREACTPROT 7.13 (H) 12/17/2024 11:52 PM    SEDRATEWES >140 (H) 12/17/2024 11:52 PM         Culture Results show:  No results found for this or any previous visit (from the past 720 hours).    Pain Level/Medicated:  IV pain medications administered by bedside RN immediately prior (Pt educated that IV medication will not be available on outpatient basis)   Would benefit from addition of lidocaine dressing soak prior on next dressing change.    INTERVENTIONS BY WOUND TEAM:  Chart and images reviewed. Discussed with bedside RN. All areas of concern (based on picture review, LDA review and discussion with bedside RN) have been thoroughly assessed. Documentation of areas based on significant findings. This RN in to assess patient. Performed standard wound care which includes appropriate positioning,  dressing removal and non-selective debridement. Pictures and measurements obtained weekly if/when required.    Wound:  RLE  Preparation for Dressing removal: Dressing soaked with wound cleanser  Cleansed/Non-selectively Debrided with:  Wound cleanser and Gauze  Becka wound: Cleansed with Wound cleanser and Gauze, Prepped with No Sting and Paste Rings  Primary Dressing:  Surgifoam applied to three areas of superficial bleeding, manual pressure applied thereafter. Majority hemostasis achieved. One piece of black foam then applied to wound bed and secured with drape. Hole cut into drape, Trac pad then applied.  Secondary (Outer) Dressing: Suction resumed at 125mmHg, no leaks detected. Offloading adhesive foam placed around the heel. Dry roll gauze and ACE wrap re-applied for mild compression.    Advanced Wound Care Discharge Planning  Number of Clinicians necessary to complete wound care: 1-2 (for positioning)  Is patient requiring IV pain medications for dressing changes:  No   Length of time for dressing change 45 min. (This does not include chart review, pre-medication time, set up, clean up or time spent charting.)    Interdisciplinary consultation: Patient, Bedside RN (Martina), Yady KELLOGG (Wound RN).     EVALUATION / RATIONALE FOR TREATMENT:     Date:  12/21/24  Wound Status:  Initial evaluation    RLE s/p surgical debridement of hematoma. Wound bed granular with expected sanguinous drainage. Regular NPWT continued to assist with wound closure by secondary intention, management of bio-burden and exudate through mechanical debridement, and increase oxygenation and granulation tissue production to wound bed.  Patient will benefit of transition to Veraflo VAC on next change to keep exposed adipose hydrated, it was not applied during this change as wound bed needed to achieve hemostasis first.  If Veraflo is applied on next change, patient can continue with Veraflo VAC while in-house, but then okay to transition back  to regular VAC for discharge.         Goals: Steady decrease in wound area and depth weekly.    NURSING PLAN OF CARE ORDERS:  Dressing changes: See Dressing Care orders    NUTRITION RECOMMENDATIONS   Wound Team Recommendations:  N/A    DIET ORDERS (From admission to next 24h)       Start     Ordered    12/19/24 2008  Diet Order Diet: Regular  ALL MEALS        Question:  Diet:  Answer:  Regular    12/19/24 2008                    PREVENTATIVE INTERVENTIONS:    Q shift Antony - performed per nursing policy  Q shift pressure point assessments - performed per nursing policy    Surface/Positioning  Low Airloss - Currently in Place    Offloading/Redistribution  Heel offloading dressing (Silicone dressing) - Applied this Visit  Heel float boots (Prevalon boot) - Applied this Visit    Respiratory  Silicone O2 tubing - Currently in Place  Gray Foam Ear protectors - Currently in Place    Anticipated discharge plans:  TBD        Vac Discharge Needs:  Vac Discharge plan is purely a recommendation from wound team and not a requirement for discharge unless otherwise stated by physician.  Regular Vac in use and continued at discharge

## 2024-12-21 NOTE — CARE PLAN
The patient is Stable - Low risk of patient condition declining or worsening    Shift Goals  Clinical Goals: monitor wound vac, monitor O2 demand and vs, provde prn meds for pain  Patient Goals: sleep  Family Goals: CLOTILDE    Progress made toward(s) clinical / shift goals:    Problem: Knowledge Deficit - Standard  Goal: Patient and family/care givers will demonstrate understanding of plan of care, disease process/condition, diagnostic tests and medications  Outcome: Progressing     Problem: Fall Risk  Goal: Patient will remain free from falls  Outcome: Progressing       Patient is not progressing towards the following goals:

## 2024-12-21 NOTE — CARE PLAN
The patient is Stable - Low risk of patient condition declining or worsening    Shift Goals  Clinical Goals: monitor surgical site, monitor WV, pulmonary hygiene  Patient Goals: rest  Family Goals: CLOTILDE    Progress made toward(s) clinical / shift goals:  The patients IS volume is 1000mL. WV remains CDI.     Patient is not progressing towards the following goals:

## 2024-12-21 NOTE — PROGRESS NOTES
"      Orthopaedic Progress Note    Interval changes:  Patient doing well post op  RLE dressings are CDI  Cleared for DC to rehab by ortho pending medicine clearance    ROS - Patient denies any new issues.  Pain well controlled.    /68   Pulse (!) 102   Temp 36.7 °C (98.1 °F) (Temporal)   Resp 16   Ht 1.82 m (5' 11.65\")   Wt (!) 161 kg (355 lb 6.1 oz)   SpO2 99%     Patient seen and examined  No acute distress  Breathing non labored  RRR  RLE vac dressing CDI without leak, DNVI, moves all toes, cap refill <2 sec .     Recent Labs     12/17/24  2352 12/19/24  1325 12/20/24  0028   WBC 6.0 4.5* 5.1   RBC 3.00* 2.86* 2.80*   HEMOGLOBIN 8.4* 7.8* 7.8*   HEMATOCRIT 26.8* 25.6* 26.1*   MCV 89.3 89.5 93.2   MCH 28.0 27.3 27.9   MCHC 31.3* 30.5* 29.9*   RDW 55.9* 57.2* 59.2*   PLATELETCT 198 172 170   MPV 8.6* 8.5* 8.6*       Active Hospital Problems    Diagnosis     Hypokalemia [E87.6]     Necrosis ([facility]) [I96]     Benzodiazepine dependence, continuous ([facility]) [F13.20]     Mood disorder ([facility]) [F39]     Class 3 severe obesity due to excess calories with serious comorbidity and body mass index (BMI) of 45.0 to 49.9 in adult ([facility]) [E66.813, Z68.42, E66.01]     SANTOSH on CPAP [G47.33]     COPD (chronic obstructive pulmonary disease) ([facility]) [J44.9]        Assessment/Plan:  Patient doing well post op  RLE dressings are CDI  Cleared for DC to rehab by ortho pending medicine clearance  POD#1 S/P:  1.  Excisional debridement of right leg wound including skin and subcutaneous tissue of wound measuring 20 x 10 cm.  2.  Evacuation of right leg subcutaneous hematoma.  3.  Application of wound VAC, right leg greater than 50 square cm.  Wt bearing status - WBAT  Wound care/Drains - Dressings to be changed every other day by nursing. Or PRN for saturation starting POD#2  Future Procedures - none planned    Sutures/Staples out- 14-21 days post operatively. Removal will completed by ortho mid levels only.  PT/OT-initiated  Antibiotics: " keflex 500mg po q6  DVT Prophylaxis- TEDS/SCDs/Foot pumps/xarleto  Cummings-not needed per ortho  Case Coordination for Discharge Planning - Disposition per therapy recs.

## 2024-12-21 NOTE — CARE PLAN
Problem: Bronchoconstriction  Goal: Improve in air movement and diminished wheezing  Description: Target End Date:  2 to 3 days    1.  Implement inhaled treatments  2.  Evaluate and manage medication effects  Outcome: Progressing   Duo qh6. Pt remains on CPAP.

## 2024-12-22 LAB
ANION GAP SERPL CALC-SCNC: 12 MMOL/L (ref 7–16)
BACTERIA SPEC ANAEROBE CULT: NORMAL
BASOPHILS # BLD AUTO: 0.8 % (ref 0–1.8)
BASOPHILS # BLD: 0.04 K/UL (ref 0–0.12)
BUN SERPL-MCNC: 19 MG/DL (ref 8–22)
CALCIUM SERPL-MCNC: 9.2 MG/DL (ref 8.5–10.5)
CHLORIDE SERPL-SCNC: 102 MMOL/L (ref 96–112)
CO2 SERPL-SCNC: 28 MMOL/L (ref 20–33)
CREAT SERPL-MCNC: 0.77 MG/DL (ref 0.5–1.4)
EOSINOPHIL # BLD AUTO: 0.13 K/UL (ref 0–0.51)
EOSINOPHIL NFR BLD: 2.7 % (ref 0–6.9)
ERYTHROCYTE [DISTWIDTH] IN BLOOD BY AUTOMATED COUNT: 59.8 FL (ref 35.9–50)
GFR SERPLBLD CREATININE-BSD FMLA CKD-EPI: 97 ML/MIN/1.73 M 2
GLUCOSE SERPL-MCNC: 88 MG/DL (ref 65–99)
HCT VFR BLD AUTO: 25.3 % (ref 42–52)
HGB BLD-MCNC: 7.7 G/DL (ref 14–18)
IMM GRANULOCYTES # BLD AUTO: 0.02 K/UL (ref 0–0.11)
IMM GRANULOCYTES NFR BLD AUTO: 0.4 % (ref 0–0.9)
LYMPHOCYTES # BLD AUTO: 0.85 K/UL (ref 1–4.8)
LYMPHOCYTES NFR BLD: 17.9 % (ref 22–41)
MCH RBC QN AUTO: 27.8 PG (ref 27–33)
MCHC RBC AUTO-ENTMCNC: 30.4 G/DL (ref 32.3–36.5)
MCV RBC AUTO: 91.3 FL (ref 81.4–97.8)
MONOCYTES # BLD AUTO: 0.41 K/UL (ref 0–0.85)
MONOCYTES NFR BLD AUTO: 8.6 % (ref 0–13.4)
NEUTROPHILS # BLD AUTO: 3.31 K/UL (ref 1.82–7.42)
NEUTROPHILS NFR BLD: 69.6 % (ref 44–72)
NRBC # BLD AUTO: 0 K/UL
NRBC BLD-RTO: 0 /100 WBC (ref 0–0.2)
PLATELET # BLD AUTO: 194 K/UL (ref 164–446)
PMV BLD AUTO: 8.8 FL (ref 9–12.9)
POTASSIUM SERPL-SCNC: 3.9 MMOL/L (ref 3.6–5.5)
RBC # BLD AUTO: 2.77 M/UL (ref 4.7–6.1)
SIGNIFICANT IND 70042: NORMAL
SITE SITE: NORMAL
SODIUM SERPL-SCNC: 142 MMOL/L (ref 135–145)
SOURCE SOURCE: NORMAL
WBC # BLD AUTO: 4.8 K/UL (ref 4.8–10.8)

## 2024-12-22 PROCEDURE — 700102 HCHG RX REV CODE 250 W/ 637 OVERRIDE(OP): Performed by: STUDENT IN AN ORGANIZED HEALTH CARE EDUCATION/TRAINING PROGRAM

## 2024-12-22 PROCEDURE — 94640 AIRWAY INHALATION TREATMENT: CPT

## 2024-12-22 PROCEDURE — 99231 SBSQ HOSP IP/OBS SF/LOW 25: CPT | Performed by: STUDENT IN AN ORGANIZED HEALTH CARE EDUCATION/TRAINING PROGRAM

## 2024-12-22 PROCEDURE — 700101 HCHG RX REV CODE 250: Performed by: STUDENT IN AN ORGANIZED HEALTH CARE EDUCATION/TRAINING PROGRAM

## 2024-12-22 PROCEDURE — 80048 BASIC METABOLIC PNL TOTAL CA: CPT

## 2024-12-22 PROCEDURE — A9270 NON-COVERED ITEM OR SERVICE: HCPCS | Performed by: STUDENT IN AN ORGANIZED HEALTH CARE EDUCATION/TRAINING PROGRAM

## 2024-12-22 PROCEDURE — 700111 HCHG RX REV CODE 636 W/ 250 OVERRIDE (IP): Performed by: STUDENT IN AN ORGANIZED HEALTH CARE EDUCATION/TRAINING PROGRAM

## 2024-12-22 PROCEDURE — 94669 MECHANICAL CHEST WALL OSCILL: CPT

## 2024-12-22 PROCEDURE — 700111 HCHG RX REV CODE 636 W/ 250 OVERRIDE (IP): Mod: JZ | Performed by: STUDENT IN AN ORGANIZED HEALTH CARE EDUCATION/TRAINING PROGRAM

## 2024-12-22 PROCEDURE — 770001 HCHG ROOM/CARE - MED/SURG/GYN PRIV*

## 2024-12-22 PROCEDURE — 36415 COLL VENOUS BLD VENIPUNCTURE: CPT

## 2024-12-22 PROCEDURE — 94660 CPAP INITIATION&MGMT: CPT

## 2024-12-22 PROCEDURE — 85025 COMPLETE CBC W/AUTO DIFF WBC: CPT

## 2024-12-22 RX ADMIN — MOMETASONE FUROATE AND FORMOTEROL FUMARATE DIHYDRATE 2 PUFF: 200; 5 AEROSOL RESPIRATORY (INHALATION) at 06:18

## 2024-12-22 RX ADMIN — LINEZOLID 600 MG: 600 TABLET, FILM COATED ORAL at 06:20

## 2024-12-22 RX ADMIN — GUAIFENESIN 600 MG: 600 TABLET, EXTENDED RELEASE ORAL at 16:42

## 2024-12-22 RX ADMIN — GEMFIBROZIL 600 MG: 600 TABLET ORAL at 22:48

## 2024-12-22 RX ADMIN — TAMSULOSIN HYDROCHLORIDE 0.4 MG: 0.4 CAPSULE ORAL at 06:19

## 2024-12-22 RX ADMIN — QUETIAPINE FUMARATE 100 MG: 100 TABLET ORAL at 16:43

## 2024-12-22 RX ADMIN — QUETIAPINE FUMARATE 100 MG: 100 TABLET ORAL at 06:20

## 2024-12-22 RX ADMIN — ATORVASTATIN CALCIUM 20 MG: 20 TABLET, FILM COATED ORAL at 22:03

## 2024-12-22 RX ADMIN — Medication 220 MG: at 06:19

## 2024-12-22 RX ADMIN — GUAIFENESIN 600 MG: 600 TABLET, EXTENDED RELEASE ORAL at 06:20

## 2024-12-22 RX ADMIN — OXYCODONE HYDROCHLORIDE AND ACETAMINOPHEN 1000 MG: 500 TABLET ORAL at 06:19

## 2024-12-22 RX ADMIN — METOPROLOL SUCCINATE 100 MG: 100 TABLET, EXTENDED RELEASE ORAL at 06:21

## 2024-12-22 RX ADMIN — THEOPHYLLINE 300 MG: 300 TABLET, EXTENDED RELEASE ORAL at 12:54

## 2024-12-22 RX ADMIN — THEOPHYLLINE 300 MG: 300 TABLET, EXTENDED RELEASE ORAL at 22:48

## 2024-12-22 RX ADMIN — HYDROCODONE BITARTRATE AND ACETAMINOPHEN 1 TABLET: 5; 325 TABLET ORAL at 22:06

## 2024-12-22 RX ADMIN — TIOTROPIUM BROMIDE INHALATION SPRAY 5 MCG: 3.12 SPRAY, METERED RESPIRATORY (INHALATION) at 06:19

## 2024-12-22 RX ADMIN — RIVAROXABAN 20 MG: 20 TABLET, FILM COATED ORAL at 16:42

## 2024-12-22 RX ADMIN — IPRATROPIUM BROMIDE AND ALBUTEROL SULFATE 3 ML: .5; 2.5 SOLUTION RESPIRATORY (INHALATION) at 11:49

## 2024-12-22 RX ADMIN — HYDROCODONE BITARTRATE AND ACETAMINOPHEN 1 TABLET: 5; 325 TABLET ORAL at 09:57

## 2024-12-22 RX ADMIN — ONDANSETRON 4 MG: 2 INJECTION INTRAMUSCULAR; INTRAVENOUS at 22:16

## 2024-12-22 RX ADMIN — FLUOXETINE HYDROCHLORIDE 40 MG: 20 CAPSULE ORAL at 06:19

## 2024-12-22 RX ADMIN — CLONAZEPAM 1 MG: 1 TABLET ORAL at 06:20

## 2024-12-22 RX ADMIN — MOMETASONE FUROATE AND FORMOTEROL FUMARATE DIHYDRATE 2 PUFF: 200; 5 AEROSOL RESPIRATORY (INHALATION) at 16:43

## 2024-12-22 RX ADMIN — FUROSEMIDE 40 MG: 10 INJECTION INTRAMUSCULAR; INTRAVENOUS at 06:21

## 2024-12-22 RX ADMIN — CLONAZEPAM 1 MG: 1 TABLET ORAL at 16:42

## 2024-12-22 RX ADMIN — LINEZOLID 600 MG: 600 TABLET, FILM COATED ORAL at 16:42

## 2024-12-22 RX ADMIN — SPIRONOLACTONE 25 MG: 50 TABLET ORAL at 06:20

## 2024-12-22 RX ADMIN — GEMFIBROZIL 600 MG: 600 TABLET ORAL at 12:54

## 2024-12-22 RX ADMIN — HYDROCODONE BITARTRATE AND ACETAMINOPHEN 1 TABLET: 5; 325 TABLET ORAL at 02:20

## 2024-12-22 ASSESSMENT — ENCOUNTER SYMPTOMS
BACK PAIN: 0
ABDOMINAL PAIN: 0
PALPITATIONS: 0
FOCAL WEAKNESS: 0
SENSORY CHANGE: 0
BLURRED VISION: 0
MYALGIAS: 1
FEVER: 0
EYE PAIN: 0
NAUSEA: 0
HEADACHES: 0
CHILLS: 0
SHORTNESS OF BREATH: 0
DIZZINESS: 0
INSOMNIA: 0
VOMITING: 0
COUGH: 0

## 2024-12-22 ASSESSMENT — PAIN DESCRIPTION - PAIN TYPE
TYPE: ACUTE PAIN;CHRONIC PAIN
TYPE: ACUTE PAIN
TYPE: ACUTE PAIN;CHRONIC PAIN
TYPE: ACUTE PAIN

## 2024-12-22 ASSESSMENT — LIFESTYLE VARIABLES: SUBSTANCE_ABUSE: 0

## 2024-12-22 NOTE — DISCHARGE PLANNING
"Case Management Discharge Planning    Admission Date: 12/17/2024  GMLOS: 6.9  ALOS: 4    Anticipated Discharge Dispo: Discharge Disposition: D/T to SNF with Medicare cert in anticipation of skilled care (03)  Discharge Address: [address] [place] [address]  Discharge Contact Phone Number: [phone]      Action(s) Taken: Received a forwarded email from JORDYN Leos CM, [facility] return, stating the following, \"Our concern is his follow up appointments. We were hoping he could be placed in [place] or even our sister facility in [place] until he has had all of his follow ups.\"    RN CM sent local blanket snf referrals and to [facility]. Also emailed the referral to [facility] per her request in the email.            "

## 2024-12-22 NOTE — CARE PLAN
The patient is Stable - Low risk of patient condition declining or worsening    Shift Goals  Clinical Goals: Patient to participate in OOB activity  Patient Goals: Rest  Family Goals: CLOTILDE    Progress made toward(s) clinical / shift goals:  Patient OOB with staff throughout shift.

## 2024-12-22 NOTE — PROGRESS NOTES
"      Orthopaedic Progress Note    Interval changes:  Patient doing well  RLE dressings are CDI  Cleared for DC to rehab by ortho pending medicine clearance    ROS - Patient denies any new issues.  Pain well controlled.    BP 99/69   Pulse (!) 103   Temp 36.4 °C (97.5 °F) (Temporal)   Resp 14   Ht 1.82 m (5' 11.65\")   Wt (!) 161 kg (355 lb 6.1 oz)   SpO2 97%     Patient seen and examined  No acute distress  Breathing non labored  RRR  RLE vac dressing CDI without leak, DNVI, moves all toes, cap refill <2 sec .     Recent Labs     12/19/24  1325 12/20/24  0028 12/21/24  0029   WBC 4.5* 5.1 4.9   RBC 2.86* 2.80* 2.70*   HEMOGLOBIN 7.8* 7.8* 7.4*   HEMATOCRIT 25.6* 26.1* 24.0*   MCV 89.5 93.2 88.9   MCH 27.3 27.9 27.4   MCHC 30.5* 29.9* 30.8*   RDW 57.2* 59.2* 57.1*   PLATELETCT 172 170 165   MPV 8.5* 8.6* 8.9*       Active Hospital Problems    Diagnosis     Hypokalemia [E87.6]     Necrosis ([facility]) [I96]     Benzodiazepine dependence, continuous ([facility]) [F13.20]     Mood disorder ([facility]) [F39]     Class 3 severe obesity due to excess calories with serious comorbidity and body mass index (BMI) of 45.0 to 49.9 in adult ([facility]) [E66.813, Z68.42, E66.01]     SANTOSH on CPAP [G47.33]     COPD (chronic obstructive pulmonary disease) ([facility]) [J44.9]        Assessment/Plan:  Patient doing well   RLE dressings are CDI  Cleared for DC to rehab by ortho pending medicine clearance  POD#2 S/P:  1.  Excisional debridement of right leg wound including skin and subcutaneous tissue of wound measuring 20 x 10 cm.  2.  Evacuation of right leg subcutaneous hematoma.  3.  Application of wound VAC, right leg greater than 50 square cm.  Wt bearing status - WBAT  Wound care/Drains - Dressings to be changed every other day by nursing. Or PRN for saturation starting POD#2  Future Procedures - none planned    Sutures/Staples out- 14-21 days post operatively. Removal will completed by ortho mid levels only.  PT/OT-initiated  Antibiotics: keflex 500mg po " q6  DVT Prophylaxis- TEDS/SCDs/Foot pumps/xarleto  Cummings-not needed per ortho

## 2024-12-22 NOTE — PROGRESS NOTES
Received report from previous shift RN  Assessment complete.  A&O x 4. Patient calls appropriately.  Patient ambulates with x1 assist. Bed alarm on.   Patient has 7/10 pain. Pain managed with prescribed medications.  Denies N&V. Tolerating diet.  Skin per flowsheets.  + void, + flatus, + BM.  Patient denies SOB.  SCD's refused.  Patient pleasant and cooperative with plan of care.  Review plan with of care with patient. Call light and personal belongings with in reach. Hourly rounding in place. All needs met at this time.

## 2024-12-22 NOTE — PROGRESS NOTES
"Received report from previous shift RN at 1900.  Assessment complete.  A&O x 4. Patient calls appropriately.  Patient ambulates with standby assist and FWW. Bed alarm on.   Patient has 8/10 pain. Pain managed with prescribed medications per MAR.  Denies N&V. Tolerating regular diet.  Skin per flowsheets.  + void, + flatus, + BM on 12/21.  Patient denies SOB on 1L O2 via NC.  /77   Pulse 94   Temp 36.6 °C (97.9 °F) (Temporal)   Resp 14   Ht 1.82 m (5' 11.65\")   Wt (!) 161 kg (355 lb 6.1 oz)   SpO2 99%   BMI 48.66 kg/m²     Patient pleasant and cooperative throughout assessment.  Reviewed plan of care with patient, pt verbalizes understanding. Call light and personal belongings with in reach. Hourly rounding in place. All needs met at this time.    "

## 2024-12-22 NOTE — PROGRESS NOTES
Hospital Medicine Daily Progress Note    Date of Service  12/22/2024    Chief Complaint  Henry Kumari is a 69 y.o. male admitted 12/17/2024 with leg wound.    Hospital Course    Henry Kumari is a 69 y.o. male with atrial fibrillation, COPD, CHF, history of DVT/PE, hypertension, chronic low back pain, morbid obesity, SANTOSH, viral hepatitis C, history of pancreatitis, bipolar 2 disorder, BPH, hyperlipidemia who presented 12/17/2024 with necrotic wound.     Reportedly patient suffered a wound on his right leg when he bumped on a trailer hitch causing a large hematoma.  He presented to OSH ED at the time and was sent home.  Patient was staying at [facility] in [place] since May 2024.  Nursing staff and patient noted that his wound has been not healing, concerns for necrosis, presented to OSH.  He has been experiencing generalized fatigue and malaise, some intermittent right lower extremity pain to his wound.he has been experiencing some nausea.  He denies any fevers or chills headache or vision changes chest pain dyspnea cough abdominal pain vomiting dysuria or diarrhea.     Presented to OSH and general surgery consulted, surgeon stated they did not have the resources they are to properly treat patient and that patient may likely need plastic surgery consultation and transfer to [facility] as recommended.  Patient was subsequently accepted for transfer, arrives here in stable condition, afebrile, pulse 104 respiratory rate 18 blood pressure 137/91 pulse ox 98% on 2 L nasal cannula.    Interval Problem Update  No acute events overnight.  Patient feeling well.  Hgb stable, tolerating home xarelto for hx a fib.  Patient is medically cleared.   will clarify with [facility] tomorrow if they can manage his wound vac there at [facility].      I have discussed this patient's plan of care and discharge plan at IDT rounds today with Case Management, Nursing, Nursing leadership, and other members of the IDT  team.    Consultants/Specialty  plastic surgery    Code Status  Full Code    Disposition  Medically Cleared  I have placed the appropriate orders for post-discharge needs.    Review of Systems  Review of Systems   Constitutional:  Negative for chills and fever.   Eyes:  Negative for blurred vision and pain.   Respiratory:  Negative for cough and shortness of breath.    Cardiovascular:  Negative for chest pain, palpitations and leg swelling.   Gastrointestinal:  Negative for abdominal pain, nausea and vomiting.   Genitourinary:  Negative for dysuria and urgency.   Musculoskeletal:  Positive for myalgias. Negative for back pain.   Skin:  Negative for itching and rash.   Neurological:  Negative for dizziness, sensory change, focal weakness and headaches.   Psychiatric/Behavioral:  Negative for substance abuse. The patient does not have insomnia.         Physical Exam  Temp:  [36.4 °C (97.5 °F)-36.7 °C (98.1 °F)] 36.5 °C (97.7 °F)  Pulse:  [] 100  Resp:  [14-18] 16  BP: ()/(67-77) 95/70  SpO2:  [91 %-99 %] 95 %    Physical Exam  Constitutional:       General: He is not in acute distress.     Appearance: He is not ill-appearing.   HENT:      Head: Normocephalic and atraumatic.      Right Ear: External ear normal.      Left Ear: External ear normal.      Mouth/Throat:      Pharynx: No oropharyngeal exudate or posterior oropharyngeal erythema.   Eyes:      Extraocular Movements: Extraocular movements intact.      Pupils: Pupils are equal, round, and reactive to light.   Cardiovascular:      Rate and Rhythm: Normal rate and regular rhythm.      Pulses: Normal pulses.      Heart sounds: Normal heart sounds.   Pulmonary:      Effort: Pulmonary effort is normal.      Breath sounds: Normal breath sounds.   Abdominal:      General: Bowel sounds are normal. There is no distension.      Palpations: Abdomen is soft.      Tenderness: There is no abdominal tenderness. There is no guarding.   Musculoskeletal:          General: Swelling present. No tenderness.      Cervical back: Normal range of motion and neck supple.      Right lower leg: Edema present.      Left lower leg: No edema.   Skin:     General: Skin is warm and dry.   Neurological:      General: No focal deficit present.      Mental Status: He is oriented to person, place, and time.      Cranial Nerves: No cranial nerve deficit.      Sensory: No sensory deficit.      Motor: No weakness.   Psychiatric:         Mood and Affect: Mood normal.         Behavior: Behavior normal.         Fluids    Intake/Output Summary (Last 24 hours) at 12/22/2024 1413  Last data filed at 12/22/2024 0711  Gross per 24 hour   Intake 120 ml   Output 1292 ml   Net -1172 ml          Laboratory  Recent Labs     12/20/24  0028 12/21/24  0029 12/22/24 0217   WBC 5.1 4.9 4.8   RBC 2.80* 2.70* 2.77*   HEMOGLOBIN 7.8* 7.4* 7.7*   HEMATOCRIT 26.1* 24.0* 25.3*   MCV 93.2 88.9 91.3   MCH 27.9 27.4 27.8   MCHC 29.9* 30.8* 30.4*   RDW 59.2* 57.1* 59.8*   PLATELETCT 170 165 194   MPV 8.6* 8.9* 8.8*     Recent Labs     12/20/24 0028 12/21/24  0029 12/22/24 0217   SODIUM 143 142 142   POTASSIUM 3.7 3.3* 3.9   CHLORIDE 103 102 102   CO2 24 29 28   GLUCOSE 108* 97 88   BUN 10 21 19   CREATININE 0.74 0.97 0.77   CALCIUM 8.7 9.0 9.2                     Imaging  DX-CHEST-PORTABLE (1 VIEW)   Final Result      1.  Bibasilar and perihilar underinflation atelectasis which could obscure an additional process.   2.  Persistently enlarged cardiac silhouette      CT-EXTREMITY, LOWER WITH RIGHT   Final Result      1.  Large ulcerated subcutaneous wound in the anterior medial aspect of the right lower leg.      2.  Large adjacent subcutaneous abscess, hematoma, or seroma adjacent to the area of soft tissue ulceration.      3.  No evidence of acute or chronic osteomyelitis.      4.  Severe tricompartmental osteoarthritis of the right knee.      US-GLORIA SINGLE LEVEL BILAT   Final Result      IR-US GUIDED PIV    (Results  Pending)   IR-US GUIDED PIV    (Results Pending)        Assessment/Plan  * Necrosis (HCC)- (present on admission)  Assessment & Plan  Large necrotic wound on right shin->calf, supposedly started with bumping a trailor hitch and a hematoma 10 days prior to presentation, seen at OSH at that time.  CT LE shows necrotic skin tissue with adjacent fluid collection, likely hematoma/seroma  Plastic surgery recommend debridement of necrotic skin and can assist with reconstructive surgery when wound is ready  S/p surgical debridement by ortho 12/19, wound vac on  Wound vac change today; continue wound care; will need wound vac on discharge  On keflex    Hypokalemia  Assessment & Plan  P.o. replacement ordered    Benzodiazepine dependence, continuous (HCC)- (present on admission)  Assessment & Plan  Continue home Klonopin    Mood disorder (HCC)- (present on admission)  Assessment & Plan  Continue Prozac, Seroquel    Class 3 severe obesity due to excess calories with serious comorbidity and body mass index (BMI) of 45.0 to 49.9 in adult (HCC)- (present on admission)  Assessment & Plan  Consider referral to bariatric surgery on discharge, consider trial of GLP-1 inhibitors as outpatient.    SANTOSH on CPAP- (present on admission)  Assessment & Plan  Nightly cpap ordered    COPD (chronic obstructive pulmonary disease) (HCC)- (present on admission)  Assessment & Plan  Not in acute exacerbation.  Continue home theophylline, Dulera, Spiriva.  DuoNebs q6h prn         VTE prophylaxis: VTE Selection    I have performed a physical exam and reviewed and updated ROS and Plan today (12/22/2024). In review of yesterday's note (12/21/2024), there are no changes except as documented above.

## 2024-12-23 LAB
BACTERIA BLD CULT: NORMAL
BACTERIA BLD CULT: NORMAL
BACTERIA TISS AEROBE CULT: ABNORMAL
GRAM STN SPEC: ABNORMAL
SIGNIFICANT IND 70042: ABNORMAL
SIGNIFICANT IND 70042: NORMAL
SIGNIFICANT IND 70042: NORMAL
SITE SITE: ABNORMAL
SITE SITE: NORMAL
SITE SITE: NORMAL
SOURCE SOURCE: ABNORMAL
SOURCE SOURCE: NORMAL
SOURCE SOURCE: NORMAL

## 2024-12-23 PROCEDURE — A9270 NON-COVERED ITEM OR SERVICE: HCPCS | Performed by: STUDENT IN AN ORGANIZED HEALTH CARE EDUCATION/TRAINING PROGRAM

## 2024-12-23 PROCEDURE — 700102 HCHG RX REV CODE 250 W/ 637 OVERRIDE(OP): Performed by: STUDENT IN AN ORGANIZED HEALTH CARE EDUCATION/TRAINING PROGRAM

## 2024-12-23 PROCEDURE — 700111 HCHG RX REV CODE 636 W/ 250 OVERRIDE (IP): Mod: JZ

## 2024-12-23 PROCEDURE — 770001 HCHG ROOM/CARE - MED/SURG/GYN PRIV*

## 2024-12-23 PROCEDURE — 94660 CPAP INITIATION&MGMT: CPT

## 2024-12-23 PROCEDURE — 700111 HCHG RX REV CODE 636 W/ 250 OVERRIDE (IP): Mod: JZ | Performed by: STUDENT IN AN ORGANIZED HEALTH CARE EDUCATION/TRAINING PROGRAM

## 2024-12-23 PROCEDURE — 99232 SBSQ HOSP IP/OBS MODERATE 35: CPT | Performed by: STUDENT IN AN ORGANIZED HEALTH CARE EDUCATION/TRAINING PROGRAM

## 2024-12-23 RX ORDER — PROCHLORPERAZINE EDISYLATE 5 MG/ML
10 INJECTION INTRAMUSCULAR; INTRAVENOUS ONCE
Status: COMPLETED | OUTPATIENT
Start: 2024-12-23 | End: 2024-12-23

## 2024-12-23 RX ADMIN — FLUOXETINE HYDROCHLORIDE 40 MG: 20 CAPSULE ORAL at 04:50

## 2024-12-23 RX ADMIN — THEOPHYLLINE 300 MG: 300 TABLET, EXTENDED RELEASE ORAL at 21:17

## 2024-12-23 RX ADMIN — OXYCODONE HYDROCHLORIDE AND ACETAMINOPHEN 1000 MG: 500 TABLET ORAL at 04:50

## 2024-12-23 RX ADMIN — ATORVASTATIN CALCIUM 20 MG: 20 TABLET, FILM COATED ORAL at 21:10

## 2024-12-23 RX ADMIN — GEMFIBROZIL 600 MG: 600 TABLET ORAL at 21:17

## 2024-12-23 RX ADMIN — Medication 220 MG: at 04:50

## 2024-12-23 RX ADMIN — HYDROCODONE BITARTRATE AND ACETAMINOPHEN 1 TABLET: 5; 325 TABLET ORAL at 13:28

## 2024-12-23 RX ADMIN — MOMETASONE FUROATE AND FORMOTEROL FUMARATE DIHYDRATE 2 PUFF: 200; 5 AEROSOL RESPIRATORY (INHALATION) at 04:52

## 2024-12-23 RX ADMIN — METOPROLOL SUCCINATE 100 MG: 100 TABLET, EXTENDED RELEASE ORAL at 06:36

## 2024-12-23 RX ADMIN — THEOPHYLLINE 300 MG: 300 TABLET, EXTENDED RELEASE ORAL at 09:45

## 2024-12-23 RX ADMIN — LINEZOLID 600 MG: 600 TABLET, FILM COATED ORAL at 18:25

## 2024-12-23 RX ADMIN — GUAIFENESIN 600 MG: 600 TABLET, EXTENDED RELEASE ORAL at 04:50

## 2024-12-23 RX ADMIN — GEMFIBROZIL 600 MG: 600 TABLET ORAL at 09:45

## 2024-12-23 RX ADMIN — TAMSULOSIN HYDROCHLORIDE 0.4 MG: 0.4 CAPSULE ORAL at 04:50

## 2024-12-23 RX ADMIN — GUAIFENESIN 600 MG: 600 TABLET, EXTENDED RELEASE ORAL at 18:25

## 2024-12-23 RX ADMIN — ACETAMINOPHEN 650 MG: 325 TABLET ORAL at 23:51

## 2024-12-23 RX ADMIN — MOMETASONE FUROATE AND FORMOTEROL FUMARATE DIHYDRATE 2 PUFF: 200; 5 AEROSOL RESPIRATORY (INHALATION) at 21:11

## 2024-12-23 RX ADMIN — MORPHINE SULFATE 4 MG: 4 INJECTION, SOLUTION INTRAMUSCULAR; INTRAVENOUS at 00:24

## 2024-12-23 RX ADMIN — TIOTROPIUM BROMIDE INHALATION SPRAY 5 MCG: 3.12 SPRAY, METERED RESPIRATORY (INHALATION) at 04:52

## 2024-12-23 RX ADMIN — HYDROCODONE BITARTRATE AND ACETAMINOPHEN 1 TABLET: 5; 325 TABLET ORAL at 21:10

## 2024-12-23 RX ADMIN — CLONAZEPAM 1 MG: 1 TABLET ORAL at 04:51

## 2024-12-23 RX ADMIN — LINEZOLID 600 MG: 600 TABLET, FILM COATED ORAL at 04:51

## 2024-12-23 RX ADMIN — PROCHLORPERAZINE EDISYLATE 10 MG: 5 INJECTION INTRAMUSCULAR; INTRAVENOUS at 00:41

## 2024-12-23 RX ADMIN — RIVAROXABAN 20 MG: 20 TABLET, FILM COATED ORAL at 18:25

## 2024-12-23 RX ADMIN — QUETIAPINE FUMARATE 100 MG: 100 TABLET ORAL at 06:36

## 2024-12-23 RX ADMIN — CLONAZEPAM 1 MG: 1 TABLET ORAL at 18:25

## 2024-12-23 RX ADMIN — QUETIAPINE FUMARATE 100 MG: 100 TABLET ORAL at 18:25

## 2024-12-23 RX ADMIN — HYDROCODONE BITARTRATE AND ACETAMINOPHEN 1 TABLET: 5; 325 TABLET ORAL at 04:50

## 2024-12-23 ASSESSMENT — PAIN DESCRIPTION - PAIN TYPE
TYPE: ACUTE PAIN

## 2024-12-23 ASSESSMENT — ENCOUNTER SYMPTOMS
SENSORY CHANGE: 0
SHORTNESS OF BREATH: 0
DIZZINESS: 0
PALPITATIONS: 0
FOCAL WEAKNESS: 0
MYALGIAS: 1
HEADACHES: 0
CHILLS: 0
FEVER: 0
ABDOMINAL PAIN: 0
VOMITING: 0
EYE PAIN: 0
BLURRED VISION: 0
COUGH: 0
BACK PAIN: 0
INSOMNIA: 0
NAUSEA: 0

## 2024-12-23 ASSESSMENT — LIFESTYLE VARIABLES: SUBSTANCE_ABUSE: 0

## 2024-12-23 NOTE — CARE PLAN
Problem: Knowledge Deficit - Standard  Goal: Patient and family/care givers will demonstrate understanding of plan of care, disease process/condition, diagnostic tests and medications  Outcome: Progressing     Problem: Pain - Standard  Goal: Alleviation of pain or a reduction in pain to the patient’s comfort goal  Outcome: Progressing     Problem: Skin Integrity  Goal: Skin integrity is maintained or improved  Outcome: Progressing     The patient is Stable - Low risk of patient condition declining or worsening    Shift Goals  Clinical Goals: WV mgmt, pain control  Patient Goals: pain control, sleep  Family Goals: CLOTILDE    Progress made toward(s) clinical / shift goals:  Pain managed per MAR with reported relief and sleep throughout night. Medicated for nausea per MAR, one episode of emesis. Wound Vac to RLE with sanguineous output.     Patient is not progressing towards the following goals:

## 2024-12-23 NOTE — PROGRESS NOTES
"      Orthopaedic Progress Note    Interval changes:  Patient doing well  RLE dressings are CDI  Cleared for DC to rehab by ortho pending medicine clearance    ROS - Patient denies any new issues.  Pain well controlled.    BP 95/70   Pulse 100   Temp 36.5 °C (97.7 °F) (Temporal)   Resp 16   Ht 1.82 m (5' 11.65\")   Wt (!) 161 kg (355 lb 6.1 oz)   SpO2 95%     Patient seen and examined  No acute distress  Breathing non labored  RRR  RLE vac dressing CDI without leak, DNVI, moves all toes, cap refill <2 sec .     Recent Labs     12/20/24  0028 12/21/24  0029 12/22/24  0217   WBC 5.1 4.9 4.8   RBC 2.80* 2.70* 2.77*   HEMOGLOBIN 7.8* 7.4* 7.7*   HEMATOCRIT 26.1* 24.0* 25.3*   MCV 93.2 88.9 91.3   MCH 27.9 27.4 27.8   MCHC 29.9* 30.8* 30.4*   RDW 59.2* 57.1* 59.8*   PLATELETCT 170 165 194   MPV 8.6* 8.9* 8.8*       Active Hospital Problems    Diagnosis     Hypokalemia [E87.6]     Necrosis ([facility]) [I96]     Benzodiazepine dependence, continuous ([facility]) [F13.20]     Mood disorder ([facility]) [F39]     Class 3 severe obesity due to excess calories with serious comorbidity and body mass index (BMI) of 45.0 to 49.9 in adult ([facility]) [E66.813, Z68.42, E66.01]     SANTOSH on CPAP [G47.33]     COPD (chronic obstructive pulmonary disease) ([facility]) [J44.9]        Assessment/Plan:  Patient doing well   RLE dressings are CDI  Cleared for DC to rehab by ortho pending medicine clearance  POD#3 S/P:  1.  Excisional debridement of right leg wound including skin and subcutaneous tissue of wound measuring 20 x 10 cm.  2.  Evacuation of right leg subcutaneous hematoma.  3.  Application of wound VAC, right leg greater than 50 square cm.  Wt bearing status - WBAT  Wound care/Drains - Dressings to be changed every other day by nursing. Or PRN for saturation starting POD#2  Future Procedures - none planned    Sutures/Staples out- 14-21 days post operatively. Removal will completed by ortho mid levels only.  PT/OT-initiated  Antibiotics: keflex 500mg po " q6  DVT Prophylaxis- TEDS/SCDs/Foot pumps/xarleto  [place]umm[place]-not needed per ortho

## 2024-12-23 NOTE — PROGRESS NOTES
Virtual Nurse rounding complete.    Round Needs: Pain Rating 7/10 and Notified of Patient Needs: bedside RN

## 2024-12-23 NOTE — DISCHARGE PLANNING
-[phone]  Per LMSW, DPA manually faxed SNF referral [facility].

## 2024-12-23 NOTE — PROGRESS NOTES
Bedside report received.  Assessment complete.  A&O x 4. Patient calls appropriately.  Patient ambulates with x1 with FWW assist. Bed alarm on.   Patient has 6/10 pain. Patient medicated per MAR.  Denies N&V. Tolerating regular diet.  Surgical RLE WV with ace wrap.  + void, - flatus, + BM.  Patient denies SOB.  SCD's refused.  Review plan with of care with patient. Call light and personal belongings within reach. Hourly rounding in place. All needs met at this time.

## 2024-12-23 NOTE — DISCHARGE PLANNING
Care Transition Team Discharge Planning    Anticipated Discharge Information  Discharge Disposition: D/T to SNF with Medicare cert in anticipation of skilled care (03)  Discharge Address: [address] [place] [address]  Discharge Contact Phone Number: [phone]        Discharge Plan:  PASRR: [id number]

## 2024-12-23 NOTE — DISCHARGE PLANNING
Case Management Discharge Planning    Admission Date: 12/17/2024  GMLOS: 6.9  ALOS: 6    6-Clicks ADL Score: 17  6-Clicks Mobility Score: 20  PT and/or OT Eval ordered: Yes  Post-acute Referrals Ordered: Yes  Post-acute Choice Obtained: Yes  Has referral(s) been sent to post-acute provider:  Yes      Anticipated Discharge Dispo: Discharge Disposition: D/T to SNF with Medicare cert in anticipation of skilled care (03)  Discharge Address: [facility] [place] [address]  Discharge Contact Phone Number: [phone]    DME Needed: No    Action(s) Taken: LMSW spoke to [facility] regarding follow up appointments. LMSW informed [facility] that there is only one ortho appointment to get the staples removed. Hunter reported that there are more f/u appointments and she has been in communication with Dafne ARANGO via email regarding it. [facility] reported that she would like to see if pt can go to their sister facility in [place]. OHSW called [facility], requesting call back from admissions.     1313  Received call back from Omaira at [facility]. Omaira is requesting hard fax referral. Requested DPA to send it.   Omaira informed OHSW that she is unable to take pt until 12/26 if he is accepted.     Escalations Completed: None    Medically Clear:Yes    Next Steps: LMSW to assist with DC needs/barriers.    Barriers to Discharge: Pending Placement    Is the patient up for discharge tomorrow: Yes    Is transport arranged for discharge disposition: No         Given to dr. Eyad Barrios to address

## 2024-12-23 NOTE — CARE PLAN
The patient is Stable - Low risk of patient condition declining or worsening    Shift Goals  Clinical Goals: monitor WV, wean O2, OOB activity, skin integrity  Patient Goals: pain control, rest  Family Goals: CLOTILDE    Progress made toward(s) clinical / shift goals:      Patient's pain will be < 5 for duration of shift with use of PRN and scheduled pain medication in addition to heat packs and rest. Patient's skin integrity will be maintained with routine wound care RN interventions, 2 RN skin assessment, and frequent turns as able.     Problem: Pain - Standard  Goal: Alleviation of pain or a reduction in pain to the patient’s comfort goal  Outcome: Progressing     Problem: Skin Integrity  Goal: Skin integrity is maintained or improved  Outcome: Progressing

## 2024-12-23 NOTE — PROGRESS NOTES
Assumed care of patient at 1900. Bedside report received. Assessment complete.    A&O x4. Patient calls appropriately.  Reports 6/10 pain. Pain managed with prescribed medications per MAR.  Denies SOB. O2 sats >90% on RA. CPAP at night.  Tolerating regular diet. Medicated for nausea.  + void. + flatus. Last BM 12/21.  Wound Vac to RLE.  Skin per flowsheets  Mobility: x1 FWW    Reviewed plan of care with patient. All needs met at this time. Call light and belongings within reach. Fall precautions in place. Hourly rounding in place.

## 2024-12-24 LAB
ALBUMIN SERPL BCP-MCNC: 3.3 G/DL (ref 3.2–4.9)
ALBUMIN/GLOB SERPL: 1 G/DL
ALP SERPL-CCNC: 173 U/L (ref 30–99)
ALT SERPL-CCNC: <5 U/L (ref 2–50)
ANION GAP SERPL CALC-SCNC: 12 MMOL/L (ref 7–16)
AST SERPL-CCNC: 9 U/L (ref 12–45)
BILIRUB SERPL-MCNC: 1.1 MG/DL (ref 0.1–1.5)
BUN SERPL-MCNC: 18 MG/DL (ref 8–22)
CALCIUM ALBUM COR SERPL-MCNC: 9.4 MG/DL (ref 8.5–10.5)
CALCIUM SERPL-MCNC: 8.8 MG/DL (ref 8.5–10.5)
CHLORIDE SERPL-SCNC: 104 MMOL/L (ref 96–112)
CO2 SERPL-SCNC: 26 MMOL/L (ref 20–33)
CREAT SERPL-MCNC: 0.86 MG/DL (ref 0.5–1.4)
FERRITIN SERPL-MCNC: 196 NG/ML (ref 22–322)
GFR SERPLBLD CREATININE-BSD FMLA CKD-EPI: 94 ML/MIN/1.73 M 2
GLOBULIN SER CALC-MCNC: 3.2 G/DL (ref 1.9–3.5)
GLUCOSE SERPL-MCNC: 87 MG/DL (ref 65–99)
IRON SATN MFR SERPL: 67 % (ref 15–55)
IRON SERPL-MCNC: 170 UG/DL (ref 50–180)
POTASSIUM SERPL-SCNC: 4.3 MMOL/L (ref 3.6–5.5)
PROT SERPL-MCNC: 6.5 G/DL (ref 6–8.2)
SODIUM SERPL-SCNC: 142 MMOL/L (ref 135–145)
TIBC SERPL-MCNC: 252 UG/DL (ref 250–450)
UIBC SERPL-MCNC: 82 UG/DL (ref 110–370)

## 2024-12-24 PROCEDURE — 36415 COLL VENOUS BLD VENIPUNCTURE: CPT

## 2024-12-24 PROCEDURE — 82728 ASSAY OF FERRITIN: CPT

## 2024-12-24 PROCEDURE — 97606 NEG PRS WND THER DME>50 SQCM: CPT

## 2024-12-24 PROCEDURE — 770001 HCHG ROOM/CARE - MED/SURG/GYN PRIV*

## 2024-12-24 PROCEDURE — 83550 IRON BINDING TEST: CPT

## 2024-12-24 PROCEDURE — 83540 ASSAY OF IRON: CPT

## 2024-12-24 PROCEDURE — 700111 HCHG RX REV CODE 636 W/ 250 OVERRIDE (IP): Mod: JZ | Performed by: STUDENT IN AN ORGANIZED HEALTH CARE EDUCATION/TRAINING PROGRAM

## 2024-12-24 PROCEDURE — 94660 CPAP INITIATION&MGMT: CPT

## 2024-12-24 PROCEDURE — A9270 NON-COVERED ITEM OR SERVICE: HCPCS | Performed by: STUDENT IN AN ORGANIZED HEALTH CARE EDUCATION/TRAINING PROGRAM

## 2024-12-24 PROCEDURE — 80053 COMPREHEN METABOLIC PANEL: CPT

## 2024-12-24 PROCEDURE — 700102 HCHG RX REV CODE 250 W/ 637 OVERRIDE(OP): Performed by: STUDENT IN AN ORGANIZED HEALTH CARE EDUCATION/TRAINING PROGRAM

## 2024-12-24 PROCEDURE — 99233 SBSQ HOSP IP/OBS HIGH 50: CPT | Performed by: STUDENT IN AN ORGANIZED HEALTH CARE EDUCATION/TRAINING PROGRAM

## 2024-12-24 RX ADMIN — THEOPHYLLINE 300 MG: 300 TABLET, EXTENDED RELEASE ORAL at 11:46

## 2024-12-24 RX ADMIN — CLONAZEPAM 1 MG: 1 TABLET ORAL at 16:57

## 2024-12-24 RX ADMIN — METOPROLOL SUCCINATE 100 MG: 100 TABLET, EXTENDED RELEASE ORAL at 05:37

## 2024-12-24 RX ADMIN — GUAIFENESIN 600 MG: 600 TABLET, EXTENDED RELEASE ORAL at 16:56

## 2024-12-24 RX ADMIN — HYDROCODONE BITARTRATE AND ACETAMINOPHEN 1 TABLET: 5; 325 TABLET ORAL at 23:15

## 2024-12-24 RX ADMIN — HYDROCODONE BITARTRATE AND ACETAMINOPHEN 1 TABLET: 5; 325 TABLET ORAL at 16:57

## 2024-12-24 RX ADMIN — GEMFIBROZIL 600 MG: 600 TABLET ORAL at 11:46

## 2024-12-24 RX ADMIN — HYDROCODONE BITARTRATE AND ACETAMINOPHEN 1 TABLET: 5; 325 TABLET ORAL at 10:16

## 2024-12-24 RX ADMIN — GUAIFENESIN 600 MG: 600 TABLET, EXTENDED RELEASE ORAL at 05:37

## 2024-12-24 RX ADMIN — FLUOXETINE HYDROCHLORIDE 40 MG: 20 CAPSULE ORAL at 05:37

## 2024-12-24 RX ADMIN — CLONAZEPAM 1 MG: 1 TABLET ORAL at 06:29

## 2024-12-24 RX ADMIN — GEMFIBROZIL 600 MG: 600 TABLET ORAL at 23:15

## 2024-12-24 RX ADMIN — QUETIAPINE FUMARATE 100 MG: 100 TABLET ORAL at 05:39

## 2024-12-24 RX ADMIN — TAMSULOSIN HYDROCHLORIDE 0.4 MG: 0.4 CAPSULE ORAL at 05:37

## 2024-12-24 RX ADMIN — LINEZOLID 600 MG: 600 TABLET, FILM COATED ORAL at 05:37

## 2024-12-24 RX ADMIN — FUROSEMIDE 40 MG: 20 TABLET ORAL at 05:38

## 2024-12-24 RX ADMIN — MORPHINE SULFATE 4 MG: 4 INJECTION, SOLUTION INTRAMUSCULAR; INTRAVENOUS at 02:43

## 2024-12-24 RX ADMIN — MOMETASONE FUROATE AND FORMOTEROL FUMARATE DIHYDRATE 2 PUFF: 200; 5 AEROSOL RESPIRATORY (INHALATION) at 16:56

## 2024-12-24 RX ADMIN — THEOPHYLLINE 300 MG: 300 TABLET, EXTENDED RELEASE ORAL at 23:15

## 2024-12-24 RX ADMIN — TIOTROPIUM BROMIDE INHALATION SPRAY 5 MCG: 3.12 SPRAY, METERED RESPIRATORY (INHALATION) at 05:35

## 2024-12-24 RX ADMIN — Medication 220 MG: at 05:37

## 2024-12-24 RX ADMIN — ATORVASTATIN CALCIUM 20 MG: 20 TABLET, FILM COATED ORAL at 20:01

## 2024-12-24 RX ADMIN — MOMETASONE FUROATE AND FORMOTEROL FUMARATE DIHYDRATE 2 PUFF: 200; 5 AEROSOL RESPIRATORY (INHALATION) at 05:35

## 2024-12-24 RX ADMIN — QUETIAPINE FUMARATE 100 MG: 100 TABLET ORAL at 16:57

## 2024-12-24 RX ADMIN — SPIRONOLACTONE 25 MG: 50 TABLET ORAL at 06:29

## 2024-12-24 RX ADMIN — OXYCODONE HYDROCHLORIDE AND ACETAMINOPHEN 1000 MG: 500 TABLET ORAL at 05:37

## 2024-12-24 RX ADMIN — HYDROCODONE BITARTRATE AND ACETAMINOPHEN 1 TABLET: 5; 325 TABLET ORAL at 05:35

## 2024-12-24 RX ADMIN — RIVAROXABAN 20 MG: 20 TABLET, FILM COATED ORAL at 16:56

## 2024-12-24 RX ADMIN — LINEZOLID 600 MG: 600 TABLET, FILM COATED ORAL at 16:56

## 2024-12-24 ASSESSMENT — CHA2DS2 SCORE
CHF OR LEFT VENTRICULAR DYSFUNCTION: YES
DIABETES: NO
PRIOR STROKE OR TIA OR THROMBOEMBOLISM: YES
SEX: MALE
AGE 75 OR GREATER: NO
HYPERTENSION: YES
AGE 65 TO 74: YES
CHA2DS2 VASC SCORE: 5
VASCULAR DISEASE: NO

## 2024-12-24 ASSESSMENT — PAIN DESCRIPTION - PAIN TYPE
TYPE: ACUTE PAIN

## 2024-12-24 ASSESSMENT — ENCOUNTER SYMPTOMS
NAUSEA: 0
VOMITING: 0

## 2024-12-24 NOTE — WOUND TEAM
[facility]  Inpatient Services  Wound and Skin Care Follow-up    Admission Date: 12/17/2024     Last order of IP CONSULT TO WOUND CARE was found on 12/19/2024 from Hospital Encounter on 12/17/2024     HPI, PMH, SH: Reviewed    Past Surgical History:   Procedure Laterality Date    IRRIGATION & DEBRIDEMENT GENERAL Right 12/19/2024    Procedure: IRRIGATION AND DEBRIDEMENT, WOUND, GENERAL, LEG;  Surgeon: Milton Swan M.D.;  Location: [facility];  Service: Orthopedics    APPLICATION OR REPLACEMENT, WOUND VAC Right 12/19/2024    Procedure: APPLICATION OR REPLACEMENT, WOUND VAC;  Surgeon: Milton Swan M.D.;  Location: [facility];  Service: Orthopedics    INCISION AND DRAINAGE GENERAL Left 1/23/2024    Procedure: INCISION AND DRAINAGE, SHOULDER;  Surgeon: Arnol Masterson M.D.;  Location: [facility];  Service: Orthopedics    IRRIGATION & DEBRIDEMENT GENERAL Left 1/4/2024    Procedure: IRRIGATION AND DEBRIDEMENT, WOUND - ARM AND CLOSURE;  Surgeon: Corey Cruz M.D.;  Location: [facility];  Service: Orthopedics    HUMERUS NAILING INTRAMEDULLARY Left 12/28/2023    Procedure: INSERTION, INTRAMEDULLARY WIL, HUMERUS;  Surgeon: Croey Cruz M.D.;  Location: [facility];  Service: Orthopedics    ERCP  7/18/2018    Procedure: ERCP;  Surgeon: Ariel Aguirre M.D.;  Location: [facility];  Service: EUS    ERCP W/REM FB AND/OR STENT CHG  7/18/2018    Procedure: ERCP W/REM FB AND/OR STENT CHG - STENT EXCHANGE;  Surgeon: Ariel Aguirre M.D.;  Location: [facility];  Service: EUS    ERCP IN OR N/A 5/2/2018    Procedure: ERCP IN OR;  Surgeon: Ariel Aguirre M.D.;  Location: [facility];  Service: Gastroenterology    ERCP IN OR N/A 4/3/2018    Procedure: ERCP IN OR- W/POSS STENT;  Surgeon: Ariel Aguirre M.D.;  Location: SURGERY SAME DAY [facility];  Service: Gastroenterology    CORNELIA BY LAPAROSCOPY  4/2/2018    Procedure: CORNELIA BY  "LAPAROSCOPY;  Surgeon: Meche Olson M.D.;  Location: SURGERY [facility];  Service: General    OTHER ABDOMINAL SURGERY  2018    appendectomy    OTHER  1976    skin grafting to rt leg     OTHER ABDOMINAL SURGERY  20018    cholecystectomy     Social History     Tobacco Use    Smoking status: Former     Current packs/day: 0.50     Average packs/day: 0.5 packs/day for 47.0 years (23.5 ttl pk-yrs)     Types: Cigarettes    Smokeless tobacco: Never   Substance Use Topics    Alcohol use: Not Currently     Comment: 2 drinks a week     No chief complaint on file.    Diagnosis: Necrosis (HCC) [I96]    Unit where seen by Wound Team: T434/02     WOUND FOLLOW UP RELATED TO:  RLE       WOUND TEAM PLAN OF CARE - Frequency of Follow-up:   Nursing to follow dressing orders written for wound care. Contact wound team if area fails to progress, deteriorates or with any questions/concerns if something comes up before next scheduled follow up (See below as to whether wound is following and frequency of wound follow up)  Dressing changes by wound team:                   NPWT change 3 times weekly - RLE    WOUND HISTORY:       Per hospitalist H&P: \"Henry Kumari is a 69 y.o. male with atrial fibrillation, COPD, CHF, history of DVT/PE, hypertension, chronic low back pain, morbid obesity, SANTOSH, viral hepatitis C, history of pancreatitis, bipolar 2 disorder, BPH, hyperlipidemia who presented 12/17/2024 with necrotic wound.     Reportedly patient suffered a wound on his right leg when he bumped on a trailer hitch causing a large hematoma.  He presented to [facility] ED at the time and was sent home.  Patient was staying at [facility] in [place] since May 2024.  Nursing staff and patient noted that his wound has been not healing, concerns for necrosis, presented to [facility].  He has been experiencing generalized fatigue and malaise, some intermittent right lower extremity pain to his wound.he has been experiencing some nausea.  He denies " "any fevers or chills headache or vision changes chest pain dyspnea cough abdominal pain vomiting dysuria or diarrhea.\"    12/19/24 I&D Leg with Dr. Swan, wound team thereafter consulted for in-house bedside VAC changes       WOUND ASSESSMENT/LDA  Negative Pressure Wound Therapy 12/19/24 Pretibial Right (Active)   Placement Date/Time: 12/19/24 1752   Inserted by: DR. SWAN  Location: Pretibial  Laterality: Right      Assessments 12/24/2024 11:00 AM   Wound Image      Vacuum Serial Number [id number]   NPWT Pump Mode / Pressure Setting Ulta   Dressing Type Medium;Black Foam (Veraflo)   Number of Foam Pieces Used 5   Canister Changed No   NEXT Dressing Change/Treatment Date 12/26/24   VAC VeraFlo Irrigant Normal Saline   VAC VeraFlo Soak Time (mins) 8   VAC VeraFlo Instill Volume (ml) 36   VAC VeraFlo - Therapy Time (hrs) 2   VAC VeraFlo Pressure (mm/Hg) Intermittent;125 mmHg       Wound 12/17/24 Full Thickness Wound Pretibial Medial Right (Active)   Date First Assessed/Time First Assessed: 12/17/24 1102   Primary Wound Type: Full Thickness Wound  Location: Pretibial  Wound Orientation: Medial  Laterality: Right      Assessments 12/24/2024 11:00 AM   Wound Image     Site Assessment Red;Bleeding   Periwound Assessment Clean;Dry;Intact   Margins Attached edges;Defined edges   Closure Secondary intention   Drainage Amount Small   Drainage Description Serosanguineous   Treatments Cleansed;Nonselective debridement;Site care;Offloading;Topical Lidocaine   Wound Cleansing Approved Wound Cleanser   Periwound Protectant No-sting Skin Prep;Paste Ring;Drape   Dressing Status Clean;Dry;Intact   Dressing Changed Changed   Dressing Cleansing/Solutions Normal Saline   Dressing Options Wound Vac;Offloading Dressing - Sacral   Dressing Change/Treatment Frequency Tuesday, Thursday, Saturday, and As Needed   NEXT Dressing Change/Treatment Date 12/26/24   NEXT Weekly Photo (Inpatient Only) 12/28/24   Wound Team Following 3x Weekly "   Non-staged Wound Description Full thickness   Shape Large oval   Wound Odor None   Exposed Structures Adipose   WOUND NURSE ONLY - Time Spent with Patient (mins) 60        Vascular:    GLORIA:   GLORIA Results, Last 30 Days US-GLORIA SINGLE LEVEL BILAT    Result Date: 2024  Narrative  Vascular Laboratory  Conclusions  No evidence of arterial insufficiency.  GRANT POPE  Age:    69    Gender:     M  MRN:    [id number]  :    1955      BSA:  Exam Date:     2024 15:00  Room #:     Inpatient  Priority:     Routine  Ht (in):             Wt (lb):  Ordering Physician:        RAHUL MARQUEZ  Referring Physician:       [id number]  Sonographer:               Jamshid Schmitz [id number]  Study Type:                Complete Bilateral  Technical Quality:         Adequate  Indications:     Non-pressure chronic ulcer of right calf with fat layer                   exposed, Ulcer of calf  CPT Codes:       09798  ICD Codes:       L97.212  707.12  History:         large necrotic ulceration of right medial calf; states smokes                   3-4 packs/day; no prior exam  Limitations:                 RIGHT  Waveform            Systolic BPs (mmHg)                             133           Brachial  Triphasic                                Common Femoral  Triphasic                                Popliteal  Triphasic                  151           Posterior Tibial  Triphasic                  206           Dorsalis Pedis                             87            Digit                             1.55          GLORIA                             0.65          TBI                       LEFT  Waveform        Systolic BPs (mmHg)                             129           Brachial  Triphasic                                Common Femoral  Triphasic                                Popliteal  Triphasic                  163           Posterior Tibial  Triphasic                  159           Dorsalis Pedis                                            Digit                             1.23          GLORIA                                           TBI  Findings  Doppler waveforms of the common femoral artery and popliteal artery are of  high amplitude and multiphasic.  Doppler waveforms at the ankle are brisk and multiphasic.  Slight elevation of the right dorsalis pedis GLORIA suggests onset of tibial  artery calcification.  The right posterior tibial & left ankle-brachial indices are normal.  A PPG sensor was used to obtain the great toe arterial waveforms & pressure  (TBI).  Right TBI is 0.65.  TBI is normal.  Arterial waveforms of the great toe are  normal.  There is no evidence of significant arterial disease demonstrated on the  right or the left.  Additional testing was not performed in accordance with lower extremity  arterial evaluation protocol.  Adelina LOCKWOOD To  (Electronically Signed)  Final Date:      18 December 2024                   16:43      Lab Values:    Lab Results   Component Value Date/Time    WBC 4.8 12/22/2024 02:17 AM    RBC 2.77 (L) 12/22/2024 02:17 AM    HEMOGLOBIN 7.7 (L) 12/22/2024 02:17 AM    HEMATOCRIT 25.3 (L) 12/22/2024 02:17 AM    CREACTPROT 7.13 (H) 12/17/2024 11:52 PM    SEDRATEWES >140 (H) 12/17/2024 11:52 PM         Culture Results show:  No results found for this or any previous visit (from the past 720 hours).    Pain Level/Medicated:  2% Lidocaine allowed to dwell on wound bed 60min prior and PO pain medications administered by bedside RN 60min prior       INTERVENTIONS BY WOUND TEAM:  Chart and images reviewed. Discussed with bedside RN. All areas of concern (based on picture review, LDA review and discussion with bedside RN) have been thoroughly assessed. Documentation of areas based on significant findings. This RN in to assess patient. Performed standard wound care which includes appropriate positioning, dressing removal and non-selective debridement. Pictures and measurements  "obtained weekly if/when required.    Wound:  RLE Full thickness NPWT  Preparation for Dressing removal: Dressing soaked with Lidocaine  Cleansed/Non-selectively Debrided with:  Wound cleanser and Gauze  Becka wound: Cleansed with Wound cleanser and Gauze, Prepped with No Sting, Paste Rings, and Drape  Primary Dressing:  One half thickness medium veraflo \"Cinnamon roll\" of foam was applied down to wound bed followed by 3 smaller half thickness pieces to fill depth. All foam was secured with drape.  Secondary (Outer) Dressing: A hole was cut in drape and a 5th and final piece of half thickness circular black veraflo foam was applied as a button for trac pad. Veraflo trac pad applied and suction resumed. No leaks noted. Fill assist used for vac settings. Fenestrated sacral offloading dressing applied.    Advanced Wound Care Discharge Planning  Number of Clinicians necessary to complete wound care: 1  Is patient requiring IV pain medications for dressing changes:  No   Length of time for dressing change 30 min. (This does not include chart review, pre-medication time, set up, clean up or time spent charting.)    Interdisciplinary consultation: Patient, Bedside RN (Aysha), N/A.  Pressure injury and staging reviewed with N/A.    EVALUATION / RATIONALE FOR TREATMENT:     Date:  12/24/24  Wound Status:  Wound progressing as expected    Wound is clean with adipose still visible. Minimal granular tissue present but only scant bleeding noted. Transitioned to veraflo vac to cleanse wound while managing drainage. Ok to transition back to regular vac therapy at time of DC if necessary.   Date:  12/21/24  Wound Status:  Initial evaluation    RLE s/p surgical debridement of hematoma. Wound bed granular with expected sanguinous drainage. Regular NPWT continued to assist with wound closure by secondary intention, management of bio-burden and exudate through mechanical debridement, and increase oxygenation and granulation tissue " production to wound bed.  Patient will benefit of transition to Veraflo VAC on next change to keep exposed adipose hydrated, it was not applied during this change as wound bed needed to achieve hemostasis first.  If Veraflo is applied on next change, patient can continue with Veraflo VAC while in-house, but then okay to transition back to regular VAC for discharge.         Goals: Steady decrease in wound area and depth weekly.    NURSING PLAN OF CARE ORDERS:  RN Prevention Protocol    NUTRITION RECOMMENDATIONS   Wound Team Recommendations:  N/A     DIET ORDERS (From admission to next 24h)       Start     Ordered    12/19/24 2008  Diet Order Diet: Regular  ALL MEALS        Question:  Diet:  Answer:  Regular    12/19/24 2008                    PREVENTATIVE INTERVENTIONS:   Q shift Antony - performed per nursing policy  Q shift pressure point assessments - performed per nursing policy    Surface/Positioning  Low Airloss - Currently in Place  Reposition q 2 hours - Currently in Place    Offloading/Redistribution  Sacral offloading dressing (Silicone dressing) - Currently in Place  Heel offloading dressing (Silicone dressing) - Currently in Place    Containment/Moisture Prevention    Dri-nelly pad - Currently in Place  Purwick/Condom Cath - Currently in Place    Anticipated discharge plans:  Skilled Nursing        Vac Discharge Needs:  Vac Discharge plan is purely a recommendation from wound team and not a requirement for discharge unless otherwise stated by physician.  Veraflo Vac while inpatient, ok to transition to Regular Vac on discharge

## 2024-12-24 NOTE — DISCHARGE PLANNING
Case Management Discharge Planning    Admission Date: 12/17/2024  GMLOS: 6.9  ALOS: 7    6-Clicks ADL Score: 17  6-Clicks Mobility Score: 20  PT and/or OT Eval ordered: Yes  Post-acute Referrals Ordered: Yes  Post-acute Choice Obtained: Yes  Has referral(s) been sent to post-acute provider:  Yes      Anticipated Discharge Dispo: Discharge Disposition: D/T to SNF with Medicare cert in anticipation of skilled care (03)  Discharge Address: [place] [address]  Discharge Contact Phone Number: [phone]    DME Needed: No    Action(s) Taken: Pt discussed in IDT rounds. Pt is from [facility] in [place]. [facility] is unable to take pt back due to follow up appointment. Referral sent to [facility].  Per ADEN Lopes, [facility] can accept pt for 12/26.    1517  LMSW met with pt at bedside and informed pt that [facility] accepted pt. Pt reported that if that is where he has to go to get better then he is okay with going to [place].     Escalations Completed: None    Medically Clear: No    Next Steps: LMSW to continue to assist with DC needs/barriers.     Barriers to Discharge: Medical clearance    Is the patient up for discharge tomorrow: No

## 2024-12-24 NOTE — PROGRESS NOTES
Assumed care of patient at 1900. Bedside report received. Assessment complete.    A&O x4. Patient calls appropriately.  Reports 8/10 pain. Pain managed with prescribed medications per MAR.  Denies SOB. O2 sats >90% on 1/2L via nasal cannula  Tolerating regular diet. Denies N/V.  + void. + flatus. Last BM 12/23.  RLE to Wound Vac  Skin per flowsheets  Mobility: x1 FWW    Reviewed plan of care with patient. All needs met at this time. Call light and belongings within reach. Fall precautions in place. Hourly rounding in place.

## 2024-12-24 NOTE — PROGRESS NOTES
4 Eyes Skin Assessment Completed by JORDYN Calderon and JORDYN Schuler.    Head WDL  Ears WDL  Nose Redness and blanching to bridge of nose  Mouth WDL  Neck WDL  Breast/Chest Bruising  Shoulder Blades WDL  Spine WDL  (R) Arm/Elbow/Hand Bruising, Swelling, and Discoloration  (L) Arm/Elbow/Hand Bruising, Swelling, and Discoloration  Abdomen Scattered bruising, scabbing to RLQ  Groin Redness and blanching to pannus  Scrotum/Coccyx/Buttocks Redness, Blanching, and Discoloration  (R) Leg Wound Vac and Ace Wrap to RLE wound. Bruising and discoloration to knee. Edema  (L) Leg Bruising, Discoloration, edema, scarring to knee  (R) Heel/Foot/Toe Toes red and blanching. Ace Wrap to rest of foot  (L) Heel/Foot/Toe Redness, Blanching, and Edema          Devices In Places Pulse Ox, Condom Cath, and Nasal Cannula. CPAP at night      Interventions In Place NC W/Ear Foams, InterDry, Heel Mepilex, Sacral Mepilex, TAP System, Pillows, Low Air Loss Mattress, Barrier Cream, and Dri-Albert Pads    Possible Skin Injury Yes    Pictures Uploaded Into Epic Yes, prev  Wound Consult Placed Yes, prev  RN Wound Prevention Protocol Ordered Yes, prev

## 2024-12-24 NOTE — PROGRESS NOTES
"      Orthopaedic Progress Note    Interval changes:  Patient doing well  RLE dressings are CDI; vac intact without leak  Cleared for DC planning    ROS - Patient denies any new issues.  Pain well controlled.    /72   Pulse 83   Temp 36.3 °C (97.4 °F) (Temporal)   Resp 16   Ht 1.82 m (5' 11.65\")   Wt (!) 161 kg (355 lb 6.1 oz)   SpO2 96%     Patient seen and examined  No acute distress  Breathing non labored  RRR  RLE vac dressing CDI without leak, DNVI, moves all toes, cap refill <2 sec .     Recent Labs     12/22/24  0217   WBC 4.8   RBC 2.77*   HEMOGLOBIN 7.7*   HEMATOCRIT 25.3*   MCV 91.3   MCH 27.8   MCHC 30.4*   RDW 59.8*   PLATELETCT 194   MPV 8.8*       Active Hospital Problems    Diagnosis     Hypokalemia [E87.6]     Necrosis ([facility]) [I96]     Benzodiazepine dependence, continuous ([facility]) [F13.20]     Mood disorder ([facility]) [F39]     Class 3 severe obesity due to excess calories with serious comorbidity and body mass index (BMI) of 45.0 to 49.9 in adult ([facility]) [E66.813, Z68.42, E66.01]     SANTOSH on CPAP [G47.33]     COPD (chronic obstructive pulmonary disease) ([facility]) [J44.9]        Assessment/Plan:  Patient doing well  RLE dressings are CDI; vac intact without leak  Cleared for DC planning    POD#6 S/P:  1.  Excisional debridement of right leg wound including skin and subcutaneous tissue of wound measuring 20 x 10 cm.  2.  Evacuation of right leg subcutaneous hematoma.  3.  Application of wound VAC, right leg greater than 50 square cm.  Wt bearing status - WBAT  Wound care/Drains - Dressings to be changed every other day by nursing. Or PRN for saturation starting POD#2  Future Procedures - none planned    Sutures/Staples out- 14-21 days post operatively. Removal will completed by ortho mid levels only.  PT/OT-initiated  Antibiotics: zyvox 600mg po Q12  DVT Prophylaxis- TEDS/SCDs/Foot pumps/xarleto  Cummings-not needed per ortho  "

## 2024-12-24 NOTE — PROGRESS NOTES
"      Orthopaedic Progress Note    Interval changes:  Patient doing well  RLE dressings are CDI  Cleared for DC to rehab by ortho pending medicine clearance    ROS - Patient denies any new issues.  Pain well controlled.    /74   Pulse 82   Temp 36.6 °C (97.8 °F) (Temporal)   Resp 16   Ht 1.82 m (5' 11.65\")   Wt (!) 161 kg (355 lb 6.1 oz)   SpO2 94%     Patient seen and examined  No acute distress  Breathing non labored  RRR  RLE vac dressing CDI without leak, DNVI, moves all toes, cap refill <2 sec .     Recent Labs     12/21/24  0029 12/22/24  0217   WBC 4.9 4.8   RBC 2.70* 2.77*   HEMOGLOBIN 7.4* 7.7*   HEMATOCRIT 24.0* 25.3*   MCV 88.9 91.3   MCH 27.4 27.8   MCHC 30.8* 30.4*   RDW 57.1* 59.8*   PLATELETCT 165 194   MPV 8.9* 8.8*       Active Hospital Problems    Diagnosis     Hypokalemia [E87.6]     Necrosis ([facility]) [I96]     Benzodiazepine dependence, continuous ([facility]) [F13.20]     Mood disorder ([facility]) [F39]     Class 3 severe obesity due to excess calories with serious comorbidity and body mass index (BMI) of 45.0 to 49.9 in adult ([facility]) [E66.813, Z68.42, E66.01]     SANTOSH on CPAP [G47.33]     COPD (chronic obstructive pulmonary disease) ([facility]) [J44.9]        Assessment/Plan:  Patient doing well   RLE dressings are CDI  Cleared for DC to rehab by ortho pending medicine clearance  POD#4 S/P:  1.  Excisional debridement of right leg wound including skin and subcutaneous tissue of wound measuring 20 x 10 cm.  2.  Evacuation of right leg subcutaneous hematoma.  3.  Application of wound VAC, right leg greater than 50 square cm.  Wt bearing status - WBAT  Wound care/Drains - Dressings to be changed every other day by nursing. Or PRN for saturation starting POD#2  Future Procedures - none planned    Sutures/Staples out- 14-21 days post operatively. Removal will completed by ortho mid levels only.  PT/OT-initiated  Antibiotics: zyvox 600mg po Q12  DVT Prophylaxis- TEDS/SCDs/Foot pumps/xarleto  Cummings-not needed per " ortho

## 2024-12-24 NOTE — CARE PLAN
Problem: Knowledge Deficit - Standard  Goal: Patient and family/care givers will demonstrate understanding of plan of care, disease process/condition, diagnostic tests and medications  Outcome: Progressing     Problem: Fall Risk  Goal: Patient will remain free from falls  Outcome: Progressing     Problem: Pain - Standard  Goal: Alleviation of pain or a reduction in pain to the patient’s comfort goal  Outcome: Progressing     Problem: Skin Integrity  Goal: Skin integrity is maintained or improved  Outcome: Progressing     The patient is Stable - Low risk of patient condition declining or worsening    Shift Goals  Clinical Goals: Monitor [place], pain control  Patient Goals: comfort  Family Goals: CLOTILDE    Progress made toward(s) clinical / shift goals:  Wound Vac to RLE hematoma, bloody drainage in cannister. Pain managed per MAR with reported relief and intermittent sleep throughout night. 2 RN skin check completed.    Patient is not progressing towards the following goals:

## 2024-12-24 NOTE — PROGRESS NOTES
Hospital Medicine Daily Progress Note    Date of Service  12/23/2024    Chief Complaint  Henry Kumari is a 69 y.o. male admitted 12/17/2024 with leg wound.    Hospital Course    Henry Kumrai is a 69 y.o. male with atrial fibrillation, COPD, CHF, history of DVT/PE, hypertension, chronic low back pain, morbid obesity, SANTOSH, viral hepatitis C, history of pancreatitis, bipolar 2 disorder, BPH, hyperlipidemia who presented 12/17/2024 with necrotic wound.  Patient reportedly bumped his leg on car 10 days prior to presentation. On presentation patient found to have necrotic leg wound and transferred to [facility] for further evaluation.  No acute signs of infection, CT scan shows large necrotic skin with underlying hematoma. Ortho consulted, patient underwent surgical debridement of wound and hematoma. He has wound vac placement to large wound bed. He is doing well postoperatively. Wound culture growing MRSA and E faecalis, suspect colonization and not true infection. Will cover for infection though with antibiotics, to complete short zyvox course. Plastic surgery consulted during hospital stay, they recommend continued wound healing before any reconstructive procedure. Patient is feeling well, he is to follow up with orthopedic surgery for wound check. He is medically cleared for discharge to SNF.      Interval Problem Update  No acute events overnight.  Patient feeling well.  Hgb stable, tolerating home xarelto for hx a fib.  Patient is medically cleared.   working on SNF.      I have discussed this patient's plan of care and discharge plan at IDT rounds today with Case Management, Nursing, Nursing leadership, and other members of the IDT team.    Consultants/Specialty  plastic surgery    Code Status  Full Code    Disposition  Medically Cleared  I have placed the appropriate orders for post-discharge needs.    Review of Systems  Review of Systems   Constitutional:  Negative for chills and fever.    Eyes:  Negative for blurred vision and pain.   Respiratory:  Negative for cough and shortness of breath.    Cardiovascular:  Negative for chest pain, palpitations and leg swelling.   Gastrointestinal:  Negative for abdominal pain, nausea and vomiting.   Genitourinary:  Negative for dysuria and urgency.   Musculoskeletal:  Positive for myalgias. Negative for back pain.   Skin:  Negative for itching and rash.   Neurological:  Negative for dizziness, sensory change, focal weakness and headaches.   Psychiatric/Behavioral:  Negative for substance abuse. The patient does not have insomnia.         Physical Exam  Temp:  [36.4 °C (97.5 °F)-36.6 °C (97.9 °F)] 36.6 °C (97.8 °F)  Pulse:  [82-99] 82  Resp:  [12-17] 16  BP: ()/(54-76) 115/74  SpO2:  [90 %-97 %] 94 %    Physical Exam  Constitutional:       General: He is not in acute distress.     Appearance: He is not ill-appearing.   HENT:      Head: Normocephalic and atraumatic.      Right Ear: External ear normal.      Left Ear: External ear normal.      Mouth/Throat:      Pharynx: No oropharyngeal exudate or posterior oropharyngeal erythema.   Eyes:      Extraocular Movements: Extraocular movements intact.      Pupils: Pupils are equal, round, and reactive to light.   Cardiovascular:      Rate and Rhythm: Normal rate and regular rhythm.      Pulses: Normal pulses.      Heart sounds: Normal heart sounds.   Pulmonary:      Effort: Pulmonary effort is normal.      Breath sounds: Normal breath sounds.   Abdominal:      General: Bowel sounds are normal. There is no distension.      Palpations: Abdomen is soft.      Tenderness: There is no abdominal tenderness. There is no guarding.   Musculoskeletal:         General: Swelling present. No tenderness.      Cervical back: Normal range of motion and neck supple.      Right lower leg: Edema present.      Left lower leg: No edema.   Skin:     General: Skin is warm and dry.   Neurological:      General: No focal deficit present.       Mental Status: He is oriented to person, place, and time.      Cranial Nerves: No cranial nerve deficit.      Sensory: No sensory deficit.      Motor: No weakness.   Psychiatric:         Mood and Affect: Mood normal.         Behavior: Behavior normal.         Fluids    Intake/Output Summary (Last 24 hours) at 12/23/2024 1608  Last data filed at 12/23/2024 1509  Gross per 24 hour   Intake --   Output 1300 ml   Net -1300 ml          Laboratory  Recent Labs     12/21/24  0029 12/22/24  0217   WBC 4.9 4.8   RBC 2.70* 2.77*   HEMOGLOBIN 7.4* 7.7*   HEMATOCRIT 24.0* 25.3*   MCV 88.9 91.3   MCH 27.4 27.8   MCHC 30.8* 30.4*   RDW 57.1* 59.8*   PLATELETCT 165 194   MPV 8.9* 8.8*     Recent Labs     12/21/24 0029 12/22/24 0217   SODIUM 142 142   POTASSIUM 3.3* 3.9   CHLORIDE 102 102   CO2 29 28   GLUCOSE 97 88   BUN 21 19   CREATININE 0.97 0.77   CALCIUM 9.0 9.2                     Imaging  DX-CHEST-PORTABLE (1 VIEW)   Final Result      1.  Bibasilar and perihilar underinflation atelectasis which could obscure an additional process.   2.  Persistently enlarged cardiac silhouette      CT-EXTREMITY, LOWER WITH RIGHT   Final Result      1.  Large ulcerated subcutaneous wound in the anterior medial aspect of the right lower leg.      2.  Large adjacent subcutaneous abscess, hematoma, or seroma adjacent to the area of soft tissue ulceration.      3.  No evidence of acute or chronic osteomyelitis.      4.  Severe tricompartmental osteoarthritis of the right knee.      US-GLORIA SINGLE LEVEL BILAT   Final Result      IR-US GUIDED PIV    (Results Pending)   IR-US GUIDED PIV    (Results Pending)        Assessment/Plan  * Necrosis (HCC)- (present on admission)  Assessment & Plan  Large necrotic wound on right shin->calf, supposedly started with bumping a trailor hitch and a hematoma 10 days prior to presentation, seen at OSH at that time.  CT LE shows necrotic skin tissue with adjacent fluid collection, likely  hematoma/seroma  Plastic surgery recommend debridement of necrotic skin and can assist with reconstructive surgery when wound is ready  S/p surgical debridement by ortho 12/19, wound vac on  Tolerating wound vac well  On zyvox for short course    Hypokalemia  Assessment & Plan  P.o. replacement ordered    Benzodiazepine dependence, continuous (HCC)- (present on admission)  Assessment & Plan  Continue home Klonopin    Mood disorder ([facility])- (present on admission)  Assessment & Plan  Continue Prozac, Seroquel    Class 3 severe obesity due to excess calories with serious comorbidity and body mass index (BMI) of 45.0 to 49.9 in adult ([facility])- (present on admission)  Assessment & Plan  Consider referral to bariatric surgery on discharge, consider trial of GLP-1 inhibitors as outpatient.    SANTOSH on CPAP- (present on admission)  Assessment & Plan  Nightly cpap ordered    COPD (chronic obstructive pulmonary disease) ([facility])- (present on admission)  Assessment & Plan  Not in acute exacerbation.  Continue home theophylline, Dulera, Spiriva.  DuoNebs q6h prn         VTE prophylaxis: VTE Selection    I have performed a physical exam and reviewed and updated ROS and Plan today (12/23/2024). In review of yesterday's note (12/22/2024), there are no changes except as documented above.

## 2024-12-24 NOTE — PROGRESS NOTES
4 Eyes Skin Assessment Completed by JORDYN Olmstead and JORDYN Crook.    Head WDL  Ears WDL  Nose Redness and Blanching  Mouth WDL  Neck WDL  Breast/Chest WDL  Shoulder Blades WDL  Spine WDL  (R) Arm/Elbow/Hand Redness, Blanching, Bruising, Swelling, and Discoloration  (L) Arm/Elbow/Hand Redness, Blanching, Bruising, Swelling, and Discoloration  Abdomen Redness, Blanching, and Abrasion  Groin Redness and Blanching - condom cath  Scrotum/Coccyx/Buttocks Redness and Blanching  (R) Leg Redness, Blanching, Bruising, Swelling, and Edema - RLE WV in place  (L) Leg Redness, Blanching, Bruising, Swelling, and Edema  (R) Heel/Foot/Toe Redness and Blanching - toes  (L) Heel/Foot/Toe Redness, Blanching, and Edema          Devices In Places Pulse Ox, Condom Cath, and Nasal Cannula; RLE WV      Interventions In Place NC W/Ear Foams, InterDry, Heel Mepilex, Sacral Mepilex, TAP System, Pillows, Low Air Loss Mattress, Barrier Cream, and Heels Loaded W/Pillows    Possible Skin Injury Yes - established    Pictures Uploaded Into Epic Yes done already   Wound Consult Placed Yes in place already  RN Wound Prevention Protocol Ordered Yes in place already

## 2024-12-24 NOTE — PROGRESS NOTES
4 Eyes Skin Assessment Completed by JORDYN Crook and JORDYN Valdez.    Head WDL  Ears WDL  Nose Redness and Blanching  Mouth WDL  Neck WDL  Breast/Chest WDL  Shoulder Blades WDL  Spine WDL  (R) Arm/Elbow/Hand Redness, Blanching, Bruising, Swelling, and Discoloration  (L) Arm/Elbow/Hand Redness, Blanching, Bruising, Swelling, and Discoloration  Abdomen Redness, Blanching, and Abrasion  Groin Redness and Blanching  Scrotum/Coccyx/Buttocks Redness and Blanching  (R) Leg Redness, Blanching, Bruising, Swelling, and Edema WV to RLE ace wrap in place  (L) Leg Redness, Blanching, Bruising, Swelling, and Edema  (R) Heel/Foot/Toe CLOTILDE WV dressing in place  (L) Heel/Foot/Toe Redness, Blanching, and Bruising          Devices In Places Pulse Ox and Nasal Cannula, RLE WV      Interventions In Place Gray Ear Foams, NC W/Ear Foams, InterDry, Heel Mepilex, Sacral Mepilex, TAP System, Pillows, Low Air Loss Mattress, and Heels Loaded W/Pillows    Possible Skin Injury Yes    Pictures Uploaded Into Epic Yes  Wound Consult Placed Yes  RN Wound Prevention Protocol Ordered Yes

## 2024-12-24 NOTE — DISCHARGE PLANNING
-0916  ADEN spoke with Omaira at [facility]. Omaira will check to make sure SNF referral was received. Omaira confirmed that fax number used was the correct one. Facility will not have a bed available until 12/26. Omaira to check for referral and review it for acceptance and follow up with DPA.     -1115  DPA informed by Omaira at [facility] that facility can accept pt. Omaira inquiring about wound vac and Quetiapine.     -1300  DPA left message for Omaira with [facility], requesting callback regarding wound vac and Quetiapine.

## 2024-12-24 NOTE — CARE PLAN
The patient is Stable - Low risk of patient condition declining or worsening    Shift Goals  Clinical Goals: WV management, pain control, skin integrity preservation, patient comfort  Patient Goals: comfort and rest  Family Goals: CLOTILDE    Progress made toward(s) clinical / shift goals:  Wound vac dressing has been changed by wound team. Pain has been medicated per MAR. Patient changes position frequently. Patient tolerates periodic ambulation to and from restroom.     Patient is not progressing towards the following goals: Pending DC planning and clearance.

## 2024-12-24 NOTE — PROGRESS NOTES
Virtual Nurse rounding complete.  Rounding Needs: Patient resting comfortably with no needs at this time. Pt had some questions regarding notes from wound and POC. Provided education and summary of wound care notes. Pt has no other questions at this time.

## 2024-12-25 PROBLEM — E83.42 HYPOMAGNESEMIA: Status: ACTIVE | Noted: 2024-12-25

## 2024-12-25 LAB
ANION GAP SERPL CALC-SCNC: 12 MMOL/L (ref 7–16)
BASOPHILS # BLD AUTO: 0.9 % (ref 0–1.8)
BASOPHILS # BLD: 0.03 K/UL (ref 0–0.12)
BUN SERPL-MCNC: 19 MG/DL (ref 8–22)
CALCIUM SERPL-MCNC: 8.5 MG/DL (ref 8.5–10.5)
CHLORIDE SERPL-SCNC: 101 MMOL/L (ref 96–112)
CO2 SERPL-SCNC: 25 MMOL/L (ref 20–33)
CREAT SERPL-MCNC: 0.7 MG/DL (ref 0.5–1.4)
EOSINOPHIL # BLD AUTO: 0.1 K/UL (ref 0–0.51)
EOSINOPHIL NFR BLD: 3 % (ref 0–6.9)
ERYTHROCYTE [DISTWIDTH] IN BLOOD BY AUTOMATED COUNT: 54.3 FL (ref 35.9–50)
GFR SERPLBLD CREATININE-BSD FMLA CKD-EPI: 100 ML/MIN/1.73 M 2
GLUCOSE SERPL-MCNC: 81 MG/DL (ref 65–99)
HCT VFR BLD AUTO: 24.7 % (ref 42–52)
HGB BLD-MCNC: 7.6 G/DL (ref 14–18)
IMM GRANULOCYTES # BLD AUTO: 0.02 K/UL (ref 0–0.11)
IMM GRANULOCYTES NFR BLD AUTO: 0.6 % (ref 0–0.9)
LYMPHOCYTES # BLD AUTO: 0.84 K/UL (ref 1–4.8)
LYMPHOCYTES NFR BLD: 24.9 % (ref 22–41)
MAGNESIUM SERPL-MCNC: 1.6 MG/DL (ref 1.5–2.5)
MCH RBC QN AUTO: 27.5 PG (ref 27–33)
MCHC RBC AUTO-ENTMCNC: 30.8 G/DL (ref 32.3–36.5)
MCV RBC AUTO: 89.5 FL (ref 81.4–97.8)
MONOCYTES # BLD AUTO: 0.42 K/UL (ref 0–0.85)
MONOCYTES NFR BLD AUTO: 12.5 % (ref 0–13.4)
NEUTROPHILS # BLD AUTO: 1.96 K/UL (ref 1.82–7.42)
NEUTROPHILS NFR BLD: 58.1 % (ref 44–72)
NRBC # BLD AUTO: 0 K/UL
NRBC BLD-RTO: 0 /100 WBC (ref 0–0.2)
PHOSPHATE SERPL-MCNC: 2.6 MG/DL (ref 2.5–4.5)
PLATELET # BLD AUTO: 149 K/UL (ref 164–446)
PMV BLD AUTO: 8.6 FL (ref 9–12.9)
POTASSIUM SERPL-SCNC: 4 MMOL/L (ref 3.6–5.5)
RBC # BLD AUTO: 2.76 M/UL (ref 4.7–6.1)
SODIUM SERPL-SCNC: 138 MMOL/L (ref 135–145)
WBC # BLD AUTO: 3.4 K/UL (ref 4.8–10.8)

## 2024-12-25 PROCEDURE — 84100 ASSAY OF PHOSPHORUS: CPT

## 2024-12-25 PROCEDURE — 770001 HCHG ROOM/CARE - MED/SURG/GYN PRIV*

## 2024-12-25 PROCEDURE — 83735 ASSAY OF MAGNESIUM: CPT

## 2024-12-25 PROCEDURE — 700111 HCHG RX REV CODE 636 W/ 250 OVERRIDE (IP): Performed by: STUDENT IN AN ORGANIZED HEALTH CARE EDUCATION/TRAINING PROGRAM

## 2024-12-25 PROCEDURE — 85025 COMPLETE CBC W/AUTO DIFF WBC: CPT

## 2024-12-25 PROCEDURE — 94660 CPAP INITIATION&MGMT: CPT

## 2024-12-25 PROCEDURE — A9270 NON-COVERED ITEM OR SERVICE: HCPCS | Performed by: STUDENT IN AN ORGANIZED HEALTH CARE EDUCATION/TRAINING PROGRAM

## 2024-12-25 PROCEDURE — 80048 BASIC METABOLIC PNL TOTAL CA: CPT

## 2024-12-25 PROCEDURE — 700102 HCHG RX REV CODE 250 W/ 637 OVERRIDE(OP): Performed by: STUDENT IN AN ORGANIZED HEALTH CARE EDUCATION/TRAINING PROGRAM

## 2024-12-25 PROCEDURE — 36415 COLL VENOUS BLD VENIPUNCTURE: CPT

## 2024-12-25 PROCEDURE — 99233 SBSQ HOSP IP/OBS HIGH 50: CPT | Performed by: STUDENT IN AN ORGANIZED HEALTH CARE EDUCATION/TRAINING PROGRAM

## 2024-12-25 RX ORDER — MAGNESIUM SULFATE HEPTAHYDRATE 40 MG/ML
2 INJECTION, SOLUTION INTRAVENOUS ONCE
Status: COMPLETED | OUTPATIENT
Start: 2024-12-25 | End: 2024-12-25

## 2024-12-25 RX ADMIN — GEMFIBROZIL 600 MG: 600 TABLET ORAL at 21:30

## 2024-12-25 RX ADMIN — THEOPHYLLINE 300 MG: 300 TABLET, EXTENDED RELEASE ORAL at 11:22

## 2024-12-25 RX ADMIN — ATORVASTATIN CALCIUM 20 MG: 20 TABLET, FILM COATED ORAL at 21:30

## 2024-12-25 RX ADMIN — RIVAROXABAN 20 MG: 20 TABLET, FILM COATED ORAL at 16:29

## 2024-12-25 RX ADMIN — CLONAZEPAM 1 MG: 1 TABLET ORAL at 16:29

## 2024-12-25 RX ADMIN — OXYCODONE HYDROCHLORIDE AND ACETAMINOPHEN 1000 MG: 500 TABLET ORAL at 05:26

## 2024-12-25 RX ADMIN — Medication 220 MG: at 05:28

## 2024-12-25 RX ADMIN — TIOTROPIUM BROMIDE INHALATION SPRAY 5 MCG: 3.12 SPRAY, METERED RESPIRATORY (INHALATION) at 05:28

## 2024-12-25 RX ADMIN — FLUOXETINE HYDROCHLORIDE 40 MG: 20 CAPSULE ORAL at 05:27

## 2024-12-25 RX ADMIN — MOMETASONE FUROATE AND FORMOTEROL FUMARATE DIHYDRATE 2 PUFF: 200; 5 AEROSOL RESPIRATORY (INHALATION) at 05:30

## 2024-12-25 RX ADMIN — QUETIAPINE FUMARATE 100 MG: 100 TABLET ORAL at 05:26

## 2024-12-25 RX ADMIN — THEOPHYLLINE 300 MG: 300 TABLET, EXTENDED RELEASE ORAL at 21:30

## 2024-12-25 RX ADMIN — HYDROCODONE BITARTRATE AND ACETAMINOPHEN 1 TABLET: 5; 325 TABLET ORAL at 16:29

## 2024-12-25 RX ADMIN — QUETIAPINE FUMARATE 100 MG: 100 TABLET ORAL at 16:29

## 2024-12-25 RX ADMIN — HYDROCODONE BITARTRATE AND ACETAMINOPHEN 1 TABLET: 5; 325 TABLET ORAL at 06:02

## 2024-12-25 RX ADMIN — LINEZOLID 600 MG: 600 TABLET, FILM COATED ORAL at 16:29

## 2024-12-25 RX ADMIN — FUROSEMIDE 40 MG: 20 TABLET ORAL at 05:26

## 2024-12-25 RX ADMIN — TAMSULOSIN HYDROCHLORIDE 0.4 MG: 0.4 CAPSULE ORAL at 05:27

## 2024-12-25 RX ADMIN — MOMETASONE FUROATE AND FORMOTEROL FUMARATE DIHYDRATE 2 PUFF: 200; 5 AEROSOL RESPIRATORY (INHALATION) at 16:30

## 2024-12-25 RX ADMIN — GUAIFENESIN 600 MG: 600 TABLET, EXTENDED RELEASE ORAL at 05:26

## 2024-12-25 RX ADMIN — GEMFIBROZIL 600 MG: 600 TABLET ORAL at 11:22

## 2024-12-25 RX ADMIN — METOPROLOL SUCCINATE 100 MG: 100 TABLET, EXTENDED RELEASE ORAL at 05:27

## 2024-12-25 RX ADMIN — MAGNESIUM SULFATE HEPTAHYDRATE 2 G: 2 INJECTION, SOLUTION INTRAVENOUS at 16:35

## 2024-12-25 RX ADMIN — CLONAZEPAM 1 MG: 1 TABLET ORAL at 05:27

## 2024-12-25 RX ADMIN — LINEZOLID 600 MG: 600 TABLET, FILM COATED ORAL at 05:26

## 2024-12-25 RX ADMIN — ONDANSETRON 4 MG: 4 TABLET, ORALLY DISINTEGRATING ORAL at 21:30

## 2024-12-25 RX ADMIN — GUAIFENESIN 600 MG: 600 TABLET, EXTENDED RELEASE ORAL at 16:29

## 2024-12-25 RX ADMIN — SPIRONOLACTONE 25 MG: 50 TABLET ORAL at 05:27

## 2024-12-25 ASSESSMENT — ENCOUNTER SYMPTOMS
VOMITING: 0
NAUSEA: 0

## 2024-12-25 ASSESSMENT — PAIN DESCRIPTION - PAIN TYPE
TYPE: ACUTE PAIN

## 2024-12-25 NOTE — CARE PLAN
The patient is Stable - Low risk of patient condition declining or worsening    Shift Goals  Clinical Goals: WV mgmt, skin integrity  Patient Goals: rest  Family Goals: CLOTILDE      Problem: Knowledge Deficit - Standard  Goal: Patient and family/care givers will demonstrate understanding of plan of care, disease process/condition, diagnostic tests and medications  Description: Target End Date:  1-3 days or as soon as patient condition allows    Document in Patient Education    1.  Patient and family/caregiver oriented to unit, equipment, visitation policy and means for communicating concern  2.  Complete/review Learning Assessment  3.  Assess knowledge level of disease process/condition, treatment plan, diagnostic tests and medications  4.  Explain disease process/condition, treatment plan, diagnostic tests and medications  Outcome: Progressing     Problem: Skin Integrity  Goal: Skin integrity is maintained or improved  Description: Target End Date:  Prior to discharge or change in level of care    Document interventions on Skin Risk/Antony flowsheet groups and corresponding LDA    1.  Assess and monitor skin integrity, appearance and/or temperature  2.  Assess risk factors for impaired skin integrity and/or pressures ulcers  3.  Implement precautions to protect skin integrity in collaboration with interdisciplinary team  4.  Implement pressure ulcer prevention protocol if at risk for skin breakdown  5.  Confirm wound care consult if at risk for skin breakdown  6.  Ensure patient use of pressure relieving devices  (Low air loss bed, waffle overlay, heel protectors, ROHO cushion, etc)  Outcome: Progressing       Progress made toward(s) clinical / shift goals:  Pt. Was educated on pharmacological and non-pharmacological modalities. Pt. Verbalized understanding. Pt. Pain was medicated per MAR. Pt. Was educated on using the IS 10x/hr while awake. Pt. Scored 1250 mL. Pt. Skin integrity was maintained with 2 RN skin check,  low air-loss mattress, and sacral mepilex. Pt. WV was monitored intermittently throughout shift.  Pt. Rested intermittently throughout shift.     Patient is not progressing towards the following goals:

## 2024-12-25 NOTE — PROGRESS NOTES
Virtual Nurse rounding complete.  Rounding Needs: Patient resting comfortably with no needs at this time.  Pt states that he doesn't want to turn the camera on at this time as he didn't sleep much last night and is trying to get some sleep. Pt states that he has no needs and thanks VRN for visiting.

## 2024-12-25 NOTE — PROGRESS NOTES
4 Eyes Skin Assessment Completed by JORDYN Olmstead and JORDYN Mcnamara.    Head WDL  Ears WDL  Nose Redness and Blanching  Mouth WDL  Neck WDL  Breast/Chest WDL  Shoulder Blades WDL  Spine WDL  (R) Arm/Elbow/Hand Redness, Blanching, Bruising, Swelling, and Discoloration  (L) Arm/Elbow/Hand Redness, Blanching, Bruising, Abrasion, Swelling, and Discoloration  Abdomen Redness, Blanching, Abrasion, and Bruising  Groin Redness and Blanching - pannus  Scrotum/Coccyx/Buttocks Redness and Blanching - sacral mepilex in place  (R) Leg Redness, Blanching, Swelling, and Edema; dusky skin; discoloration; wound vac in place on lower leg  (L) Leg Redness, Blanching, and Swelling dusky skin  (R) Heel/Foot/Toe Redness, Blanching, and Edema  (L) Heel/Foot/Toe Redness, Blanching, and Edema          Devices In Places Blood Pressure Cuff, Pulse Ox, and Nasal Cannula/CPAP      Interventions In Place Heel Mepilex, Sacral Mepilex, TAP System, Pillows, Low Air Loss Mattress, Barrier Cream, Dri-Albert Pads, and Heels Loaded W/Pillows    Possible Skin Injury Yes established    Pictures Uploaded Into Epic Yes previously  Wound Consult Placed Yes previously  RN Wound Prevention Protocol Ordered Yes previously

## 2024-12-25 NOTE — PROGRESS NOTES
Hospital Medicine Daily Progress Note    Date of Service  12/25/2024    Chief Complaint  Henry Kumari is a 69 y.o. male admitted 12/17/2024 with right lower extremity necrotic wound    Hospital Course    Henry Kumari is a 69 y.o. male with atrial fibrillation, COPD, CHF, history of DVT/PE, hypertension, chronic low back pain, morbid obesity, SANTOSH, viral hepatitis C, history of pancreatitis, bipolar 2 disorder, BPH, hyperlipidemia who presented 12/17/2024 with necrotic wound.  Underwent excisional debridement of right leg wound, wound VAC placement on 12/19/2024, wound culture growing MRSA, Enterococcus faecalis.  Initiated on Zyvox.    Interval Problem Update    12/25/2024  Seen and examined at bedside  Vitals remained stable  Labs noted with leukopenia, hemoglobin 7.6, chemistry sodium 138 potassium 4, renal function stable, phosphorus 2.6 magnesium 1.6  Wound culture growing MRSA, Enterococcus faecalis     2 g IV magnesium ordered  Continue on Zyvox, complete total 10 days course  Requiring IV narcotics for pain management, monitor for toxicity  Repeat CBC in a.m. to monitor hemoglobin, electrolytes  Repeat BMP in a.m. to monitor renal function, electrolytes  Check magnesium level in a.m.  Need placement,  assisting  Case discussed with Ortho AMINTA Ovalle    I have discussed this patient's plan of care and discharge plan at IDT rounds today with Case Management, Nursing, Nursing leadership, and other members of the IDT team.    Consultants/Specialty  orthopedics    Code Status  Full Code    Disposition  The patient is not medically cleared for discharge to home or a post-acute facility.  Anticipate discharge to: skilled nursing facility    I have placed the appropriate orders for post-discharge needs.    Review of Systems  Review of Systems   Gastrointestinal:  Negative for nausea and vomiting.   Musculoskeletal:  Positive for joint pain.        Physical Exam  Temp:  [36.2 °C (97.2 °F)-36.7  °C (98.1 °F)] 36.7 °C (98.1 °F)  Pulse:  [77-90] 88  Resp:  [12-18] 17  BP: ()/(53-77) 124/69  SpO2:  [93 %-99 %] 97 %    Physical Exam  Musculoskeletal:      Comments: Right lower extremities  wound VAC noted, dressing noted         Fluids    Intake/Output Summary (Last 24 hours) at 12/25/2024 1439  Last data filed at 12/25/2024 1200  Gross per 24 hour   Intake 60 ml   Output 2270 ml   Net -2210 ml        Laboratory  Recent Labs     12/25/24  0246   WBC 3.4*   RBC 2.76*   HEMOGLOBIN 7.6*   HEMATOCRIT 24.7*   MCV 89.5   MCH 27.5   MCHC 30.8*   RDW 54.3*   PLATELETCT 149*   MPV 8.6*     Recent Labs     12/24/24  0209 12/25/24  0246   SODIUM 142 138   POTASSIUM 4.3 4.0   CHLORIDE 104 101   CO2 26 25   GLUCOSE 87 81   BUN 18 19   CREATININE 0.86 0.70   CALCIUM 8.8 8.5                   Imaging  DX-CHEST-PORTABLE (1 VIEW)   Final Result      1.  Bibasilar and perihilar underinflation atelectasis which could obscure an additional process.   2.  Persistently enlarged cardiac silhouette      CT-EXTREMITY, LOWER WITH RIGHT   Final Result      1.  Large ulcerated subcutaneous wound in the anterior medial aspect of the right lower leg.      2.  Large adjacent subcutaneous abscess, hematoma, or seroma adjacent to the area of soft tissue ulceration.      3.  No evidence of acute or chronic osteomyelitis.      4.  Severe tricompartmental osteoarthritis of the right knee.      US-GLORIA SINGLE LEVEL BILAT   Final Result      IR-US GUIDED PIV    (Results Pending)   IR-US GUIDED PIV    (Results Pending)        Assessment/Plan  * Necrosis (HCC)- (present on admission)  Assessment & Plan  Large necrotic wound on right shin->calf, supposedly started with bumping a trailor hitch and a hematoma 10 days prior to presentation, seen at OSH at that time.  CT LE shows necrotic skin tissue with adjacent fluid collection, likely hematoma/seroma  Plastic surgery recommend debridement of necrotic skin and can assist with reconstructive  surgery when wound is ready  S/p surgical debridement by ortho 12/19, wound vac on  Tolerating wound vac well  On zyvox for short course    12/25/2024  2 g IV magnesium  Continue on Zyvox, complete total 10 days course  Requiring IV narcotics for pain management, monitor for toxicity  Repeat CBC in a.m. to monitor hemoglobin, electrolytes  Repeat BMP in a.m. to monitor renal function, electrolytes  Check magnesium level in a.m.  Need placement,  assisting  Case discussed with Ortho AMINTA Ovalle    Hypomagnesemia  Assessment & Plan  2 g magnesium ordered  Repeat labs in a.m.    Hypokalemia  Assessment & Plan  P.o. replacement ordered    Benzodiazepine dependence, continuous (HCC)- (present on admission)  Assessment & Plan  Continue home Klonopin    Mood disorder (HCC)- (present on admission)  Assessment & Plan  Continue Prozac, Seroquel    Class 3 severe obesity due to excess calories with serious comorbidity and body mass index (BMI) of 45.0 to 49.9 in adult ([facility])- (present on admission)  Assessment & Plan  Consider referral to bariatric surgery on discharge, consider trial of GLP-1 inhibitors as outpatient.    SANTOSH on CPAP- (present on admission)  Assessment & Plan  Nightly cpap ordered    COPD (chronic obstructive pulmonary disease) ([facility])- (present on admission)  Assessment & Plan  Not in acute exacerbation.  Continue home theophylline, Dulera, Spiriva.  DuoNebs q6h prn         VTE prophylaxis: xarelto     I have performed a physical exam and reviewed and updated ROS and Plan today (12/25/2024). In review of yesterday's note (12/24/2024), there are no changes except as documented above.

## 2024-12-25 NOTE — PROGRESS NOTES
Hospital Medicine Daily Progress Note    Date of Service  12/24/2024    Chief Complaint  Henry Kumari is a 69 y.o. male admitted 12/17/2024 with right lower extremity necrotic wound    Hospital Course    Henry Kumari is a 69 y.o. male with atrial fibrillation, COPD, CHF, history of DVT/PE, hypertension, chronic low back pain, morbid obesity, SANTOSH, viral hepatitis C, history of pancreatitis, bipolar 2 disorder, BPH, hyperlipidemia who presented 12/17/2024 with necrotic wound.  Underwent excisional debridement of right leg wound, wound VAC placement on 12/19/2024, wound culture growing MRSA, Enterococcus faecalis.  Initiated on Zyvox.    Interval Problem Update  12/24/2024  Patient seen and examined at bedside  Vital remained stable  Labs reviewed noted to have normal white count, hemoglobin 7.7, chemistry sodium 142 potassium 4.3, renal function stable  Reviewed, procedure note reviewed, culture reviewed    Continue on Zyvox  Requiring IV narcotics for pain management, monitor for toxicity  Repeat CBC in a.m. to monitor hemoglobin, check iron panel  Repeat BMP in a.m. to monitor electrolytes, renal function  Returned back to SNF   assisting with discharge plan    I have discussed this patient's plan of care and discharge plan at IDT rounds today with Case Management, Nursing, Nursing leadership, and other members of the IDT team.    Consultants/Specialty  orthopedics    Code Status  Full Code    Disposition  The patient is not medically cleared for discharge to home or a post-acute facility.  Anticipate discharge to: home with close outpatient follow-up    I have placed the appropriate orders for post-discharge needs.    Review of Systems  Review of Systems   Gastrointestinal:  Negative for nausea and vomiting.   Musculoskeletal:  Positive for joint pain.        Physical Exam  Temp:  [36.2 °C (97.2 °F)-36.6 °C (97.9 °F)] 36.5 °C (97.7 °F)  Pulse:  [] 77  Resp:  [12-16] 12  BP:  ()/(57-79) 101/57  SpO2:  [93 %-97 %] 93 %    Physical Exam  Musculoskeletal:      Comments: Right first with wound VAC noted, dressing noted         Fluids    Intake/Output Summary (Last 24 hours) at 12/24/2024 1637  Last data filed at 12/24/2024 1546  Gross per 24 hour   Intake --   Output 2150 ml   Net -2150 ml        Laboratory  Recent Labs     12/22/24  0217   WBC 4.8   RBC 2.77*   HEMOGLOBIN 7.7*   HEMATOCRIT 25.3*   MCV 91.3   MCH 27.8   MCHC 30.4*   RDW 59.8*   PLATELETCT 194   MPV 8.8*     Recent Labs     12/22/24  0217 12/24/24  0209   SODIUM 142 142   POTASSIUM 3.9 4.3   CHLORIDE 102 104   CO2 28 26   GLUCOSE 88 87   BUN 19 18   CREATININE 0.77 0.86   CALCIUM 9.2 8.8                   Imaging  DX-CHEST-PORTABLE (1 VIEW)   Final Result      1.  Bibasilar and perihilar underinflation atelectasis which could obscure an additional process.   2.  Persistently enlarged cardiac silhouette      CT-EXTREMITY, LOWER WITH RIGHT   Final Result      1.  Large ulcerated subcutaneous wound in the anterior medial aspect of the right lower leg.      2.  Large adjacent subcutaneous abscess, hematoma, or seroma adjacent to the area of soft tissue ulceration.      3.  No evidence of acute or chronic osteomyelitis.      4.  Severe tricompartmental osteoarthritis of the right knee.      US-GLORIA SINGLE LEVEL BILAT   Final Result      IR-US GUIDED PIV    (Results Pending)   IR-US GUIDED PIV    (Results Pending)        Assessment/Plan  * Necrosis (HCC)- (present on admission)  Assessment & Plan  Large necrotic wound on right shin->calf, supposedly started with bumping a trailor hitch and a hematoma 10 days prior to presentation, seen at OSH at that time.  CT LE shows necrotic skin tissue with adjacent fluid collection, likely hematoma/seroma  Plastic surgery recommend debridement of necrotic skin and can assist with reconstructive surgery when wound is ready  S/p surgical debridement by ortho 12/19, wound vac on  Tolerating  wound vac well  On zyvox for short course    12/24/2024  Continue on Zyvox  Requiring IV narcotics for pain management, monitor for toxicity  Repeat CBC in a.m. to monitor hemoglobin, check iron panel  Repeat BMP in a.m. to monitor electrolytes, renal function  Returned back to SNF   assisting with discharge plan    Hypokalemia  Assessment & Plan  P.o. replacement ordered    Benzodiazepine dependence, continuous (HCC)- (present on admission)  Assessment & Plan  Continue home Klonopin    Mood disorder (HCC)- (present on admission)  Assessment & Plan  Continue Prozac, Seroquel    Class 3 severe obesity due to excess calories with serious comorbidity and body mass index (BMI) of 45.0 to 49.9 in adult (HCC)- (present on admission)  Assessment & Plan  Consider referral to bariatric surgery on discharge, consider trial of GLP-1 inhibitors as outpatient.    SANTOSH on CPAP- (present on admission)  Assessment & Plan  Nightly cpap ordered    COPD (chronic obstructive pulmonary disease) ([facility])- (present on admission)  Assessment & Plan  Not in acute exacerbation.  Continue home theophylline, Dulera, Spiriva.  DuoNebs q6h prn         VTE prophylaxis: xarelto     I have performed a physical exam and reviewed and updated ROS and Plan today (12/24/2024). In review of yesterday's note (12/23/2024), there are no changes except as documented above.         Greater than 52 minutes spent preparing to see patient (e.g. review of tests) obtaining and/or reviewing separately obtained history. Performing a medically appropriate examination and/ evaluation.  Counseling and educating the patient/family/caregiver.  Ordering medications, tests, or procedures.  Referring and communicating with other health care professionals.  Documenting clinical information in EPIC.  Independently interpreting results and communicating results to patient/family/caregiver.  Care coordination.

## 2024-12-25 NOTE — PROGRESS NOTES
Received report from previous shift RN  Assessment complete.  A&O x 4. Bed alarm on.  Patient calls appropriately.  Patient ambulates with x1 with FWW per report. Bed alarm on.  Patient has 5 /10 pain. Pain managed with repositioning and rest at the moment.  Denies N&V. Tolerating regular diet.  + void via condom cath, + flatus, +BM (12/24)   Patient denies SOB. Spo2>95% on 2L CPAP.   SCD's refused..  Patient is resting in bed.  Reviewed plan of care with patient. Call light and personal belongings with in reach. Hourly rounding in place. All needs met at this time.

## 2024-12-25 NOTE — PROGRESS NOTES
4 Eyes Skin Assessment Completed by JORDYN Carney and JORDYN Delaney.    Head WDL  Ears WDL  Nose Redness and Blanching  Mouth WDL  Neck WDL  Breast/Chest WDL  Shoulder Blades WDL  Spine WDL  (R) Arm/Elbow/Hand Redness, Blanching, Bruising, Swelling, and Discoloration  (L) Arm/Elbow/Hand Redness, Blanching, Bruising, Abrasion, Swelling, and Discoloration  Abdomen Redness, Blanching, and Abrasion  Groin  condom cath in place Redness and Blanching to pannus   Scrotum/Coccyx/Buttocks Redness and Blanching, sacral mepilex in place  (R) Leg  Wound VAC Redness, Blanching, Swelling, and Edema, dusky, discoloration   (L) Leg Redness, Blanching, Swelling, and Edema, dusky, discoloration   (R) Heel/Foot/Toe Redness, Blanching, and Edema  (L) Heel/Foot/Toe Redness, Blanching, and Edema          Devices In Places Blood Pressure Cuff, Pulse Ox, and Nasal Cannula, CPAP       Interventions In Place Heel Mepilex, Sacral Mepilex, TAP System, Pillows, Low Air Loss Mattress, Barrier Cream, and Dri-Albert Pads    Possible Skin Injury Yes    Pictures Uploaded Into Epic Yes, previously   Wound Consult Placed Yes, previously   RN Wound Prevention Protocol Ordered Yes, previously

## 2024-12-25 NOTE — CARE PLAN
The patient is Stable - Low risk of patient condition declining or worsening    Shift Goals  Clinical Goals: skin integrity preservation, WV management, pain control  Patient Goals: rest  Family Goals: CLOTILDE    Progress made toward(s) clinical / shift goals:  Patient independently changes position frequently. Wound vac has remained in place on RLE and functions appropriately. Pain medication remains available per MAR parameters for patient comfort.    Patient is not progressing towards the following goals: DC planning and clearance remain pending.

## 2024-12-26 PROBLEM — I50.9 CHF (CONGESTIVE HEART FAILURE) (HCC): Status: ACTIVE | Noted: 2024-12-26

## 2024-12-26 LAB
BASOPHILS # BLD AUTO: 1.1 % (ref 0–1.8)
BASOPHILS # BLD: 0.04 K/UL (ref 0–0.12)
EOSINOPHIL # BLD AUTO: 0.05 K/UL (ref 0–0.51)
EOSINOPHIL NFR BLD: 1.4 % (ref 0–6.9)
ERYTHROCYTE [DISTWIDTH] IN BLOOD BY AUTOMATED COUNT: 50.3 FL (ref 35.9–50)
HCT VFR BLD AUTO: 24.5 % (ref 42–52)
HGB BLD-MCNC: 7.8 G/DL (ref 14–18)
IMM GRANULOCYTES # BLD AUTO: 0.01 K/UL (ref 0–0.11)
IMM GRANULOCYTES NFR BLD AUTO: 0.3 % (ref 0–0.9)
LYMPHOCYTES # BLD AUTO: 0.61 K/UL (ref 1–4.8)
LYMPHOCYTES NFR BLD: 17.4 % (ref 22–41)
MCH RBC QN AUTO: 28 PG (ref 27–33)
MCHC RBC AUTO-ENTMCNC: 31.8 G/DL (ref 32.3–36.5)
MCV RBC AUTO: 87.8 FL (ref 81.4–97.8)
MONOCYTES # BLD AUTO: 0.44 K/UL (ref 0–0.85)
MONOCYTES NFR BLD AUTO: 12.5 % (ref 0–13.4)
NEUTROPHILS # BLD AUTO: 2.36 K/UL (ref 1.82–7.42)
NEUTROPHILS NFR BLD: 67.3 % (ref 44–72)
NRBC # BLD AUTO: 0 K/UL
NRBC BLD-RTO: 0 /100 WBC (ref 0–0.2)
PLATELET # BLD AUTO: 157 K/UL (ref 164–446)
PMV BLD AUTO: 8.8 FL (ref 9–12.9)
RBC # BLD AUTO: 2.79 M/UL (ref 4.7–6.1)
WBC # BLD AUTO: 3.5 K/UL (ref 4.8–10.8)

## 2024-12-26 PROCEDURE — 85025 COMPLETE CBC W/AUTO DIFF WBC: CPT

## 2024-12-26 PROCEDURE — 99232 SBSQ HOSP IP/OBS MODERATE 35: CPT | Performed by: STUDENT IN AN ORGANIZED HEALTH CARE EDUCATION/TRAINING PROGRAM

## 2024-12-26 PROCEDURE — 770001 HCHG ROOM/CARE - MED/SURG/GYN PRIV*

## 2024-12-26 PROCEDURE — 700102 HCHG RX REV CODE 250 W/ 637 OVERRIDE(OP): Performed by: STUDENT IN AN ORGANIZED HEALTH CARE EDUCATION/TRAINING PROGRAM

## 2024-12-26 PROCEDURE — 700111 HCHG RX REV CODE 636 W/ 250 OVERRIDE (IP): Mod: JZ | Performed by: STUDENT IN AN ORGANIZED HEALTH CARE EDUCATION/TRAINING PROGRAM

## 2024-12-26 PROCEDURE — A9270 NON-COVERED ITEM OR SERVICE: HCPCS | Performed by: STUDENT IN AN ORGANIZED HEALTH CARE EDUCATION/TRAINING PROGRAM

## 2024-12-26 PROCEDURE — 97535 SELF CARE MNGMENT TRAINING: CPT

## 2024-12-26 PROCEDURE — 94660 CPAP INITIATION&MGMT: CPT

## 2024-12-26 PROCEDURE — 36415 COLL VENOUS BLD VENIPUNCTURE: CPT

## 2024-12-26 RX ORDER — LINEZOLID 600 MG/1
600 TABLET, FILM COATED ORAL EVERY 12 HOURS
Status: DISCONTINUED | OUTPATIENT
Start: 2024-12-26 | End: 2024-12-26

## 2024-12-26 RX ORDER — DOXYCYCLINE 100 MG/1
100 TABLET ORAL EVERY 12 HOURS
Status: DISCONTINUED | OUTPATIENT
Start: 2024-12-26 | End: 2024-12-27 | Stop reason: HOSPADM

## 2024-12-26 RX ADMIN — MORPHINE SULFATE 4 MG: 4 INJECTION, SOLUTION INTRAMUSCULAR; INTRAVENOUS at 21:30

## 2024-12-26 RX ADMIN — AMOXICILLIN AND CLAVULANATE POTASSIUM 1 TABLET: 875; 125 TABLET, FILM COATED ORAL at 17:04

## 2024-12-26 RX ADMIN — THEOPHYLLINE 300 MG: 300 TABLET, EXTENDED RELEASE ORAL at 11:50

## 2024-12-26 RX ADMIN — QUETIAPINE FUMARATE 100 MG: 100 TABLET ORAL at 04:06

## 2024-12-26 RX ADMIN — QUETIAPINE FUMARATE 100 MG: 100 TABLET ORAL at 17:04

## 2024-12-26 RX ADMIN — GEMFIBROZIL 600 MG: 600 TABLET ORAL at 11:50

## 2024-12-26 RX ADMIN — GUAIFENESIN 600 MG: 600 TABLET, EXTENDED RELEASE ORAL at 17:04

## 2024-12-26 RX ADMIN — HYDROCODONE BITARTRATE AND ACETAMINOPHEN 1 TABLET: 5; 325 TABLET ORAL at 07:35

## 2024-12-26 RX ADMIN — OXYCODONE HYDROCHLORIDE AND ACETAMINOPHEN 1000 MG: 500 TABLET ORAL at 04:05

## 2024-12-26 RX ADMIN — FUROSEMIDE 40 MG: 20 TABLET ORAL at 04:06

## 2024-12-26 RX ADMIN — LINEZOLID 600 MG: 600 TABLET, FILM COATED ORAL at 04:05

## 2024-12-26 RX ADMIN — METOPROLOL SUCCINATE 100 MG: 100 TABLET, EXTENDED RELEASE ORAL at 04:05

## 2024-12-26 RX ADMIN — TAMSULOSIN HYDROCHLORIDE 0.4 MG: 0.4 CAPSULE ORAL at 04:06

## 2024-12-26 RX ADMIN — HYDROCODONE BITARTRATE AND ACETAMINOPHEN 1 TABLET: 5; 325 TABLET ORAL at 17:04

## 2024-12-26 RX ADMIN — RIVAROXABAN 20 MG: 20 TABLET, FILM COATED ORAL at 17:04

## 2024-12-26 RX ADMIN — GUAIFENESIN 600 MG: 600 TABLET, EXTENDED RELEASE ORAL at 04:05

## 2024-12-26 RX ADMIN — TIOTROPIUM BROMIDE INHALATION SPRAY 5 MCG: 3.12 SPRAY, METERED RESPIRATORY (INHALATION) at 04:05

## 2024-12-26 RX ADMIN — MOMETASONE FUROATE AND FORMOTEROL FUMARATE DIHYDRATE 2 PUFF: 200; 5 AEROSOL RESPIRATORY (INHALATION) at 04:04

## 2024-12-26 RX ADMIN — Medication 220 MG: at 04:06

## 2024-12-26 RX ADMIN — SPIRONOLACTONE 25 MG: 50 TABLET ORAL at 04:06

## 2024-12-26 RX ADMIN — CLONAZEPAM 1 MG: 1 TABLET ORAL at 04:06

## 2024-12-26 RX ADMIN — ATORVASTATIN CALCIUM 20 MG: 20 TABLET, FILM COATED ORAL at 21:30

## 2024-12-26 RX ADMIN — MOMETASONE FUROATE AND FORMOTEROL FUMARATE DIHYDRATE 2 PUFF: 200; 5 AEROSOL RESPIRATORY (INHALATION) at 17:04

## 2024-12-26 RX ADMIN — MORPHINE SULFATE 4 MG: 4 INJECTION, SOLUTION INTRAMUSCULAR; INTRAVENOUS at 02:50

## 2024-12-26 RX ADMIN — FLUOXETINE HYDROCHLORIDE 40 MG: 20 CAPSULE ORAL at 04:06

## 2024-12-26 RX ADMIN — DOXYCYCLINE 100 MG: 100 TABLET, FILM COATED ORAL at 17:04

## 2024-12-26 RX ADMIN — CLONAZEPAM 1 MG: 1 TABLET ORAL at 17:04

## 2024-12-26 ASSESSMENT — COGNITIVE AND FUNCTIONAL STATUS - GENERAL
HELP NEEDED FOR BATHING: A LOT
DRESSING REGULAR LOWER BODY CLOTHING: A LOT
DAILY ACTIVITIY SCORE: 17
PERSONAL GROOMING: A LITTLE
TOILETING: A LITTLE
SUGGESTED CMS G CODE MODIFIER DAILY ACTIVITY: CK
DRESSING REGULAR UPPER BODY CLOTHING: A LITTLE

## 2024-12-26 ASSESSMENT — PAIN DESCRIPTION - PAIN TYPE
TYPE: ACUTE PAIN
TYPE: ACUTE PAIN;SURGICAL PAIN
TYPE: ACUTE PAIN

## 2024-12-26 ASSESSMENT — ENCOUNTER SYMPTOMS
NAUSEA: 0
VOMITING: 0

## 2024-12-26 NOTE — PROGRESS NOTES
Received report from previous shift RN  Assessment complete.  A&O x 4. Bed alarm on.  Patient calls appropriately.  Patient ambulates with x1 with FWW. Bed alarm on.  Patient has 6/10 pain. Pain managed with repositioning and rest at the moment.  Complains of nausea. Tolerating regular diet.  + void, + flatus, +BM (12/25)   Patient denies SOB. Spo2>90% on 2L CPAP.   SCD's refused.  Patient is resting in bed.  Reviewed plan of care with patient. Call light and personal belongings with in reach. Hourly rounding in place. All needs met at this time.

## 2024-12-26 NOTE — PROGRESS NOTES
4 Eyes Skin Assessment Completed by JORDYN Carney and JORDYN Marks.    Head WDL  Ears WDL  Nose Redness and Blanching  Mouth WDL  Neck WDL  Breast/Chest WDL  Shoulder Blades WDL  Spine Redness and Blanching  (R) Arm/Elbow/Hand Redness, Blanching, Bruising, Swelling, and Discoloration  (L) Arm/Elbow/Hand Redness, Blanching, Bruising, Abrasion, Swelling, and Discoloration  Abdomen Redness, Blanching, and Bruising  Groin Redness and Blanching to pannus   Scrotum/Coccyx/Buttocks Redness and Blanching mepilex in place   (R) Leg Wound Vac  Redness, Blanching, Swelling, and Edema, dusky, discoloration  (L) Leg Redness, Blanching, Swelling, and Edema, dusky, discoloration  (R) Heel/Foot/Toe Redness, Blanching, and Edema  (L) Heel/Foot/Toe Redness, Blanching, and Edema          Devices In Places Blood Pressure Cuff and Pulse Ox, CPAP       Interventions In Place Heel Mepilex, Sacral Mepilex, TAP System, Pillows, Low Air Loss Mattress, Barrier Cream, and Dri-Albert Pads    Possible Skin Injury Yes    Pictures Uploaded Into Epic Yes previously   Wound Consult Placed Yes previously   RN Wound Prevention Protocol Ordered Yes previously

## 2024-12-26 NOTE — CARE PLAN
The patient is Stable - Low risk of patient condition declining or worsening    Shift Goals  Clinical Goals: skin integrity, WV mgmt  Patient Goals: rest  Family Goals: CLOTILDE      Problem: Knowledge Deficit - Standard  Goal: Patient and family/care givers will demonstrate understanding of plan of care, disease process/condition, diagnostic tests and medications  Description: Target End Date:  1-3 days or as soon as patient condition allows    Document in Patient Education    1.  Patient and family/caregiver oriented to unit, equipment, visitation policy and means for communicating concern  2.  Complete/review Learning Assessment  3.  Assess knowledge level of disease process/condition, treatment plan, diagnostic tests and medications  4.  Explain disease process/condition, treatment plan, diagnostic tests and medications  Outcome: Progressing     Problem: Skin Integrity  Goal: Skin integrity is maintained or improved  Description: Target End Date:  Prior to discharge or change in level of care    Document interventions on Skin Risk/Antony flowsheet groups and corresponding LDA    1.  Assess and monitor skin integrity, appearance and/or temperature  2.  Assess risk factors for impaired skin integrity and/or pressures ulcers  3.  Implement precautions to protect skin integrity in collaboration with interdisciplinary team  4.  Implement pressure ulcer prevention protocol if at risk for skin breakdown  5.  Confirm wound care consult if at risk for skin breakdown  6.  Ensure patient use of pressure relieving devices  (Low air loss bed, waffle overlay, heel protectors, ROHO cushion, etc)  Outcome: Progressing       Progress made toward(s) clinical / shift goals:  Pt. Was educated on pharmacological and non-pharmacological modalities. Pt. Verbalized understanding. Pt. Pain and nausea was medicated per MAR.  Pt. Skin integrity was maintained with 2 RN skin check, low air-loss mattress, and sacral mepilex. Pt. WV was monitored  intermittently throughout shift.  Cannister was changed. Pt. Rested intermittently throughout shift.        Patient is not progressing towards the following goals:

## 2024-12-26 NOTE — PROGRESS NOTES
Hospital Medicine Daily Progress Note    Date of Service  12/26/2024    Chief Complaint  Henry Kumari is a 69 y.o. male admitted 12/17/2024 with right lower extremity necrotic wound    Hospital Course    Henry Kumari is a 69 y.o. male with atrial fibrillation, COPD, CHF, history of DVT/PE, hypertension, chronic low back pain, morbid obesity, SANTOSH, viral hepatitis C, history of pancreatitis, bipolar 2 disorder, BPH, hyperlipidemia who presented 12/17/2024 with necrotic wound.  Underwent excisional debridement of right leg wound, wound VAC placement on 12/19/2024, wound culture growing MRSA, Enterococcus faecalis.  Initiated on Zyvox.    Interval Problem Update    12/26/2024  Seen and examined at bedside  Vitals remained stable  Remained afebrile  Labs reviewed noted to have pancytopenia white count 3.5, hemoglobin 7.8, platelet count 157K    Discontinue Zyvox, switch to Augmentin and doxycycline for ongoing neutropenia.  Total antibiotic course for 14 days  Requiring IV narcotics for pain management, monitor for toxicity  Repeat CBC in a.m. to monitor ongoing pancytopenia, need outpatient follow-up for repeat labs once discharge  Need placement,  assisting  Case discussed with Ortho AMINTA Ovalle.  Cleared to discharge from Ortho standpoint to SNF    I have discussed this patient's plan of care and discharge plan at IDT rounds today with Case Management, Nursing, Nursing leadership, and other members of the IDT team.    Consultants/Specialty  orthopedics    Code Status  Full Code    Disposition  The patient is not medically cleared for discharge to home or a post-acute facility.  Anticipate discharge to: skilled nursing facility    I have placed the appropriate orders for post-discharge needs.    Review of Systems  Review of Systems   Gastrointestinal:  Negative for nausea and vomiting.   Musculoskeletal:  Positive for joint pain.        Physical Exam  Temp:  [36.8 °C (98.2 °F)-37.1 °C (98.8  °F)] 36.8 °C (98.2 °F)  Pulse:  [] 88  Resp:  [17-18] 18  BP: (105-129)/(62-86) 110/67  SpO2:  [94 %-98 %] 97 %    Physical Exam  Musculoskeletal:      Comments: Right lower extremities  wound VAC noted, dressing noted         Fluids    Intake/Output Summary (Last 24 hours) at 12/26/2024 1236  Last data filed at 12/26/2024 1151  Gross per 24 hour   Intake 60 ml   Output 3195 ml   Net -3135 ml        Laboratory  Recent Labs     12/25/24  0246 12/26/24  0751   WBC 3.4* 3.5*   RBC 2.76* 2.79*   HEMOGLOBIN 7.6* 7.8*   HEMATOCRIT 24.7* 24.5*   MCV 89.5 87.8   MCH 27.5 28.0   MCHC 30.8* 31.8*   RDW 54.3* 50.3*   PLATELETCT 149* 157*   MPV 8.6* 8.8*     Recent Labs     12/24/24  0209 12/25/24  0246   SODIUM 142 138   POTASSIUM 4.3 4.0   CHLORIDE 104 101   CO2 26 25   GLUCOSE 87 81   BUN 18 19   CREATININE 0.86 0.70   CALCIUM 8.8 8.5                   Imaging  DX-CHEST-PORTABLE (1 VIEW)   Final Result      1.  Bibasilar and perihilar underinflation atelectasis which could obscure an additional process.   2.  Persistently enlarged cardiac silhouette      CT-EXTREMITY, LOWER WITH RIGHT   Final Result      1.  Large ulcerated subcutaneous wound in the anterior medial aspect of the right lower leg.      2.  Large adjacent subcutaneous abscess, hematoma, or seroma adjacent to the area of soft tissue ulceration.      3.  No evidence of acute or chronic osteomyelitis.      4.  Severe tricompartmental osteoarthritis of the right knee.      US-GLORIA SINGLE LEVEL BILAT   Final Result      IR-US GUIDED PIV    (Results Pending)   IR-US GUIDED PIV    (Results Pending)        Assessment/Plan  * Necrosis (HCC)- (present on admission)  Assessment & Plan  Large necrotic wound on right shin->calf, supposedly started with bumping a trailor hitch and a hematoma 10 days prior to presentation, seen at OSH at that time.  CT LE shows necrotic skin tissue with adjacent fluid collection, likely hematoma/seroma  Plastic surgery recommend  debridement of necrotic skin and can assist with reconstructive surgery when wound is ready  S/p surgical debridement by ortho 12/19, wound vac on  Tolerating wound vac well    12/26/2024  Discontinue Zyvox, switch to Augmentin and doxycycline for ongoing neutropenia.  Total antibiotic course for 14 days  Requiring IV narcotics for pain management, monitor for toxicity  Repeat CBC in a.m. to monitor ongoing pancytopenia, need outpatient follow-up for repeat labs once discharge  Need placement,  assisting  Case discussed with Ortho AMINTA Ovalle.  Cleared to discharge from Ortho standpoint to SNF    CHF (congestive heart failure) ([facility])  Assessment & Plan  Currently euvolemic    Hypomagnesemia  Assessment & Plan  2 g magnesium ordered  Repeat labs in a.m.    Hypokalemia  Assessment & Plan  P.o. replacement ordered    Benzodiazepine dependence, continuous ([facility])- (present on admission)  Assessment & Plan  Continue home Klonopin    Mood disorder ([facility])- (present on admission)  Assessment & Plan  Continue Prozac, Seroquel    Class 3 severe obesity due to excess calories with serious comorbidity and body mass index (BMI) of 45.0 to 49.9 in adult ([facility])- (present on admission)  Assessment & Plan  Consider referral to bariatric surgery on discharge, consider trial of GLP-1 inhibitors as outpatient.    SANTOSH on CPAP- (present on admission)  Assessment & Plan  Nightly cpap ordered    COPD (chronic obstructive pulmonary disease) ([facility])- (present on admission)  Assessment & Plan  Not in acute exacerbation.  Continue home theophylline, Dulera, Spiriva.  DuoNebs q6h prn    History of DVT (deep vein thrombosis)- (present on admission)  Assessment & Plan  On Xarelto         VTE prophylaxis: xarelto     I have performed a physical exam and reviewed and updated ROS and Plan today (12/26/2024). In review of yesterday's note (12/25/2024), there are no changes except as documented above.            Greater than 37  minutes spent  preparing to see patient (e.g. review of tests) obtaining and/or reviewing separately obtained history. Performing a medically appropriate examination and/ evaluation.  Counseling and educating the patient/family/caregiver.  Ordering medications, tests, or procedures.  Referring and communicating with other health care professionals.  Documenting clinical information in EPIC.  Independently interpreting results and communicating results to patient/family/caregiver.  Care coordination.

## 2024-12-26 NOTE — CARE PLAN
The patient is Stable - Low risk of patient condition declining or worsening    Shift Goals  Clinical Goals: WV management, skin integrity preservation, pain control  Patient Goals: pain control  Family Goals: CLOTILDE    Progress made toward(s) clinical / shift goals:  WV has remained in place and operational. Pain has been medicated per MAR. Patient continues to change position frequently.     Patient is not progressing towards the following goals: Pending DC back to [facility].

## 2024-12-26 NOTE — DISCHARGE PLANNING
Agency/Facility Name: [facility]  Outcome: Bed not available until tomorrow. Patient will be transported tomorrow.

## 2024-12-26 NOTE — DISCHARGE PLANNING
Case Management Discharge Planning    Admission Date: 12/17/2024  GMLOS: 6.9  ALOS: 9    6-Clicks ADL Score: 17  6-Clicks Mobility Score: 20  PT and/or OT Eval ordered: Yes  Post-acute Referrals Ordered: Yes  Post-acute Choice Obtained: Yes  Has referral(s) been sent to post-acute provider:  Yes      Anticipated Discharge Dispo: Discharge Disposition: D/T to SNF with Medicare cert in anticipation of skilled care (03)  Discharge Address: [facility] [place] [address]  Discharge Contact Phone Number: [phone]    DME Needed: No    Action(s) Taken: Pt discussed in IDT rounds. Plan for pt to DC to [facility] tomorrow to arrive to SNF by 1400. SW spoke to Omaira at [facility] who requested DC meds and wound vac information. [facility] sent Roland hard fax of this information to [phone].     1600  SW called pts sister, emergency contact, to inform her that pt will be transporting to [facility] tomorrow at 1300. [facility] provided pts sister with facility address and phone number.     1610  Transport confirmed, 1300 tomorrow to [facility].     Escalations Completed: None    Medically Clear: Yes    Next Steps: Set up transport to [place] for 1300 tomorrow.     Barriers to Discharge: None    Is the patient up for discharge tomorrow: Yes    Is transport arranged for discharge disposition: In the process

## 2024-12-26 NOTE — PROGRESS NOTES
4 Eyes Skin Assessment Completed by JORDYN Olmstead and JORDYN Mcnamara.    Head WDL  Ears WDL  Nose Redness and Blanching  Mouth WDL  Neck WDL  Breast/Chest WDL  Shoulder Blades WDL  Spine Redness and Blanching  (R) Arm/Elbow/Hand Redness, Blanching, Bruising, Swelling, and Discoloration  (L) Arm/Elbow/Hand Redness, Blanching, Bruising, Abrasion, Swelling, and Discoloration  Abdomen Redness, Blanching, and Bruising - pannus  Groin Redness and Blanching - pannus  Scrotum/Coccyx/Buttocks Redness and Blanching - mepilex in place  (R) Leg Redness, Blanching, Swelling, and Edema - dusky skin; WV in place RLE  (L) Leg Redness, Blanching, Swelling, and Edema dusky skin  (R) Heel/Foot/Toe Redness, Blanching, and Edema  (L) Heel/Foot/Toe Redness, Blanching, and Edema          Devices In Places Blood pressure cuff, pulse ox, CPAP      Interventions In Place Heel Mepilex, Sacral Mepilex, TAP System, Pillows, Low Air Loss Mattress, Barrier Cream, and Dri-Albert Pads    Possible Skin Injury Yes    Pictures Uploaded Into Epic Yes previously  Wound Consult Placed Yes previously  RN Wound Prevention Protocol Ordered Yes previously

## 2024-12-26 NOTE — DISCHARGE SUMMARY
Discharge Summary    CHIEF COMPLAINT ON ADMISSION  No chief complaint on file.      Reason for Admission  Wound to RLE     Admission Date  12/17/2024    CODE STATUS  Full Code    HPI & HOSPITAL COURSE  Henry Kumari is a 69 y.o. male with atrial fibrillation, COPD, CHF, history of DVT/PE, hypertension, chronic low back pain, morbid obesity, SANTOSH, viral hepatitis C, history of pancreatitis, bipolar 2 disorder, BPH, hyperlipidemia who presented 12/17/2024 with necrotic wound. Underwent excisional debridement of right leg wound, wound VAC placement on 12/19/2024, wound culture growing MRSA, Enterococcus faecalis. Initiated on Zyvox.       Therefore, he is discharged in good and stable condition to skilled nursing facility.    The patient met 2-midnight criteria for an inpatient stay at the time of discharge.        DISCHARGE DIAGNOSES  Principal Problem:    Necrosis (HCC) (POA: Yes)  Active Problems:    COPD (chronic obstructive pulmonary disease) (HCC) (POA: Yes)    SANTOSH on CPAP (POA: Yes)    Class 3 severe obesity due to excess calories with serious comorbidity and body mass index (BMI) of 45.0 to 49.9 in adult (HCC) (POA: Yes)    Mood disorder (HCC) (POA: Yes)    Benzodiazepine dependence, continuous (HCC) (POA: Yes)    Hypokalemia (POA: Unknown)    Hypomagnesemia (POA: Unknown)  Resolved Problems:    * No resolved hospital problems. *      FOLLOW UP  No future appointments.  No follow-up provider specified.    MEDICATIONS ON DISCHARGE     Medication List        CONTINUE taking these medications        Instructions   acetaminophen 500 MG Tabs  Commonly known as: Tylenol   Take 2 Tablets by mouth every 6 hours as needed for Mild Pain, Moderate Pain or Fever.  Dose: 1,000 mg     albuterol 108 (90 Base) MCG/ACT Aers inhalation aerosol   Inhale 2 Puffs by mouth every 6 hours as needed for Shortness of Breath.  Dose: 2 Puff     ascorbic acid 1000 MG tablet  Commonly known as: Vitamin C   Take 1 Tablet by mouth every  day.  Dose: 1,000 mg     atorvastatin 20 MG Tabs  Commonly known as: Lipitor   Take 20 mg by mouth every evening.  Dose: 20 mg     buprenorphine 2 MG Subl  Commonly known as: Subutex   Place 2 mg under the tongue 1 time a day as needed. .  Dose: 2 mg     clonazePAM 1 MG Tabs  Commonly known as: KlonoPIN   Take 1 mg by mouth 2 times a day.  Dose: 1 mg     D3 5000 125 MCG (5000 UT) Caps  Generic drug: cholecalciferol   Take 5,000 Units by mouth every day.  Dose: 5,000 Units     fluoxetine 40 MG capsule  Commonly known as: PROzac   Take 40 mg by mouth every day.  Dose: 40 mg     fluticasone-salmeterol 250-50 MCG/DOSE Aepb  Commonly known as: ADVAIR   Inhale 2 Puffs 2 times a day.  Dose: 2 Puff     furosemide 40 MG Tabs  Commonly known as: Lasix   Take 40 mg by mouth every day. Indications: Cardiac Failure  Dose: 40 mg     gemfibrozil 600 MG Tabs  Commonly known as: Lopid   Take 600 mg by mouth 2 times a day.  Dose: 600 mg     guaiFENesin 100 MG/5ML liquid  Commonly known as: Robitussin   Take 10 mL by mouth every four hours as needed for Cough.  Dose: 10 mL     Home Care Oxygen   Inhale 2 L/min continuous. [facility]  Dose: 2 L/min     Magnesium 400 MG Tabs   Take 400 mg by mouth 4 times a day.  Dose: 400 mg     metoprolol  MG Tb24  Commonly known as: Toprol XL   Take 125 mg by mouth every day.  Dose: 125 mg     nystatin powder  Commonly known as: Mycostatin   Apply 1 g topically 2 times a day.  Dose: 1 Application     ondansetron 4 MG Tbdp  Commonly known as: Zofran ODT   Take 4 mg by mouth every 6 hours as needed for Nausea/Vomiting.  Dose: 4 mg     QUEtiapine Fumarate 150 MG Tabs   Take 100 mg by mouth 2 times a day.  Dose: 100 mg     senna-docusate 8.6-50 MG Tabs  Commonly known as: Pericolace Or Senokot S   Take 2 Tablets by mouth 2 times a day.  Dose: 2 Tablet     spironolactone 25 MG Tabs  Commonly known as: Aldactone   Take 50 mg by mouth every day.  Dose: 50 mg     tamsulosin 0.4 MG  capsule  Commonly known as: Flomax   Take 0.8 mg by mouth every day.  Dose: 0.8 mg     theophylline  MG Tb12  Commonly known as: Theodur   Take 300 mg by mouth 2 times a day.  Dose: 300 mg     tiotropium 18 MCG Caps  Commonly known as: Spiriva   Inhale 18 mcg by mouth every day.  Dose: 18 mcg     Xarelto 20 MG Tabs tablet  Generic drug: rivaroxaban   Take 20 mg by mouth with dinner.  Dose: 20 mg     zinc sulfate 220 (50 Zn) MG Caps  Commonly known as: Zincate   Take 1 Capsule by mouth every day.  Dose: 220 mg            STOP taking these medications      ibuprofen 200 MG Tabs  Commonly known as: Motrin              Allergies  Allergies   Allergen Reactions    Tape Rash     Pt states tape removes his skin         DIET  Orders Placed This Encounter   Procedures    Diet Order Diet: Regular     Standing Status:   Standing     Number of Occurrences:   1     Order Specific Question:   Diet:     Answer:   Regular [1]         LABORATORY  Lab Results   Component Value Date    SODIUM 138 12/25/2024    POTASSIUM 4.0 12/25/2024    CHLORIDE 101 12/25/2024    CO2 25 12/25/2024    GLUCOSE 81 12/25/2024    BUN 19 12/25/2024    CREATININE 0.70 12/25/2024        Lab Results   Component Value Date    WBC 3.5 (L) 12/26/2024    HEMOGLOBIN 7.8 (L) 12/26/2024    HEMATOCRIT 24.5 (L) 12/26/2024    PLATELETCT 157 (L) 12/26/2024

## 2024-12-27 VITALS
RESPIRATION RATE: 20 BRPM | HEART RATE: 95 BPM | TEMPERATURE: 97.9 F | HEIGHT: 72 IN | DIASTOLIC BLOOD PRESSURE: 95 MMHG | BODY MASS INDEX: 42.66 KG/M2 | OXYGEN SATURATION: 98 % | WEIGHT: 315 LBS | SYSTOLIC BLOOD PRESSURE: 143 MMHG

## 2024-12-27 LAB
BASOPHILS # BLD AUTO: 0.5 % (ref 0–1.8)
BASOPHILS # BLD: 0.02 K/UL (ref 0–0.12)
EOSINOPHIL # BLD AUTO: 0.06 K/UL (ref 0–0.51)
EOSINOPHIL NFR BLD: 1.6 % (ref 0–6.9)
ERYTHROCYTE [DISTWIDTH] IN BLOOD BY AUTOMATED COUNT: 48.6 FL (ref 35.9–50)
HCT VFR BLD AUTO: 24.3 % (ref 42–52)
HGB BLD-MCNC: 7.6 G/DL (ref 14–18)
IMM GRANULOCYTES # BLD AUTO: 0.01 K/UL (ref 0–0.11)
IMM GRANULOCYTES NFR BLD AUTO: 0.3 % (ref 0–0.9)
LYMPHOCYTES # BLD AUTO: 0.78 K/UL (ref 1–4.8)
LYMPHOCYTES NFR BLD: 20.8 % (ref 22–41)
MCH RBC QN AUTO: 27 PG (ref 27–33)
MCHC RBC AUTO-ENTMCNC: 31.3 G/DL (ref 32.3–36.5)
MCV RBC AUTO: 86.2 FL (ref 81.4–97.8)
MONOCYTES # BLD AUTO: 0.52 K/UL (ref 0–0.85)
MONOCYTES NFR BLD AUTO: 13.9 % (ref 0–13.4)
NEUTROPHILS # BLD AUTO: 2.36 K/UL (ref 1.82–7.42)
NEUTROPHILS NFR BLD: 62.9 % (ref 44–72)
NRBC # BLD AUTO: 0 K/UL
NRBC BLD-RTO: 0 /100 WBC (ref 0–0.2)
PLATELET # BLD AUTO: 162 K/UL (ref 164–446)
PMV BLD AUTO: 8.8 FL (ref 9–12.9)
RBC # BLD AUTO: 2.82 M/UL (ref 4.7–6.1)
WBC # BLD AUTO: 3.8 K/UL (ref 4.8–10.8)

## 2024-12-27 PROCEDURE — 700102 HCHG RX REV CODE 250 W/ 637 OVERRIDE(OP): Performed by: STUDENT IN AN ORGANIZED HEALTH CARE EDUCATION/TRAINING PROGRAM

## 2024-12-27 PROCEDURE — A9270 NON-COVERED ITEM OR SERVICE: HCPCS | Performed by: STUDENT IN AN ORGANIZED HEALTH CARE EDUCATION/TRAINING PROGRAM

## 2024-12-27 PROCEDURE — 36415 COLL VENOUS BLD VENIPUNCTURE: CPT

## 2024-12-27 PROCEDURE — 97605 NEG PRS WND THER DME<=50SQCM: CPT

## 2024-12-27 PROCEDURE — 99239 HOSP IP/OBS DSCHRG MGMT >30: CPT | Performed by: STUDENT IN AN ORGANIZED HEALTH CARE EDUCATION/TRAINING PROGRAM

## 2024-12-27 PROCEDURE — 94660 CPAP INITIATION&MGMT: CPT

## 2024-12-27 PROCEDURE — 85025 COMPLETE CBC W/AUTO DIFF WBC: CPT

## 2024-12-27 RX ORDER — HYDROCODONE BITARTRATE AND ACETAMINOPHEN 5; 325 MG/1; MG/1
1 TABLET ORAL EVERY 8 HOURS PRN
Qty: 3 TABLET | Refills: 0 | Status: SHIPPED | OUTPATIENT
Start: 2024-12-27 | End: 2024-12-28

## 2024-12-27 RX ORDER — DOXYCYCLINE 100 MG/1
100 TABLET ORAL EVERY 12 HOURS
Status: ACTIVE | DISCHARGE
Start: 2024-12-27 | End: 2024-12-31

## 2024-12-27 RX ORDER — SODIUM HYPOCHLORITE 1.25 MG/ML
SOLUTION TOPICAL DAILY
Status: DISCONTINUED | OUTPATIENT
Start: 2024-12-27 | End: 2024-12-27 | Stop reason: ALTCHOICE

## 2024-12-27 RX ORDER — CLONAZEPAM 1 MG/1
1 TABLET ORAL 2 TIMES DAILY
Qty: 2 TABLET | Refills: 0 | Status: SHIPPED | OUTPATIENT
Start: 2024-12-27 | End: 2024-12-28

## 2024-12-27 RX ADMIN — THEOPHYLLINE 300 MG: 300 TABLET, EXTENDED RELEASE ORAL at 10:45

## 2024-12-27 RX ADMIN — HYDROCODONE BITARTRATE AND ACETAMINOPHEN 1 TABLET: 5; 325 TABLET ORAL at 05:48

## 2024-12-27 RX ADMIN — FUROSEMIDE 40 MG: 20 TABLET ORAL at 10:44

## 2024-12-27 RX ADMIN — MOMETASONE FUROATE AND FORMOTEROL FUMARATE DIHYDRATE 2 PUFF: 200; 5 AEROSOL RESPIRATORY (INHALATION) at 05:38

## 2024-12-27 RX ADMIN — SPIRONOLACTONE 25 MG: 50 TABLET ORAL at 10:45

## 2024-12-27 RX ADMIN — THEOPHYLLINE 300 MG: 300 TABLET, EXTENDED RELEASE ORAL at 00:03

## 2024-12-27 RX ADMIN — FLUOXETINE HYDROCHLORIDE 40 MG: 20 CAPSULE ORAL at 05:38

## 2024-12-27 RX ADMIN — AMOXICILLIN AND CLAVULANATE POTASSIUM 1 TABLET: 875; 125 TABLET, FILM COATED ORAL at 05:36

## 2024-12-27 RX ADMIN — HYDROCODONE BITARTRATE AND ACETAMINOPHEN 1 TABLET: 5; 325 TABLET ORAL at 00:03

## 2024-12-27 RX ADMIN — DOXYCYCLINE 100 MG: 100 TABLET, FILM COATED ORAL at 05:38

## 2024-12-27 RX ADMIN — QUETIAPINE FUMARATE 100 MG: 100 TABLET ORAL at 05:38

## 2024-12-27 RX ADMIN — HYDROCODONE BITARTRATE AND ACETAMINOPHEN 1 TABLET: 5; 325 TABLET ORAL at 11:24

## 2024-12-27 RX ADMIN — Medication 220 MG: at 05:40

## 2024-12-27 RX ADMIN — GEMFIBROZIL 600 MG: 600 TABLET ORAL at 00:03

## 2024-12-27 RX ADMIN — GUAIFENESIN 600 MG: 600 TABLET, EXTENDED RELEASE ORAL at 05:38

## 2024-12-27 RX ADMIN — TIOTROPIUM BROMIDE INHALATION SPRAY 5 MCG: 3.12 SPRAY, METERED RESPIRATORY (INHALATION) at 05:37

## 2024-12-27 RX ADMIN — CLONAZEPAM 1 MG: 1 TABLET ORAL at 05:40

## 2024-12-27 RX ADMIN — OXYCODONE HYDROCHLORIDE AND ACETAMINOPHEN 1000 MG: 500 TABLET ORAL at 05:40

## 2024-12-27 RX ADMIN — METOPROLOL SUCCINATE 100 MG: 100 TABLET, EXTENDED RELEASE ORAL at 05:40

## 2024-12-27 RX ADMIN — GEMFIBROZIL 600 MG: 600 TABLET ORAL at 10:44

## 2024-12-27 RX ADMIN — TAMSULOSIN HYDROCHLORIDE 0.4 MG: 0.4 CAPSULE ORAL at 05:38

## 2024-12-27 ASSESSMENT — PAIN DESCRIPTION - PAIN TYPE
TYPE: ACUTE PAIN;SURGICAL PAIN
TYPE: ACUTE PAIN

## 2024-12-27 NOTE — DISCHARGE SUMMARY
Discharge Summary    CHIEF COMPLAINT ON ADMISSION  No chief complaint on file.      Reason for Admission  Wound to RLE     Admission Date  12/17/2024    CODE STATUS  Full Code    HPI & HOSPITAL COURSE    Henry Kumari is a 69 y.o. male with atrial fibrillation, COPD, CHF, history of DVT/PE, hypertension, chronic low back pain, morbid obesity, SANTOSH, viral hepatitis C, history of pancreatitis, bipolar 2 disorder, BPH, hyperlipidemia who presented 12/17/2024 with necrotic wound.  Underwent excisional debridement of right leg wound, wound VAC placement on 12/19/2024, wound culture growing MRSA, Enterococcus faecalis.  Initiated on Zyvox.  Antibiotic switched to Augmentin and doxycycline for ongoing pancytopenia.  Patient to complete antibiotic on 12/31/2024.  Ortho has cleared patient for discharge with outpatient follow-up.    Patient seen and examined at bedside of discharge.  Vitals remained stable.  Labs with pancytopenia which is improving, no concern for bleeding, does have chronic anemia.  I have recommended to repeat labs in the morning.  I will order outpatient CBC      At the time of discharge patient remain hemodynamically stable ,asymptomatic .  Patient will be discharged with close follow up with PCP, orthopedics.Discharge plan was discussed with patient in details .  Patient agreed with discharge plan  and  all questions answered.        Therefore, he is discharged in good and stable condition to home with close outpatient follow-up.    The patient met 2-midnight criteria for an inpatient stay at the time of discharge.    Discharge Date  12/27/2024    DISCHARGE DIAGNOSES  Principal Problem:    Necrosis (HCC) (POA: Yes)  Active Problems:    History of DVT (deep vein thrombosis) (POA: Yes)    COPD (chronic obstructive pulmonary disease) (HCC) (POA: Yes)    SANTOSH on CPAP (POA: Yes)    Class 3 severe obesity due to excess calories with serious comorbidity and body mass index (BMI) of 45.0 to 49.9 in adult  (HCC) (POA: Yes)    Mood disorder (HCC) (POA: Yes)    Benzodiazepine dependence, continuous (HCC) (POA: Yes)    Hypokalemia (POA: Unknown)    Hypomagnesemia (POA: Unknown)    CHF (congestive heart failure) (HCC) (POA: Unknown)  Resolved Problems:    * No resolved hospital problems. *      FOLLOW UP  No future appointments.  Milton Swan M.D.  [address]  [address]  [facility] [id number] [phone]    Follow up in 2 week(s)        MEDICATIONS ON DISCHARGE     Medication List        START taking these medications        Instructions   amoxicillin-clavulanate 875-125 MG Tabs  Commonly known as: Augmentin   Take 1 Tablet by mouth every 12 hours for 4 days.  Dose: 1 Tablet     doxycycline monohydrate 100 MG tablet  Commonly known as: Adoxa   Take 1 Tablet by mouth every 12 hours for 4 days.  Dose: 100 mg     HYDROcodone-acetaminophen 5-325 MG Tabs per tablet  Commonly known as: Norco   Take 1 Tablet by mouth every 8 hours as needed (severe pain) for up to 1 day.  Dose: 1 Tablet            CONTINUE taking these medications        Instructions   acetaminophen 500 MG Tabs  Commonly known as: Tylenol   Take 2 Tablets by mouth every 6 hours as needed for Mild Pain, Moderate Pain or Fever.  Dose: 1,000 mg     albuterol 108 (90 Base) MCG/ACT Aers inhalation aerosol   Inhale 2 Puffs by mouth every 6 hours as needed for Shortness of Breath.  Dose: 2 Puff     ascorbic acid 1000 MG tablet  Commonly known as: Vitamin C   Take 1 Tablet by mouth every day.  Dose: 1,000 mg     atorvastatin 20 MG Tabs  Commonly known as: Lipitor   Take 20 mg by mouth every evening.  Dose: 20 mg     buprenorphine 2 MG Subl  Commonly known as: Subutex   Place 2 mg under the tongue 1 time a day as needed. .  Dose: 2 mg     clonazePAM 1 MG Tabs  Commonly known as: KlonoPIN   Take 1 Tablet by mouth 2 times a day for 1 day.  Dose: 1 mg     D3 5000 125 MCG (5000 UT) Caps  Generic drug: cholecalciferol   Take 5,000 Units by mouth every  day.  Dose: 5,000 Units     fluoxetine 40 MG capsule  Commonly known as: PROzac   Take 40 mg by mouth every day.  Dose: 40 mg     fluticasone-salmeterol 250-50 MCG/DOSE Aepb  Commonly known as: ADVAIR   Inhale 2 Puffs 2 times a day.  Dose: 2 Puff     furosemide 40 MG Tabs  Commonly known as: Lasix   Take 40 mg by mouth every day. Indications: Cardiac Failure  Dose: 40 mg     gemfibrozil 600 MG Tabs  Commonly known as: Lopid   Take 600 mg by mouth 2 times a day.  Dose: 600 mg     guaiFENesin 100 MG/5ML liquid  Commonly known as: Robitussin   Take 10 mL by mouth every four hours as needed for Cough.  Dose: 10 mL     Home Care Oxygen   Inhale 2 L/min continuous. Last reported DME = [facility]  Dose: 2 L/min     Magnesium 400 MG Tabs   Take 400 mg by mouth 4 times a day.  Dose: 400 mg     metoprolol  MG Tb24  Commonly known as: Toprol XL   Take 125 mg by mouth every day.  Dose: 125 mg     nystatin powder  Commonly known as: Mycostatin   Apply 1 g topically 2 times a day.  Dose: 1 Application     ondansetron 4 MG Tbdp  Commonly known as: Zofran ODT   Take 4 mg by mouth every 6 hours as needed for Nausea/Vomiting.  Dose: 4 mg     QUEtiapine Fumarate 150 MG Tabs   Take 100 mg by mouth 2 times a day.  Dose: 100 mg     senna-docusate 8.6-50 MG Tabs  Commonly known as: Pericolace Or Senokot S   Take 2 Tablets by mouth 2 times a day.  Dose: 2 Tablet     spironolactone 25 MG Tabs  Commonly known as: Aldactone   Take 50 mg by mouth every day.  Dose: 50 mg     tamsulosin 0.4 MG capsule  Commonly known as: Flomax   Take 0.8 mg by mouth every day.  Dose: 0.8 mg     theophylline  MG Tb12  Commonly known as: Theodur   Take 300 mg by mouth 2 times a day.  Dose: 300 mg     tiotropium 18 MCG Caps  Commonly known as: Spiriva   Inhale 18 mcg by mouth every day.  Dose: 18 mcg     Xarelto 20 MG Tabs tablet  Generic drug: rivaroxaban   Take 20 mg by mouth with dinner.  Dose: 20 mg     zinc sulfate 220 (50 Zn) MG Caps  Commonly known  as: Zincate   Take 1 Capsule by mouth every day.  Dose: 220 mg            STOP taking these medications      ibuprofen 200 MG Tabs  Commonly known as: Motrin              Allergies  Allergies   Allergen Reactions    Tape Rash     Pt states tape removes his skin         DIET  Orders Placed This Encounter   Procedures    Diet Order Diet: Regular     Standing Status:   Standing     Number of Occurrences:   1     Order Specific Question:   Diet:     Answer:   Regular [1]       ACTIVITY  As tolerated.  Weight bearing as tolerated    CONSULTATIONS  Ortho     PROCEDURES  DX-CHEST-PORTABLE (1 VIEW)   Final Result      1.  Bibasilar and perihilar underinflation atelectasis which could obscure an additional process.   2.  Persistently enlarged cardiac silhouette      CT-EXTREMITY, LOWER WITH RIGHT   Final Result      1.  Large ulcerated subcutaneous wound in the anterior medial aspect of the right lower leg.      2.  Large adjacent subcutaneous abscess, hematoma, or seroma adjacent to the area of soft tissue ulceration.      3.  No evidence of acute or chronic osteomyelitis.      4.  Severe tricompartmental osteoarthritis of the right knee.      US-GLORIA SINGLE LEVEL BILAT   Final Result      IR-US GUIDED PIV    (Results Pending)   IR-US GUIDED PIV    (Results Pending)         LABORATORY  Lab Results   Component Value Date    SODIUM 138 12/25/2024    POTASSIUM 4.0 12/25/2024    CHLORIDE 101 12/25/2024    CO2 25 12/25/2024    GLUCOSE 81 12/25/2024    BUN 19 12/25/2024    CREATININE 0.70 12/25/2024        Lab Results   Component Value Date    WBC 3.8 (L) 12/27/2024    HEMOGLOBIN 7.6 (L) 12/27/2024    HEMATOCRIT 24.3 (L) 12/27/2024    PLATELETCT 162 (L) 12/27/2024        Total time of the discharge process exceeds  33 minutes.

## 2024-12-27 NOTE — PROGRESS NOTES
"Bedside report received.  Assessment complete.  A&O x 4. Patient calls appropriately.  Patient ambulates with FWW and X1 assist. Bed alarm on.   Patient has 7/10 pain.   Denies N&V. Tolerating regular diet.  Surgical site to RLE, WV in place  Skin per note.  + void, + flatus, + BM.  Patient denies SOB.  SCD's refused.  Patient irritable but cooperative with staff and POC at this time.  Review plan with of care with patient. Call light and personal belongings within reach. Hourly rounding in place. All needs met at this time.    BP (P) 96/64 Comment: Rn notified  Pulse (P) 77   Temp (P) 37 °C (98.6 °F) (Temporal)   Resp (P) 17   Ht 1.82 m (5' 11.65\")   Wt (!) 161 kg (355 lb 6.1 oz)   SpO2 (P) 100%   BMI 48.66 kg/m²     "

## 2024-12-27 NOTE — WOUND TEAM
[facility]  Inpatient Services  Wound and Skin Care Follow-up    Admission Date: 12/17/2024     Last order of IP CONSULT TO WOUND CARE was found on 12/19/2024 from Hospital Encounter on 12/17/2024     HPI, PMH, SH: Reviewed    Past Surgical History:   Procedure Laterality Date    IRRIGATION & DEBRIDEMENT GENERAL Right 12/19/2024    Procedure: IRRIGATION AND DEBRIDEMENT, WOUND, GENERAL, LEG;  Surgeon: Milton Swan M.D.;  Location: [facility];  Service: Orthopedics    APPLICATION OR REPLACEMENT, WOUND VAC Right 12/19/2024    Procedure: APPLICATION OR REPLACEMENT, WOUND VAC;  Surgeon: Milton Swan M.D.;  Location: [facility];  Service: Orthopedics    INCISION AND DRAINAGE GENERAL Left 1/23/2024    Procedure: INCISION AND DRAINAGE, SHOULDER;  Surgeon: Arnol Masterson M.D.;  Location: [facility];  Service: Orthopedics    IRRIGATION & DEBRIDEMENT GENERAL Left 1/4/2024    Procedure: IRRIGATION AND DEBRIDEMENT, WOUND - ARM AND CLOSURE;  Surgeon: Corey Cruz M.D.;  Location: [facility];  Service: Orthopedics    HUMERUS NAILING INTRAMEDULLARY Left 12/28/2023    Procedure: INSERTION, INTRAMEDULLARY WIL, HUMERUS;  Surgeon: Corey Cruz M.D.;  Location: [facility];  Service: Orthopedics    ERCP  7/18/2018    Procedure: ERCP;  Surgeon: Ariel Aguirre M.D.;  Location: [facility];  Service: EUS    ERCP W/REM FB AND/OR STENT CHG  7/18/2018    Procedure: ERCP W/REM FB AND/OR STENT CHG - STENT EXCHANGE;  Surgeon: Ariel Aguirre M.D.;  Location: [facility];  Service: EUS    ERCP IN OR N/A 5/2/2018    Procedure: ERCP IN OR;  Surgeon: Ariel Aguirre M.D.;  Location: [facility];  Service: Gastroenterology    ERCP IN OR N/A 4/3/2018    Procedure: ERCP IN OR- W/POSS STENT;  Surgeon: Ariel Aguirre M.D.;  Location: SURGERY SAME DAY [facility];  Service: Gastroenterology    CORNELIA BY LAPAROSCOPY  4/2/2018    Procedure: CORNELIA BY  "LAPAROSCOPY;  Surgeon: Meche Olson M.D.;  Location: SURGERY [facility];  Service: General    OTHER ABDOMINAL SURGERY  2018    appendectomy    OTHER  1976    skin grafting to rt leg     OTHER ABDOMINAL SURGERY  20018    cholecystectomy     Social History     Tobacco Use    Smoking status: Former     Current packs/day: 0.50     Average packs/day: 0.5 packs/day for 47.0 years (23.5 ttl pk-yrs)     Types: Cigarettes    Smokeless tobacco: Never   Substance Use Topics    Alcohol use: Not Currently     Comment: 2 drinks a week     No chief complaint on file.    Diagnosis: Necrosis (HCC) [I96]    Unit where seen by Wound Team: T434/02     WOUND FOLLOW UP RELATED TO:  Right medial lower leg       WOUND TEAM PLAN OF CARE - Frequency of Follow-up:   Nursing to follow dressing orders written for wound care. Contact wound team if area fails to progress, deteriorates or with any questions/concerns if something comes up before next scheduled follow up (See below as to whether wound is following and frequency of wound follow up)  Dressing changes by wound team:                   NPWT change 3 times weekly - RLE    WOUND HISTORY:       Per hospitalist H&P: \"Henry Kumari is a 69 y.o. male with atrial fibrillation, COPD, CHF, history of DVT/PE, hypertension, chronic low back pain, morbid obesity, SANTOSH, viral hepatitis C, history of pancreatitis, bipolar 2 disorder, BPH, hyperlipidemia who presented 12/17/2024 with necrotic wound.     Reportedly patient suffered a wound on his right leg when he bumped on a trailer hitch causing a large hematoma.  He presented to [facility] ED at the time and was sent home.  Patient was staying at [facility] in [place] since May 2024.  Nursing staff and patient noted that his wound has been not healing, concerns for necrosis, presented to [facility].  He has been experiencing generalized fatigue and malaise, some intermittent right lower extremity pain to his wound.he has been experiencing some " "nausea.  He denies any fevers or chills headache or vision changes chest pain dyspnea cough abdominal pain vomiting dysuria or diarrhea.\"    12/19/24 I&D Leg with Dr. Swan, wound team thereafter consulted for in-house bedside VAC changes       WOUND ASSESSMENT/LDA  Negative Pressure Wound Therapy 12/19/24 Pretibial Right (Active)   Placement Date/Time: 12/19/24 1752   Inserted by: DR. SWAN  Location: Pretibial  Laterality: Right      Assessments 12/27/2024 12:00 PM   NPWT Pump Mode / Pressure Setting 125 mmHg;Ulta   Dressing Type Medium;Black Foam (Veraflo)   Number of Foam Pieces Used 5   Canister Changed No   Output (mL) 200 mL   NEXT Dressing Change/Treatment Date 12/30/24   VAC VeraFlo Irrigant Normal Saline   VAC VeraFlo Soak Time (mins) 8   VAC VeraFlo Instill Volume (ml) 30   VAC VeraFlo - Therapy Time (hrs) 2.5   VAC VeraFlo Pressure (mm/Hg) Intermittent;125 mmHg       Wound 12/17/24 Full Thickness Wound Pretibial Medial Right (Active)   Date First Assessed/Time First Assessed: 12/17/24 1102   Primary Wound Type: Full Thickness Wound  Location: Pretibial  Wound Orientation: Medial  Laterality: Right      Assessments 12/27/2024 12:00 PM   Wound Image     Site Assessment Red;Pink;Brown;Black;Yellow;Drainage;Granulation tissue;Slough   Periwound Assessment Clean;Dry;Intact   Margins Attached edges;Defined edges   Closure Secondary intention   Drainage Amount Small   Drainage Description Sanguineous   Treatments Cleansed;Nonselective debridement;Site care;Offloading   Wound Cleansing Approved Wound Cleanser   Periwound Protectant No-sting Skin Prep;Paste Ring;Drape   Dressing Status Removed   Dressing Changed Changed   Dressing Cleansing/Solutions Normal Saline   Dressing Options Wound Vac   Dressing Change/Treatment Frequency Monday, Wednesday, Friday, and As Needed   NEXT Dressing Change/Treatment Date 12/30/24   NEXT Weekly Photo (Inpatient Only) 01/01/25   Wound Team Following 3x Weekly   Non-staged " Wound Description Full thickness   Wound Length (cm) 19.2 cm   Wound Width (cm) 10.5 cm   Wound Depth (cm) 1.5 cm   Wound Surface Area (cm^2) 201.6 cm^2   Wound Volume (cm^3) 302.4 cm^3   Wound Healing % 23   Wound Bed Granulation (%) 80 %   Wound Bed Slough (%) 10 %   Undermining (cm) 1.5 cm   Undermining of Wound, 1st Location From 12 o'clock;To 1 o'clock   Shape large oval   Wound Odor None   Exposed Structures Adipose   WOUND NURSE ONLY - Time Spent with Patient (mins) 60        Vascular:    GLORIA:   GLORIA Results, Last 30 Days US-GLORIA SINGLE LEVEL BILAT    Result Date: 2024  Narrative  Vascular Laboratory  Conclusions  No evidence of arterial insufficiency.  GRANT POPE  Age:    69    Gender:     M  MRN:    [id number]  :    1955      BSA:  Exam Date:     2024 15:00  Room #:     Inpatient  Priority:     Routine  Ht (in):             Wt (lb):  Ordering Physician:        RAHUL MARQUEZ  Referring Physician:       [id number] Daugherty  Sonographer:               Jamshid Schmitz [id number]  Study Type:                Complete Bilateral  Technical Quality:         Adequate  Indications:     Non-pressure chronic ulcer of right calf with fat layer                   exposed, Ulcer of calf  CPT Codes:       22660  ICD Codes:       L97.212  707.12  History:         large necrotic ulceration of right medial calf; states smokes                   3-4 packs/day; no prior exam  Limitations:                 RIGHT  Waveform            Systolic BPs (mmHg)                             133           Brachial  Triphasic                                Common Femoral  Triphasic                                Popliteal  Triphasic                  151           Posterior Tibial  Triphasic                  206           Dorsalis Pedis                             87            Digit                             1.55          GLORIA                             0.65          TBI                       LEFT   Waveform        Systolic BPs (mmHg)                             129           Brachial  Triphasic                                Common Femoral  Triphasic                                Popliteal  Triphasic                  163           Posterior Tibial  Triphasic                  159           Dorsalis Pedis                                           Digit                             1.23          GLORIA                                           TBI  Findings  Doppler waveforms of the common femoral artery and popliteal artery are of  high amplitude and multiphasic.  Doppler waveforms at the ankle are brisk and multiphasic.  Slight elevation of the right dorsalis pedis GLORIA suggests onset of tibial  artery calcification.  The right posterior tibial & left ankle-brachial indices are normal.  A PPG sensor was used to obtain the great toe arterial waveforms & pressure  (TBI).  Right TBI is 0.65.  TBI is normal.  Arterial waveforms of the great toe are  normal.  There is no evidence of significant arterial disease demonstrated on the  right or the left.  Additional testing was not performed in accordance with lower extremity  arterial evaluation protocol.  Adelina LOCKWOOD To  (Electronically Signed)  Final Date:      18 December 2024                   16:43      Lab Values:    Lab Results   Component Value Date/Time    WBC 3.8 (L) 12/27/2024 12:25 AM    RBC 2.82 (L) 12/27/2024 12:25 AM    HEMOGLOBIN 7.6 (L) 12/27/2024 12:25 AM    HEMATOCRIT 24.3 (L) 12/27/2024 12:25 AM    CREACTPROT 7.13 (H) 12/17/2024 11:52 PM    SEDRATEWES >140 (H) 12/17/2024 11:52 PM         Culture Results show:  No results found for this or any previous visit (from the past 720 hours).    Pain Level/Medicated:  PO pain medications administered by bedside RN 15 minutes prior       INTERVENTIONS BY WOUND TEAM:  Chart and images reviewed. Discussed with bedside RN. All areas of concern (based on picture review, LDA review and discussion with bedside RN)  have been thoroughly assessed. Documentation of areas based on significant findings. This RN in to assess patient. Performed standard wound care which includes appropriate positioning, dressing removal and non-selective debridement. Pictures and measurements obtained weekly if/when required.    Wound:  Right medial lower leg  Preparation for Dressing removal: Dressing soaked with wound cleanser  Cleansed/Non-selectively Debrided with:  Wound cleanser and Gauze  Becka wound: Cleansed with Wound cleanser and Gauze, Prepped with No Sting, Paste Rings, and Drape  Primary Dressing:  black foam to wound bed x5  Secondary (Outer) Dressing: drape, trac pad, offloading foam dressing    Advanced Wound Care Discharge Planning  Number of Clinicians necessary to complete wound care: 1  Is patient requiring IV pain medications for dressing changes:  No   Length of time for dressing change 60 min. (This does not include chart review, pre-medication time, set up, clean up or time spent charting.)    Interdisciplinary consultation: Patient, Bedside RN (Mariama),    EVALUATION / RATIONALE FOR TREATMENT:     Date:  12/27/24  Wound Status:  Wound progressing as expected    The wound bed is majority red healthy appearing cobbled granular tissue with a small area of yellow adipose tissue and brown/black cauterized tissue to superior aspect. Scant sanguinous drainage with cleansing, no odor. Continue with NPWT with veraflo and normal saline.     Date:  12/24/24  Wound Status:  Wound progressing as expected    Wound is clean with adipose still visible. Minimal granular tissue present but only scant bleeding noted. Transitioned to veraflo vac to cleanse wound while managing drainage. Ok to transition back to regular vac therapy at time of DC if necessary.   Date:  12/21/24  Wound Status:  Initial evaluation    RLE s/p surgical debridement of hematoma. Wound bed granular with expected sanguinous drainage. Regular NPWT continued to assist  with wound closure by secondary intention, management of bio-burden and exudate through mechanical debridement, and increase oxygenation and granulation tissue production to wound bed.  Patient will benefit of transition to Veraflo VAC on next change to keep exposed adipose hydrated, it was not applied during this change as wound bed needed to achieve hemostasis first.  If Veraflo is applied on next change, patient can continue with Veraflo VAC while in-house, but then okay to transition back to regular VAC for discharge.         Goals: Steady decrease in wound area and depth weekly.    NURSING PLAN OF CARE ORDERS:  Dressing changes: See Dressing Care orders  Skin care: See Skin Care orders    NUTRITION RECOMMENDATIONS   Wound Team Recommendations:  Protein supplements  Arginine powder  Vitamin C supplements  Zinc supplements     DIET ORDERS (From admission to next 24h)       Start     Ordered    12/19/24 2008  Diet Order Diet: Regular  ALL MEALS        Question:  Diet:  Answer:  Regular    12/19/24 2008                    PREVENTATIVE INTERVENTIONS:   Q shift Antony - performed per nursing policy  Q shift pressure point assessments - performed per nursing policy         Anticipated discharge plans:  TBD        Vac Discharge Needs:  Vac Discharge plan is purely a recommendation from wound team and not a requirement for discharge unless otherwise stated by physician.  Veraflo Vac while inpatient, ok to transition to Regular Vac on discharge

## 2024-12-27 NOTE — PROGRESS NOTES
1200: Received report of patient at start of shift. Patient is AOx4, irritable at times. PRN Norco administered for complaints of pain. Assessment complete. Patient wearing CPAP throughout day, non-complaint with RN's request to remove CPAP during awake hours. Discharge orders received. Patient updated on plan of care/discharge plan, encouraged to notify staff for any needs/assistance. Call light within reach.      1325: Discharging patient to [facility] per physician order.  Discharged with [employer] transporters via gurney.  Patient demonstrated understanding of discharge instructions, follow up appointments, home medications, and prescriptions. Patient ambulating with x1 person assistance, voiding without difficulty, pain well controlled, tolerating oral medications, oxygen saturation greater than 90%, tolerating diet.   Educational handouts given and discussed.  Patient verbalized understanding of discharge instructions and educational handouts.  All questions answered. [employer] debit card found in medication drawer and returned to patient at time of discharge. COBRA packet provided to [employer] transporters at time of discharge.     1330: Attempted to call report to receiving RN at [facility]. Phone call not answered. No option to leave voice message.

## 2024-12-27 NOTE — CARE PLAN
The patient is Stable - Low risk of patient condition declining or worsening    Shift Goals  Clinical Goals: Discharge today  Patient Goals: Discharge today  Family Goals: CLOTILDE    Progress made toward(s) clinical / shift goals:  Patient discharged to [facility] per provider order. Transportation provided by [facility].    Patient is not progressing towards the following goals: N/A

## 2024-12-27 NOTE — THERAPY
Occupational Therapy  Discharge      Patient Name: Henry Kumari  Age:  69 y.o., Sex:  male  Medical Record #: [id number]  Today's Date: 12/26/2024     Precautions  Precautions: Fall Risk, Weight Bearing As Tolerated Right Lower Extremity  Comments: wound vac R LE    Assessment    Pt greeted for OT treatment session. On first attempt, pt adamantly declining OT and requesting pain meds. As soon as this therapist left the room, the bed alarm was triggered. On arrival back to the room, pt was found standing EOB with the FWW attempting to step over wound vac lines in attempt to get to bathroom. Pt required min A for LB sponge bath to protect skin integrity following toileting but was otherwise SPV-SBA for mobility and ADLs; declined socks. Educated pt on role of OT and importance of only mobilizing while staff present d/t wound vac and poor line awareness; limited receptivity. Pt expressing disinterest in working with acute IP OT and reporting at/near functional baseline. Patient will not be actively followed for occupational therapy services at this time, however may be seen if requested by physician for 1 more visit within 30 days to address any discharge or equipment needs.     Plan    Reason for Discharge From Therapy: Discharge Secondary to No Likely Benefit (Pt now expressing he is at/near his functional baseline and has no interest in working with therapy.)    DC Equipment Recommendations: Unable to determine at this time  Discharge Recommendations: Other - (Return to [facility])    Objective     12/26/24 1640   Vitals   O2 Delivery Device CPAP   Vitals Comments Increased WOB with all mobility.   Pain 0 - 10 Group   Location Neck   Therapist Pain Assessment During Activity;Post Activity Pain Same as Prior to Activity;Nurse Notified  (reports of severe neck pain throughout entire session; not quantified.)   Cognition    Cognition / Consciousness X   Speech/ Communication Dysarthric   Level of  Consciousness Alert   Safety Awareness Impaired;Impulsive   Comments Patient demonstrating limited engagement/cooperation this session. Initially refusing OT treatment and then found getting OOB attempting to step over wound vac line as OT leaving room   Balance   Sitting Balance (Static) Fair   Sitting Balance (Dynamic) Fair   Standing Balance (Static) Fair   Standing Balance (Dynamic) Fair   Weight Shift Sitting Fair   Weight Shift Standing Fair   Skilled Intervention Compensatory Strategies;Verbal Cuing   Comments FWW   Bed Mobility    Sit to Supine Supervised   Skilled Intervention Verbal Cuing   Comments Received standing EOB attempting to walk over woud vac lines   Activities of Daily Living   Grooming Standby Assist;Seated  (wiped face; refused hand hygiene)   Bathing Moderate Assist  (LE sponge bath 2/2 urine on skin)   Upper Body Dressing Supervision   Lower Body Dressing   (refused socks)   Toileting Standby Assist   Skilled Intervention Compensatory Strategies;Facilitation;Sequencing;Tactile Cuing;Verbal Cuing   Functional Mobility   Sit to Stand Supervised   Bed, Chair, Wheelchair Transfer Supervised  (uncontrolled descent)   Toilet Transfers Supervised   Transfer Method Stand Step   Mobility FWW; bed > toilet > bed   Skilled Intervention Compensatory Strategies;Verbal Cuing   Activity Tolerance   Comments Limited by fatigue, pain and SOB   Patient / Family Goals   Patient / Family Goal #1 Return to SNF   Goal #1 Outcome Progressing as expected   Short Term Goals   Short Term Goal # 1 Pt will be able to complete LB dressing with SPV and AE PRN   Goal Outcome # 1 Other (see comments)  (Expressed disinterest in working on this goal)   Short Term Goal # 2 Pt will be able to complete UB dressing with SPV   Goal Outcome # 2 Goal met   Short Term Goal # 3 Pt will be able to complete standing g/h routine with SPV and seated rest breaks PRN   Goal Outcome # 3 Other (see comments)  (Expressed disinterest in  working on this goal)   Education Group   Education Provided Role of Occupational Therapist   Role of Occupational Therapist Patient Response Patient;Acceptance;Explanation;Verbal Demonstration   Use of Call Light Patient Response Patient;Acceptance;Explanation;Reinforcement Needed  (Educated on importance of using call light for all needs. Educated on reasoning it is unsafe to mobilize without staff present while wound vac attached. Limited receptivity.)   Occupational Therapy Treatment Plan    O.T. Treatment Plan Modify Current Treatment Plan   Duration Discharge Needs Only   Reason For Discharge Discharge Secondary to No Likely Benefit  (Pt now expressing he is at/near his functional baseline and has no interest in working with therapy.)   Anticipated Discharge Equipment and Recommendations   DC Equipment Recommendations Unable to determine at this time   Discharge Recommendations Other -  (Return to [facility])   Interdisciplinary Plan of Care Collaboration   IDT Collaboration with  Nursing;Certified Nursing Assistant   Patient Position at End of Therapy In Bed;Bed Alarm On;Call Light within Reach  (CNA in room)   Collaboration Comments OT report and recs;   Session Information   Date / Session Number  12/26, #2 (2/3, 12/26) DC needs only

## 2024-12-27 NOTE — PROGRESS NOTES
4 Eyes Skin Assessment Completed by JORDYN Marks and JORDYN Thapa.    Head WDL  Ears Redness and Blanching  Nose Redness and Blanching, to bridge of nose under CPAP, weeping. Education provided to take CPAP off. Patient dismissive of education.  Mouth Dry  Neck WDL  Breast/Chest Redness and Bruising  Shoulder Blades WDL  Spine WDL  (R) Arm/Elbow/Hand Bruising and Discoloration  (L) Arm/Elbow/Hand Bruising and Discoloration  Abdomen Redness and Blanching to pannus, scattered bruising  Groin Redness and Blanching, excoriation  Scrotum/Coccyx/Buttocks Redness and Blanching  (R) Leg Redness, Blanching, Scab, and Bruising, WV to RLE, Discoloration  (L) Leg Redness, Blanching, Bruising, and Swelling, and discoloration  (R) Heel/Foot/Toe Redness and Blanching  (L) Heel/Foot/Toe Redness and Blanching          Devices In Places CPAP      Interventions In Place Heel Mepilex, Sacral Mepilex, TAP System, Pillows, and Low Air Loss Mattress    Possible Skin Injury Yes    Pictures Uploaded Into Epic Yes/previously  Wound Consult Placed Yes/previously   RN Wound Prevention Protocol Ordered Yes

## 2024-12-27 NOTE — CARE PLAN
The patient is Stable - Low risk of patient condition declining or worsening    Shift Goals  Clinical Goals: Monitor WV, Provide medication PRN for pain, Increase OOB activity.  Patient Goals: rest  Family Goals: CLOTILDE    Progress made toward(s) clinical / shift goals:  WV in place. Medication provided PRN. Patient educated on need to increase OOB activity, ambulated multiple times throughout shift to bathroom.     Patient is not progressing towards the following goals:

## 2024-12-27 NOTE — DISCHARGE PLANNING
DC Transport Scheduled    Transport Company Scheduled:  [employer]  Spoke with Jerri at [facility] to schedule transport.    Scheduled Date: 12/27/2024  Scheduled Time: 1300    Transport Type: [place]  Destination: [facility]   Destination address: [address] [place] [address]  Notified care team of scheduled transport via [employer].     If there are any changes needed to the DC transportation scheduled, please contact [facility] at ext. [phone] between the hours of 4234-2846. If outside those hours, contact the ED Case Manager at ext. [phone].

## (undated) DEVICE — BITE BLOCK ADULT 60FR (100EA/CA)

## (undated) DEVICE — GOWN WARMING STANDARD FLEX - (30/CA)

## (undated) DEVICE — GUIDE WIRE SMOOTH TIP 2.2X800MM STERILE

## (undated) DEVICE — TUBING CLEARLINK DUO-VENT - C-FLO (48EA/CA)

## (undated) DEVICE — SUCTION INSTRUMENT YANKAUER OPEN TIP W/O VENT (50EA/CA)

## (undated) DEVICE — TOWEL STOP TIMEOUT SAFETY FLAG (40EA/CA)

## (undated) DEVICE — TRAY MULTI-LUMEN 7FR PRESSURE W/MAX BARRIER AND BIOPATCH - (5/CA)

## (undated) DEVICE — SYRINGE SAFETY 3 ML 18 GA X 1 1/2 BLUNT LL (100/BX 8BX/CA)

## (undated) DEVICE — WIRE GUIDE LOCKING DEVICE (10/BX)

## (undated) DEVICE — SLEEVE VASO DVT COMPRESSION CALF MED - (10PR/CA)

## (undated) DEVICE — CANISTER INFO VAC 1000ML (5EA/CA)

## (undated) DEVICE — GLOVE BIOGEL PI ORTHO SZ 7.5 PF LF (40PR/BX)

## (undated) DEVICE — DRAPE SURGICAL U 77X120 - (10/CA)

## (undated) DEVICE — SUTURE 3-0 ETHILON 3-0 PSLX 30 BLACK (36PK/BX)

## (undated) DEVICE — APPLIER ENDO MEDIUM CLIP (3EA/BX)

## (undated) DEVICE — SUTURE ETHILON 2-0 FSLX 30 (36PK/BX)"

## (undated) DEVICE — SYRINGE DISP. 50CC LS - (40/BX)

## (undated) DEVICE — CANISTER SUCTION RIGID RED 1500CC (40EA/CA)

## (undated) DEVICE — ANTI-FOG SOLUTION - 60BTL/CA

## (undated) DEVICE — KIT ANESTHESIA W/CIRCUIT & 3/LT BAG W/FILTER (20EA/CA)

## (undated) DEVICE — MASK ANESTHESIA ADULT  - (100/CA)

## (undated) DEVICE — SUTURE GENERAL

## (undated) DEVICE — SYRINGE SAFETY 5 ML 18 GA X 1-1/2 BLUNT LL (100/BX 4BX/CA)

## (undated) DEVICE — SUCTION INSTRUMENT YANKAUER BULBOUS TIP W/O VENT (50EA/CA)

## (undated) DEVICE — DRAPE 36X28IN RAD CARM BND BG - (25/CA) O

## (undated) DEVICE — ELECTRODE DUAL RETURN W/ CORD - (50/PK)

## (undated) DEVICE — LACTATED RINGERS INJ 1000 ML - (14EA/CA 60CA/PF)

## (undated) DEVICE — PADDING CAST 6 IN STERILE - 6 X 4 YDS (24/CA)

## (undated) DEVICE — NEPTUNE 4 PORT MANIFOLD - (20/PK)

## (undated) DEVICE — CHLORAPREP 26 ML APPLICATOR - ORANGE TINT(25/CA)

## (undated) DEVICE — GLOVE BIOGEL INDICATOR SZ 8 SURGICAL PF LTX - (50/BX 4BX/CA)

## (undated) DEVICE — SUTURE 2-0 VICRYL PLUS CT-1 - 8 X 18 INCH(12/BX)

## (undated) DEVICE — SUTURE 4-0 VICRYL PLUS FS-2 - 27 INCH (36/BX)

## (undated) DEVICE — HEAD HOLDER JUNIOR/ADULT

## (undated) DEVICE — TUBE CONNECT SUCTION CLEAR 120 X 1/4" (50EA/CA)"

## (undated) DEVICE — SODIUM CHL IRRIGATION 0.9% 1000ML (12EA/CA)

## (undated) DEVICE — BAG RETRIEVAL 10ML (10EA/BX)

## (undated) DEVICE — KIT  I.V. START (100EA/CA)

## (undated) DEVICE — SET EXTENSION WITH 2 PORTS (48EA/CA) ***PART #2C8610 IS A SUBSTITUTE*****

## (undated) DEVICE — PROTECTOR ULNA NERVE - (36PR/CA)

## (undated) DEVICE — DRAIN JACKSON PRATT 10FR - (10/CS)

## (undated) DEVICE — HANDPIECE 10FT INTPLS SCT PLS IRRIGATION HAND CONTROL SET (6/PK)

## (undated) DEVICE — FILM CASSETTE ENDO

## (undated) DEVICE — TUBE E-T HI-LO CUFF 8.0MM (10EA/PK)

## (undated) DEVICE — CANNULA O2 COMFORT SOFT EAR ADULT 7 FT TUBING (50/CA)

## (undated) DEVICE — BLADE SURGICAL #15 - (50/BX 3BX/CA)

## (undated) DEVICE — TIP INTPLS HFLO ML ORFC BTRY - (12/CS) FOR SURGILAV

## (undated) DEVICE — CATHETER REDDICK ----MUST ORDER IN MULITPLES OF 10----

## (undated) DEVICE — KIT ROOM DECONTAMINATION

## (undated) DEVICE — PACK MAJOR ORTHO - (2EA/CA)

## (undated) DEVICE — PACK UPPER EXTREMITY (2EA/CA)

## (undated) DEVICE — GLOVE BIOGEL SZ 6.5 SURGICAL PF LTX (50PR/BX 4BX/CA)

## (undated) DEVICE — CONTAINER SPECIMEN BAG OR - STERILE 4 OZ W/LID (100EA/CA)

## (undated) DEVICE — KIT CUSTOM PROCEDURE SINGLE FOR ENDO  (15/CA)

## (undated) DEVICE — SET SUCTION/IRRIGATION WITH DISPOSABLE TIP (6/CA )PART #0250-070-520 IS A SUB

## (undated) DEVICE — BALLOON RETRIEVAL EXTRACTOR PRO RX   9-12MM

## (undated) DEVICE — CATHETER IV SAFETY 20 GA X 1-1/4 (50/BX)

## (undated) DEVICE — CONTAINER, SPECIMEN, STERILE

## (undated) DEVICE — GOWN SURGEONS X-LARGE - DISP. (30/CA)

## (undated) DEVICE — TUBE SUCTION YANKAUER  1/4 X 6FT (20EA/CA)"

## (undated) DEVICE — TROCARCANN&SEAL 5X55 ZTHREAD - 12/BX

## (undated) DEVICE — SLEEVE, VASO, THIGH, MED

## (undated) DEVICE — SPONGE GAUZE NON-STERILE 4X4 - (2000/CA 10PK/CA)

## (undated) DEVICE — MASK WITH FACE SHIELD (25/BX 4BX/CA)

## (undated) DEVICE — DRAPE X LONG COILED INSTRUMENT ORGANIZER EUS (20EA/BX)

## (undated) DEVICE — BLOCK BITE ENDOSCOPIC 2809 - (100/BX) INTERMEDIATE

## (undated) DEVICE — DRILL BIT 3.5X230MM AO STERILE

## (undated) DEVICE — SENSOR SPO2 ADULT LNCS ADTX (20/BX) ORDER ITEM #19593

## (undated) DEVICE — TROCAR 5X100 BLADED Z-THREAD - KII (6/BX)

## (undated) DEVICE — CANNULA W/ SUPPLY TUBING O2 - (50/CA)

## (undated) DEVICE — TUBE TRACHEOSTOMY BLUSELECT SUCTIONAIDE SIZE 8 (1/EA)

## (undated) DEVICE — SENSOR SPO2 NEO LNCS ADHESIVE (20/BX) SEE USER NOTES

## (undated) DEVICE — WRAP COBAN SELF-ADHERENT 6 IN X 5YDS STERILE TAN (12/CA)

## (undated) DEVICE — SPONGE GAUZESTER 4 X 4 4PLY - (128PK/CA)

## (undated) DEVICE — SET LEADWIRE 5 LEAD BEDSIDE DISPOSABLE ECG (1SET OF 5/EA)

## (undated) DEVICE — DRAPE LOWER EXTREMETY - (6/CA)

## (undated) DEVICE — PACK LAP CHOLE OR - (2EA/CA)

## (undated) DEVICE — SENSOR OXIMETER ADULT SPO2 RD SET (20EA/BX)

## (undated) DEVICE — BOVIE BLADE COATED - (50/PK)

## (undated) DEVICE — SUTURE 2-0 ETHILON FS - (36/BX) 18 INCH

## (undated) DEVICE — DRESSING ABDOMINAL PAD STERILE 8 X 10" (360EA/CA)"

## (undated) DEVICE — GLOVE BIOGEL PI INDICATOR SZ 7.0 SURGICAL PF LF - (50/BX 4BX/CA)

## (undated) DEVICE — STAPLER SKIN DISP - (6/BX 10BX/CA) VISISTAT

## (undated) DEVICE — DRESSING TRANSPARENT FILM TEGADERM 2.375 X 2.75"  (100EA/BX)"

## (undated) DEVICE — DRESSING TRANSPARENT FILM TEGADERM 4 X 4.75" (50EA/BX)"

## (undated) DEVICE — SUTURE 0 PDS CT-1 CR 8 X 18 (12PK/BX)"

## (undated) DEVICE — K-WIRE 3MMX285MM STERILE

## (undated) DEVICE — MASK OXYGEN VNYL ADLT MED CONC WITH 7 FOOT TUBING - (50EA/CA)

## (undated) DEVICE — RESERVOIR SUCTION 100 CC - SILICONE (20EA/CA)

## (undated) DEVICE — PAD LAP STERILE 18 X 18 - (5/PK 40PK/CA)

## (undated) DEVICE — TRAY SRGPRP PVP IOD WT PRP - (20/CA)

## (undated) DEVICE — DRESSING TEGADERM 8 X 12 - (10/BX 8BX/CA)

## (undated) DEVICE — TROCAR5X55 KII SHIELDED SYS - (6/BX)

## (undated) DEVICE — PACK MINOR BASIN - (2EA/CA)

## (undated) DEVICE — GLOVE BIOGEL INDICATOR SZ 6.5 SURGICAL PF LTX - (50PR/BX 4BX/CA)

## (undated) DEVICE — GLOVE BIOGEL PI ORTHO SZ 6 1/2 SURGICAL PF LF (40PR/BX)

## (undated) DEVICE — NEEDLE INSFL 120MM 14GA VRRS - (20/BX)

## (undated) DEVICE — CANISTER SUCTION 3000ML MECHANICAL FILTER AUTO SHUTOFF MEDI-VAC NONSTERILE LF DISP  (40EA/CA)

## (undated) DEVICE — GLOVE SZ 7 BIOGEL PI MICRO - PF LF (50PR/BX 4BX/CA)

## (undated) DEVICE — GLOVE BIOGEL INDICATOR SZ 7.5 SURGICAL PF LTX - (50PR/BX 4BX/CA)

## (undated) DEVICE — DRAPE U ORTHOPEDIC - (10/BX)

## (undated) DEVICE — SYRINGE SAFETY 10 ML 18 GA X 1 1/2 BLUNT LL (100/BX 4BX/CA)

## (undated) DEVICE — SODIUM CHL. IRRIGATION 0.9% 3000ML (4EA/CA 65CA/PF)

## (undated) DEVICE — JAGWIRE 035 260 CM STRAIGHT (2/BX)

## (undated) DEVICE — PENCIL ELECTSURG 10FT BTN SWH - (50/CA)

## (undated) DEVICE — DRAPE SURG STERI-DRAPE 7X11OD - (40EA/CA)

## (undated) DEVICE — CANISTER SUCTION 3000ML MECHANICAL FILTER AUTO SHUTOFF MEDI-VAC NONSTERILE LF DISP (40EA/CA)

## (undated) DEVICE — DRILL BIT 3.5X130MM AO STERILE

## (undated) DEVICE — TROCAR 12 X 150 KII FIOS Z THREAD (6EA/BX)

## (undated) DEVICE — COVER LIGHT HANDLE ALC PLUS DISP (18EA/BX)

## (undated) DEVICE — POUCH FLUID COLLECTION INVISISHIELD - (10/BX)

## (undated) DEVICE — DRESSING KIT V.A.C. SENSA T.R.A.C. LARGE (10EA/CA)

## (undated) DEVICE — GLOVE BIOGEL SZ 7.5 SURGICAL PF LTX - (50PR/BX 4BX/CA)

## (undated) DEVICE — GLOVE, LITE (PAIR)

## (undated) DEVICE — DRAPE LARGE 3 QUARTER - (20/CA)

## (undated) DEVICE — INTRODUCER STENT NAVIFLEX 10FR

## (undated) DEVICE — BLADE SURGICAL CLIPPER - (50EA/CA)

## (undated) DEVICE — TUBE CONNECTING SUCTION - CLEAR PLASTIC STERILE 72 IN (50EA/CA)

## (undated) DEVICE — BANDAGE ELASTIC 6 HONEYCOMB - 6X5YD LF (20/CA)"

## (undated) DEVICE — PACK LOWER EXTREMITY - (2/CA)

## (undated) DEVICE — WATER IRRIGATION STERILE 1000ML (12EA/CA)

## (undated) DEVICE — HEMOSTAT SURG ABSORBABLE - 4 X 8 IN SURGICEL (24EA/CA)

## (undated) DEVICE — TROCAR FIOS OPTICAL ACCESS SYSTEM 5 X 150MM (6/BX)

## (undated) DEVICE — SUTURE 0 VICRYL PLUS CT-1 - 8 X 18 INCH (12/BX)

## (undated) DEVICE — DETERGENT RENUZYME PLUS 10 OZ PACKET (50/BX)

## (undated) DEVICE — ELECTRODE 850 FOAM ADHESIVE - HYDROGEL RADIOTRNSPRNT (50/PK)

## (undated) DEVICE — JAGWIRE, STR 260CM .025

## (undated) DEVICE — TROCAR Z THREAD 11 X 100 - BLADED (6/BX)

## (undated) DEVICE — BASIN EMESIS DISP. - (250/CA)

## (undated) DEVICE — CAPTIVATOR II-15MM ROUND STIFF

## (undated) DEVICE — STOPCOCK MALE 4-WAY - (50/CA)

## (undated) DEVICE — CANNULA W/SEAL 5X100 Z-THRE - ADED KII (12/BX)

## (undated) DEVICE — TUBING INSUFFLATION - (10/BX)